# Patient Record
Sex: MALE | Race: WHITE | Employment: FULL TIME | URBAN - METROPOLITAN AREA
[De-identification: names, ages, dates, MRNs, and addresses within clinical notes are randomized per-mention and may not be internally consistent; named-entity substitution may affect disease eponyms.]

---

## 2020-01-15 ENCOUNTER — HOSPITAL ENCOUNTER (OUTPATIENT)
Age: 55
Setting detail: SPECIMEN
Discharge: HOME | End: 2020-01-15
Payer: COMMERCIAL

## 2020-01-15 PROCEDURE — 81378 HLA I & II TYPING HR: CPT

## 2020-02-04 ENCOUNTER — HOSPITAL ENCOUNTER (OUTPATIENT)
Age: 55
Setting detail: SPECIMEN
Discharge: HOME | End: 2020-02-04
Payer: COMMERCIAL

## 2020-02-04 PROCEDURE — 38204 BL DONOR SEARCH MANAGEMENT: CPT

## 2020-02-11 ENCOUNTER — HOSPITAL ENCOUNTER (OUTPATIENT)
Dept: ONCOLOGY | Age: 55
Setting detail: INFUSION SERIES
Discharge: HOME | End: 2020-02-11
Payer: COMMERCIAL

## 2020-02-11 LAB
ABO/RH: NORMAL
ANTIBODY SCREEN: NORMAL

## 2020-02-11 PROCEDURE — 86900 BLOOD TYPING SEROLOGIC ABO: CPT

## 2020-02-11 PROCEDURE — 86901 BLOOD TYPING SEROLOGIC RH(D): CPT

## 2020-02-11 PROCEDURE — 86644 CMV ANTIBODY: CPT

## 2020-02-11 PROCEDURE — 86850 RBC ANTIBODY SCREEN: CPT

## 2020-02-14 LAB — CYTOMEGALOVIRUS IGG ANTIBODY: 1.2 U/ML

## 2020-02-17 ENCOUNTER — HOSPITAL ENCOUNTER (OUTPATIENT)
Dept: INTERVENTIONAL RADIOLOGY/VASCULAR | Age: 55
Discharge: HOME | End: 2020-02-17
Attending: RADIOLOGY | Admitting: RADIOLOGY
Payer: COMMERCIAL

## 2020-02-17 VITALS — BODY MASS INDEX: 25.77 KG/M2 | TEMPERATURE: 97.5 F | HEIGHT: 70 IN | WEIGHT: 180 LBS

## 2020-02-17 PROCEDURE — 85610 PROTHROMBIN TIME: CPT

## 2020-02-17 PROCEDURE — C1788 PORT, INDWELLING, IMP: HCPCS

## 2020-02-17 PROCEDURE — 2500000003 HC RX 250 WO HCPCS

## 2020-02-17 PROCEDURE — 77001 FLUOROGUIDE FOR VEIN DEVICE: CPT

## 2020-02-17 PROCEDURE — C1769 GUIDE WIRE: HCPCS

## 2020-02-17 PROCEDURE — 2709999900 HC NON-CHARGEABLE SUPPLY

## 2020-02-17 PROCEDURE — 99152 MOD SED SAME PHYS/QHP 5/>YRS: CPT

## 2020-02-17 PROCEDURE — 76937 US GUIDE VASCULAR ACCESS: CPT

## 2020-02-17 PROCEDURE — 6360000002 HC RX W HCPCS

## 2020-02-17 PROCEDURE — 99153 MOD SED SAME PHYS/QHP EA: CPT

## 2020-02-17 PROCEDURE — 36561 INSERT TUNNELED CV CATH: CPT

## 2020-02-17 PROCEDURE — C1887 CATHETER, GUIDING: HCPCS

## 2020-02-17 PROCEDURE — C1894 INTRO/SHEATH, NON-LASER: HCPCS

## 2020-02-17 RX ORDER — IBUPROFEN 200 MG
200 TABLET ORAL EVERY 6 HOURS PRN
Status: ON HOLD | COMMUNITY
End: 2020-06-04 | Stop reason: HOSPADM

## 2020-02-17 NOTE — H&P
Patient:  Bell Ansari   :   1965      Relevant clinical history, particularly as it involves the pending procedure, was reviewed and discussed. The procedure including risks and benefits was discussed at length with the patient (or designated family member) and all questions were answered. Informed consent to proceed with the procedure was given. Vital signs were monitored and documented by the Radiology nurse. Targeted physical examination  Heart : regular rate and rhythm  Lungs : clear, breathing easily  Condition : stable    [facility] nurses notes reviewed and agreed. Past Medical History:    No past medical history on file. Past Surgical History:     No past surgical history on file. Allergies:  Pcn [penicillins]    Medications:   Home Meds  No current facility-administered medications on file prior to encounter.       Current Outpatient Medications on File Prior to Encounter   Medication Sig Dispense Refill    ibuprofen (ADVIL;MOTRIN) 200 MG tablet Take 200 mg by mouth every 6 hours as needed for Pain         Current Meds  vancomycin (VANCOCIN) 1,000 mg in dextrose 5 % 250 mL IVPB, Once          ASA 2 - Patient with mild systemic disease with no functional limitations    I (soft palate, uvula, fauces, tonsillar pillars visible)    Activity:  2 - Able to move 4 extremities voluntarily on command  Respiration:  2 - Able to breathe deeply and cough freely  Circulation:  2 - BP+/- 20mmHg of normal  Consciousness:  2 - Fully awake  Oxygen Saturation (color):  2 - Able to maintain oxygen saturation >92% on room air    Sedation : Moderate sedation planned

## 2020-02-18 LAB — INR BLD: 1.2 (ref 0.86–1.14)

## 2020-03-06 ENCOUNTER — HOSPITAL ENCOUNTER (OUTPATIENT)
Age: 55
Setting detail: SPECIMEN
Discharge: HOME | End: 2020-03-06
Payer: COMMERCIAL

## 2020-03-06 PROCEDURE — 38204 BL DONOR SEARCH MANAGEMENT: CPT

## 2020-03-31 ENCOUNTER — HOSPITAL ENCOUNTER (OUTPATIENT)
Dept: GENERAL RADIOLOGY | Age: 55
Discharge: HOME | End: 2020-03-31
Payer: COMMERCIAL

## 2020-03-31 ENCOUNTER — HOSPITAL ENCOUNTER (OUTPATIENT)
Dept: CT IMAGING | Age: 55
Discharge: HOME | End: 2020-03-31
Payer: COMMERCIAL

## 2020-03-31 ENCOUNTER — HOSPITAL ENCOUNTER (OUTPATIENT)
Dept: ONCOLOGY | Age: 55
Setting detail: INFUSION SERIES
Discharge: HOME | End: 2020-03-31
Payer: COMMERCIAL

## 2020-03-31 ENCOUNTER — HOSPITAL ENCOUNTER (OUTPATIENT)
Dept: PULMONOLOGY | Age: 55
Discharge: HOME | End: 2020-03-31
Payer: COMMERCIAL

## 2020-03-31 ENCOUNTER — HOSPITAL ENCOUNTER (OUTPATIENT)
Dept: NON INVASIVE DIAGNOSTICS | Age: 55
Discharge: HOME | End: 2020-03-31
Payer: COMMERCIAL

## 2020-03-31 VITALS — OXYGEN SATURATION: 97 %

## 2020-03-31 VITALS
WEIGHT: 184.6 LBS | RESPIRATION RATE: 18 BRPM | HEART RATE: 67 BPM | HEIGHT: 70 IN | TEMPERATURE: 97.6 F | SYSTOLIC BLOOD PRESSURE: 113 MMHG | DIASTOLIC BLOOD PRESSURE: 77 MMHG | BODY MASS INDEX: 26.43 KG/M2

## 2020-03-31 LAB
A/G RATIO: 1.8 (ref 1.1–2.2)
ABO/RH: NORMAL
ALBUMIN SERPL-MCNC: 4.4 G/DL (ref 3.4–5)
ALP BLD-CCNC: 110 U/L (ref 40–129)
ALT SERPL-CCNC: 46 U/L (ref 10–40)
ANION GAP SERPL CALCULATED.3IONS-SCNC: 11 MMOL/L (ref 3–16)
ANTIBODY SCREEN: NORMAL
AST SERPL-CCNC: 28 U/L (ref 15–37)
BASOPHILS ABSOLUTE: 0 K/UL (ref 0–0.2)
BASOPHILS RELATIVE PERCENT: 0 %
BILIRUB SERPL-MCNC: 1.2 MG/DL (ref 0–1)
BILIRUBIN URINE: NEGATIVE
BLOOD, URINE: NEGATIVE
BUN BLDV-MCNC: 12 MG/DL (ref 7–20)
CALCIUM SERPL-MCNC: 9.2 MG/DL (ref 8.3–10.6)
CHLORIDE BLD-SCNC: 103 MMOL/L (ref 99–110)
CLARITY: CLEAR
CO2: 23 MMOL/L (ref 21–32)
COLOR: YELLOW
CREAT SERPL-MCNC: 0.9 MG/DL (ref 0.9–1.3)
EKG ATRIAL RATE: 68 BPM
EKG DIAGNOSIS: NORMAL
EKG P AXIS: 35 DEGREES
EKG P-R INTERVAL: 160 MS
EKG Q-T INTERVAL: 412 MS
EKG QRS DURATION: 76 MS
EKG QTC CALCULATION (BAZETT): 438 MS
EKG R AXIS: 60 DEGREES
EKG T AXIS: 37 DEGREES
EKG VENTRICULAR RATE: 68 BPM
EOSINOPHILS ABSOLUTE: 0.1 K/UL (ref 0–0.6)
EOSINOPHILS RELATIVE PERCENT: 4 %
FERRITIN: 370.5 NG/ML (ref 30–400)
GFR AFRICAN AMERICAN: >60
GFR NON-AFRICAN AMERICAN: >60
GLOBULIN: 2.5 G/DL
GLUCOSE BLD-MCNC: 97 MG/DL (ref 70–99)
GLUCOSE URINE: NEGATIVE MG/DL
HCT VFR BLD CALC: 31.9 % (ref 40.5–52.5)
HEMOGLOBIN: 10.9 G/DL (ref 13.5–17.5)
KETONES, URINE: NEGATIVE MG/DL
LACTATE DEHYDROGENASE: 157 U/L (ref 100–190)
LEUKOCYTE ESTERASE, URINE: NEGATIVE
LV EF: 58 %
LVEF MODALITY: NORMAL
LYMPHOCYTES ABSOLUTE: 1.1 K/UL (ref 1–5.1)
LYMPHOCYTES RELATIVE PERCENT: 79 %
MAGNESIUM: 1.8 MG/DL (ref 1.8–2.4)
MCH RBC QN AUTO: 34.8 PG (ref 26–34)
MCHC RBC AUTO-ENTMCNC: 34.2 G/DL (ref 31–36)
MCV RBC AUTO: 101.9 FL (ref 80–100)
MICROSCOPIC EXAMINATION: NORMAL
MONOCYTES ABSOLUTE: 0.1 K/UL (ref 0–1.3)
MONOCYTES RELATIVE PERCENT: 6 %
NEUTROPHILS ABSOLUTE: 0.2 K/UL (ref 1.7–7.7)
NEUTROPHILS RELATIVE PERCENT: 11 %
NITRITE, URINE: NEGATIVE
PDW BLD-RTO: 13.8 % (ref 12.4–15.4)
PH UA: 5.5 (ref 5–8)
PLATELET # BLD: 154 K/UL (ref 135–450)
PMV BLD AUTO: 9.2 FL (ref 5–10.5)
POTASSIUM SERPL-SCNC: 3.9 MMOL/L (ref 3.5–5.1)
PROSTATE SPECIFIC ANTIGEN: 1.95 NG/ML (ref 0–4)
PROTEIN UA: NEGATIVE MG/DL
RBC # BLD: 3.13 M/UL (ref 4.2–5.9)
SODIUM BLD-SCNC: 137 MMOL/L (ref 136–145)
SPECIFIC GRAVITY UA: >=1.03 (ref 1–1.03)
TOTAL PROTEIN: 6.9 G/DL (ref 6.4–8.2)
URINE TYPE: NORMAL
UROBILINOGEN, URINE: 0.2 E.U./DL
WBC # BLD: 1.4 K/UL (ref 4–11)

## 2020-03-31 PROCEDURE — 86665 EPSTEIN-BARR CAPSID VCA: CPT

## 2020-03-31 PROCEDURE — 86777 TOXOPLASMA ANTIBODY: CPT

## 2020-03-31 PROCEDURE — 86787 VARICELLA-ZOSTER ANTIBODY: CPT

## 2020-03-31 PROCEDURE — 86705 HEP B CORE ANTIBODY IGM: CPT

## 2020-03-31 PROCEDURE — 80053 COMPREHEN METABOLIC PANEL: CPT

## 2020-03-31 PROCEDURE — 86694 HERPES SIMPLEX NES ANTBDY: CPT

## 2020-03-31 PROCEDURE — 81265 STR MARKERS SPECIMEN ANAL: CPT

## 2020-03-31 PROCEDURE — 70486 CT MAXILLOFACIAL W/O DYE: CPT

## 2020-03-31 PROCEDURE — 71046 X-RAY EXAM CHEST 2 VIEWS: CPT

## 2020-03-31 PROCEDURE — 87340 HEPATITIS B SURFACE AG IA: CPT

## 2020-03-31 PROCEDURE — 86664 EPSTEIN-BARR NUCLEAR ANTIGEN: CPT

## 2020-03-31 PROCEDURE — 86709 HEPATITIS A IGM ANTIBODY: CPT

## 2020-03-31 PROCEDURE — 86702 HIV-2 ANTIBODY: CPT

## 2020-03-31 PROCEDURE — 86780 TREPONEMA PALLIDUM: CPT

## 2020-03-31 PROCEDURE — 94664 DEMO&/EVAL PT USE INHALER: CPT

## 2020-03-31 PROCEDURE — 86803 HEPATITIS C AB TEST: CPT

## 2020-03-31 PROCEDURE — 86778 TOXOPLASMA ANTIBODY IGM: CPT

## 2020-03-31 PROCEDURE — 86707 HEPATITIS BE ANTIBODY: CPT

## 2020-03-31 PROCEDURE — 83735 ASSAY OF MAGNESIUM: CPT

## 2020-03-31 PROCEDURE — 86663 EPSTEIN-BARR ANTIBODY: CPT

## 2020-03-31 PROCEDURE — 93005 ELECTROCARDIOGRAM TRACING: CPT | Performed by: INTERNAL MEDICINE

## 2020-03-31 PROCEDURE — 86645 CMV ANTIBODY IGM: CPT

## 2020-03-31 PROCEDURE — 94726 PLETHYSMOGRAPHY LUNG VOLUMES: CPT

## 2020-03-31 PROCEDURE — 6360000002 HC RX W HCPCS: Performed by: INTERNAL MEDICINE

## 2020-03-31 PROCEDURE — 94010 BREATHING CAPACITY TEST: CPT

## 2020-03-31 PROCEDURE — 81003 URINALYSIS AUTO W/O SCOPE: CPT

## 2020-03-31 PROCEDURE — 87536 HIV-1 QUANT&REVRSE TRNSCRPJ: CPT

## 2020-03-31 PROCEDURE — 86788 WEST NILE VIRUS AB IGM: CPT

## 2020-03-31 PROCEDURE — 93306 TTE W/DOPPLER COMPLETE: CPT

## 2020-03-31 PROCEDURE — 82728 ASSAY OF FERRITIN: CPT

## 2020-03-31 PROCEDURE — 86701 HIV-1ANTIBODY: CPT

## 2020-03-31 PROCEDURE — 85025 COMPLETE CBC W/AUTO DIFF WBC: CPT

## 2020-03-31 PROCEDURE — 87522 HEPATITIS C REVRS TRNSCRPJ: CPT

## 2020-03-31 PROCEDURE — 94729 DIFFUSING CAPACITY: CPT

## 2020-03-31 PROCEDURE — 93356 MYOCRD STRAIN IMG SPCKL TRCK: CPT

## 2020-03-31 PROCEDURE — 83615 LACTATE (LD) (LDH) ENZYME: CPT

## 2020-03-31 PROCEDURE — 93010 ELECTROCARDIOGRAM REPORT: CPT | Performed by: INTERNAL MEDICINE

## 2020-03-31 PROCEDURE — 94760 N-INVAS EAR/PLS OXIMETRY 1: CPT

## 2020-03-31 PROCEDURE — 86850 RBC ANTIBODY SCREEN: CPT

## 2020-03-31 PROCEDURE — 86901 BLOOD TYPING SEROLOGIC RH(D): CPT

## 2020-03-31 PROCEDURE — 86789 WEST NILE VIRUS ANTIBODY: CPT

## 2020-03-31 PROCEDURE — 36591 DRAW BLOOD OFF VENOUS DEVICE: CPT

## 2020-03-31 PROCEDURE — 86900 BLOOD TYPING SEROLOGIC ABO: CPT

## 2020-03-31 PROCEDURE — 86790 VIRUS ANTIBODY NOS: CPT

## 2020-03-31 PROCEDURE — 86706 HEP B SURFACE ANTIBODY: CPT

## 2020-03-31 PROCEDURE — 81371 HLA I & II TYPE VERIFY LR: CPT

## 2020-03-31 PROCEDURE — 36415 COLL VENOUS BLD VENIPUNCTURE: CPT

## 2020-03-31 PROCEDURE — 84153 ASSAY OF PSA TOTAL: CPT

## 2020-03-31 RX ORDER — HEPARIN SODIUM (PORCINE) LOCK FLUSH IV SOLN 100 UNIT/ML 100 UNIT/ML
300 SOLUTION INTRAVENOUS PRN
Status: DISCONTINUED | OUTPATIENT
Start: 2020-03-31 | End: 2020-04-01 | Stop reason: HOSPADM

## 2020-03-31 RX ADMIN — Medication 300 UNITS: at 09:19

## 2020-03-31 NOTE — PROGRESS NOTES
Patient seen in OP Infusion for labs and teaching with Shannon Love, Transplant Coordinator. D/C'd ambulatory with Maria Luisa.

## 2020-04-01 LAB
CYTOMEGALOVIRUS IGM ANTIBODY: <8 AU/ML
EPSTEIN BARR VIRUS NUCLEAR AB IGG: 112 U/ML (ref 0–21.9)
EPSTEIN-BARR EARLY ANTIGEN ANTIBODY: 18.9 U/ML (ref 0–10.9)
EPSTEIN-BARR VCA IGG: 120 U/ML (ref 0–21.9)
EPSTEIN-BARR VCA IGM: <10 U/ML (ref 0–43.9)
HAV IGM SER IA-ACNC: NORMAL
HBV SURFACE AB TITR SER: <3.5 MIU/ML
HEPATITIS B CORE IGM ANTIBODY: NORMAL
HEPATITIS B SURFACE ANTIGEN INTERPRETATION: NORMAL
HEPATITIS C ANTIBODY INTERPRETATION: NORMAL
HERPES TYPE 1/2 IGM COMBINED: 0.35 IV
HIV AG/AB: NORMAL
HIV ANTIGEN: NORMAL
HIV-1 ANTIBODY: NORMAL
HIV-2 AB: NORMAL
HTLV I/II AB: NEGATIVE
TOTAL SYPHILLIS IGG/IGM: NORMAL
TOXOPLASMA GONDI AB IGM: <3 AU/ML
TOXOPLASMA GONDII AB IGG: <3 IU/ML
VARICELLA-ZOSTER VIRUS AB, IGG: NORMAL

## 2020-04-02 LAB
HCV QNT BY NAAT IU/ML: NOT DETECTED IU/ML
HCV QNT BY NAAT LOG IU/ML: NOT DETECTED LOG IU/ML
HEPATITIS BE ANTIBODY: NEGATIVE
HERPES TYPE I/II IGG COMBINED: >22.4 IV
HIV-1 QNT LOG, IU/ML: NOT DETECTED LOG CPY/ML
HIV-1 QNT, IU/ML: NOT DETECTED CPY/ML
INTERPRETATION: NOT DETECTED
INTERPRETATION: NOT DETECTED
VARICELLA ZOSTER AB IGM: 0.1 ISR
WEST NILE VIRUS, IGG: 0.42 IV
WEST NILE VIRUS, IGM: 0.01 IV

## 2020-04-02 NOTE — BH NOTE
to the no visitors policy during the COVID crisis, it will be important to try to build sufficient rapport with Mr. Bhumika Weber to be able to provide emotional support. He has a great support network at work but other than sending him messages he wont have that support that has helped him deal with his diagnosis thus far. He will be encouraged to make a [employer] page to allow coworkers to reach out to him because he is not someone who seems likely to want to talk on the phone. I did emphasize self-care to his wife in her caregiver role and encouraged her to reach out, but she is also reserved and shy so reaching out to her while she is home during his transplant will be a priority. Mr. Bhumika Weber will receive frequent monitoring for distress and intervention will be provided as needed.     Vale [email or web address], ABPP

## 2020-04-06 ENCOUNTER — HOSPITAL ENCOUNTER (OUTPATIENT)
Age: 55
Setting detail: SPECIMEN
Discharge: HOME | End: 2020-04-06
Payer: COMMERCIAL

## 2020-04-06 PROCEDURE — 38204 BL DONOR SEARCH MANAGEMENT: CPT

## 2020-04-27 ENCOUNTER — HOSPITAL ENCOUNTER (OUTPATIENT)
Dept: ONCOLOGY | Age: 55
Setting detail: INFUSION SERIES
Discharge: HOME | End: 2020-04-27
Payer: COMMERCIAL

## 2020-04-27 PROCEDURE — U0002 COVID-19 LAB TEST NON-CDC: HCPCS

## 2020-04-27 NOTE — PROGRESS NOTES
Patient seen in Outpatient Infusion for COVID-19 nasal swab per order received from MD Chanel. Test completed per RN, results to be forwarded to MD Chanel. Pt verbalizes understanding of d/c instructions, discharged ambulatory to family meeting.

## 2020-04-28 LAB
SARS-COV-2: NOT DETECTED
SOURCE: NORMAL

## 2020-04-30 ENCOUNTER — HOSPITAL ENCOUNTER (OUTPATIENT)
Age: 55
Setting detail: SPECIMEN
Discharge: HOME | End: 2020-04-30
Payer: COMMERCIAL

## 2020-04-30 PROCEDURE — 38204 BL DONOR SEARCH MANAGEMENT: CPT

## 2020-05-02 ENCOUNTER — HOSPITAL ENCOUNTER (OUTPATIENT)
Age: 55
Setting detail: SPECIMEN
Discharge: HOME | End: 2020-05-02
Payer: COMMERCIAL

## 2020-05-02 PROCEDURE — 38207 CRYOPRESERVE STEM CELLS: CPT

## 2020-05-05 ENCOUNTER — HOSPITAL ENCOUNTER (INPATIENT)
Dept: INTERVENTIONAL RADIOLOGY/VASCULAR | Age: 55
LOS: 31 days | Discharge: HOME | DRG: 014 | End: 2020-06-05
Attending: INTERNAL MEDICINE | Admitting: INTERNAL MEDICINE
Payer: COMMERCIAL

## 2020-05-05 ENCOUNTER — APPOINTMENT (OUTPATIENT)
Dept: GENERAL RADIOLOGY | Age: 55
DRG: 014 | End: 2020-05-05
Attending: INTERNAL MEDICINE
Payer: COMMERCIAL

## 2020-05-05 PROBLEM — D46.9 MDS (MYELODYSPLASTIC SYNDROME) (HCC): Status: ACTIVE | Noted: 2020-05-05

## 2020-05-05 LAB
ALBUMIN SERPL-MCNC: 4.8 G/DL (ref 3.4–5)
ALP BLD-CCNC: 101 U/L (ref 40–129)
ALT SERPL-CCNC: 17 U/L (ref 10–40)
ANION GAP SERPL CALCULATED.3IONS-SCNC: 10 MMOL/L (ref 3–16)
ANISOCYTOSIS: ABNORMAL
APTT: 29.6 SEC (ref 24.2–36.2)
AST SERPL-CCNC: 14 U/L (ref 15–37)
BASOPHILS ABSOLUTE: 0 K/UL (ref 0–0.2)
BASOPHILS RELATIVE PERCENT: 0 %
BILIRUB SERPL-MCNC: 0.7 MG/DL (ref 0–1)
BILIRUBIN DIRECT: <0.2 MG/DL (ref 0–0.3)
BILIRUBIN URINE: NEGATIVE
BILIRUBIN, INDIRECT: ABNORMAL MG/DL (ref 0–1)
BLOOD, URINE: NEGATIVE
BUN BLDV-MCNC: 12 MG/DL (ref 7–20)
CALCIUM SERPL-MCNC: 9.3 MG/DL (ref 8.3–10.6)
CHLORIDE BLD-SCNC: 103 MMOL/L (ref 99–110)
CLARITY: CLEAR
CO2: 23 MMOL/L (ref 21–32)
COLOR: YELLOW
CREAT SERPL-MCNC: 0.8 MG/DL (ref 0.9–1.3)
EKG ATRIAL RATE: 60 BPM
EKG DIAGNOSIS: NORMAL
EKG P AXIS: 29 DEGREES
EKG P-R INTERVAL: 180 MS
EKG Q-T INTERVAL: 422 MS
EKG QRS DURATION: 74 MS
EKG QTC CALCULATION (BAZETT): 422 MS
EKG R AXIS: 38 DEGREES
EKG T AXIS: 47 DEGREES
EKG VENTRICULAR RATE: 60 BPM
EOSINOPHILS ABSOLUTE: 0.1 K/UL (ref 0–0.6)
EOSINOPHILS RELATIVE PERCENT: 3 %
GFR AFRICAN AMERICAN: >60
GFR NON-AFRICAN AMERICAN: >60
GLUCOSE BLD-MCNC: 110 MG/DL (ref 70–99)
GLUCOSE URINE: NEGATIVE MG/DL
HCT VFR BLD CALC: 34.5 % (ref 40.5–52.5)
HEMOGLOBIN: 11.9 G/DL (ref 13.5–17.5)
INR BLD: 1.03 (ref 0.86–1.14)
KETONES, URINE: NEGATIVE MG/DL
LACTATE DEHYDROGENASE: 186 U/L (ref 100–190)
LEUKOCYTE ESTERASE, URINE: NEGATIVE
LYMPHOCYTES ABSOLUTE: 1.4 K/UL (ref 1–5.1)
LYMPHOCYTES RELATIVE PERCENT: 76 %
MACROCYTES: ABNORMAL
MCH RBC QN AUTO: 34.4 PG (ref 26–34)
MCHC RBC AUTO-ENTMCNC: 34.3 G/DL (ref 31–36)
MCV RBC AUTO: 100.2 FL (ref 80–100)
MICROSCOPIC EXAMINATION: NORMAL
MONOCYTES ABSOLUTE: 0 K/UL (ref 0–1.3)
MONOCYTES RELATIVE PERCENT: 2 %
NEUTROPHILS ABSOLUTE: 0.4 K/UL (ref 1.7–7.7)
NEUTROPHILS RELATIVE PERCENT: 19 %
NITRITE, URINE: NEGATIVE
OVALOCYTES: ABNORMAL
PDW BLD-RTO: 15.2 % (ref 12.4–15.4)
PH UA: 5.5 (ref 5–8)
PHOSPHORUS: 3.6 MG/DL (ref 2.5–4.9)
PLATELET # BLD: 324 K/UL (ref 135–450)
PMV BLD AUTO: 9.3 FL (ref 5–10.5)
POTASSIUM SERPL-SCNC: 4.2 MMOL/L (ref 3.5–5.1)
PROTEIN UA: NEGATIVE MG/DL
PROTHROMBIN TIME: 12 SEC (ref 10–13.2)
RBC # BLD: 3.45 M/UL (ref 4.2–5.9)
SCHISTOCYTES: ABNORMAL
SODIUM BLD-SCNC: 136 MMOL/L (ref 136–145)
SPECIFIC GRAVITY UA: >=1.03 (ref 1–1.03)
TOTAL PROTEIN: 7.3 G/DL (ref 6.4–8.2)
URIC ACID, SERUM: 7.3 MG/DL (ref 3.5–7.2)
URINE TYPE: NORMAL
UROBILINOGEN, URINE: 0.2 E.U./DL
WBC # BLD: 1.9 K/UL (ref 4–11)

## 2020-05-05 PROCEDURE — 96415 CHEMO IV INFUSION ADDL HR: CPT

## 2020-05-05 PROCEDURE — 80048 BASIC METABOLIC PNL TOTAL CA: CPT

## 2020-05-05 PROCEDURE — C1769 GUIDE WIRE: HCPCS

## 2020-05-05 PROCEDURE — 84100 ASSAY OF PHOSPHORUS: CPT

## 2020-05-05 PROCEDURE — 2580000003 HC RX 258: Performed by: STUDENT IN AN ORGANIZED HEALTH CARE EDUCATION/TRAINING PROGRAM

## 2020-05-05 PROCEDURE — 83615 LACTATE (LD) (LDH) ENZYME: CPT

## 2020-05-05 PROCEDURE — 81003 URINALYSIS AUTO W/O SCOPE: CPT

## 2020-05-05 PROCEDURE — C1894 INTRO/SHEATH, NON-LASER: HCPCS

## 2020-05-05 PROCEDURE — 6370000000 HC RX 637 (ALT 250 FOR IP): Performed by: NURSE PRACTITIONER

## 2020-05-05 PROCEDURE — 2709999900 HC NON-CHARGEABLE SUPPLY

## 2020-05-05 PROCEDURE — 02H633Z INSERTION OF INFUSION DEVICE INTO RIGHT ATRIUM, PERCUTANEOUS APPROACH: ICD-10-PCS | Performed by: RADIOLOGY

## 2020-05-05 PROCEDURE — 6360000002 HC RX W HCPCS: Performed by: NURSE PRACTITIONER

## 2020-05-05 PROCEDURE — 94150 VITAL CAPACITY TEST: CPT

## 2020-05-05 PROCEDURE — 2580000003 HC RX 258: Performed by: INTERNAL MEDICINE

## 2020-05-05 PROCEDURE — 85730 THROMBOPLASTIN TIME PARTIAL: CPT

## 2020-05-05 PROCEDURE — C1751 CATH, INF, PER/CENT/MIDLINE: HCPCS

## 2020-05-05 PROCEDURE — 2580000003 HC RX 258: Performed by: NURSE PRACTITIONER

## 2020-05-05 PROCEDURE — 6360000002 HC RX W HCPCS

## 2020-05-05 PROCEDURE — 99152 MOD SED SAME PHYS/QHP 5/>YRS: CPT | Performed by: RADIOLOGY

## 2020-05-05 PROCEDURE — 96413 CHEMO IV INFUSION 1 HR: CPT

## 2020-05-05 PROCEDURE — 93010 ELECTROCARDIOGRAM REPORT: CPT | Performed by: INTERNAL MEDICINE

## 2020-05-05 PROCEDURE — 6370000000 HC RX 637 (ALT 250 FOR IP): Performed by: INTERNAL MEDICINE

## 2020-05-05 PROCEDURE — 6360000002 HC RX W HCPCS: Performed by: STUDENT IN AN ORGANIZED HEALTH CARE EDUCATION/TRAINING PROGRAM

## 2020-05-05 PROCEDURE — 84550 ASSAY OF BLOOD/URIC ACID: CPT

## 2020-05-05 PROCEDURE — 93005 ELECTROCARDIOGRAM TRACING: CPT | Performed by: NURSE PRACTITIONER

## 2020-05-05 PROCEDURE — 85025 COMPLETE CBC W/AUTO DIFF WBC: CPT

## 2020-05-05 PROCEDURE — 2500000003 HC RX 250 WO HCPCS

## 2020-05-05 PROCEDURE — 2500000003 HC RX 250 WO HCPCS: Performed by: INTERNAL MEDICINE

## 2020-05-05 PROCEDURE — 71046 X-RAY EXAM CHEST 2 VIEWS: CPT

## 2020-05-05 PROCEDURE — 77001 FLUOROGUIDE FOR VEIN DEVICE: CPT | Performed by: RADIOLOGY

## 2020-05-05 PROCEDURE — 6360000002 HC RX W HCPCS: Performed by: INTERNAL MEDICINE

## 2020-05-05 PROCEDURE — 80076 HEPATIC FUNCTION PANEL: CPT

## 2020-05-05 PROCEDURE — 2060000000 HC ICU INTERMEDIATE R&B

## 2020-05-05 PROCEDURE — 87081 CULTURE SCREEN ONLY: CPT

## 2020-05-05 PROCEDURE — 85610 PROTHROMBIN TIME: CPT

## 2020-05-05 PROCEDURE — 36558 INSERT TUNNELED CV CATH: CPT | Performed by: RADIOLOGY

## 2020-05-05 RX ORDER — VALACYCLOVIR HYDROCHLORIDE 500 MG/1
500 TABLET, FILM COATED ORAL 2 TIMES DAILY
Status: DISCONTINUED | OUTPATIENT
Start: 2020-05-05 | End: 2020-06-05 | Stop reason: HOSPADM

## 2020-05-05 RX ORDER — LORAZEPAM 0.5 MG/1
0.5 TABLET ORAL EVERY 4 HOURS PRN
Status: DISCONTINUED | OUTPATIENT
Start: 2020-05-10 | End: 2020-06-04

## 2020-05-05 RX ORDER — ONDANSETRON HYDROCHLORIDE 8 MG/1
24 TABLET, FILM COATED ORAL ONCE
Status: COMPLETED | OUTPATIENT
Start: 2020-05-14 | End: 2020-05-14

## 2020-05-05 RX ORDER — FLUCONAZOLE 200 MG/1
400 TABLET ORAL DAILY
Status: DISCONTINUED | OUTPATIENT
Start: 2020-05-16 | End: 2020-06-03

## 2020-05-05 RX ORDER — ONDANSETRON HYDROCHLORIDE 8 MG/1
24 TABLET, FILM COATED ORAL ONCE
Status: COMPLETED | OUTPATIENT
Start: 2020-05-15 | End: 2020-05-15

## 2020-05-05 RX ORDER — IBUPROFEN 200 MG
600 TABLET ORAL ONCE
Status: COMPLETED | OUTPATIENT
Start: 2020-05-05 | End: 2020-05-05

## 2020-05-05 RX ORDER — LORAZEPAM 2 MG/ML
0.5 INJECTION INTRAMUSCULAR EVERY 4 HOURS PRN
Status: DISCONTINUED | OUTPATIENT
Start: 2020-05-10 | End: 2020-06-04

## 2020-05-05 RX ORDER — FLUCONAZOLE 200 MG/1
200 TABLET ORAL DAILY
Status: COMPLETED | OUTPATIENT
Start: 2020-05-05 | End: 2020-05-15

## 2020-05-05 RX ORDER — SODIUM CHLORIDE 9 MG/ML
INJECTION, SOLUTION INTRAVENOUS CONTINUOUS
Status: DISCONTINUED | OUTPATIENT
Start: 2020-05-16 | End: 2020-06-03

## 2020-05-05 RX ORDER — LEVOFLOXACIN 500 MG/1
500 TABLET, FILM COATED ORAL NIGHTLY
Status: DISCONTINUED | OUTPATIENT
Start: 2020-05-05 | End: 2020-05-20

## 2020-05-05 RX ORDER — URSODIOL 250 MG/1
500 TABLET, FILM COATED ORAL 2 TIMES DAILY
Status: DISCONTINUED | OUTPATIENT
Start: 2020-05-05 | End: 2020-06-05 | Stop reason: HOSPADM

## 2020-05-05 RX ORDER — ONDANSETRON HYDROCHLORIDE 8 MG/1
24 TABLET, FILM COATED ORAL EVERY 24 HOURS
Status: COMPLETED | OUTPATIENT
Start: 2020-05-05 | End: 2020-05-08

## 2020-05-05 RX ORDER — URSODIOL 250 MG/1
500 TABLET, FILM COATED ORAL 2 TIMES DAILY
COMMUNITY
End: 2020-08-09

## 2020-05-05 RX ORDER — URSODIOL 300 MG/1
300 CAPSULE ORAL 2 TIMES DAILY
Status: ON HOLD | COMMUNITY
End: 2020-05-05

## 2020-05-05 RX ORDER — LORAZEPAM 0.5 MG/1
0.5 TABLET ORAL EVERY 6 HOURS
Status: DISCONTINUED | OUTPATIENT
Start: 2020-05-06 | End: 2020-05-07

## 2020-05-05 RX ORDER — SODIUM CHLORIDE 9 MG/ML
INJECTION, SOLUTION INTRAVENOUS CONTINUOUS PRN
Status: DISCONTINUED | OUTPATIENT
Start: 2020-05-05 | End: 2020-06-05 | Stop reason: HOSPADM

## 2020-05-05 RX ORDER — PROCHLORPERAZINE EDISYLATE 5 MG/ML
10 INJECTION INTRAMUSCULAR; INTRAVENOUS EVERY 4 HOURS PRN
Status: DISCONTINUED | OUTPATIENT
Start: 2020-05-05 | End: 2020-06-05 | Stop reason: HOSPADM

## 2020-05-05 RX ORDER — URSODIOL 300 MG/1
500 CAPSULE ORAL 2 TIMES DAILY
Status: ON HOLD | COMMUNITY
End: 2020-05-05 | Stop reason: CLARIF

## 2020-05-05 RX ORDER — LORAZEPAM 2 MG/ML
1 INJECTION INTRAMUSCULAR ONCE
Status: COMPLETED | OUTPATIENT
Start: 2020-05-05 | End: 2020-05-05

## 2020-05-05 RX ORDER — POTASSIUM CHLORIDE 29.8 MG/ML
20 INJECTION INTRAVENOUS PRN
Status: DISCONTINUED | OUTPATIENT
Start: 2020-05-05 | End: 2020-06-05 | Stop reason: HOSPADM

## 2020-05-05 RX ORDER — HEPARIN SODIUM (PORCINE) LOCK FLUSH IV SOLN 100 UNIT/ML 100 UNIT/ML
500 SOLUTION INTRAVENOUS ONCE
Status: COMPLETED | OUTPATIENT
Start: 2020-05-05 | End: 2020-05-05

## 2020-05-05 RX ORDER — DEXAMETHASONE 4 MG/1
20 TABLET ORAL EVERY 24 HOURS
Status: COMPLETED | OUTPATIENT
Start: 2020-05-05 | End: 2020-05-08

## 2020-05-05 RX ORDER — DEXTROSE, SODIUM CHLORIDE, AND POTASSIUM CHLORIDE 5; .45; .075 G/100ML; G/100ML; G/100ML
INJECTION INTRAVENOUS CONTINUOUS
Status: DISCONTINUED | OUTPATIENT
Start: 2020-05-05 | End: 2020-05-06

## 2020-05-05 RX ORDER — SODIUM CHLORIDE 0.9 % (FLUSH) 0.9 %
10 SYRINGE (ML) INJECTION EVERY 12 HOURS SCHEDULED
Status: DISCONTINUED | OUTPATIENT
Start: 2020-05-05 | End: 2020-06-05 | Stop reason: HOSPADM

## 2020-05-05 RX ORDER — FUROSEMIDE 10 MG/ML
40 INJECTION INTRAMUSCULAR; INTRAVENOUS EVERY 12 HOURS
Status: DISCONTINUED | OUTPATIENT
Start: 2020-05-06 | End: 2020-05-19

## 2020-05-05 RX ORDER — MAGNESIUM SULFATE IN WATER 40 MG/ML
4 INJECTION, SOLUTION INTRAVENOUS PRN
Status: DISCONTINUED | OUTPATIENT
Start: 2020-05-05 | End: 2020-06-05 | Stop reason: HOSPADM

## 2020-05-05 RX ORDER — SODIUM CHLORIDE 9 MG/ML
INJECTION, SOLUTION INTRAVENOUS CONTINUOUS
Status: DISPENSED | OUTPATIENT
Start: 2020-05-14 | End: 2020-05-16

## 2020-05-05 RX ORDER — SULFAMETHOXAZOLE AND TRIMETHOPRIM 800; 160 MG/1; MG/1
1 TABLET ORAL 2 TIMES DAILY
Status: COMPLETED | OUTPATIENT
Start: 2020-05-05 | End: 2020-05-06

## 2020-05-05 RX ORDER — FUROSEMIDE 10 MG/ML
20 INJECTION INTRAMUSCULAR; INTRAVENOUS PRN
Status: ACTIVE | OUTPATIENT
Start: 2020-05-14 | End: 2020-05-16

## 2020-05-05 RX ORDER — PROCHLORPERAZINE MALEATE 10 MG
10 TABLET ORAL EVERY 4 HOURS PRN
Status: DISCONTINUED | OUTPATIENT
Start: 2020-05-05 | End: 2020-06-05 | Stop reason: HOSPADM

## 2020-05-05 RX ORDER — URSODIOL 250 MG/1
500 TABLET, FILM COATED ORAL 2 TIMES DAILY
Status: DISCONTINUED | OUTPATIENT
Start: 2020-05-05 | End: 2020-05-05 | Stop reason: SDUPTHER

## 2020-05-05 RX ORDER — ACETAMINOPHEN 325 MG/1
650 TABLET ORAL EVERY 4 HOURS PRN
Status: DISCONTINUED | OUTPATIENT
Start: 2020-05-10 | End: 2020-05-13

## 2020-05-05 RX ORDER — LORAZEPAM 2 MG/ML
0.5 INJECTION INTRAMUSCULAR EVERY 6 HOURS
Status: DISCONTINUED | OUTPATIENT
Start: 2020-05-06 | End: 2020-05-07

## 2020-05-05 RX ADMIN — SODIUM CHLORIDE 15 ML: 900 IRRIGANT IRRIGATION at 18:41

## 2020-05-05 RX ADMIN — BUSULFAN 252 MG: 6 INJECTION INTRAVENOUS at 22:18

## 2020-05-05 RX ADMIN — VALACYCLOVIR HYDROCHLORIDE 500 MG: 500 TABLET, FILM COATED ORAL at 14:04

## 2020-05-05 RX ADMIN — SODIUM CHLORIDE 15 ML: 900 IRRIGANT IRRIGATION at 14:29

## 2020-05-05 RX ADMIN — Medication 10 ML: at 14:05

## 2020-05-05 RX ADMIN — VANCOMYCIN HYDROCHLORIDE 1000 MG: 10 INJECTION, POWDER, LYOPHILIZED, FOR SOLUTION INTRAVENOUS at 07:13

## 2020-05-05 RX ADMIN — FLUCONAZOLE 200 MG: 200 TABLET ORAL at 14:04

## 2020-05-05 RX ADMIN — ONDANSETRON HYDROCHLORIDE 24 MG: 8 TABLET, FILM COATED ORAL at 19:50

## 2020-05-05 RX ADMIN — Medication 10 ML: at 20:56

## 2020-05-05 RX ADMIN — URSODIOL 500 MG: 250 TABLET, FILM COATED ORAL at 14:05

## 2020-05-05 RX ADMIN — POTASSIUM CHLORIDE, DEXTROSE MONOHYDRATE AND SODIUM CHLORIDE: 75; 5; 450 INJECTION, SOLUTION INTRAVENOUS at 14:04

## 2020-05-05 RX ADMIN — ENOXAPARIN SODIUM 40 MG: 40 INJECTION SUBCUTANEOUS at 18:39

## 2020-05-05 RX ADMIN — Medication 500 UNITS: at 14:05

## 2020-05-05 RX ADMIN — DEXAMETHASONE 20 MG: 4 TABLET ORAL at 19:50

## 2020-05-05 RX ADMIN — SULFAMETHOXAZOLE AND TRIMETHOPRIM 1 TABLET: 800; 160 TABLET ORAL at 20:56

## 2020-05-05 RX ADMIN — LEVOFLOXACIN 500 MG: 500 TABLET, FILM COATED ORAL at 20:56

## 2020-05-05 RX ADMIN — SULFAMETHOXAZOLE AND TRIMETHOPRIM 1 TABLET: 800; 160 TABLET ORAL at 14:05

## 2020-05-05 RX ADMIN — IBUPROFEN 600 MG: 200 TABLET, FILM COATED ORAL at 09:17

## 2020-05-05 RX ADMIN — VALACYCLOVIR HYDROCHLORIDE 500 MG: 500 TABLET, FILM COATED ORAL at 20:56

## 2020-05-05 RX ADMIN — FLUDARABINE PHOSPHATE 80 MG: 25 INJECTION, SOLUTION INTRAVENOUS at 20:55

## 2020-05-05 RX ADMIN — URSODIOL 500 MG: 250 TABLET, FILM COATED ORAL at 20:55

## 2020-05-05 RX ADMIN — LORAZEPAM 1 MG: 2 INJECTION INTRAMUSCULAR; INTRAVENOUS at 19:50

## 2020-05-05 ASSESSMENT — PAIN DESCRIPTION - PAIN TYPE: TYPE: ACUTE PAIN

## 2020-05-05 ASSESSMENT — PAIN DESCRIPTION - ORIENTATION: ORIENTATION: LEFT

## 2020-05-05 ASSESSMENT — PAIN SCALES - GENERAL
PAINLEVEL_OUTOF10: 4
PAINLEVEL_OUTOF10: 0
PAINLEVEL_OUTOF10: 0

## 2020-05-05 ASSESSMENT — PAIN DESCRIPTION - ONSET: ONSET: GRADUAL

## 2020-05-05 ASSESSMENT — PAIN DESCRIPTION - PROGRESSION: CLINICAL_PROGRESSION: NOT CHANGED

## 2020-05-05 ASSESSMENT — PAIN - FUNCTIONAL ASSESSMENT: PAIN_FUNCTIONAL_ASSESSMENT: ACTIVITIES ARE NOT PREVENTED

## 2020-05-05 ASSESSMENT — PAIN DESCRIPTION - FREQUENCY: FREQUENCY: INTERMITTENT

## 2020-05-05 ASSESSMENT — PAIN DESCRIPTION - LOCATION: LOCATION: HEAD;NECK

## 2020-05-05 ASSESSMENT — PAIN DESCRIPTION - DESCRIPTORS: DESCRIPTORS: ACHING;SORE;HEADACHE

## 2020-05-05 NOTE — H&P
PLAN:        1. Myelodysplastic Syndrome: Stable disease  - Most recent BM Bx/Asp on 4/7/20 revealed ongoing disease  - Admit for Bu/Flu & MUD-pb BMT     Dr. Raji Gordon has discussed the risks associated with transplant which include the risk of infection, bleeding and inability to obtain a long term remission. He understands these risks and is willing to proceed. The consent is signed and on the chart.        Day - 6     2. ID: Afebrile, no evidence of infection.    - Start Levaquin, Valtrex & Diflucan ppx   - Increase Diflucan ppx to 400 mg daily 5/16/20 (day +5)     Donor/Recipient CMV: Pos / Pos  - Check CMV PCR weekly once WBC 1.0  - Start letermovir ppx if started on high-dose steroids     3. Heme:  Neutropenia and anemia from disease   - Transfuse for Hgb < 7 and Platelets < 73B.    - No transfusion today. 4.  Metabolic:  Electrolytes are WNL and renal fxn stable. - Start IVF:  D5.45NS + KCl 10  Meq @ 150 mL/hr  - Replace potassium and magnesium per policy. 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 VOD:  No evidence of VOD. Admission Weight: 185 lb (83.9 kg)  Current Weight: Weight: 185 lb (83.9 kg). Recent Labs     05/05/20  0958   BILIDIR <0.2   BILITOT 0.7     - Cont Actigall. 7. Pulmonary: No active issues. - Encourage IS and ambulation      8. GI / Nutrition: Appetite & intake good  - Start low microbial diet  - Dietician to follow    - DVT Prophylaxis: Platelets >07,849 cells/dL, - daily lovenox prophylaxis ordered  Contraindications to pharmacologic prophylaxis: None  Contraindications to mechanical prophylaxis: None    - Disposition:  Once ANC >1 and recovered from toxicities of transplant. The patient was seen and examined by Dr. Raji Gordon.   This admission history and physical has been discussed

## 2020-05-05 NOTE — FLOWSHEET NOTE
05/05/20 1548   Encounter Summary   Services provided to: Patient not available   Referral/Consult From:   (Spiritual consult. )   Routine   Type   (Nurse will tell the patient about Spiritual Care service.)

## 2020-05-05 NOTE — H&P
Patient:  Keller Severin   :   1965      Relevant clinical history, particularly as it involves the pending procedure, was reviewed and discussed. The procedure including risks and benefits was discussed at length with the patient (or designated family member) and all questions were answered. Informed consent to proceed with the procedure was given. Vital signs were monitored and documented by the Radiology nurse. Targeted physical examination  Heart : regular rate and rhythm  Lungs : clear, breathing easily  Condition : stable    [facility] nurses notes reviewed and agreed. Past Medical History:    No past medical history on file. Past Surgical History:     No past surgical history on file. Allergies:  Pcn [penicillins]    Medications:   Home Meds  Current Outpatient Medications on File Prior to Encounter   Medication Sig Dispense Refill    ibuprofen (ADVIL;MOTRIN) 200 MG tablet Take 200 mg by mouth every 6 hours as needed for Pain       No current facility-administered medications on file prior to encounter. Current Meds  No current facility-administered medications for this encounter.          ASA 2 - Patient with mild systemic disease with no functional limitations    II (soft palate, uvula, fauces visible)    Activity:  2 - Able to move 4 extremities voluntarily on command  Respiration:  2 - Able to breathe deeply and cough freely  Circulation:  2 - BP+/- 20mmHg of normal  Consciousness:  2 - Fully awake  Oxygen Saturation (color):  2 - Able to maintain oxygen saturation >92% on room air    Sedation : Moderate sedation planned

## 2020-05-05 NOTE — PROGRESS NOTES
Incentive Spirometry education and demonstration completed by Respiratory Therapy. Turning over to Nursing for routine follow-up. Minimum Predicted Vital Capacity - 1068 mL. Patient's Actual Vital Capacity - 1250 mL.

## 2020-05-05 NOTE — PROGRESS NOTES
4 Eyes Admission Assessment     I agree as the admission nurse that 2 RN's have performed a thorough Head to Toe Skin Assessment on the patient. ALL assessment sites listed below have been assessed on admission. Areas assessed by both nurses: Martha Sapp and Virgen Wiseman  [x]   Head, Face, and Ears   [x]   Shoulders, Back, and Chest  [x]   Arms, Elbows, and Hands   [x]   Coccyx, Sacrum, and Ischum  [x]   Legs, Feet, and Heels        Does the Patient have Skin Breakdown?   No         Fidel Prevention initiated:  No   Wound Care Orders initiated:  No      WOC nurse consulted for Pressure Injury (Stage 3,4, Unstageable, DTI, NWPT, and Complex wounds):  No      Nurse 1 eSignature: Electronically signed by Rock Duque on 5/5/20 at 6:41 PM EDT    **SHARE this note so that the co-signing nurse is able to place an eSignature**    Nurse 2 eSignature: Electronically signed by Virgen Wiseman RN on 5/5/20 at 6:52 PM EDT

## 2020-05-06 LAB
ALBUMIN SERPL-MCNC: 4.4 G/DL (ref 3.4–5)
ALP BLD-CCNC: 100 U/L (ref 40–129)
ALT SERPL-CCNC: 16 U/L (ref 10–40)
ANION GAP SERPL CALCULATED.3IONS-SCNC: 13 MMOL/L (ref 3–16)
AST SERPL-CCNC: 14 U/L (ref 15–37)
BASOPHILS ABSOLUTE: 0 K/UL (ref 0–0.2)
BASOPHILS RELATIVE PERCENT: 0.4 %
BILIRUB SERPL-MCNC: 0.7 MG/DL (ref 0–1)
BILIRUBIN DIRECT: <0.2 MG/DL (ref 0–0.3)
BILIRUBIN, INDIRECT: ABNORMAL MG/DL (ref 0–1)
BUN BLDV-MCNC: 12 MG/DL (ref 7–20)
CALCIUM SERPL-MCNC: 9.3 MG/DL (ref 8.3–10.6)
CHLORIDE BLD-SCNC: 106 MMOL/L (ref 99–110)
CO2: 19 MMOL/L (ref 21–32)
CREAT SERPL-MCNC: 1 MG/DL (ref 0.9–1.3)
EOSINOPHILS ABSOLUTE: 0 K/UL (ref 0–0.6)
EOSINOPHILS RELATIVE PERCENT: 0.2 %
GFR AFRICAN AMERICAN: >60
GFR NON-AFRICAN AMERICAN: >60
GLUCOSE BLD-MCNC: 114 MG/DL (ref 70–99)
GLUCOSE BLD-MCNC: 144 MG/DL (ref 70–99)
GLUCOSE BLD-MCNC: 159 MG/DL (ref 70–99)
GLUCOSE BLD-MCNC: 198 MG/DL (ref 70–99)
HCT VFR BLD CALC: 34.8 % (ref 40.5–52.5)
HEMOGLOBIN: 11.8 G/DL (ref 13.5–17.5)
INR BLD: 1.2 (ref 0.86–1.14)
LACTATE DEHYDROGENASE: 174 U/L (ref 100–190)
LYMPHOCYTES ABSOLUTE: 0.3 K/UL (ref 1–5.1)
LYMPHOCYTES RELATIVE PERCENT: 33.1 %
MCH RBC QN AUTO: 34.2 PG (ref 26–34)
MCHC RBC AUTO-ENTMCNC: 34 G/DL (ref 31–36)
MCV RBC AUTO: 100.5 FL (ref 80–100)
MONOCYTES ABSOLUTE: 0 K/UL (ref 0–1.3)
MONOCYTES RELATIVE PERCENT: 0.9 %
NEUTROPHILS ABSOLUTE: 0.7 K/UL (ref 1.7–7.7)
NEUTROPHILS RELATIVE PERCENT: 65.4 %
PDW BLD-RTO: 15.1 % (ref 12.4–15.4)
PERFORMED ON: ABNORMAL
PHOSPHORUS: 1.4 MG/DL (ref 2.5–4.9)
PLATELET # BLD: 300 K/UL (ref 135–450)
PMV BLD AUTO: 9.1 FL (ref 5–10.5)
POTASSIUM SERPL-SCNC: 4.2 MMOL/L (ref 3.5–5.1)
RBC # BLD: 3.46 M/UL (ref 4.2–5.9)
SODIUM BLD-SCNC: 138 MMOL/L (ref 136–145)
TOTAL PROTEIN: 7.2 G/DL (ref 6.4–8.2)
URIC ACID, SERUM: 6.6 MG/DL (ref 3.5–7.2)
WBC # BLD: 1 K/UL (ref 4–11)

## 2020-05-06 PROCEDURE — 2060000000 HC ICU INTERMEDIATE R&B

## 2020-05-06 PROCEDURE — 36592 COLLECT BLOOD FROM PICC: CPT

## 2020-05-06 PROCEDURE — 6360000002 HC RX W HCPCS: Performed by: NURSE PRACTITIONER

## 2020-05-06 PROCEDURE — 2580000003 HC RX 258: Performed by: INTERNAL MEDICINE

## 2020-05-06 PROCEDURE — 2500000003 HC RX 250 WO HCPCS: Performed by: NURSE PRACTITIONER

## 2020-05-06 PROCEDURE — 84100 ASSAY OF PHOSPHORUS: CPT

## 2020-05-06 PROCEDURE — 80076 HEPATIC FUNCTION PANEL: CPT

## 2020-05-06 PROCEDURE — 83615 LACTATE (LD) (LDH) ENZYME: CPT

## 2020-05-06 PROCEDURE — 2580000003 HC RX 258: Performed by: NURSE PRACTITIONER

## 2020-05-06 PROCEDURE — 84550 ASSAY OF BLOOD/URIC ACID: CPT

## 2020-05-06 PROCEDURE — 6360000002 HC RX W HCPCS: Performed by: INTERNAL MEDICINE

## 2020-05-06 PROCEDURE — 6370000000 HC RX 637 (ALT 250 FOR IP): Performed by: NURSE PRACTITIONER

## 2020-05-06 PROCEDURE — 6370000000 HC RX 637 (ALT 250 FOR IP): Performed by: INTERNAL MEDICINE

## 2020-05-06 PROCEDURE — 85025 COMPLETE CBC W/AUTO DIFF WBC: CPT

## 2020-05-06 PROCEDURE — 80048 BASIC METABOLIC PNL TOTAL CA: CPT

## 2020-05-06 PROCEDURE — 2500000003 HC RX 250 WO HCPCS: Performed by: INTERNAL MEDICINE

## 2020-05-06 RX ORDER — DEXTROSE MONOHYDRATE 50 MG/ML
100 INJECTION, SOLUTION INTRAVENOUS PRN
Status: DISCONTINUED | OUTPATIENT
Start: 2020-05-06 | End: 2020-06-05 | Stop reason: HOSPADM

## 2020-05-06 RX ORDER — DEXTROSE MONOHYDRATE 25 G/50ML
12.5 INJECTION, SOLUTION INTRAVENOUS PRN
Status: DISCONTINUED | OUTPATIENT
Start: 2020-05-06 | End: 2020-06-05 | Stop reason: HOSPADM

## 2020-05-06 RX ORDER — INSULIN LISPRO 100 [IU]/ML
0-3 INJECTION, SOLUTION INTRAVENOUS; SUBCUTANEOUS NIGHTLY
Status: DISCONTINUED | OUTPATIENT
Start: 2020-05-06 | End: 2020-05-08

## 2020-05-06 RX ORDER — NICOTINE POLACRILEX 4 MG
15 LOZENGE BUCCAL PRN
Status: DISCONTINUED | OUTPATIENT
Start: 2020-05-06 | End: 2020-06-05 | Stop reason: HOSPADM

## 2020-05-06 RX ORDER — INSULIN LISPRO 100 [IU]/ML
0-6 INJECTION, SOLUTION INTRAVENOUS; SUBCUTANEOUS
Status: DISCONTINUED | OUTPATIENT
Start: 2020-05-06 | End: 2020-05-08

## 2020-05-06 RX ADMIN — INSULIN LISPRO 1 UNITS: 100 INJECTION, SOLUTION INTRAVENOUS; SUBCUTANEOUS at 21:39

## 2020-05-06 RX ADMIN — LORAZEPAM 0.5 MG: 0.5 TABLET ORAL at 12:55

## 2020-05-06 RX ADMIN — BUSULFAN 252 MG: 6 INJECTION INTRAVENOUS at 21:29

## 2020-05-06 RX ADMIN — POTASSIUM CHLORIDE: 2 INJECTION, SOLUTION, CONCENTRATE INTRAVENOUS at 10:40

## 2020-05-06 RX ADMIN — POTASSIUM CHLORIDE, DEXTROSE MONOHYDRATE AND SODIUM CHLORIDE: 75; 5; 450 INJECTION, SOLUTION INTRAVENOUS at 08:06

## 2020-05-06 RX ADMIN — URSODIOL 500 MG: 250 TABLET, FILM COATED ORAL at 08:07

## 2020-05-06 RX ADMIN — FLUDARABINE PHOSPHATE 80 MG: 25 INJECTION, SOLUTION INTRAVENOUS at 20:15

## 2020-05-06 RX ADMIN — DEXAMETHASONE 20 MG: 4 TABLET ORAL at 19:14

## 2020-05-06 RX ADMIN — VALACYCLOVIR HYDROCHLORIDE 500 MG: 500 TABLET, FILM COATED ORAL at 20:27

## 2020-05-06 RX ADMIN — SODIUM CHLORIDE 15 ML: 900 IRRIGANT IRRIGATION at 12:07

## 2020-05-06 RX ADMIN — LORAZEPAM 0.5 MG: 0.5 TABLET ORAL at 01:52

## 2020-05-06 RX ADMIN — SULFAMETHOXAZOLE AND TRIMETHOPRIM 1 TABLET: 800; 160 TABLET ORAL at 08:08

## 2020-05-06 RX ADMIN — LORAZEPAM 0.5 MG: 0.5 TABLET ORAL at 19:15

## 2020-05-06 RX ADMIN — SULFAMETHOXAZOLE AND TRIMETHOPRIM 1 TABLET: 800; 160 TABLET ORAL at 20:16

## 2020-05-06 RX ADMIN — FLUCONAZOLE 200 MG: 200 TABLET ORAL at 08:07

## 2020-05-06 RX ADMIN — SODIUM PHOSPHATE, MONOBASIC, MONOHYDRATE 20 MMOL: 276; 142 INJECTION, SOLUTION INTRAVENOUS at 12:07

## 2020-05-06 RX ADMIN — LORAZEPAM 0.5 MG: 0.5 TABLET ORAL at 07:20

## 2020-05-06 RX ADMIN — ONDANSETRON HYDROCHLORIDE 24 MG: 8 TABLET, FILM COATED ORAL at 19:14

## 2020-05-06 RX ADMIN — Medication 10 ML: at 20:16

## 2020-05-06 RX ADMIN — LEVOFLOXACIN 500 MG: 500 TABLET, FILM COATED ORAL at 20:16

## 2020-05-06 RX ADMIN — ENOXAPARIN SODIUM 40 MG: 40 INJECTION SUBCUTANEOUS at 17:34

## 2020-05-06 RX ADMIN — URSODIOL 500 MG: 250 TABLET, FILM COATED ORAL at 20:16

## 2020-05-06 RX ADMIN — VALACYCLOVIR HYDROCHLORIDE 500 MG: 500 TABLET, FILM COATED ORAL at 08:07

## 2020-05-06 ASSESSMENT — PAIN SCALES - GENERAL
PAINLEVEL_OUTOF10: 0

## 2020-05-06 NOTE — ONCOLOGY
Original chemotherapy orders reviewed and acknowledged. Appropriateness of chemotherapy treatment regimen BuFlu for diagnosis of MDS was verified. Patient educated on chemotherapy regimen. Acknowledgement of informed consent for chemotherapy verified. Pt height, weight, and BSA verified. Appropriate dosing calculations of chemotherapy based on height, weight, and BSA verified. Fludara Administration: Chemotherapy drug Fludara independently verified with Maria Esther Adames RN prior to administration. Original order, appropriateness of regimen, drug supplied, height, weight, BSA, dose calculations, expiration dates/times, drug appearance, and two patient identifiers were verified by both RNs. Drug checked for vesicant/irritant status and for risk of hypersensitivity. Most recent laboratory values and allergies, were reviewed. Appropriate IV access available: Positive, brisk blood return via CVC was confirmed prior to administration. Chest x-ray for correct line placement reviewed  Tyrel Gant and Chapo Estes RN verified correct rate of chemotherapy and maintenance IV fluids. Patient was educated on chemotherapy regimen prior to administration including indication for treatment related to disease & side effects of chemotherapy drug. Patient verbalizes understanding of all instructions. Fludara Completion of Chemotherapy: Monitoring during infusion done per policy, see Flowsheets. Blood return verified before, during, and after infusion per policy; no signs of extravasation. Pt tolerated chemotherapy well and without incident. Chemotherapy infusion end time on the [facility] Will continue to monitor. Busulfan Administration: Chemotherapy drug Busulfan independently verified with Maria Esther Adames RN prior to administration.   Original order, appropriateness of regimen, drug supplied, height, weight, BSA, dose calculations, expiration dates/times, drug appearance, and two patient identifiers were verified by both RNs. Drug checked for vesicant/irritant status and for risk of hypersensitivity. Most recent laboratory values and allergies, were reviewed. Appropriate IV access available: Positive, brisk blood return via CVC was confirmed prior to administration. Chest x-ray for correct line placement reviewed  Shanda Gunn RN verified correct rate of chemotherapy and maintenance IV fluids. Patient was educated on chemotherapy regimen prior to administration including indication for treatment related to disease & side effects of chemotherapy drug. Patient verbalizes understanding of all instructions. Busulfan Completion of Chemotherapy: Monitoring during infusion done per policy, see Flowsheets. Blood return verified before, during, and after infusion per policy; no signs of extravasation. Pt tolerated chemotherapy well and without incident. Chemotherapy infusion end time on the [facility] Will continue to monitor.

## 2020-05-06 NOTE — PLAN OF CARE
Problem: Pain:  Goal: Pain level will decrease  Description: Pain level will decrease  5/6/2020 0424 by Dejuan Barr RN  Outcome: Ongoing  Note: No pain reported this shift. Will continue to monitor. Problem: Falls - Risk of:  Goal: Will remain free from falls  Description: Will remain free from falls  5/6/2020 0424 by Dejuan Barr RN  Outcome: Ongoing  Note: Orthostatic vital signs obtained at start of shift - see flowsheet for details. Pt does not meet criteria for orthostasis. Pt is a Med fall risk. See Dieudonne Rachana Fall Score and ABCDS Injury Risk assessments. - Screening for Orthostasis AND not a [place] Risk per PHAM/ABCDS: Pt bed is in low position, side rails up, call light and belongings are in reach. Fall risk light is on outside pts room. Pt encouraged to call for assistance as needed. Will continue with hourly rounds for PO intake, pain needs, toileting and repositioning as needed. Problem: Infection - Central Venous Catheter-Associated Bloodstream Infection:  Goal: Will show no infection signs and symptoms  Description: Will show no infection signs and symptoms  5/6/2020 0424 by Dejuan Barr RN  Outcome: Ongoing  Note: CVC site remains free of signs/symptoms of infection. No drainage, edema, erythema, pain, or warmth noted at site. Dressing changes continue per protocol and on an as needed basis - see flowsheet. Compliant with [facility] Bath Protocol:  Performed CHG bath today per [facility] protocol utilizing CHG solution in the shower. CVC site cleansed with CHG wipe over dressing, skin surrounding dressing, and first 6\" of IV tubing. Pt tolerated well. Continued to encourage daily CHG bathing per [facility] protocol.            Problem: Venous Thromboembolism:  Goal: Will show no signs or symptoms of venous thromboembolism  Description: Will show no signs or symptoms of venous thromboembolism  5/6/2020 0424 by Dejuan Barr RN  Outcome: Ongoing  Note: Adherent with DVT Prevention: Pt is at risk for DVT d/t decreased mobility and cancer treatment. Pt educated on importance of activity. Pt has orders for Subcut prophylactic lovenox. Pt verbalizes understanding of need for prophylaxis while inpatient. Problem: Bleeding:  Goal: Will show no signs and symptoms of excessive bleeding  Description: Will show no signs and symptoms of excessive bleeding  5/6/2020 0424 by Manuela Cox RN  Outcome: Ongoing  Note: Patient's hemoglobin this AM:   Recent Labs     05/06/20 0334   HGB 11.8*     Patient's platelet count this AM:   Recent Labs     05/06/20 0334       Thrombocytopenia not present at this time. Patient showing no signs or symptoms of active bleeding. Transfusion not indicated at this time. Patient verbalizes understanding of all instructions. Will continue to assess and implement POC. Call light within reach and hourly rounding in place.

## 2020-05-06 NOTE — PLAN OF CARE
excessive bleeding  Description: Will show no signs and symptoms of excessive bleeding  5/6/2020 1345 by Juno eWber RN  Outcome: Ongoing

## 2020-05-06 NOTE — PROGRESS NOTES
Oral Q12H     Continuous Infusions:   IV infusion builder      dextrose      sodium chloride      [START ON 5/14/2020] sodium chloride      [START ON 5/16/2020] sodium chloride       PRN Meds:glucose, dextrose, glucagon (rDNA), dextrose, sodium chloride, alteplase, magnesium hydroxide, magnesium sulfate, potassium chloride, Saline Mouthwash, prochlorperazine **OR** prochlorperazine, [START ON 5/10/2020] acetaminophen, [START ON 5/10/2020] LORazepam **OR** [START ON 5/10/2020] LORazepam, [START ON 5/14/2020] furosemide    ROS:  As noted above, otherwise remainder of 10-point ROS negative    Physical Exam:    I&O:      Intake/Output Summary (Last 24 hours) at 5/6/2020 0931  Last data filed at 5/6/2020 0803  Gross per 24 hour   Intake 2915 ml   Output 3825 ml   Net -910 ml       Vital Signs:  /81   Pulse 80   Temp 97.8 °F (36.6 °C) (Oral)   Resp 16   Ht 5' 9.8\" (1.773 m)   Wt 185 lb 6.4 oz (84.1 kg)   SpO2 96%   BMI 26.75 kg/m²     Weight:    Wt Readings from Last 3 Encounters:   05/06/20 185 lb 6.4 oz (84.1 kg)   03/31/20 184 lb 9.6 oz (83.7 kg)   02/17/20 180 lb (81.6 kg)       General: Awake, alert and oriented.   HEENT: normocephalic, PERRL, no scleral erythema or icterus, Oral mucosa moist and intact, throat clear  NECK: supple without palpable adenopathy  BACK: Straight  SKIN: warm dry and intact without lesions rashes or masses  CHEST: CTA bilaterally without use of accessory muscles  CV: Normal S1 S2, RRR, no MRG  ABD: NT, ND, normoactive BS, no palpable masses or hepatosplenomegaly  EXTREMITIES: without edema, denies calf tenderness  NEURO: CN II - XII grossly intact  CATHETER: Left IJ TLH (5/5/20, IR) - CDI & Right IJ PAC (2/17/20, [facility]) - CDI    Data:   CBC:   Recent Labs     05/05/20  0958 05/06/20  0334   WBC 1.9* 1.0*   HGB 11.9* 11.8*   HCT 34.5* 34.8*   .2* 100.5*    300     BMP/Mag:  Recent Labs     05/05/20  0958 05/06/20  0334    138   K 4.2 4.2    106   CO2

## 2020-05-07 LAB
ANION GAP SERPL CALCULATED.3IONS-SCNC: 12 MMOL/L (ref 3–16)
APTT: 26.8 SEC (ref 24.2–36.2)
BASOPHILS ABSOLUTE: 0 K/UL (ref 0–0.2)
BASOPHILS RELATIVE PERCENT: 0.1 %
BUN BLDV-MCNC: 13 MG/DL (ref 7–20)
CALCIUM SERPL-MCNC: 9.3 MG/DL (ref 8.3–10.6)
CHLORIDE BLD-SCNC: 109 MMOL/L (ref 99–110)
CO2: 18 MMOL/L (ref 21–32)
CREAT SERPL-MCNC: 0.7 MG/DL (ref 0.9–1.3)
EOSINOPHILS ABSOLUTE: 0 K/UL (ref 0–0.6)
EOSINOPHILS RELATIVE PERCENT: 0 %
GFR AFRICAN AMERICAN: >60
GFR NON-AFRICAN AMERICAN: >60
GLUCOSE BLD-MCNC: 106 MG/DL (ref 70–99)
GLUCOSE BLD-MCNC: 156 MG/DL (ref 70–99)
GLUCOSE BLD-MCNC: 163 MG/DL (ref 70–99)
GLUCOSE BLD-MCNC: 181 MG/DL (ref 70–99)
GLUCOSE BLD-MCNC: 98 MG/DL (ref 70–99)
HCT VFR BLD CALC: 33 % (ref 40.5–52.5)
HEMOGLOBIN: 11 G/DL (ref 13.5–17.5)
INR BLD: 1.13 (ref 0.86–1.14)
LYMPHOCYTES ABSOLUTE: 0.3 K/UL (ref 1–5.1)
LYMPHOCYTES RELATIVE PERCENT: 15.4 %
MAGNESIUM: 2 MG/DL (ref 1.8–2.4)
MCH RBC QN AUTO: 34 PG (ref 26–34)
MCHC RBC AUTO-ENTMCNC: 33.4 G/DL (ref 31–36)
MCV RBC AUTO: 101.9 FL (ref 80–100)
MONOCYTES ABSOLUTE: 0 K/UL (ref 0–1.3)
MONOCYTES RELATIVE PERCENT: 1.4 %
NEUTROPHILS ABSOLUTE: 1.8 K/UL (ref 1.7–7.7)
NEUTROPHILS RELATIVE PERCENT: 83.1 %
PDW BLD-RTO: 15.5 % (ref 12.4–15.4)
PERFORMED ON: ABNORMAL
PERFORMED ON: NORMAL
PHOSPHORUS: 2.9 MG/DL (ref 2.5–4.9)
PLATELET # BLD: 301 K/UL (ref 135–450)
PMV BLD AUTO: 9.2 FL (ref 5–10.5)
POTASSIUM SERPL-SCNC: 4.4 MMOL/L (ref 3.5–5.1)
PROTHROMBIN TIME: 13.1 SEC (ref 10–13.2)
RBC # BLD: 3.24 M/UL (ref 4.2–5.9)
SLIDE REVIEW: ABNORMAL
SODIUM BLD-SCNC: 139 MMOL/L (ref 136–145)
VRE CULTURE: NORMAL
WBC # BLD: 2.1 K/UL (ref 4–11)

## 2020-05-07 PROCEDURE — 80048 BASIC METABOLIC PNL TOTAL CA: CPT

## 2020-05-07 PROCEDURE — 83735 ASSAY OF MAGNESIUM: CPT

## 2020-05-07 PROCEDURE — 6360000002 HC RX W HCPCS: Performed by: INTERNAL MEDICINE

## 2020-05-07 PROCEDURE — 96413 CHEMO IV INFUSION 1 HR: CPT

## 2020-05-07 PROCEDURE — 36415 COLL VENOUS BLD VENIPUNCTURE: CPT

## 2020-05-07 PROCEDURE — 96415 CHEMO IV INFUSION ADDL HR: CPT

## 2020-05-07 PROCEDURE — 6360000002 HC RX W HCPCS: Performed by: NURSE PRACTITIONER

## 2020-05-07 PROCEDURE — 6370000000 HC RX 637 (ALT 250 FOR IP): Performed by: NURSE PRACTITIONER

## 2020-05-07 PROCEDURE — 85730 THROMBOPLASTIN TIME PARTIAL: CPT

## 2020-05-07 PROCEDURE — 85610 PROTHROMBIN TIME: CPT

## 2020-05-07 PROCEDURE — 2060000000 HC ICU INTERMEDIATE R&B

## 2020-05-07 PROCEDURE — 2580000003 HC RX 258: Performed by: INTERNAL MEDICINE

## 2020-05-07 PROCEDURE — 36592 COLLECT BLOOD FROM PICC: CPT

## 2020-05-07 PROCEDURE — 2580000003 HC RX 258: Performed by: NURSE PRACTITIONER

## 2020-05-07 PROCEDURE — 6370000000 HC RX 637 (ALT 250 FOR IP): Performed by: INTERNAL MEDICINE

## 2020-05-07 PROCEDURE — 85025 COMPLETE CBC W/AUTO DIFF WBC: CPT

## 2020-05-07 PROCEDURE — 84100 ASSAY OF PHOSPHORUS: CPT

## 2020-05-07 RX ORDER — LANOLIN ALCOHOL/MO/W.PET/CERES
CREAM (GRAM) TOPICAL PRN
Status: DISCONTINUED | OUTPATIENT
Start: 2020-05-07 | End: 2020-06-05 | Stop reason: HOSPADM

## 2020-05-07 RX ORDER — LORAZEPAM 2 MG/ML
1 INJECTION INTRAMUSCULAR EVERY 6 HOURS
Status: COMPLETED | OUTPATIENT
Start: 2020-05-07 | End: 2020-05-10

## 2020-05-07 RX ORDER — LORAZEPAM 1 MG/1
1 TABLET ORAL EVERY 6 HOURS
Status: COMPLETED | OUTPATIENT
Start: 2020-05-07 | End: 2020-05-10

## 2020-05-07 RX ADMIN — FLUDARABINE PHOSPHATE 80 MG: 25 INJECTION, SOLUTION INTRAVENOUS at 20:13

## 2020-05-07 RX ADMIN — POTASSIUM CHLORIDE: 2 INJECTION, SOLUTION, CONCENTRATE INTRAVENOUS at 08:47

## 2020-05-07 RX ADMIN — POTASSIUM CHLORIDE: 2 INJECTION, SOLUTION, CONCENTRATE INTRAVENOUS at 14:40

## 2020-05-07 RX ADMIN — VALACYCLOVIR HYDROCHLORIDE 500 MG: 500 TABLET, FILM COATED ORAL at 20:14

## 2020-05-07 RX ADMIN — BUSULFAN 315 MG: 6 INJECTION INTRAVENOUS at 21:43

## 2020-05-07 RX ADMIN — SODIUM CHLORIDE 0.5 ML: 900 IRRIGANT IRRIGATION at 18:32

## 2020-05-07 RX ADMIN — LEVOFLOXACIN 500 MG: 500 TABLET, FILM COATED ORAL at 20:14

## 2020-05-07 RX ADMIN — ENOXAPARIN SODIUM 40 MG: 40 INJECTION SUBCUTANEOUS at 18:33

## 2020-05-07 RX ADMIN — LORAZEPAM 0.5 MG: 0.5 TABLET ORAL at 06:40

## 2020-05-07 RX ADMIN — LORAZEPAM 1 MG: 1 TABLET ORAL at 13:00

## 2020-05-07 RX ADMIN — VALACYCLOVIR HYDROCHLORIDE 500 MG: 500 TABLET, FILM COATED ORAL at 08:53

## 2020-05-07 RX ADMIN — URSODIOL 500 MG: 250 TABLET, FILM COATED ORAL at 08:53

## 2020-05-07 RX ADMIN — LORAZEPAM 0.5 MG: 0.5 TABLET ORAL at 00:56

## 2020-05-07 RX ADMIN — ONDANSETRON HYDROCHLORIDE 24 MG: 8 TABLET, FILM COATED ORAL at 19:35

## 2020-05-07 RX ADMIN — Medication 10 ML: at 20:33

## 2020-05-07 RX ADMIN — DEXAMETHASONE 20 MG: 4 TABLET ORAL at 19:35

## 2020-05-07 RX ADMIN — FLUCONAZOLE 200 MG: 200 TABLET ORAL at 08:53

## 2020-05-07 RX ADMIN — POTASSIUM CHLORIDE: 2 INJECTION, SOLUTION, CONCENTRATE INTRAVENOUS at 00:56

## 2020-05-07 RX ADMIN — URSODIOL 500 MG: 250 TABLET, FILM COATED ORAL at 20:14

## 2020-05-07 RX ADMIN — LORAZEPAM 1 MG: 1 TABLET ORAL at 18:32

## 2020-05-07 RX ADMIN — Medication 10 ML: at 08:47

## 2020-05-07 RX ADMIN — INSULIN LISPRO 1 UNITS: 100 INJECTION, SOLUTION INTRAVENOUS; SUBCUTANEOUS at 20:28

## 2020-05-07 ASSESSMENT — PAIN SCALES - GENERAL
PAINLEVEL_OUTOF10: 0

## 2020-05-07 NOTE — PLAN OF CARE
Problem: Pain:  Goal: Pain level will decrease  Description: Pain level will decrease  5/7/2020 1614 by Boo Love RN  Outcome: Ongoing  Note: No complaints of pain this shift. Problem: Falls - Risk of:  Goal: Will remain free from falls  Description: Will remain free from falls  5/7/2020 1614 by Boo Love RN  Outcome: Ongoing  Note: Orthostatic vital signs obtained at start of shift - see flowsheet for details. Pt does not meet criteria for orthostasis. Pt is a Med fall risk. See Erle Olivares Fall Score and ABCDS Injury Risk assessments. - Screening for Orthostasis AND not a [place] Risk per PHAM/ABCDS: Pt bed is in low position, side rails up, call light and belongings are in reach. Fall risk light is on outside pts room. Pt encouraged to call for assistance as needed. Will continue with hourly rounds for PO intake, pain needs, toileting and repositioning as needed. Problem: Infection - Central Venous Catheter-Associated Bloodstream Infection:  Goal: Will show no infection signs and symptoms  Description: Will show no infection signs and symptoms  5/7/2020 1614 by Boo Love RN  Outcome: Ongoing     Problem: Venous Thromboembolism:  Goal: Will show no signs or symptoms of venous thromboembolism  Description: Will show no signs or symptoms of venous thromboembolism  5/7/2020 1614 by Boo Love RN  Outcome: Ongoing  Note: Adherent with DVT Prevention: Pt is at risk for DVT d/t decreased mobility and cancer treatment. Pt educated on importance of activity. Pt has orders for Subcut prophylactic lovenox. Pt verbalizes understanding of need for prophylaxis while inpatient.         Problem: Bleeding:  Goal: Will show no signs and symptoms of excessive bleeding  Description: Will show no signs and symptoms of excessive bleeding  5/7/2020 1614 by Boo Love RN  Outcome: Ongoing  Note: Patient's hemoglobin this AM:   Recent Labs     05/07/20  0340   HGB 11.0*     Patient's platelet count

## 2020-05-07 NOTE — PLAN OF CARE
Problem: Pain:  Goal: Pain level will decrease  Description: Pain level will decrease  Outcome: Ongoing  Note:   No complaints of pain this shift. Problem: Falls - Risk of:  Goal: Will remain free from falls  Description: Will remain free from falls  Outcome: Ongoing  Note: Orthostatic vital signs obtained at start of shift - see flowsheet for details. Pt does not meet criteria for orthostasis. Pt is a Med fall risk. See Silas Cadet Fall Score and ABCDS Injury Risk assessments. - Screening for Orthostasis AND not a [place] Risk per PHAM/ABCDS: Pt bed is in low position, side rails up, call light and belongings are in reach. Fall risk light is on outside pts room. Pt encouraged to call for assistance as needed. Will continue with hourly rounds for PO intake, pain needs, toileting and repositioning as needed. Problem: Infection - Central Venous Catheter-Associated Bloodstream Infection:  Goal: Will show no infection signs and symptoms  Description: Will show no infection signs and symptoms  Outcome: Ongoing  Note: CVC site remains free of signs/symptoms of infection. No drainage, edema, erythema, pain, or warmth noted at site. Dressing changes continue per protocol and on an as needed basis - see flowsheet. Compliant with [facility] Bath Protocol:  Performed CHG bath today per [facility] protocol utilizing CHG solution in the shower. CVC site cleansed with CHG wipe over dressing, skin surrounding dressing, and first 6\" of IV tubing. Pt tolerated well. Continued to encourage daily CHG bathing per [facility] protocol. Problem: Venous Thromboembolism:  Goal: Will show no signs or symptoms of venous thromboembolism  Description: Will show no signs or symptoms of venous thromboembolism  Outcome: Ongoing  Note: Adherent with DVT Prevention: Pt is at risk for DVT d/t decreased mobility and cancer treatment. Pt educated on importance of activity. Pt has orders for Subcut prophylactic lovenox.   Pt verbalizes understanding of need for prophylaxis while inpatient. Problem: Bleeding:  Goal: Will show no signs and symptoms of excessive bleeding  Description: Will show no signs and symptoms of excessive bleeding  Outcome: Ongoing  Note: Patient's hemoglobin this AM:   Recent Labs     05/07/20  0340   HGB 11.0*     Patient's platelet count this AM:   Recent Labs     05/07/20  0340       Thrombocytopenia not present at this time. Patient showing no signs or symptoms of active bleeding. Transfusion not indicated at this time. Patient verbalizes understanding of all instructions. Will continue to assess and implement POC. Call light within reach and hourly rounding in place.

## 2020-05-07 NOTE — PROGRESS NOTES
BUN 12 12 13   CREATININE 0.8* 1.0 0.7*   MG  --   --  2.00     LIVP:   Recent Labs     05/05/20  0958 05/06/20  0334   AST 14* 14*   ALT 17 16   BILIDIR <0.2 <0.2   BILITOT 0.7 0.7   ALKPHOS 101 100     Uric Acid:    Recent Labs     05/06/20  0334   LABURIC 6.6     Coags:   Recent Labs     05/05/20  0657 05/05/20  0958 05/07/20  0340   PROTIME  --  12.0 13.1   INR 1.20* 1.03 1.13   APTT  --  29.6 26.8     Tacro:  No results for input(s): TACROLEV in the last 72 hours. CMV Quant DNA by PCR: No results found for: CMVDNAQNT    PROBLEM LIST:        1. MDS  2. Anxiety and depression  3. HLD  4. H/o shingles (2004, RLQ)  5. Gilbert syndrome     TREATMENT:       1. Decitabine x3 cycles - 2/17-4/14/20   2. Targeted Busulfan & Fludarabine followed by MUD Allo PBSC infusion 5/11/20  Preparative Regimen: Targeted Busulfan & Fludarabine  Date of BMT: 5/11/2020  Source of stem cells: PBSC - 6.7 x10^6 [id number]/kg  Donor/Recipient Blood Type: A+ / A+  Donor Sex: Male, follow VNTR (DID# [id number])  CMV Donor / Recipient: Pos / Pos     ASSESSMENT AND PLAN:         1. Myelodysplastic Syndrome: Stable disease  - Most recent BM Bx/Asp (4/7/20) - ongoing disease  - Admitted for Bu/Flu & MUD-pb BMT     Day - 4     2. ID: Afebrile, no evidence of infection.    - Cont Levaquin, Valtrex & Diflucan ppx   - Increase Diflucan ppx to 400 mg daily 5/16/20 (day +5)     Donor/Recipient CMV: Pos / Pos  - Check CMV PCR weekly once WBC 1.0  - Start letermovir ppx if started on high-dose steroids        3. Heme:  Neutropenia and anemia from disease   - Transfuse for Hgb < 7 and Platelets < 17Z.    - No transfusion today.     4.  Metabolic:  Electrolytes are WNL except metabolic acidosis and steroid-induced hyperglycemia and renal fxn stable.     - Cont IVF:  NS + KCl 10 meq @ 150 mL/hr  - Replace potassium and magnesium per policy.     5. 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

## 2020-05-07 NOTE — ONCOLOGY
Original chemotherapy orders reviewed and acknowledged. Appropriateness of chemotherapy treatment regimen BuFlu for diagnosis of MDS was verified. Patient educated on chemotherapy regimen. Acknowledgement of informed consent for chemotherapy verified. Pt height, weight, and BSA verified. Appropriate dosing calculations of chemotherapy based on height, weight, and BSA verified. Fludarabine Administration: Chemotherapy drug Fludarabine independently verified with Sahara Duarte, RN prior to administration. Original order, appropriateness of regimen, drug supplied, height, weight, BSA, dose calculations, expiration dates/times, drug appearance, and two patient identifiers were verified by both RNs. Drug checked for vesicant/irritant status and for risk of hypersensitivity. Most recent laboratory values and allergies, were reviewed. Appropriate IV access available: Positive, brisk blood return via CVC was confirmed prior to administration. Chest x-ray for correct line placement reviewed  Oneida Felty and Carlos Will RN verified correct rate of chemotherapy and maintenance IV fluids. Patient was educated on chemotherapy regimen prior to administration including indication for treatment related to disease & side effects of chemotherapy drug. Patient verbalizes understanding of all instructions. Fludarabine Completion of Chemotherapy: Monitoring during infusion done per policy, see Flowsheets. Blood return verified before, during, and after infusion per policy; no signs of extravasation. Pt tolerated chemotherapy well and without incident. Chemotherapy infusion end time on the [facility] Will continue to monitor. Busulfan Administration: Chemotherapy drug Busulfan independently verified with Sahara Duarte, AUNG prior to administration.   Original order, appropriateness of regimen, drug supplied, height, weight, BSA, dose calculations, expiration dates/times, drug appearance, and two patient identifiers were verified by both RNs. Drug checked for vesicant/irritant status and for risk of hypersensitivity. Most recent laboratory values and allergies, were reviewed. Appropriate IV access available: Positive, brisk blood return via CVC was confirmed prior to administration. Chest x-ray for correct line placement reviewed  Davonte Irizarry and Elaina Cronin RN verified correct rate of chemotherapy and maintenance IV fluids. Patient was educated on chemotherapy regimen prior to administration including indication for treatment related to disease & side effects of chemotherapy drug. Patient verbalizes understanding of all instructions. Busulfan Completion of Chemotherapy: Monitoring during infusion done per policy, see Flowsheets. Blood return verified before, during, and after infusion per policy; no signs of extravasation. Pt tolerated chemotherapy well and without incident. Chemotherapy infusion end time on the [facility] Will continue to monitor.

## 2020-05-08 LAB
ALBUMIN SERPL-MCNC: 3.8 G/DL (ref 3.4–5)
ALP BLD-CCNC: 67 U/L (ref 40–129)
ALT SERPL-CCNC: 13 U/L (ref 10–40)
ANION GAP SERPL CALCULATED.3IONS-SCNC: 12 MMOL/L (ref 3–16)
AST SERPL-CCNC: 9 U/L (ref 15–37)
BASOPHILS ABSOLUTE: 0 K/UL (ref 0–0.2)
BASOPHILS RELATIVE PERCENT: 0 %
BILIRUB SERPL-MCNC: 0.6 MG/DL (ref 0–1)
BILIRUBIN DIRECT: <0.2 MG/DL (ref 0–0.3)
BILIRUBIN, INDIRECT: ABNORMAL MG/DL (ref 0–1)
BUN BLDV-MCNC: 12 MG/DL (ref 7–20)
CALCIUM SERPL-MCNC: 8.9 MG/DL (ref 8.3–10.6)
CHLORIDE BLD-SCNC: 109 MMOL/L (ref 99–110)
CO2: 18 MMOL/L (ref 21–32)
CREAT SERPL-MCNC: 0.7 MG/DL (ref 0.9–1.3)
EOSINOPHILS ABSOLUTE: 0 K/UL (ref 0–0.6)
EOSINOPHILS RELATIVE PERCENT: 0 %
GFR AFRICAN AMERICAN: >60
GFR NON-AFRICAN AMERICAN: >60
GLUCOSE BLD-MCNC: 147 MG/DL (ref 70–99)
GLUCOSE BLD-MCNC: 152 MG/DL (ref 70–99)
HCT VFR BLD CALC: 30.5 % (ref 40.5–52.5)
HEMOGLOBIN: 10.2 G/DL (ref 13.5–17.5)
LACTATE DEHYDROGENASE: 141 U/L (ref 100–190)
LYMPHOCYTES ABSOLUTE: 0.1 K/UL (ref 1–5.1)
LYMPHOCYTES RELATIVE PERCENT: 6 %
MCH RBC QN AUTO: 34.2 PG (ref 26–34)
MCHC RBC AUTO-ENTMCNC: 33.4 G/DL (ref 31–36)
MCV RBC AUTO: 102.3 FL (ref 80–100)
MONOCYTES ABSOLUTE: 0 K/UL (ref 0–1.3)
MONOCYTES RELATIVE PERCENT: 0 %
NEUTROPHILS ABSOLUTE: 2 K/UL (ref 1.7–7.7)
NEUTROPHILS RELATIVE PERCENT: 94 %
PDW BLD-RTO: 15.9 % (ref 12.4–15.4)
PERFORMED ON: ABNORMAL
PHOSPHORUS: 3.2 MG/DL (ref 2.5–4.9)
PLATELET # BLD: 259 K/UL (ref 135–450)
PMV BLD AUTO: 8.5 FL (ref 5–10.5)
POTASSIUM SERPL-SCNC: 4.4 MMOL/L (ref 3.5–5.1)
RBC # BLD: 2.98 M/UL (ref 4.2–5.9)
SODIUM BLD-SCNC: 139 MMOL/L (ref 136–145)
TOTAL PROTEIN: 6.1 G/DL (ref 6.4–8.2)
URIC ACID, SERUM: 4.8 MG/DL (ref 3.5–7.2)
WBC # BLD: 2.1 K/UL (ref 4–11)

## 2020-05-08 PROCEDURE — 2580000003 HC RX 258: Performed by: NURSE PRACTITIONER

## 2020-05-08 PROCEDURE — 80076 HEPATIC FUNCTION PANEL: CPT

## 2020-05-08 PROCEDURE — 96415 CHEMO IV INFUSION ADDL HR: CPT

## 2020-05-08 PROCEDURE — 6370000000 HC RX 637 (ALT 250 FOR IP): Performed by: NURSE PRACTITIONER

## 2020-05-08 PROCEDURE — 2580000003 HC RX 258: Performed by: INTERNAL MEDICINE

## 2020-05-08 PROCEDURE — 6360000002 HC RX W HCPCS: Performed by: INTERNAL MEDICINE

## 2020-05-08 PROCEDURE — 85025 COMPLETE CBC W/AUTO DIFF WBC: CPT

## 2020-05-08 PROCEDURE — 6370000000 HC RX 637 (ALT 250 FOR IP): Performed by: INTERNAL MEDICINE

## 2020-05-08 PROCEDURE — 6360000002 HC RX W HCPCS: Performed by: NURSE PRACTITIONER

## 2020-05-08 PROCEDURE — 84100 ASSAY OF PHOSPHORUS: CPT

## 2020-05-08 PROCEDURE — 2060000000 HC ICU INTERMEDIATE R&B

## 2020-05-08 PROCEDURE — 80048 BASIC METABOLIC PNL TOTAL CA: CPT

## 2020-05-08 PROCEDURE — 96413 CHEMO IV INFUSION 1 HR: CPT

## 2020-05-08 PROCEDURE — 84550 ASSAY OF BLOOD/URIC ACID: CPT

## 2020-05-08 PROCEDURE — 83615 LACTATE (LD) (LDH) ENZYME: CPT

## 2020-05-08 PROCEDURE — 36592 COLLECT BLOOD FROM PICC: CPT

## 2020-05-08 RX ADMIN — VALACYCLOVIR HYDROCHLORIDE 500 MG: 500 TABLET, FILM COATED ORAL at 08:41

## 2020-05-08 RX ADMIN — POTASSIUM CHLORIDE: 2 INJECTION, SOLUTION, CONCENTRATE INTRAVENOUS at 08:32

## 2020-05-08 RX ADMIN — VALACYCLOVIR HYDROCHLORIDE 500 MG: 500 TABLET, FILM COATED ORAL at 20:30

## 2020-05-08 RX ADMIN — LORAZEPAM 1 MG: 1 TABLET ORAL at 18:19

## 2020-05-08 RX ADMIN — DEXAMETHASONE 20 MG: 4 TABLET ORAL at 19:46

## 2020-05-08 RX ADMIN — URSODIOL 500 MG: 250 TABLET, FILM COATED ORAL at 08:40

## 2020-05-08 RX ADMIN — LORAZEPAM 1 MG: 1 TABLET ORAL at 00:51

## 2020-05-08 RX ADMIN — LORAZEPAM 1 MG: 1 TABLET ORAL at 06:44

## 2020-05-08 RX ADMIN — Medication 10 ML: at 20:17

## 2020-05-08 RX ADMIN — LEVOFLOXACIN 500 MG: 500 TABLET, FILM COATED ORAL at 20:30

## 2020-05-08 RX ADMIN — LORAZEPAM 1 MG: 1 TABLET ORAL at 12:26

## 2020-05-08 RX ADMIN — FLUDARABINE PHOSPHATE 80 MG: 25 INJECTION, SOLUTION INTRAVENOUS at 20:17

## 2020-05-08 RX ADMIN — Medication 10 ML: at 08:41

## 2020-05-08 RX ADMIN — URSODIOL 500 MG: 250 TABLET, FILM COATED ORAL at 20:18

## 2020-05-08 RX ADMIN — BUSULFAN 315 MG: 6 INJECTION INTRAVENOUS at 21:31

## 2020-05-08 RX ADMIN — FLUCONAZOLE 200 MG: 200 TABLET ORAL at 08:41

## 2020-05-08 RX ADMIN — POTASSIUM CHLORIDE: 2 INJECTION, SOLUTION, CONCENTRATE INTRAVENOUS at 00:51

## 2020-05-08 RX ADMIN — ONDANSETRON HYDROCHLORIDE 24 MG: 8 TABLET, FILM COATED ORAL at 19:46

## 2020-05-08 RX ADMIN — ENOXAPARIN SODIUM 40 MG: 40 INJECTION SUBCUTANEOUS at 18:19

## 2020-05-08 RX ADMIN — POTASSIUM CHLORIDE: 2 INJECTION, SOLUTION, CONCENTRATE INTRAVENOUS at 15:14

## 2020-05-08 ASSESSMENT — PAIN SCALES - GENERAL
PAINLEVEL_OUTOF10: 0

## 2020-05-08 NOTE — PLAN OF CARE
importance of activity. Pt has orders for Subcut prophylactic lovenox. Pt verbalizes understanding of need for prophylaxis while inpatient. Problem: Bleeding:  Goal: Will show no signs and symptoms of excessive bleeding  Description: Will show no signs and symptoms of excessive bleeding  5/8/2020 1607 by Luci Waite RN  Outcome: Met This Shift  Note: Patient's hemoglobin this AM:   Recent Labs     05/08/20  0415   HGB 10.2*     Patient's platelet count this AM:   Recent Labs     05/08/20  0415       Thrombocytopenia Precautions in place. Patient showing no signs or symptoms of active bleeding. Transfusion not indicated at this time. Patient verbalizes understanding of all instructions. Will continue to assess and implement POC. Call light within reach and hourly rounding in place. Problem: Anxiety:  Goal: Level of anxiety will decrease  Description: Level of anxiety will decrease  5/8/2020 1607 by Luci Waite RN  Outcome: Met This Shift  Note: Pt denies anxiety during shift.

## 2020-05-08 NOTE — ONCOLOGY
Original chemotherapy orders reviewed and acknowledged. Appropriateness of chemotherapy treatment regimen BuFlu for diagnosis of MDS was verified. Patient educated on chemotherapy regimen. Acknowledgement of informed consent for chemotherapy verified. Pt height, weight, and BSA verified. Appropriate dosing calculations of chemotherapy based on height, weight, and BSA verified. Fludarabine Administration: Chemotherapy drug Fludarabine independently verified with Gavin Wiseman RN prior to administration. Original order, appropriateness of regimen, drug supplied, height, weight, BSA, dose calculations, expiration dates/times, drug appearance, and two patient identifiers were verified by both RNs. Drug checked for vesicant/irritant status and for risk of hypersensitivity. Most recent laboratory values and allergies, were reviewed. Appropriate IV access available: Positive, brisk blood return via CVC was confirmed prior to administration. Chest x-ray for correct line placement reviewed  Orlin Dickens and Tamika Reinoso RN verified correct rate of chemotherapy and maintenance IV fluids. Patient was educated on chemotherapy regimen prior to administration including indication for treatment related to disease & side effects of chemotherapy drug. Patient verbalizes understanding of all instructions. Fludarabine Completion of Chemotherapy: Monitoring during infusion done per policy, see Flowsheets. Blood return verified before, during, and after infusion per policy; no signs of extravasation. Pt tolerated chemotherapy well and without incident. Chemotherapy infusion end time on the [facility] Will continue to monitor. Busulfan Administration: Chemotherapy drug Busulfan independently verified with Gavin Wiseman RN prior to administration.   Original order, appropriateness of regimen, drug supplied, height, weight, BSA, dose calculations, expiration dates/times, drug appearance, and two patient identifiers were verified by

## 2020-05-08 NOTE — PROGRESS NOTES
Nutrition Assessment    Type and Reason for Visit: Initial, Patient Education    Nutrition Recommendations:   Continue current diet and encourage intake. Education provided for post BMT diet    Nutrition Assessment: Pre Allo transplant nutrition evaluation and Pt provided with diet edu handouts for post BMT.  RD to monitor for pt nutritional adequacy through admit and post BMT    Malnutrition Assessment:  · Malnutrition Status: No malnutrition    Nutrition Risk Level   Risk Level: Low    Nutrition Diagnosis:   · Problem: No nutrition diagnosis at this time    Nutrition Intervention:  Food and/or Delivery: Continue current diet  Nutrition Education/Counseling/Coordination of Care:  Continued Inpatient Monitoring, Education Initiated      Electronically signed by Gretchen Coles RD, LD on 5/8/20 at 2:27 PM EDT    Contact Number: [phone]

## 2020-05-08 NOTE — PLAN OF CARE
Problem: Pain:  Goal: Pain level will decrease  Description: Pain level will decrease  5/8/2020 0608 by Darrin Zhou RN  Outcome: Ongoing  Note: No pain reported this shift. Will continue to monitor. Problem: Falls - Risk of:  Goal: Will remain free from falls  Description: Will remain free from falls  5/8/2020 0608 by Darrin Zhou RN  Outcome: Ongoing  Note: Orthostatic vital signs obtained at start of shift - see flowsheet for details. Pt does not meet criteria for orthostasis. Pt is a Med fall risk. See [place] Smiling Fall Score and ABCDS Injury Risk assessments. - Screening for Orthostasis AND not a [place] Risk per PHAM/ABCDS: Pt bed is in low position, side rails up, call light and belongings are in reach. Fall risk light is on outside pts room. Pt encouraged to call for assistance as needed. Will continue with hourly rounds for PO intake, pain needs, toileting and repositioning as needed. Problem: Infection - Central Venous Catheter-Associated Bloodstream Infection:  Goal: Will show no infection signs and symptoms  Description: Will show no infection signs and symptoms  5/8/2020 0608 by Darrin Zhou RN  Outcome: Ongoing  Note: CVC site remains free of signs/symptoms of infection. No drainage, edema, erythema, pain, or warmth noted at site. Dressing changes continue per protocol and on an as needed basis - see flowsheet. Compliant with [facility] Bath Protocol:  Performed CHG bath today per [facility] protocol utilizing CHG solution in the shower. CVC site cleansed with CHG wipe over dressing, skin surrounding dressing, and first 6\" of IV tubing. Pt tolerated well.   Continued to encourage daily CHG bathing per [employer] protocol       Problem: Venous Thromboembolism:  Goal: Will show no signs or symptoms of venous thromboembolism  Description: Will show no signs or symptoms of venous thromboembolism  5/8/2020 0608 by Darrin Zhou RN  Outcome: Ongoing  Note: Adherent with DVT Prevention: Pt is at risk for DVT d/t decreased mobility and cancer treatment. Pt educated on importance of activity. Pt has orders for Subcut prophylactic lovenox. Pt verbalizes understanding of need for prophylaxis while inpatient. Problem: Bleeding:  Goal: Will show no signs and symptoms of excessive bleeding  Description: Will show no signs and symptoms of excessive bleeding  5/8/2020 0608 by Jackie Rose RN  Outcome: Ongoing  Note: Patient's hemoglobin this AM:   Recent Labs     05/08/20  0415   HGB 10.2*     Patient's platelet count this AM:   Recent Labs     05/08/20  0415       Thrombocytopenia not present at this time. Patient showing no signs or symptoms of active bleeding. Transfusion not indicated at this time. Patient verbalizes understanding of all instructions. Will continue to assess and implement POC. Call light within reach and hourly rounding in place. Problem: Anxiety:  Goal: Level of anxiety will decrease  Description: Level of anxiety will decrease  5/8/2020 0608 by Jackie Rose RN  Outcome: Ongoing  Note: Patient remains on scheduled Ativan for seizure prophylaxis. No anxiety reported this shift. Will continue to monitor.

## 2020-05-08 NOTE — PROGRESS NOTES
[facility] All[facility]ic Progress Note    2020    Philippe Lozoya    :  1965    MRN:  [id number]      Subjective: Tremor improved today.      ECOG PS:  (1) Restricted in physically strenuous activity, ambulatory and able to do work of light nature    KPS: 90% Able to carry on normal activity; minor signs or symptoms of disease    Isolation:  None     Medications    Scheduled Meds:   LORazepam  1 mg Oral Q6H    Or    LORazepam  1 mg Intravenous Q6H    busulfan (BUSULFEX) chemo infusion  315 mg Intravenous Q24H    insulin lispro  0-6 Units Subcutaneous TID WC    insulin lispro  0-3 Units Subcutaneous Nightly    enoxaparin  40 mg Subcutaneous Daily    [START ON 2020] fluconazole  400 mg Oral Daily    levoFLOXacin  500 mg Oral Nightly    Saline Mouthwash  15 mL Swish & Spit 4x Daily AC & HS    sodium chloride flush  10 mL Intravenous 2 times per day    valACYclovir  500 mg Oral BID    fluconazole  200 mg Oral Daily    ursodiol  500 mg Oral BID    ondansetron  24 mg Oral Q24H    dexamethasone  20 mg Oral Q24H    furosemide  40 mg Intravenous Q12H    [START ON 2020] ondansetron  24 mg Oral Once    [START ON 2020] fosaprepitant (EMEND) IVPB  150 mg Intravenous Once    [START ON 2020] mesna (MESNEX) IVPB  720 mg Intravenous Once    [START ON 2020] mesna (MESNEX) IVPB  3,600 mg Intravenous Q24H    [START ON 2020] cyclophosphamide (CYTOXAN) chemo infusion  3,600 mg Intravenous Once    [START ON 5/15/2020] ondansetron  24 mg Oral Once    [START ON 5/15/2020] mesna (MESNEX) IVPB  720 mg Intravenous Once    [START ON 5/15/2020] cyclophosphamide (CYTOXAN) chemo infusion  3,600 mg Intravenous Once    fludarabine (FLUDARA) chemo IVPB  80 mg Intravenous Q24H    [START ON 2020] tacrolimus  2.5 mg Oral Q12H     Continuous Infusions:   IV infusion builder 150 mL/hr at 20 0051    dextrose      sodium chloride      [START ON 2020] sodium chloride      [START ON 5/16/2020] sodium chloride       PRN Meds:potassium phosphate IVPB, Hydrocerin, glucose, dextrose, glucagon (rDNA), dextrose, sodium chloride, alteplase, magnesium hydroxide, magnesium sulfate, potassium chloride, Saline Mouthwash, prochlorperazine **OR** prochlorperazine, [START ON 5/10/2020] acetaminophen, [START ON 5/10/2020] LORazepam **OR** [START ON 5/10/2020] LORazepam, [START ON 5/14/2020] furosemide    ROS:  As noted above, otherwise remainder of 10-point ROS negative    Physical Exam:    I&O:      Intake/Output Summary (Last 24 hours) at 5/8/2020 0700  Last data filed at 5/8/2020 0640  Gross per 24 hour   Intake 4639 ml   Output 5450 ml   Net -811 ml       Vital Signs:  /79   Pulse 65   Temp 97.9 °F (36.6 °C) (Oral)   Resp 18   Ht 5' 9.8\" (1.773 m)   Wt 183 lb 6.4 oz (83.2 kg)   SpO2 97%   BMI 26.46 kg/m²     Weight:    Wt Readings from Last 3 Encounters:   05/07/20 183 lb 6.4 oz (83.2 kg)   03/31/20 184 lb 9.6 oz (83.7 kg)   02/17/20 180 lb (81.6 kg)       General: Awake, alert and oriented. HEENT: normocephalic, PERRL, no scleral erythema or icterus, Oral mucosa moist and intact, throat clear  NECK: supple without palpable adenopathy  BACK: Straight  SKIN: warm dry and intact without lesions rashes or masses  CHEST: CTA bilaterally without use of accessory muscles  CV: Normal S1 S2, RRR, no MRG  ABD: NT, ND, normoactive BS, no palpable masses or hepatosplenomegaly  EXTREMITIES: without edema, denies calf tenderness  NEURO: CN II - XII grossly intact.   REsting tremor  CATHETER: Left IJ TLH (5/5/20, IR) - CDI & Right IJ PAC (2/17/20, [facility]) - CDI    Data:   CBC:   Recent Labs     05/06/20  0334 05/07/20  0340 05/08/20  0415   WBC 1.0* 2.1* 2.1*   HGB 11.8* 11.0* 10.2*   HCT 34.8* 33.0* 30.5*   .5* 101.9* 102.3*    301 259     BMP/Mag:  Recent Labs     05/06/20  0334 05/07/20  0304 05/07/20  0340 05/08/20  0415     --  139 139   K 4.2  --  4.4

## 2020-05-09 LAB
ANION GAP SERPL CALCULATED.3IONS-SCNC: 11 MMOL/L (ref 3–16)
BASOPHILS ABSOLUTE: 0 K/UL (ref 0–0.2)
BASOPHILS RELATIVE PERCENT: 0.2 %
BUN BLDV-MCNC: 12 MG/DL (ref 7–20)
CALCIUM SERPL-MCNC: 8.5 MG/DL (ref 8.3–10.6)
CHLORIDE BLD-SCNC: 107 MMOL/L (ref 99–110)
CO2: 21 MMOL/L (ref 21–32)
CREAT SERPL-MCNC: 0.7 MG/DL (ref 0.9–1.3)
EOSINOPHILS ABSOLUTE: 0 K/UL (ref 0–0.6)
EOSINOPHILS RELATIVE PERCENT: 0 %
GFR AFRICAN AMERICAN: >60
GFR NON-AFRICAN AMERICAN: >60
GLUCOSE BLD-MCNC: 157 MG/DL (ref 70–99)
HCT VFR BLD CALC: 28.9 % (ref 40.5–52.5)
HEMOGLOBIN: 10 G/DL (ref 13.5–17.5)
LYMPHOCYTES ABSOLUTE: 0 K/UL (ref 1–5.1)
LYMPHOCYTES RELATIVE PERCENT: 3.4 %
MCH RBC QN AUTO: 34.7 PG (ref 26–34)
MCHC RBC AUTO-ENTMCNC: 34.6 G/DL (ref 31–36)
MCV RBC AUTO: 100.5 FL (ref 80–100)
MONOCYTES ABSOLUTE: 0 K/UL (ref 0–1.3)
MONOCYTES RELATIVE PERCENT: 0.4 %
NEUTROPHILS ABSOLUTE: 1.4 K/UL (ref 1.7–7.7)
NEUTROPHILS RELATIVE PERCENT: 96 %
PDW BLD-RTO: 15.3 % (ref 12.4–15.4)
PLATELET # BLD: 241 K/UL (ref 135–450)
PMV BLD AUTO: 9 FL (ref 5–10.5)
POTASSIUM SERPL-SCNC: 4.5 MMOL/L (ref 3.5–5.1)
RBC # BLD: 2.88 M/UL (ref 4.2–5.9)
SODIUM BLD-SCNC: 139 MMOL/L (ref 136–145)
WBC # BLD: 1.5 K/UL (ref 4–11)

## 2020-05-09 PROCEDURE — 6370000000 HC RX 637 (ALT 250 FOR IP): Performed by: NURSE PRACTITIONER

## 2020-05-09 PROCEDURE — 36592 COLLECT BLOOD FROM PICC: CPT

## 2020-05-09 PROCEDURE — 6360000002 HC RX W HCPCS: Performed by: NURSE PRACTITIONER

## 2020-05-09 PROCEDURE — 80048 BASIC METABOLIC PNL TOTAL CA: CPT

## 2020-05-09 PROCEDURE — 6370000000 HC RX 637 (ALT 250 FOR IP): Performed by: INTERNAL MEDICINE

## 2020-05-09 PROCEDURE — 2060000000 HC ICU INTERMEDIATE R&B

## 2020-05-09 PROCEDURE — 6360000002 HC RX W HCPCS: Performed by: INTERNAL MEDICINE

## 2020-05-09 PROCEDURE — 2580000003 HC RX 258: Performed by: NURSE PRACTITIONER

## 2020-05-09 PROCEDURE — 85025 COMPLETE CBC W/AUTO DIFF WBC: CPT

## 2020-05-09 PROCEDURE — 2580000003 HC RX 258: Performed by: INTERNAL MEDICINE

## 2020-05-09 RX ORDER — PANTOPRAZOLE SODIUM 40 MG/1
40 TABLET, DELAYED RELEASE ORAL
Status: DISCONTINUED | OUTPATIENT
Start: 2020-05-09 | End: 2020-06-05 | Stop reason: HOSPADM

## 2020-05-09 RX ORDER — CALCIUM CARBONATE 200(500)MG
500 TABLET,CHEWABLE ORAL 3 TIMES DAILY PRN
Status: DISCONTINUED | OUTPATIENT
Start: 2020-05-09 | End: 2020-06-05 | Stop reason: HOSPADM

## 2020-05-09 RX ADMIN — LORAZEPAM 1 MG: 1 TABLET ORAL at 12:11

## 2020-05-09 RX ADMIN — VALACYCLOVIR HYDROCHLORIDE 500 MG: 500 TABLET, FILM COATED ORAL at 08:44

## 2020-05-09 RX ADMIN — VALACYCLOVIR HYDROCHLORIDE 500 MG: 500 TABLET, FILM COATED ORAL at 20:30

## 2020-05-09 RX ADMIN — Medication 10 ML: at 08:44

## 2020-05-09 RX ADMIN — LEVOFLOXACIN 500 MG: 500 TABLET, FILM COATED ORAL at 20:30

## 2020-05-09 RX ADMIN — Medication 10 ML: at 20:29

## 2020-05-09 RX ADMIN — POTASSIUM CHLORIDE: 2 INJECTION, SOLUTION, CONCENTRATE INTRAVENOUS at 08:44

## 2020-05-09 RX ADMIN — PANTOPRAZOLE SODIUM 40 MG: 40 TABLET, DELAYED RELEASE ORAL at 13:00

## 2020-05-09 RX ADMIN — ENOXAPARIN SODIUM 40 MG: 40 INJECTION SUBCUTANEOUS at 17:55

## 2020-05-09 RX ADMIN — URSODIOL 500 MG: 250 TABLET, FILM COATED ORAL at 08:44

## 2020-05-09 RX ADMIN — LORAZEPAM 1 MG: 1 TABLET ORAL at 06:52

## 2020-05-09 RX ADMIN — LORAZEPAM 1 MG: 1 TABLET ORAL at 17:55

## 2020-05-09 RX ADMIN — LORAZEPAM 1 MG: 1 TABLET ORAL at 00:37

## 2020-05-09 RX ADMIN — POTASSIUM CHLORIDE: 2 INJECTION, SOLUTION, CONCENTRATE INTRAVENOUS at 17:55

## 2020-05-09 RX ADMIN — FLUCONAZOLE 200 MG: 200 TABLET ORAL at 08:44

## 2020-05-09 RX ADMIN — URSODIOL 500 MG: 250 TABLET, FILM COATED ORAL at 20:30

## 2020-05-09 RX ADMIN — SODIUM CHLORIDE 15 ML: 900 IRRIGANT IRRIGATION at 20:31

## 2020-05-09 RX ADMIN — POTASSIUM CHLORIDE: 2 INJECTION, SOLUTION, CONCENTRATE INTRAVENOUS at 01:47

## 2020-05-09 ASSESSMENT — PAIN SCALES - GENERAL
PAINLEVEL_OUTOF10: 0

## 2020-05-09 NOTE — PLAN OF CARE
05/09/20  0350       Thrombocytopenia Precautions in place. Patient showing no signs or symptoms of active bleeding. Transfusion not indicated at this time. Patient verbalizes understanding of all instructions. Will continue to assess and implement POC. Call light within reach and hourly rounding in place. Problem: Anxiety:  Goal: Level of anxiety will decrease  Description: Level of anxiety will decrease    Outcome: Met This Shift  Note: Pt denies anxiety during shift.

## 2020-05-09 NOTE — PLAN OF CARE
Problem: Pain:  Goal: Pain level will decrease  Description: Pain level will decrease  5/8/2020 1607 by Nurys Christine RN  Outcome: Met This Shift  Note: Pt denies pain during shift. Problem: Falls - Risk of:  Goal: Will remain free from falls  Description: Will remain free from falls  5/8/2020 1607 by Nurys Christine RN  Outcome: Met This Shift  Note: Orthostatic vital signs obtained at start of shift - see flowsheet for details. Pt does not meet criteria for orthostasis. Pt is a Med fall risk. See [facility] Fall Score and ABCDS Injury Risk assessments. Pt bed is in low position, side rails up, call light and belongings are in reach. Fall risk light is on outside pts room. Pt encouraged to call for assistance as needed. Will continue with hourly rounds for PO intake, pain needs, toileting and repositioning as needed. Problem: Infection - Central Venous Catheter-Associated Bloodstream Infection:  Goal: Will show no infection signs and symptoms  Description: Will show no infection signs and symptoms  5/8/2020 1607 by Nurys Christine RN  Outcome: Met This Shift  Note: CVC site remains free of signs/symptoms of infection. No drainage, edema, erythema, pain, or warmth noted at site. Dressing changes continue per protocol and on an as needed basis - see flowsheet. Problem: Venous Thromboembolism:  Goal: Will show no signs or symptoms of venous thromboembolism  Description: Will show no signs or symptoms of venous thromboembolism  5/8/2020 1607 by Nurys Christine RN  Outcome: Met This Shift  Note: Pt is at risk for DVT d/t decreased mobility and cancer treatment. Pt educated on importance of activity. Pt has orders for Subcut prophylactic lovenox. Pt verbalizes understanding of need for prophylaxis while inpatient.         Problem: Bleeding:  Goal: Will show no signs and symptoms of excessive bleeding  Description: Will show no signs and symptoms of excessive bleeding  5/8/2020 1607 by Zaynab Sanabria RN  Outcome: Met This Shift  Note: Patient's hemoglobin this AM:   Recent Labs     05/09/20  0350   HGB 10.0*     Patient's platelet count this AM:   Recent Labs     05/09/20  0350       Thrombocytopenia Precautions in place. Patient showing no signs or symptoms of active bleeding. Transfusion not indicated at this time. Patient verbalizes understanding of all instructions. Will continue to assess and implement POC. Call light within reach and hourly rounding in place. Problem: Anxiety:  Goal: Level of anxiety will decrease  Description: Level of anxiety will decrease  5/8/2020 1607 by Zaynab Sanabria RN  Outcome: Met This Shift  Note: Pt denies anxiety during shift.

## 2020-05-10 LAB
ANION GAP SERPL CALCULATED.3IONS-SCNC: 8 MMOL/L (ref 3–16)
BASOPHILS ABSOLUTE: 0 K/UL (ref 0–0.2)
BASOPHILS RELATIVE PERCENT: 0.3 %
BUN BLDV-MCNC: 17 MG/DL (ref 7–20)
CALCIUM SERPL-MCNC: 8.4 MG/DL (ref 8.3–10.6)
CHLORIDE BLD-SCNC: 105 MMOL/L (ref 99–110)
CO2: 25 MMOL/L (ref 21–32)
CREAT SERPL-MCNC: 0.8 MG/DL (ref 0.9–1.3)
EOSINOPHILS ABSOLUTE: 0 K/UL (ref 0–0.6)
EOSINOPHILS RELATIVE PERCENT: 0.1 %
GFR AFRICAN AMERICAN: >60
GFR NON-AFRICAN AMERICAN: >60
GLUCOSE BLD-MCNC: 107 MG/DL (ref 70–99)
HCT VFR BLD CALC: 29.1 % (ref 40.5–52.5)
HEMOGLOBIN: 9.8 G/DL (ref 13.5–17.5)
LYMPHOCYTES ABSOLUTE: 0 K/UL (ref 1–5.1)
LYMPHOCYTES RELATIVE PERCENT: 3 %
MCH RBC QN AUTO: 34 PG (ref 26–34)
MCHC RBC AUTO-ENTMCNC: 33.7 G/DL (ref 31–36)
MCV RBC AUTO: 100.9 FL (ref 80–100)
MONOCYTES ABSOLUTE: 0 K/UL (ref 0–1.3)
MONOCYTES RELATIVE PERCENT: 1.1 %
NEUTROPHILS ABSOLUTE: 1 K/UL (ref 1.7–7.7)
NEUTROPHILS RELATIVE PERCENT: 95.5 %
PDW BLD-RTO: 15.1 % (ref 12.4–15.4)
PLATELET # BLD: 225 K/UL (ref 135–450)
PMV BLD AUTO: 9.1 FL (ref 5–10.5)
POTASSIUM SERPL-SCNC: 4 MMOL/L (ref 3.5–5.1)
RBC # BLD: 2.88 M/UL (ref 4.2–5.9)
SODIUM BLD-SCNC: 138 MMOL/L (ref 136–145)
WBC # BLD: 1.1 K/UL (ref 4–11)

## 2020-05-10 PROCEDURE — 80048 BASIC METABOLIC PNL TOTAL CA: CPT

## 2020-05-10 PROCEDURE — 6370000000 HC RX 637 (ALT 250 FOR IP): Performed by: INTERNAL MEDICINE

## 2020-05-10 PROCEDURE — 6370000000 HC RX 637 (ALT 250 FOR IP): Performed by: NURSE PRACTITIONER

## 2020-05-10 PROCEDURE — 2580000003 HC RX 258: Performed by: INTERNAL MEDICINE

## 2020-05-10 PROCEDURE — 2580000003 HC RX 258: Performed by: NURSE PRACTITIONER

## 2020-05-10 PROCEDURE — 36592 COLLECT BLOOD FROM PICC: CPT

## 2020-05-10 PROCEDURE — 6360000002 HC RX W HCPCS: Performed by: INTERNAL MEDICINE

## 2020-05-10 PROCEDURE — 2060000000 HC ICU INTERMEDIATE R&B

## 2020-05-10 PROCEDURE — 85025 COMPLETE CBC W/AUTO DIFF WBC: CPT

## 2020-05-10 PROCEDURE — 6360000002 HC RX W HCPCS: Performed by: NURSE PRACTITIONER

## 2020-05-10 RX ADMIN — PANTOPRAZOLE SODIUM 40 MG: 40 TABLET, DELAYED RELEASE ORAL at 06:11

## 2020-05-10 RX ADMIN — LEVOFLOXACIN 500 MG: 500 TABLET, FILM COATED ORAL at 20:54

## 2020-05-10 RX ADMIN — FLUCONAZOLE 200 MG: 200 TABLET ORAL at 09:25

## 2020-05-10 RX ADMIN — VALACYCLOVIR HYDROCHLORIDE 500 MG: 500 TABLET, FILM COATED ORAL at 20:54

## 2020-05-10 RX ADMIN — ENOXAPARIN SODIUM 40 MG: 40 INJECTION SUBCUTANEOUS at 17:53

## 2020-05-10 RX ADMIN — URSODIOL 500 MG: 250 TABLET, FILM COATED ORAL at 20:54

## 2020-05-10 RX ADMIN — LORAZEPAM 1 MG: 1 TABLET ORAL at 00:42

## 2020-05-10 RX ADMIN — URSODIOL 500 MG: 250 TABLET, FILM COATED ORAL at 09:26

## 2020-05-10 RX ADMIN — POTASSIUM CHLORIDE: 2 INJECTION, SOLUTION, CONCENTRATE INTRAVENOUS at 11:44

## 2020-05-10 RX ADMIN — VALACYCLOVIR HYDROCHLORIDE 500 MG: 500 TABLET, FILM COATED ORAL at 09:26

## 2020-05-10 RX ADMIN — Medication 10 ML: at 09:26

## 2020-05-10 RX ADMIN — MAGNESIUM HYDROXIDE 10 ML: 400 SUSPENSION ORAL at 17:57

## 2020-05-10 ASSESSMENT — PAIN SCALES - GENERAL
PAINLEVEL_OUTOF10: 0

## 2020-05-10 NOTE — PROGRESS NOTES
**OR** prochlorperazine, acetaminophen, LORazepam **OR** LORazepam, [START ON 5/14/2020] furosemide    ROS:  As noted above, otherwise remainder of 10-point ROS negative    Physical Exam:    I&O:      Intake/Output Summary (Last 24 hours) at 5/10/2020 1211  Last data filed at 5/10/2020 8534  Gross per 24 hour   Intake 2699 ml   Output 2850 ml   Net -151 ml       Vital Signs:  /76   Pulse 54   Temp 97.7 °F (36.5 °C) (Oral)   Resp 16   Ht 5' 9.8\" (1.773 m)   Wt 191 lb 1 oz (86.7 kg)   SpO2 100%   BMI 27.57 kg/m²     Weight:    Wt Readings from Last 3 Encounters:   05/10/20 191 lb 1 oz (86.7 kg)   03/31/20 184 lb 9.6 oz (83.7 kg)   02/17/20 180 lb (81.6 kg)       General: Awake, alert and oriented. HEENT: normocephalic, PERRL, no scleral erythema or icterus, Oral mucosa moist and intact, throat clear  NECK: supple without palpable adenopathy  BACK: Straight  SKIN: warm dry and intact without lesions rashes or masses  CHEST: CTA bilaterally without use of accessory muscles  CV: Normal S1 S2, RRR, no MRG  ABD: NT, ND, normoactive BS, no palpable masses or hepatosplenomegaly  EXTREMITIES: without edema, denies calf tenderness  NEURO: CN II - XII grossly intact. REsting tremor  CATHETER: Left IJ TLH (5/5/20, IR) - CDI & Right IJ PAC (2/17/20, [facility]) - CDI    Data:   CBC:   Recent Labs     05/08/20  0415 05/09/20  0350 05/10/20  0330   WBC 2.1* 1.5* 1.1*   HGB 10.2* 10.0* 9.8*   HCT 30.5* 28.9* 29.1*   .3* 100.5* 100.9*    241 225     BMP/Mag:  Recent Labs     05/08/20  0415 05/09/20  0350 05/10/20  0330    139 138   K 4.4 4.5 4.0    107 105   CO2 18* 21 25   PHOS 3.2  --   --    BUN 12 12 17   CREATININE 0.7* 0.7* 0.8*     LIVP:   Recent Labs     05/08/20 0415   AST 9*   ALT 13   BILIDIR <0.2   BILITOT 0.6   ALKPHOS 67     Uric Acid:    Recent Labs     05/08/20 0415   LABURIC 4.8     Coags:   No results for input(s): PROTIME, INR, APTT in the last 72 hours.   Tacro:  No results for input(s): TACROLEV in the last 72 hours. CMV Quant DNA by PCR: No results found for: CMVDNAQNT    PROBLEM LIST:        1. MDS  2. Anxiety and depression  3. HLD  4. H/o shingles (2004, RLQ)  5. Gilbert syndrome    Post- Transplant Complications:  1. Resting tremor   2. Steroid Induced Hyperglycemia      TREATMENT:       1. Decitabine x3 cycles - 2/17-4/14/20   2. Targeted Busulfan & Fludarabine followed by MUD Allo PBSC infusion 5/11/20  Preparative Regimen: Targeted Busulfan & Fludarabine  Date of BMT: 5/11/2020  Source of stem cells: PBSC - 6.7 x10^6 hv75iffdf/kg  Donor/Recipient Blood Type: A+ / A+  Donor Sex: Male, follow VNTR (DID# [id number])  CMV Donor / Recipient: Pos / Pos     ASSESSMENT AND PLAN:         1. Myelodysplastic Syndrome: Stable disease  - Most recent BM Bx/Asp (4/7/20) - ongoing disease  - Admitted for Bu/Flu & MUD-pb BMT  - Busulfan AUC low at 4672, therefore busulfan dose increased from 252 mg to 315 mg, for doses 3 and 4. Day - 1     2. ID: Afebrile, no evidence of infection.    - Cont Levaquin, Valtrex & Diflucan ppx   - Increase Diflucan ppx to 400 mg daily 5/16/20 (day +5)     Donor/Recipient CMV: Pos / Pos  - Check CMV PCR weekly once WBC 1.0  - Start letermovir ppx if started on high-dose steroids     3. Heme:  Neutropenia and anemia from chemotherapy   - Transfuse for Hgb < 7 and Platelets < 04V.    - No transfusion today.     4.  Metabolic:  hypervolemia + hyperglycemia from steroids     - Cont IVF:  NS + KCl 10 meq @ 150 mL/hr --> decrease to 100mls/hr (5/9)   - Replace potassium and magnesium per policy.     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 VOD:  No evidence of VOD.   Recent Labs     05/08/20  0415   BILIDIR <0.2   BILITOT 0.6     Admission Weight: 185 lb (83.9 kg)  Current

## 2020-05-11 ENCOUNTER — APPOINTMENT (OUTPATIENT)
Dept: GENERAL RADIOLOGY | Age: 55
DRG: 014 | End: 2020-05-11
Attending: INTERNAL MEDICINE
Payer: COMMERCIAL

## 2020-05-11 LAB
ALBUMIN SERPL-MCNC: 3.9 G/DL (ref 3.4–5)
ALP BLD-CCNC: 54 U/L (ref 40–129)
ALT SERPL-CCNC: 12 U/L (ref 10–40)
ANION GAP SERPL CALCULATED.3IONS-SCNC: 7 MMOL/L (ref 3–16)
APTT: 30.4 SEC (ref 24.2–36.2)
AST SERPL-CCNC: 11 U/L (ref 15–37)
ATYPICAL LYMPHOCYTE RELATIVE PERCENT: 3 % (ref 0–6)
BANDED NEUTROPHILS RELATIVE PERCENT: 2 % (ref 0–7)
BASOPHILS ABSOLUTE: 0 K/UL (ref 0–0.2)
BASOPHILS RELATIVE PERCENT: 4 %
BILIRUB SERPL-MCNC: 1.4 MG/DL (ref 0–1)
BILIRUBIN DIRECT: <0.2 MG/DL (ref 0–0.3)
BILIRUBIN, INDIRECT: ABNORMAL MG/DL (ref 0–1)
BUN BLDV-MCNC: 17 MG/DL (ref 7–20)
CALCIUM SERPL-MCNC: 8.3 MG/DL (ref 8.3–10.6)
CHLORIDE BLD-SCNC: 106 MMOL/L (ref 99–110)
CO2: 25 MMOL/L (ref 21–32)
CREAT SERPL-MCNC: 0.7 MG/DL (ref 0.9–1.3)
EOSINOPHILS ABSOLUTE: 0 K/UL (ref 0–0.6)
EOSINOPHILS RELATIVE PERCENT: 0 %
GFR AFRICAN AMERICAN: >60
GFR NON-AFRICAN AMERICAN: >60
GLUCOSE BLD-MCNC: 104 MG/DL (ref 70–99)
HCT VFR BLD CALC: 30.4 % (ref 40.5–52.5)
HEMATOLOGY PATH CONSULT: NO
HEMOGLOBIN: 10.5 G/DL (ref 13.5–17.5)
HYPOCHROMIA: ABNORMAL
INR BLD: 1.21 (ref 0.86–1.14)
LACTATE DEHYDROGENASE: 154 U/L (ref 100–190)
LYMPHOCYTES ABSOLUTE: 0.1 K/UL (ref 1–5.1)
LYMPHOCYTES RELATIVE PERCENT: 7 %
MACROCYTES: ABNORMAL
MAGNESIUM: 2.2 MG/DL (ref 1.8–2.4)
MCH RBC QN AUTO: 34.4 PG (ref 26–34)
MCHC RBC AUTO-ENTMCNC: 34.4 G/DL (ref 31–36)
MCV RBC AUTO: 100 FL (ref 80–100)
METAMYELOCYTES RELATIVE PERCENT: 2 %
MONOCYTES ABSOLUTE: 0 K/UL (ref 0–1.3)
MONOCYTES RELATIVE PERCENT: 3 %
MYELOCYTE PERCENT: 2 %
NEUTROPHILS ABSOLUTE: 0.7 K/UL (ref 1.7–7.7)
NEUTROPHILS RELATIVE PERCENT: 77 %
NUCLEATED RED BLOOD CELLS: 2 /100 WBC
NUCLEATED RED BLOOD CELLS: 2 /100 WBC
PDW BLD-RTO: 15.1 % (ref 12.4–15.4)
PHOSPHORUS: 2.3 MG/DL (ref 2.5–4.9)
PLATELET # BLD: 239 K/UL (ref 135–450)
PMV BLD AUTO: 9.4 FL (ref 5–10.5)
POTASSIUM SERPL-SCNC: 3.4 MMOL/L (ref 3.5–5.1)
PROTHROMBIN TIME: 14.1 SEC (ref 10–13.2)
RBC # BLD: 3.04 M/UL (ref 4.2–5.9)
SODIUM BLD-SCNC: 138 MMOL/L (ref 136–145)
TOTAL PROTEIN: 5.7 G/DL (ref 6.4–8.2)
URIC ACID, SERUM: 5.8 MG/DL (ref 3.5–7.2)
WBC # BLD: 0.9 K/UL (ref 4–11)

## 2020-05-11 PROCEDURE — 6360000002 HC RX W HCPCS: Performed by: INTERNAL MEDICINE

## 2020-05-11 PROCEDURE — 6360000002 HC RX W HCPCS: Performed by: NURSE PRACTITIONER

## 2020-05-11 PROCEDURE — 2580000003 HC RX 258: Performed by: NURSE PRACTITIONER

## 2020-05-11 PROCEDURE — 84550 ASSAY OF BLOOD/URIC ACID: CPT

## 2020-05-11 PROCEDURE — 6370000000 HC RX 637 (ALT 250 FOR IP): Performed by: NURSE PRACTITIONER

## 2020-05-11 PROCEDURE — 36592 COLLECT BLOOD FROM PICC: CPT

## 2020-05-11 PROCEDURE — 85610 PROTHROMBIN TIME: CPT

## 2020-05-11 PROCEDURE — 85025 COMPLETE CBC W/AUTO DIFF WBC: CPT

## 2020-05-11 PROCEDURE — 30243Y3 TRANSFUSION OF ALLOGENEIC UNRELATED HEMATOPOIETIC STEM CELLS INTO CENTRAL VEIN, PERCUTANEOUS APPROACH: ICD-10-PCS | Performed by: INTERNAL MEDICINE

## 2020-05-11 PROCEDURE — 85730 THROMBOPLASTIN TIME PARTIAL: CPT

## 2020-05-11 PROCEDURE — 80048 BASIC METABOLIC PNL TOTAL CA: CPT

## 2020-05-11 PROCEDURE — 84100 ASSAY OF PHOSPHORUS: CPT

## 2020-05-11 PROCEDURE — 83735 ASSAY OF MAGNESIUM: CPT

## 2020-05-11 PROCEDURE — 71045 X-RAY EXAM CHEST 1 VIEW: CPT

## 2020-05-11 PROCEDURE — 6370000000 HC RX 637 (ALT 250 FOR IP): Performed by: INTERNAL MEDICINE

## 2020-05-11 PROCEDURE — 2580000003 HC RX 258: Performed by: INTERNAL MEDICINE

## 2020-05-11 PROCEDURE — 80076 HEPATIC FUNCTION PANEL: CPT

## 2020-05-11 PROCEDURE — 38208 THAW PRESERVED STEM CELLS: CPT

## 2020-05-11 PROCEDURE — 83615 LACTATE (LD) (LDH) ENZYME: CPT

## 2020-05-11 PROCEDURE — 2060000000 HC ICU INTERMEDIATE R&B

## 2020-05-11 PROCEDURE — 38241 TRANSPLT AUTOL HCT/DONOR: CPT

## 2020-05-11 RX ORDER — ACETAMINOPHEN 325 MG/1
650 TABLET ORAL ONCE
Status: COMPLETED | OUTPATIENT
Start: 2020-05-11 | End: 2020-05-11

## 2020-05-11 RX ORDER — MORPHINE SULFATE 2 MG/ML
2 INJECTION, SOLUTION INTRAMUSCULAR; INTRAVENOUS EVERY 10 MIN PRN
Status: DISCONTINUED | OUTPATIENT
Start: 2020-05-11 | End: 2020-05-18

## 2020-05-11 RX ORDER — SODIUM CHLORIDE 9 MG/ML
INJECTION, SOLUTION INTRAVENOUS CONTINUOUS PRN
Status: DISCONTINUED | OUTPATIENT
Start: 2020-05-11 | End: 2020-06-05 | Stop reason: HOSPADM

## 2020-05-11 RX ORDER — EPINEPHRINE 1 MG/ML
0.3 INJECTION, SOLUTION, CONCENTRATE INTRAVENOUS
Status: DISPENSED | OUTPATIENT
Start: 2020-05-11 | End: 2020-05-11

## 2020-05-11 RX ORDER — DIPHENHYDRAMINE HYDROCHLORIDE 50 MG/ML
25 INJECTION INTRAMUSCULAR; INTRAVENOUS PRN
Status: DISCONTINUED | OUTPATIENT
Start: 2020-05-11 | End: 2020-05-18 | Stop reason: ALTCHOICE

## 2020-05-11 RX ORDER — DIPHENHYDRAMINE HCL 25 MG
25 TABLET ORAL ONCE
Status: COMPLETED | OUTPATIENT
Start: 2020-05-11 | End: 2020-05-11

## 2020-05-11 RX ADMIN — POTASSIUM CHLORIDE 20 MEQ: 29.8 INJECTION, SOLUTION INTRAVENOUS at 09:04

## 2020-05-11 RX ADMIN — DIPHENHYDRAMINE HYDROCHLORIDE 25 MG: 25 TABLET ORAL at 10:30

## 2020-05-11 RX ADMIN — SODIUM CHLORIDE 15 ML: 900 IRRIGANT IRRIGATION at 10:32

## 2020-05-11 RX ADMIN — POTASSIUM CHLORIDE: 2 INJECTION, SOLUTION, CONCENTRATE INTRAVENOUS at 06:06

## 2020-05-11 RX ADMIN — LORAZEPAM 0.5 MG: 0.5 TABLET ORAL at 21:03

## 2020-05-11 RX ADMIN — POTASSIUM CHLORIDE: 2 INJECTION, SOLUTION, CONCENTRATE INTRAVENOUS at 13:40

## 2020-05-11 RX ADMIN — POTASSIUM CHLORIDE 20 MEQ: 29.8 INJECTION, SOLUTION INTRAVENOUS at 08:00

## 2020-05-11 RX ADMIN — POTASSIUM CHLORIDE 20 MEQ: 29.8 INJECTION, SOLUTION INTRAVENOUS at 06:57

## 2020-05-11 RX ADMIN — PANTOPRAZOLE SODIUM 40 MG: 40 TABLET, DELAYED RELEASE ORAL at 06:38

## 2020-05-11 RX ADMIN — ENOXAPARIN SODIUM 40 MG: 40 INJECTION SUBCUTANEOUS at 17:29

## 2020-05-11 RX ADMIN — POTASSIUM CHLORIDE: 2 INJECTION, SOLUTION, CONCENTRATE INTRAVENOUS at 23:55

## 2020-05-11 RX ADMIN — URSODIOL 500 MG: 250 TABLET, FILM COATED ORAL at 08:03

## 2020-05-11 RX ADMIN — Medication 10 ML: at 08:03

## 2020-05-11 RX ADMIN — LEVOFLOXACIN 500 MG: 500 TABLET, FILM COATED ORAL at 21:03

## 2020-05-11 RX ADMIN — SODIUM CHLORIDE 15 ML: 900 IRRIGANT IRRIGATION at 16:07

## 2020-05-11 RX ADMIN — HYDROCORTISONE SODIUM SUCCINATE 100 MG: 100 INJECTION, POWDER, FOR SOLUTION INTRAMUSCULAR; INTRAVENOUS at 10:30

## 2020-05-11 RX ADMIN — URSODIOL 500 MG: 250 TABLET, FILM COATED ORAL at 21:02

## 2020-05-11 RX ADMIN — BISACODYL 10 MG: 5 TABLET, COATED ORAL at 10:39

## 2020-05-11 RX ADMIN — VALACYCLOVIR HYDROCHLORIDE 500 MG: 500 TABLET, FILM COATED ORAL at 21:03

## 2020-05-11 RX ADMIN — FLUCONAZOLE 200 MG: 200 TABLET ORAL at 08:03

## 2020-05-11 RX ADMIN — VALACYCLOVIR HYDROCHLORIDE 500 MG: 500 TABLET, FILM COATED ORAL at 08:03

## 2020-05-11 RX ADMIN — ACETAMINOPHEN 650 MG: 325 TABLET ORAL at 10:30

## 2020-05-11 RX ADMIN — POTASSIUM CHLORIDE: 2 INJECTION, SOLUTION, CONCENTRATE INTRAVENOUS at 18:37

## 2020-05-11 RX ADMIN — POTASSIUM CHLORIDE 20 MEQ: 29.8 INJECTION, SOLUTION INTRAVENOUS at 06:09

## 2020-05-11 ASSESSMENT — PAIN SCALES - GENERAL
PAINLEVEL_OUTOF10: 0

## 2020-05-11 NOTE — PLAN OF CARE
Problem: Pain:  Goal: Pain level will decrease  Description: Pain level will decrease  Outcome: Ongoing  Note:   No complaints of pain this shift. Problem: Falls - Risk of:  Goal: Will remain free from falls  Description: Will remain free from falls  Outcome: Ongoing  Note: Orthostatic vital signs obtained at start of shift - see flowsheet for details. Pt does not meet criteria for orthostasis. Pt is a Med fall risk. See [place] Fall Score and ABCDS Injury Risk assessments. - Screening for Orthostasis AND not a [place] Risk per PHAM/ABCDS: Pt bed is in low position, side rails up, call light and belongings are in reach. Fall risk light is on outside pts room. Pt encouraged to call for assistance as needed. Will continue with hourly rounds for PO intake, pain needs, toileting and repositioning as needed. Problem: Infection - Central Venous Catheter-Associated Bloodstream Infection:  Goal: Will show no infection signs and symptoms  Description: Will show no infection signs and symptoms  Outcome: Ongoing  Note: CVC site remains free of signs/symptoms of infection. No drainage, edema, erythema, pain, or warmth noted at site. Dressing changes continue per protocol and on an as needed basis - see flowsheet. Compliant with [facility] Bath Protocol:  Performed CHG bath today per [facility] protocol utilizing CHG solution in the shower. CVC site cleansed with CHG wipe over dressing, skin surrounding dressing, and first 6\" of IV tubing. Pt tolerated well. Continued to encourage daily CHG bathing per [employer] protocol. Problem: Venous Thromboembolism:  Goal: Will show no signs or symptoms of venous thromboembolism  Description: Will show no signs or symptoms of venous thromboembolism  Outcome: Ongoing  Note: Adherent with DVT Prevention: Pt is at risk for DVT d/t decreased mobility and cancer treatment. Pt educated on importance of activity. Pt has orders for Subcut prophylactic lovenox.   Pt verbalizes understanding of need for prophylaxis while inpatient. Problem: Bleeding:  Goal: Will show no signs and symptoms of excessive bleeding  Description: Will show no signs and symptoms of excessive bleeding  Outcome: Ongoing  Note: Patient's hemoglobin this AM:   Recent Labs     05/07/20  0340   HGB 11.0*     Patient's platelet count this AM:   Recent Labs     05/07/20  0340       Thrombocytopenia not present at this time. Patient showing no signs or symptoms of active bleeding. Transfusion not indicated at this time. Patient verbalizes understanding of all instructions. Will continue to assess and implement POC. Call light within reach and hourly rounding in place.

## 2020-05-11 NOTE — PROGRESS NOTES
Pt feeling better this afternoon. Pt verbalize abdominal cramps went away. Pt still awaiting a BM. Pt walked several times in hallway. Will monitor.

## 2020-05-11 NOTE — CARE COORDINATION
Case Management Assessment           Daily Note                 Date/ Time of Note: 5/11/2020 11:16 AM         Note completed by: Juanita Sánchez    Patient Name: Martha Burnette  YOB: 1965    Diagnosis:Myelodysplastic syndrome [facility]) [D46.9]  MDS (myelodysplastic syndrome) ([facility] [D46.9]  Patient Admission Status: Inpatient    Date of Admission:5/5/2020  8:30 AM Length of Stay: 6 GLOS:      Current Plan of Care: home with no needs  ________________________________________________________________________________________  PT AM-PAC:   / 24 per last evaluation on: none    OT AM-PAC:   / 24 per last evaluation on: none    DME Needs for discharge: none  ________________________________________________________________________________________  Discharge Plan: Home    Tentative discharge date: 2-3 weeks    Current barriers to discharge: recovery from allogenic transplant    Referrals completed: Not Applicable    Resources/ information provided: Not indicated at this time  ________________________________________________________________________________________  Case Management Notes: Patient is receiving allogenic transplant today. Will likely be hospitalized for another 2-3 weeks waiting for his WBC to recover. SW following for potential discharge planning needs. Jamarikt Todd and his family were provided with choice of provider; he and his family are in agreement with the discharge plan.     Care Transition Patient: Anne Marie SánchezAdrienne  Case Management Department  Ph: [phone]  Fax: [phone]

## 2020-05-11 NOTE — PROGRESS NOTES
<0. 2   BILITOT 1.4*     Admission Weight: 185 lb (83.9 kg)  Current Weight: Weight: 191 lb 1 oz (86.7 kg). - Cont Actigall.  - Monitor bili closely     7. Pulmonary: No active issues. - Encourage IS and ambulation      8. GI / Nutrition:   Nutrition:  Appetite & intake has decreased slightly   - Cont low microbial diet  - Dietician to follow  - constiptation- no response to MOM 5/11, Duloclax 10 mg 5/12    9. Neuro:  Resting tremor improved w/ increased ativan, possibly from Busulfan.  It was present prior to starting   - S/p scheduled Ativan w/ Buslufan  - Cont to monitor     - DVT Prophylaxis: Platelets >23,997 cells/dL, - daily lovenox prophylaxis ordered  Contraindications to pharmacologic prophylaxis: None  Contraindications to mechanical prophylaxis: None    - Disposition:  Once ANC >1 and recovered from toxicities of transplant.  ODALIS Moon - CNP     Anayeli Moya MD  [facility]  Please contact me through [address]

## 2020-05-11 NOTE — PLAN OF CARE
Problem: Pain:  Goal: Pain level will decrease  Description: Pain level will decrease  Outcome: Ongoing   Pt verbalize having some discomfort from abdominal cramping due to constipation. Pt denies need for pain medication at this time. Encourage pt to call out with any needs or status changes. Will monitor. Problem: Falls - Risk of:  Goal: Will remain free from falls  Description: Will remain free from falls  Outcome: Ongoing    Pt with activity orders for up ad savannah. Encouraged pt to be up OOB as much as possible throughout the day and for all meals. Encouraged frequent short naps as necessary to preserve energy but instructed that while awake, pt should be OOB. Pt in bed most of day due to not sleeping last night. Call light within reach. Pt is visualized to be OOB 1-25% of the time this shift. Will continue to encourage frequent activity. Problem: Venous Thromboembolism:  Goal: Will show no signs or symptoms of venous thromboembolism  Description: Will show no signs or symptoms of venous thromboembolism  Outcome: Ongoing   Adherent with DVT Prevention: Pt is at risk for DVT d/t decreased mobility and cancer treatment. Pt educated on importance of activity. Pt has orders for Subcut prophylactic lovenox. Pt verbalizes understanding of need for prophylaxis while inpatient. Problem: Bleeding:  Goal: Will show no signs and symptoms of excessive bleeding  Description: Will show no signs and symptoms of excessive bleeding  Outcome: Ongoing   Patient's hemoglobin this AM:   Recent Labs     05/11/20  0348   HGB 10.5*     Patient's platelet count this AM:   Recent Labs     05/11/20  0348       Thrombocytopenia Precautions in place. Patient showing no signs or symptoms of active bleeding. Transfusion not indicated at this time. Patient verbalizes understanding of all instructions. Will continue to assess and implement POC. Call light within reach and hourly rounding in place.       Problem:

## 2020-05-12 LAB
ANION GAP SERPL CALCULATED.3IONS-SCNC: 7 MMOL/L (ref 3–16)
BASOPHILS ABSOLUTE: 0 K/UL (ref 0–0.2)
BASOPHILS RELATIVE PERCENT: 0.5 %
BILIRUB SERPL-MCNC: 1 MG/DL (ref 0–1)
BUN BLDV-MCNC: 13 MG/DL (ref 7–20)
CALCIUM SERPL-MCNC: 8.6 MG/DL (ref 8.3–10.6)
CHLORIDE BLD-SCNC: 104 MMOL/L (ref 99–110)
CO2: 22 MMOL/L (ref 21–32)
CREAT SERPL-MCNC: 0.7 MG/DL (ref 0.9–1.3)
EOSINOPHILS ABSOLUTE: 0.1 K/UL (ref 0–0.6)
EOSINOPHILS RELATIVE PERCENT: 4.6 %
GFR AFRICAN AMERICAN: >60
GFR NON-AFRICAN AMERICAN: >60
GLUCOSE BLD-MCNC: 93 MG/DL (ref 70–99)
HCT VFR BLD CALC: 30.9 % (ref 40.5–52.5)
HEMOGLOBIN: 10.6 G/DL (ref 13.5–17.5)
LYMPHOCYTES ABSOLUTE: 0.1 K/UL (ref 1–5.1)
LYMPHOCYTES RELATIVE PERCENT: 9 %
MCH RBC QN AUTO: 34 PG (ref 26–34)
MCHC RBC AUTO-ENTMCNC: 34.4 G/DL (ref 31–36)
MCV RBC AUTO: 98.7 FL (ref 80–100)
MONOCYTES ABSOLUTE: 0 K/UL (ref 0–1.3)
MONOCYTES RELATIVE PERCENT: 0.8 %
NEUTROPHILS ABSOLUTE: 1.2 K/UL (ref 1.7–7.7)
NEUTROPHILS RELATIVE PERCENT: 85.1 %
PDW BLD-RTO: 14.8 % (ref 12.4–15.4)
PHOSPHORUS: 3 MG/DL (ref 2.5–4.9)
PLATELET # BLD: 240 K/UL (ref 135–450)
PMV BLD AUTO: 9.3 FL (ref 5–10.5)
POTASSIUM SERPL-SCNC: 4.1 MMOL/L (ref 3.5–5.1)
RBC # BLD: 3.13 M/UL (ref 4.2–5.9)
SODIUM BLD-SCNC: 133 MMOL/L (ref 136–145)
WBC # BLD: 1.4 K/UL (ref 4–11)

## 2020-05-12 PROCEDURE — 2580000003 HC RX 258: Performed by: NURSE PRACTITIONER

## 2020-05-12 PROCEDURE — 2580000003 HC RX 258: Performed by: INTERNAL MEDICINE

## 2020-05-12 PROCEDURE — 85025 COMPLETE CBC W/AUTO DIFF WBC: CPT

## 2020-05-12 PROCEDURE — 6370000000 HC RX 637 (ALT 250 FOR IP): Performed by: NURSE PRACTITIONER

## 2020-05-12 PROCEDURE — 6370000000 HC RX 637 (ALT 250 FOR IP): Performed by: INTERNAL MEDICINE

## 2020-05-12 PROCEDURE — 6360000002 HC RX W HCPCS: Performed by: INTERNAL MEDICINE

## 2020-05-12 PROCEDURE — 80048 BASIC METABOLIC PNL TOTAL CA: CPT

## 2020-05-12 PROCEDURE — 2060000000 HC ICU INTERMEDIATE R&B

## 2020-05-12 PROCEDURE — 84100 ASSAY OF PHOSPHORUS: CPT

## 2020-05-12 PROCEDURE — 36592 COLLECT BLOOD FROM PICC: CPT

## 2020-05-12 PROCEDURE — 6360000002 HC RX W HCPCS: Performed by: NURSE PRACTITIONER

## 2020-05-12 PROCEDURE — 87081 CULTURE SCREEN ONLY: CPT

## 2020-05-12 PROCEDURE — 82247 BILIRUBIN TOTAL: CPT

## 2020-05-12 RX ADMIN — Medication 10 ML: at 08:42

## 2020-05-12 RX ADMIN — POTASSIUM CHLORIDE: 2 INJECTION, SOLUTION, CONCENTRATE INTRAVENOUS at 18:02

## 2020-05-12 RX ADMIN — SODIUM CHLORIDE 15 ML: 900 IRRIGANT IRRIGATION at 17:24

## 2020-05-12 RX ADMIN — PANTOPRAZOLE SODIUM 40 MG: 40 TABLET, DELAYED RELEASE ORAL at 06:45

## 2020-05-12 RX ADMIN — SODIUM CHLORIDE 15 ML: 900 IRRIGANT IRRIGATION at 11:24

## 2020-05-12 RX ADMIN — URSODIOL 500 MG: 250 TABLET, FILM COATED ORAL at 20:45

## 2020-05-12 RX ADMIN — SODIUM CHLORIDE 15 ML: 900 IRRIGANT IRRIGATION at 20:45

## 2020-05-12 RX ADMIN — ENOXAPARIN SODIUM 40 MG: 40 INJECTION SUBCUTANEOUS at 17:24

## 2020-05-12 RX ADMIN — VALACYCLOVIR HYDROCHLORIDE 500 MG: 500 TABLET, FILM COATED ORAL at 08:42

## 2020-05-12 RX ADMIN — PROCHLORPERAZINE MALEATE 10 MG: 10 TABLET ORAL at 17:22

## 2020-05-12 RX ADMIN — LORAZEPAM 0.5 MG: 2 INJECTION INTRAMUSCULAR; INTRAVENOUS at 23:45

## 2020-05-12 RX ADMIN — Medication 10 ML: at 20:45

## 2020-05-12 RX ADMIN — URSODIOL 500 MG: 250 TABLET, FILM COATED ORAL at 08:43

## 2020-05-12 RX ADMIN — PROCHLORPERAZINE MALEATE 10 MG: 10 TABLET ORAL at 20:45

## 2020-05-12 RX ADMIN — FLUCONAZOLE 200 MG: 200 TABLET ORAL at 08:42

## 2020-05-12 RX ADMIN — VALACYCLOVIR HYDROCHLORIDE 500 MG: 500 TABLET, FILM COATED ORAL at 20:45

## 2020-05-12 RX ADMIN — LEVOFLOXACIN 500 MG: 500 TABLET, FILM COATED ORAL at 20:45

## 2020-05-12 ASSESSMENT — PAIN DESCRIPTION - PROGRESSION
CLINICAL_PROGRESSION: NOT CHANGED
CLINICAL_PROGRESSION: NOT CHANGED

## 2020-05-12 ASSESSMENT — PAIN SCALES - GENERAL
PAINLEVEL_OUTOF10: 0
PAINLEVEL_OUTOF10: 0
PAINLEVEL_OUTOF10: 4
PAINLEVEL_OUTOF10: 0
PAINLEVEL_OUTOF10: 4

## 2020-05-12 ASSESSMENT — PAIN DESCRIPTION - LOCATION
LOCATION: HEAD
LOCATION: HEAD

## 2020-05-12 ASSESSMENT — PAIN DESCRIPTION - FREQUENCY
FREQUENCY: CONTINUOUS
FREQUENCY: CONTINUOUS

## 2020-05-12 ASSESSMENT — PAIN DESCRIPTION - ORIENTATION
ORIENTATION: RIGHT;ANTERIOR
ORIENTATION: RIGHT;ANTERIOR

## 2020-05-12 ASSESSMENT — PAIN DESCRIPTION - ONSET
ONSET: ON-GOING
ONSET: ON-GOING

## 2020-05-12 ASSESSMENT — PAIN DESCRIPTION - DESCRIPTORS
DESCRIPTORS: HEADACHE
DESCRIPTORS: HEADACHE

## 2020-05-12 ASSESSMENT — PAIN DESCRIPTION - PAIN TYPE
TYPE: ACUTE PAIN
TYPE: ACUTE PAIN

## 2020-05-12 NOTE — PROGRESS NOTES
hydroxide, magnesium sulfate, potassium chloride, Saline Mouthwash, prochlorperazine **OR** prochlorperazine, acetaminophen, LORazepam **OR** LORazepam, [START ON 5/14/2020] furosemide    ROS:  As noted above, otherwise remainder of 10-point ROS negative    Physical Exam:    I&O:      Intake/Output Summary (Last 24 hours) at 5/12/2020 0751  Last data filed at 5/12/2020 0553  Gross per 24 hour   Intake 4771 ml   Output 6500 ml   Net -1729 ml       Vital Signs:  BP (!) 141/86   Pulse 70   Temp 98.7 °F (37.1 °C) (Oral)   Resp 17   Ht 5' 9.8\" (1.773 m)   Wt 185 lb 14.4 oz (84.3 kg)   SpO2 99%   BMI 26.82 kg/m²     Weight:    Wt Readings from Last 3 Encounters:   05/11/20 185 lb 14.4 oz (84.3 kg)   03/31/20 184 lb 9.6 oz (83.7 kg)   02/17/20 180 lb (81.6 kg)       General: Awake, alert and oriented. HEENT: normocephalic, PERRL, no scleral erythema or icterus, Oral mucosa moist and intact, throat clear  NECK: supple without palpable adenopathy  BACK: Straight  SKIN: warm dry and intact without lesions rashes or masses  CHEST: CTA bilaterally without use of accessory muscles  CV: Normal S1 S2, RRR, no MRG  ABD: NT, ND, normoactive BS, no palpable masses or hepatosplenomegaly  EXTREMITIES: without edema, denies calf tenderness  NEURO: CN II - XII grossly intact.   REsting tremor  CATHETER: Left IJ TLH (5/5/20, [facility]) - CDI & Right IJ PAC (2/17/20, [facility]) - CDI    Data:   CBC:   Recent Labs     05/10/20  0330 05/11/20  0348 05/12/20  0406   WBC 1.1* 0.9* 1.4*   HGB 9.8* 10.5* 10.6*   HCT 29.1* 30.4* 30.9*   .9* 100.0 98.7    239 240     BMP/Mag:  Recent Labs     05/10/20  0330 05/11/20  0348 05/12/20  0406    138 133*   K 4.0 3.4* 4.1    106 104   CO2 25 25 22   PHOS  --  2.3* 3.0   BUN 17 17 13   CREATININE 0.8* 0.7* 0.7*   MG  --  2.20  --      LIVP:   Recent Labs     05/11/20 0348 05/12/20 0406   AST 11*  --    ALT 12  --    BILIDIR <0.2  --    BILITOT 1.4* 1.0   ALKPHOS 54  --      Uric

## 2020-05-12 NOTE — PLAN OF CARE
Problem: Nausea/Vomiting:  Goal: Absence of nausea/vomiting  Description: Absence of nausea/vomiting  Outcome: Ongoing   Pt nauseated today. Pt had emesis in toilet. Medicated with compazine. Encourage pt to call out with any needs or status changes. Will monitor.

## 2020-05-13 ENCOUNTER — APPOINTMENT (OUTPATIENT)
Dept: GENERAL RADIOLOGY | Age: 55
DRG: 014 | End: 2020-05-13
Attending: INTERNAL MEDICINE
Payer: COMMERCIAL

## 2020-05-13 LAB
ALBUMIN SERPL-MCNC: 3.7 G/DL (ref 3.4–5)
ALP BLD-CCNC: 49 U/L (ref 40–129)
ALT SERPL-CCNC: 16 U/L (ref 10–40)
ANION GAP SERPL CALCULATED.3IONS-SCNC: 7 MMOL/L (ref 3–16)
AST SERPL-CCNC: 13 U/L (ref 15–37)
BASOPHILS ABSOLUTE: 0 K/UL (ref 0–0.2)
BASOPHILS RELATIVE PERCENT: 0 %
BILIRUB SERPL-MCNC: 1.2 MG/DL (ref 0–1)
BILIRUBIN DIRECT: <0.2 MG/DL (ref 0–0.3)
BILIRUBIN, INDIRECT: ABNORMAL MG/DL (ref 0–1)
BUN BLDV-MCNC: 13 MG/DL (ref 7–20)
CALCIUM SERPL-MCNC: 8.7 MG/DL (ref 8.3–10.6)
CHLORIDE BLD-SCNC: 103 MMOL/L (ref 99–110)
CO2: 25 MMOL/L (ref 21–32)
CREAT SERPL-MCNC: 0.6 MG/DL (ref 0.9–1.3)
EOSINOPHILS ABSOLUTE: 0 K/UL (ref 0–0.6)
EOSINOPHILS RELATIVE PERCENT: 2.3 %
GFR AFRICAN AMERICAN: >60
GFR NON-AFRICAN AMERICAN: >60
GLUCOSE BLD-MCNC: 88 MG/DL (ref 70–99)
HCT VFR BLD CALC: 31.1 % (ref 40.5–52.5)
HEMOGLOBIN: 10.6 G/DL (ref 13.5–17.5)
LACTATE DEHYDROGENASE: 217 U/L (ref 100–190)
LYMPHOCYTES ABSOLUTE: 0.1 K/UL (ref 1–5.1)
LYMPHOCYTES RELATIVE PERCENT: 13.2 %
MCH RBC QN AUTO: 33.9 PG (ref 26–34)
MCHC RBC AUTO-ENTMCNC: 34 G/DL (ref 31–36)
MCV RBC AUTO: 99.8 FL (ref 80–100)
MONOCYTES ABSOLUTE: 0 K/UL (ref 0–1.3)
MONOCYTES RELATIVE PERCENT: 0.5 %
NEUTROPHILS ABSOLUTE: 0.9 K/UL (ref 1.7–7.7)
NEUTROPHILS RELATIVE PERCENT: 84 %
PDW BLD-RTO: 14.9 % (ref 12.4–15.4)
PHOSPHORUS: 3.2 MG/DL (ref 2.5–4.9)
PLATELET # BLD: 253 K/UL (ref 135–450)
PMV BLD AUTO: 9.2 FL (ref 5–10.5)
POTASSIUM SERPL-SCNC: 4.3 MMOL/L (ref 3.5–5.1)
RBC # BLD: 3.12 M/UL (ref 4.2–5.9)
SODIUM BLD-SCNC: 135 MMOL/L (ref 136–145)
TOTAL PROTEIN: 5.7 G/DL (ref 6.4–8.2)
URIC ACID, SERUM: 5.3 MG/DL (ref 3.5–7.2)
WBC # BLD: 1.1 K/UL (ref 4–11)

## 2020-05-13 PROCEDURE — 0J2TXYZ CHANGE OTHER DEVICE IN TRUNK SUBCUTANEOUS TISSUE AND FASCIA, EXTERNAL APPROACH: ICD-10-PCS | Performed by: RADIOLOGY

## 2020-05-13 PROCEDURE — 84100 ASSAY OF PHOSPHORUS: CPT

## 2020-05-13 PROCEDURE — 2580000003 HC RX 258: Performed by: NURSE PRACTITIONER

## 2020-05-13 PROCEDURE — 6370000000 HC RX 637 (ALT 250 FOR IP): Performed by: NURSE PRACTITIONER

## 2020-05-13 PROCEDURE — 36592 COLLECT BLOOD FROM PICC: CPT

## 2020-05-13 PROCEDURE — 80076 HEPATIC FUNCTION PANEL: CPT

## 2020-05-13 PROCEDURE — 85025 COMPLETE CBC W/AUTO DIFF WBC: CPT

## 2020-05-13 PROCEDURE — 84550 ASSAY OF BLOOD/URIC ACID: CPT

## 2020-05-13 PROCEDURE — 83615 LACTATE (LD) (LDH) ENZYME: CPT

## 2020-05-13 PROCEDURE — 6360000002 HC RX W HCPCS: Performed by: NURSE PRACTITIONER

## 2020-05-13 PROCEDURE — 6360000002 HC RX W HCPCS: Performed by: INTERNAL MEDICINE

## 2020-05-13 PROCEDURE — 71046 X-RAY EXAM CHEST 2 VIEWS: CPT

## 2020-05-13 PROCEDURE — 2060000000 HC ICU INTERMEDIATE R&B

## 2020-05-13 PROCEDURE — 2580000003 HC RX 258: Performed by: INTERNAL MEDICINE

## 2020-05-13 PROCEDURE — 6370000000 HC RX 637 (ALT 250 FOR IP): Performed by: INTERNAL MEDICINE

## 2020-05-13 PROCEDURE — 80048 BASIC METABOLIC PNL TOTAL CA: CPT

## 2020-05-13 RX ORDER — ONDANSETRON 2 MG/ML
8 INJECTION INTRAMUSCULAR; INTRAVENOUS EVERY 8 HOURS PRN
Status: DISPENSED | OUTPATIENT
Start: 2020-05-13 | End: 2020-05-14

## 2020-05-13 RX ORDER — ACETAMINOPHEN 325 MG/1
650 TABLET ORAL EVERY 4 HOURS PRN
Status: DISCONTINUED | OUTPATIENT
Start: 2020-05-13 | End: 2020-05-20 | Stop reason: SDUPTHER

## 2020-05-13 RX ADMIN — Medication 10 ML: at 21:00

## 2020-05-13 RX ADMIN — URSODIOL 500 MG: 250 TABLET, FILM COATED ORAL at 08:55

## 2020-05-13 RX ADMIN — VALACYCLOVIR HYDROCHLORIDE 500 MG: 500 TABLET, FILM COATED ORAL at 08:55

## 2020-05-13 RX ADMIN — Medication 10 ML: at 08:56

## 2020-05-13 RX ADMIN — ENOXAPARIN SODIUM 40 MG: 40 INJECTION SUBCUTANEOUS at 18:09

## 2020-05-13 RX ADMIN — PANTOPRAZOLE SODIUM 40 MG: 40 TABLET, DELAYED RELEASE ORAL at 05:45

## 2020-05-13 RX ADMIN — LEVOFLOXACIN 500 MG: 500 TABLET, FILM COATED ORAL at 21:16

## 2020-05-13 RX ADMIN — POTASSIUM CHLORIDE: 2 INJECTION, SOLUTION, CONCENTRATE INTRAVENOUS at 15:10

## 2020-05-13 RX ADMIN — FLUCONAZOLE 200 MG: 200 TABLET ORAL at 08:55

## 2020-05-13 RX ADMIN — URSODIOL 500 MG: 250 TABLET, FILM COATED ORAL at 21:16

## 2020-05-13 RX ADMIN — VALACYCLOVIR HYDROCHLORIDE 500 MG: 500 TABLET, FILM COATED ORAL at 21:16

## 2020-05-13 RX ADMIN — SODIUM CHLORIDE 15 ML: 900 IRRIGANT IRRIGATION at 05:45

## 2020-05-13 ASSESSMENT — PAIN DESCRIPTION - LOCATION: LOCATION: HEAD

## 2020-05-13 ASSESSMENT — PAIN DESCRIPTION - PAIN TYPE: TYPE: ACUTE PAIN

## 2020-05-13 ASSESSMENT — PAIN SCALES - GENERAL
PAINLEVEL_OUTOF10: 0
PAINLEVEL_OUTOF10: 4
PAINLEVEL_OUTOF10: 0
PAINLEVEL_OUTOF10: 0

## 2020-05-13 ASSESSMENT — PAIN DESCRIPTION - FREQUENCY: FREQUENCY: CONTINUOUS

## 2020-05-13 ASSESSMENT — PAIN DESCRIPTION - PROGRESSION: CLINICAL_PROGRESSION: NOT CHANGED

## 2020-05-13 ASSESSMENT — PAIN DESCRIPTION - ORIENTATION: ORIENTATION: RIGHT;ANTERIOR

## 2020-05-13 ASSESSMENT — PAIN DESCRIPTION - ONSET: ONSET: ON-GOING

## 2020-05-13 ASSESSMENT — PAIN DESCRIPTION - DESCRIPTORS: DESCRIPTORS: HEADACHE

## 2020-05-13 NOTE — PLAN OF CARE
Problem: Pain:  Goal: Pain level will decrease  Description: Pain level will decrease  5/13/2020 1457 by Vivian Brian RN  Outcome: Ongoing  Note: No complaints of pain at this time. Will continue to monitor,      Problem: Falls - Risk of:  Goal: Will remain free from falls  Description: Will remain free from falls  5/13/2020 1457 by Vivian Brian RN  Outcome: Ongoing  Note: Orthostatic vital signs obtained at start of shift - see flowsheet for details. Pt meets criteria for orthostasis. Pt is a Med fall risk. See [employer] Fall Score and ABCDS Injury Risk assessments. - Screening for Orthostasis AND not a [place] Risk per PHAM/ABCDS: Pt bed is in low position, side rails up, call light and belongings are in reach. Fall risk light is on outside pts room. Pt encouraged to call for assistance as needed. Will continue with hourly rounds for PO intake, pain needs, toileting and repositioning as needed. Problem: Infection - Central Venous Catheter-Associated Bloodstream Infection:  Goal: Will show no infection signs and symptoms  Description: Will show no infection signs and symptoms  5/13/2020 1457 by Vivian Brian RN  Outcome: Ongoing  Note: CVC site remains free of signs/symptoms of infection. No drainage, edema, erythema, pain, or warmth noted at site. Dressing changes continue per protocol and on an as needed basis - see flowsheet. Compliant with BCC Bath Protocol:  Performed CHG bath today per BCC protocol utilizing CHG solution in the shower. CVC site cleansed with CHG wipe over dressing, skin surrounding dressing, and first 6\" of IV tubing. Pt tolerated well. Continued to encourage daily CHG bathing per [address] [address] [employer] protocol.          Problem: Venous Thromboembolism:  Goal: Will show no signs or symptoms of venous thromboembolism  Description: Will show no signs or symptoms of venous thromboembolism  5/13/2020 1457 by Vivian Brian RN  Outcome: Ongoing  Note: Adherent with DVT Prevention: Pt is at risk for DVT d/t decreased mobility and cancer treatment. Pt educated on importance of activity. Pt has orders for SCDs while in bed. Pt verbalizes understanding of need for prophylaxis while inpatient. Problem: Bleeding:  Goal: Will show no signs and symptoms of excessive bleeding  Description: Will show no signs and symptoms of excessive bleeding  5/13/2020 1457 by Anu Valladares RN  Outcome: Ongoing  Note: Patient's hemoglobin this AM:   Recent Labs     05/13/20  0345   HGB 10.6*     Patient's platelet count this AM:   Recent Labs     05/13/20  0345       Thrombocytopenia Precautions in place. Patient showing no signs or symptoms of active bleeding. Transfusion not indicated at this time. Patient verbalizes understanding of all instructions. Will continue to assess and implement POC. Call light within reach and hourly rounding in place. Problem: Nausea/Vomiting:  Goal: Absence of nausea/vomiting  Description: Absence of nausea/vomiting  5/13/2020 1457 by Anu Valladares RN  Outcome: Ongoing  Note: No complaints at this time. Will continue to monitor,      Problem: PROTECTIVE PRECAUTIONS  Goal: Patient will remain free of nosocomial Infections  5/13/2020 1457 by Anu Valladares RN  Outcome: Ongoing  Note: Pt remains in neutropenic precautions per floor policy. Pt, visitors, and staff noted to be following precautions appropriately. Handwashing in place; pt wearing mask in hallway per protocol. Pt in private room. Low microbial diet in place. Will continue to monitor. Problem: Activity:  Goal: Ability to tolerate increased activity will improve  Description: Ability to tolerate increased activity will improve  5/13/2020 1457 by Anu Valladares RN  Outcome: Ongoing  Note: Stable/No Isolation Precautions:  Pt with activity orders for up ad savannah. Encouraged pt to be up OOB as much as possible throughout the day and for all meals.   Encouraged frequent short

## 2020-05-13 NOTE — PROGRESS NOTES
magnesium hydroxide, magnesium sulfate, potassium chloride, Saline Mouthwash, prochlorperazine **OR** prochlorperazine, LORazepam **OR** LORazepam, [START ON 5/14/2020] furosemide    ROS:  As noted above, otherwise remainder of 10-point ROS negative    Physical Exam:    I&O:      Intake/Output Summary (Last 24 hours) at 5/13/2020 1013  Last data filed at 5/13/2020 9451  Gross per 24 hour   Intake 2641 ml   Output 4550 ml   Net -1909 ml       Vital Signs:  /88   Pulse 67   Temp 97.9 °F (36.6 °C) (Oral)   Resp 16   Ht 5' 9.8\" (1.773 m)   Wt 182 lb (82.6 kg)   SpO2 99%   BMI 26.26 kg/m²     Weight:    Wt Readings from Last 3 Encounters:   05/13/20 182 lb (82.6 kg)   03/31/20 184 lb 9.6 oz (83.7 kg)   02/17/20 180 lb (81.6 kg)       General: Awake, alert and oriented. HEENT: normocephalic, PERRL, no scleral erythema or icterus, Oral mucosa moist and intact, throat clear  NECK: supple without palpable adenopathy  BACK: Straight  SKIN: warm dry and intact without lesions rashes or masses  CHEST: CTA bilaterally without use of accessory muscles  CV: Normal S1 S2, RRR, no MRG  ABD: NT, ND, normoactive BS, no palpable masses or hepatosplenomegaly  EXTREMITIES: without edema, denies calf tenderness  NEURO: CN II - XII grossly intact.   REsting tremor  CATHETER: Left IJ TLH (5/5/20, ) - CDI with cuff exposed & Right IJ PAC (2/17/20, [facility]) - CDI    Data:   CBC:   Recent Labs     05/11/20  0348 05/12/20  0406 05/13/20  0345   WBC 0.9* 1.4* 1.1*   HGB 10.5* 10.6* 10.6*   HCT 30.4* 30.9* 31.1*   .0 98.7 99.8    240 253     BMP/Mag:  Recent Labs     05/11/20  0348 05/12/20  0406 05/13/20  0345    133* 135*   K 3.4* 4.1 4.3    104 103   CO2 25 22 25   PHOS 2.3* 3.0 3.2   BUN 17 13 13   CREATININE 0.7* 0.7* 0.6*   MG 2.20  --   --      LIVP:   Recent Labs     05/11/20  0348 05/12/20  0406 05/13/20  0345   AST 11*  --  13*   ALT 12  --  16   BILIDIR <0.2  --  <0.2   BILITOT 1.4* 1.0 1.2* ALKPHOS 54  --  49     Uric Acid:    Recent Labs     05/13/20  0345   LABURIC 5.3     Coags:   Recent Labs     05/11/20  0348   PROTIME 14.1*   INR 1.21*   APTT 30.4     Tacro:  No results for input(s): TACROLEV in the last 72 hours. CMV Quant DNA by PCR: No results found for: CMVDNAQNT    PROBLEM LIST:        1. MDS  2. Anxiety and depression  3. HLD  4. H/o shingles (2004, RLQ)  5. Gilbert syndrome    Post- Transplant Complications:  1. Resting tremor   2. Steroid Induced Hyperglycemia   3. Hypervolemia   4. Constipation      TREATMENT:       1. Decitabine x3 cycles - 2/17-4/14/20   2. Targeted Busulfan & Fludarabine followed by MUD Allo PBSC infusion 5/11/20  Preparative Regimen: Targeted Busulfan & Fludarabine  Date of BMT: 5/11/2020  Source of stem cells: PBSC - 6.7 x10^6 np91tmgxl/kg  Donor/Recipient Blood Type: A+ / A+  Donor Sex: Male, follow VNTR (DID# [id number])  CMV Donor / Recipient: Pos / Pos     ASSESSMENT AND PLAN:         1. Myelodysplastic Syndrome: Stable disease  - Most recent BM Bx/Asp (4/7/20) - ongoing disease  - S/p Bu/Flu & MUD-pb BMT  - Busulfan AUC low at 4672, therefore busulfan dose increased from 252 mg to 315 mg, for doses 3 and 4. Day + 2     2. ID: Afebrile, no evidence of infection.    - Cont Levaquin, Valtrex & Diflucan ppx   - Increase Diflucan ppx to 400 mg daily 5/16/20 (day +5)     Donor/Recipient CMV: Pos / Pos  - Check CMV PCR weekly once WBC 1.0  - Start letermovir ppx if started on high-dose steroids     3.   Heme:  Neutropenia and anemia from chemotherapy   - Transfuse for Hgb < 7 and Platelets < 08A.    - No transfusion today.     4.  Metabolic:  mild hypervolemia w/ hypoNa  - Cont IVF:  NS + KCl 10 meq @ 50 mL/hr  - Replace potassium and magnesium per policy.     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 VOD:  No evidence of VOD  Recent Labs     05/13/20  0345   BILIDIR <0.2   BILITOT 1.2*     Admission Weight: 185 lb (83.9 kg)  Current Weight: Weight: 182 lb (82.6 kg). - Cont Actigall. 7. Pulmonary: No active issues. - Encourage IS and ambulation      8. GI / Nutrition:  Chemotherapy induced nausea  Nutrition:  Appetite & intake has decreased slightly   - Cont low microbial diet  - Dietician to follow  Constipation:  BM after dulcolax (5/12/20)    - No response to MOM (5/11/12)  Nausea:  -PRN compazine, zofran and ativan    9.   Neuro:    - H/o resting tremor, possibly from Busulfan   - S/p scheduled Ativan w/ Buslufan  - Cont to monitor   -Headaches:  Prn tylenol    - DVT Prophylaxis: Platelets >00,272 cells/dL, - daily lovenox prophylaxis ordered  Contraindications to pharmacologic prophylaxis: None  Contraindications to mechanical prophylaxis: None    - Disposition:  Once ANC >1 and recovered from toxicities of transplant.  ODALIS Garg - CNP     Quince Nyhan, MD  [facility]  Please contact me through [employer]

## 2020-05-13 NOTE — PLAN OF CARE
Problem: Falls - Risk of:  Goal: Will remain free from falls  Description: Will remain free from falls  Outcome: Ongoing  Note: Pt with steady gait, up ad savannah, no issues at this time, instructed pt to call out for assistance as needed, pt verbalized understanding, bed in low position, side rails up, call light in reach, will continue to monitor. Problem: Infection - Central Venous Catheter-Associated Bloodstream Infection:  Goal: Will show no infection signs and symptoms  Description: Will show no infection signs and symptoms  Outcome: Ongoing  Note: Pt afebrile, no S/S of infection, CVC site free from S/S of infection, no drainage, edema, redness, swelling, or tenderness, flushes easily with brisk blood return, dressing changes continue per protocol and on an as needed basis - see flowsheet, will continue to monitor. Problem: Bleeding:  Goal: Will show no signs and symptoms of excessive bleeding  Description: Will show no signs and symptoms of excessive bleeding  Outcome: Ongoing  Note: Pt at risk for bleeding, no S/S of bleeding noted, PLTS were 253 with AM labs, orders to transfuse if equal to or less then 10, will continue to monitor. Problem: PROTECTIVE PRECAUTIONS  Goal: Patient will remain free of nosocomial Infections  Outcome: Ongoing  Note: Pt afebrile, no S/S of infection, will continue to monitor. Compliant with [facility]CC Bath Protocol:  Performed CHG bath today per [facility] protocol utilizing CHG solution. CVC site cleansed with CHG wipe over dressing, skin surrounding dressing, and first 6\" of IV tubing. Pt tolerated well. Continued to encourage daily CHG bathing per [facility] protocol. Problem: Activity:  Goal: Ability to tolerate increased activity will improve  Description: Ability to tolerate increased activity will improve  Outcome: Ongoing  Note: Stable/No Isolation Precautions:  Pt with activity orders for up ad savannah.   Encouraged pt to be up OOB as much as possible throughout the day and for all meals. Encouraged frequent short naps as necessary to preserve energy but instructed that while awake, pt should be OOB. Encouraged pt to ambulate in halls. Pt seen up in halls once this shift. Pt is visualized to be OOB 26-50% of the time this shift. Will continue to encourage frequent activity. Problem: Skin Integrity:  Goal: Absence of new skin breakdown  Description: Absence of new skin breakdown  Outcome: Ongoing  Note: Pt's skin remains intact, no evidence of breakdown noted, will continue to monitor. Problem: Venous Thromboembolism:  Goal: Will show no signs or symptoms of venous thromboembolism  Description: Will show no signs or symptoms of venous thromboembolism  Outcome: Ongoing  Note: Adherent with DVT Prevention: Pt is at risk for DVT d/t decreased mobility and cancer treatment. Pt educated on importance of activity. Pt has orders for lovenox. Pt verbalizes understanding of need for prophylaxis while inpatient. Problem: Pain:  Goal: Pain level will decrease  Description: Pain level will decrease  Outcome: Ongoing  Note: Pt reporting ongoing headache, denies want for medication at this time, will continue to monitor. Problem: Anxiety:  Goal: Level of anxiety will decrease  Description: Level of anxiety will decrease  Outcome: Ongoing  Note: Pt free from anxiety, will continue to monitor. Problem: Nausea/Vomiting:  Goal: Absence of nausea/vomiting  Description: Absence of nausea/vomiting  Outcome: Ongoing  Note: Pt reporting nausea, administered compazine x1 with no relief, administered ativan x1 with relief, will notify treatment team and continue to monitor.

## 2020-05-14 ENCOUNTER — APPOINTMENT (OUTPATIENT)
Dept: INTERVENTIONAL RADIOLOGY/VASCULAR | Age: 55
DRG: 014 | End: 2020-05-14
Attending: INTERNAL MEDICINE
Payer: COMMERCIAL

## 2020-05-14 LAB
ANION GAP SERPL CALCULATED.3IONS-SCNC: 7 MMOL/L (ref 3–16)
APTT: 30.7 SEC (ref 24.2–36.2)
BASOPHILS ABSOLUTE: 0 K/UL (ref 0–0.2)
BASOPHILS RELATIVE PERCENT: 0 %
BILIRUB SERPL-MCNC: 1.2 MG/DL (ref 0–1)
BUN BLDV-MCNC: 13 MG/DL (ref 7–20)
CALCIUM SERPL-MCNC: 8.8 MG/DL (ref 8.3–10.6)
CHLORIDE BLD-SCNC: 107 MMOL/L (ref 99–110)
CO2: 24 MMOL/L (ref 21–32)
CREAT SERPL-MCNC: 0.6 MG/DL (ref 0.9–1.3)
EOSINOPHILS ABSOLUTE: 0 K/UL (ref 0–0.6)
EOSINOPHILS RELATIVE PERCENT: 0 %
GFR AFRICAN AMERICAN: >60
GFR NON-AFRICAN AMERICAN: >60
GLUCOSE BLD-MCNC: 107 MG/DL (ref 70–99)
HCT VFR BLD CALC: 31.3 % (ref 40.5–52.5)
HEMOGLOBIN: 10.8 G/DL (ref 13.5–17.5)
INR BLD: 0.96 (ref 0.86–1.14)
LYMPHOCYTES ABSOLUTE: 0.1 K/UL (ref 1–5.1)
LYMPHOCYTES RELATIVE PERCENT: 5 %
MAGNESIUM: 2.1 MG/DL (ref 1.8–2.4)
MCH RBC QN AUTO: 34 PG (ref 26–34)
MCHC RBC AUTO-ENTMCNC: 34.4 G/DL (ref 31–36)
MCV RBC AUTO: 98.7 FL (ref 80–100)
MONOCYTES ABSOLUTE: 0 K/UL (ref 0–1.3)
MONOCYTES RELATIVE PERCENT: 2 %
NEUTROPHILS ABSOLUTE: 0.9 K/UL (ref 1.7–7.7)
NEUTROPHILS RELATIVE PERCENT: 93 %
NUCLEATED RED BLOOD CELLS: 1 /100 WBC
NUCLEATED RED BLOOD CELLS: 1 /100 WBC
PDW BLD-RTO: 14.7 % (ref 12.4–15.4)
PHOSPHORUS: 3.2 MG/DL (ref 2.5–4.9)
PLATELET # BLD: 257 K/UL (ref 135–450)
PMV BLD AUTO: 9.3 FL (ref 5–10.5)
POTASSIUM SERPL-SCNC: 4.4 MMOL/L (ref 3.5–5.1)
PROTHROMBIN TIME: 11.1 SEC (ref 10–13.2)
RBC # BLD: 3.17 M/UL (ref 4.2–5.9)
SODIUM BLD-SCNC: 138 MMOL/L (ref 136–145)
VRE CULTURE: NORMAL
WBC # BLD: 1 K/UL (ref 4–11)

## 2020-05-14 PROCEDURE — 96413 CHEMO IV INFUSION 1 HR: CPT

## 2020-05-14 PROCEDURE — 77001 FLUOROGUIDE FOR VEIN DEVICE: CPT

## 2020-05-14 PROCEDURE — C1894 INTRO/SHEATH, NON-LASER: HCPCS

## 2020-05-14 PROCEDURE — 2580000003 HC RX 258: Performed by: INTERNAL MEDICINE

## 2020-05-14 PROCEDURE — 85610 PROTHROMBIN TIME: CPT

## 2020-05-14 PROCEDURE — 6370000000 HC RX 637 (ALT 250 FOR IP): Performed by: INTERNAL MEDICINE

## 2020-05-14 PROCEDURE — 6360000002 HC RX W HCPCS

## 2020-05-14 PROCEDURE — C1769 GUIDE WIRE: HCPCS

## 2020-05-14 PROCEDURE — 99152 MOD SED SAME PHYS/QHP 5/>YRS: CPT

## 2020-05-14 PROCEDURE — 96415 CHEMO IV INFUSION ADDL HR: CPT

## 2020-05-14 PROCEDURE — 2060000000 HC ICU INTERMEDIATE R&B

## 2020-05-14 PROCEDURE — 2500000003 HC RX 250 WO HCPCS

## 2020-05-14 PROCEDURE — 6370000000 HC RX 637 (ALT 250 FOR IP): Performed by: NURSE PRACTITIONER

## 2020-05-14 PROCEDURE — 6360000002 HC RX W HCPCS: Performed by: NURSE PRACTITIONER

## 2020-05-14 PROCEDURE — 85730 THROMBOPLASTIN TIME PARTIAL: CPT

## 2020-05-14 PROCEDURE — 85025 COMPLETE CBC W/AUTO DIFF WBC: CPT

## 2020-05-14 PROCEDURE — C1751 CATH, INF, PER/CENT/MIDLINE: HCPCS

## 2020-05-14 PROCEDURE — 36558 INSERT TUNNELED CV CATH: CPT

## 2020-05-14 PROCEDURE — 84100 ASSAY OF PHOSPHORUS: CPT

## 2020-05-14 PROCEDURE — 83735 ASSAY OF MAGNESIUM: CPT

## 2020-05-14 PROCEDURE — 2580000003 HC RX 258: Performed by: NURSE PRACTITIONER

## 2020-05-14 PROCEDURE — 6360000002 HC RX W HCPCS: Performed by: INTERNAL MEDICINE

## 2020-05-14 PROCEDURE — 82247 BILIRUBIN TOTAL: CPT

## 2020-05-14 PROCEDURE — 80048 BASIC METABOLIC PNL TOTAL CA: CPT

## 2020-05-14 RX ORDER — TRAMADOL HYDROCHLORIDE 50 MG/1
50 TABLET ORAL EVERY 6 HOURS PRN
Status: DISCONTINUED | OUTPATIENT
Start: 2020-05-14 | End: 2020-05-27

## 2020-05-14 RX ADMIN — VALACYCLOVIR HYDROCHLORIDE 500 MG: 500 TABLET, FILM COATED ORAL at 09:37

## 2020-05-14 RX ADMIN — FLUCONAZOLE 200 MG: 200 TABLET ORAL at 09:36

## 2020-05-14 RX ADMIN — MESNA 720 MG: 100 INJECTION, SOLUTION INTRAVENOUS at 10:05

## 2020-05-14 RX ADMIN — SODIUM CHLORIDE: 9 INJECTION, SOLUTION INTRAVENOUS at 16:47

## 2020-05-14 RX ADMIN — ONDANSETRON HYDROCHLORIDE 24 MG: 8 TABLET, FILM COATED ORAL at 09:36

## 2020-05-14 RX ADMIN — URSODIOL 500 MG: 250 TABLET, FILM COATED ORAL at 09:36

## 2020-05-14 RX ADMIN — URSODIOL 500 MG: 250 TABLET, FILM COATED ORAL at 21:01

## 2020-05-14 RX ADMIN — MESNA 3600 MG: 100 INJECTION, SOLUTION INTRAVENOUS at 10:18

## 2020-05-14 RX ADMIN — LEVOFLOXACIN 500 MG: 500 TABLET, FILM COATED ORAL at 21:01

## 2020-05-14 RX ADMIN — SODIUM CHLORIDE: 9 INJECTION, SOLUTION INTRAVENOUS at 07:00

## 2020-05-14 RX ADMIN — ACETAMINOPHEN 650 MG: 325 TABLET ORAL at 11:34

## 2020-05-14 RX ADMIN — Medication 10 ML: at 21:01

## 2020-05-14 RX ADMIN — Medication 10 ML: at 09:37

## 2020-05-14 RX ADMIN — CYCLOPHOSPHAMIDE 3600 MG: 2 INJECTION, POWDER, FOR SOLUTION INTRAVENOUS; ORAL at 10:24

## 2020-05-14 RX ADMIN — VALACYCLOVIR HYDROCHLORIDE 500 MG: 500 TABLET, FILM COATED ORAL at 21:01

## 2020-05-14 RX ADMIN — SODIUM CHLORIDE 150 MG: 900 INJECTION, SOLUTION INTRAVENOUS at 09:47

## 2020-05-14 RX ADMIN — ACETAMINOPHEN 650 MG: 325 TABLET ORAL at 22:06

## 2020-05-14 RX ADMIN — ENOXAPARIN SODIUM 40 MG: 40 INJECTION SUBCUTANEOUS at 17:41

## 2020-05-14 ASSESSMENT — PAIN DESCRIPTION - LOCATION
LOCATION: CHEST
LOCATION: CHEST
LOCATION: SHOULDER
LOCATION: CHEST

## 2020-05-14 ASSESSMENT — PAIN DESCRIPTION - FREQUENCY
FREQUENCY: CONTINUOUS
FREQUENCY: INTERMITTENT

## 2020-05-14 ASSESSMENT — PAIN DESCRIPTION - DESCRIPTORS
DESCRIPTORS: ACHING;SORE
DESCRIPTORS: DISCOMFORT
DESCRIPTORS: BURNING;SHARP;DISCOMFORT
DESCRIPTORS: BURNING;DISCOMFORT;SORE

## 2020-05-14 ASSESSMENT — PAIN DESCRIPTION - PROGRESSION
CLINICAL_PROGRESSION: GRADUALLY WORSENING
CLINICAL_PROGRESSION: NOT CHANGED
CLINICAL_PROGRESSION: GRADUALLY IMPROVING
CLINICAL_PROGRESSION: GRADUALLY WORSENING

## 2020-05-14 ASSESSMENT — PAIN DESCRIPTION - ONSET
ONSET: ON-GOING

## 2020-05-14 ASSESSMENT — PAIN - FUNCTIONAL ASSESSMENT
PAIN_FUNCTIONAL_ASSESSMENT: PREVENTS OR INTERFERES SOME ACTIVE ACTIVITIES AND ADLS
PAIN_FUNCTIONAL_ASSESSMENT: ACTIVITIES ARE NOT PREVENTED
PAIN_FUNCTIONAL_ASSESSMENT: PREVENTS OR INTERFERES SOME ACTIVE ACTIVITIES AND ADLS
PAIN_FUNCTIONAL_ASSESSMENT: ACTIVITIES ARE NOT PREVENTED

## 2020-05-14 ASSESSMENT — PAIN DESCRIPTION - PAIN TYPE
TYPE: ACUTE PAIN

## 2020-05-14 ASSESSMENT — PAIN SCALES - GENERAL
PAINLEVEL_OUTOF10: 4
PAINLEVEL_OUTOF10: 0
PAINLEVEL_OUTOF10: 0
PAINLEVEL_OUTOF10: 7
PAINLEVEL_OUTOF10: 8
PAINLEVEL_OUTOF10: 5
PAINLEVEL_OUTOF10: 0

## 2020-05-14 ASSESSMENT — PAIN DESCRIPTION - ORIENTATION
ORIENTATION: LEFT

## 2020-05-14 NOTE — H&P
Patient:  Jeniffer Morales   :   1965      Relevant clinical history, particularly as it involves the pending procedure, was reviewed and discussed. The procedure including risks and benefits was discussed at length with the patient (or designated family member) and all questions were answered. Informed consent to proceed with the procedure was given. Vital signs were monitored and documented by the Radiology nurse. Targeted physical examination  Heart : regular rate and rhythm  Lungs : clear, breathing easily  Condition : stable    [facility] nurses notes reviewed and agreed. Past Medical History:    No past medical history on file. Past Surgical History:     No past surgical history on file. Allergies:  Pcn [penicillins]    Medications:   Home Meds  No current facility-administered medications on file prior to encounter.       Current Outpatient Medications on File Prior to Encounter   Medication Sig Dispense Refill    ursodiol (BOYD) 250 MG tablet Take 500 mg by mouth 2 times daily      ibuprofen (ADVIL;MOTRIN) 200 MG tablet Take 200 mg by mouth every 6 hours as needed for Pain         Current Meds  acetaminophen (TYLENOL) tablet 650 mg, Q4H PRN  diphenhydrAMINE (BENADRYL) injection 25 mg, PRN  morphine (PF) injection 2 mg, Q10 Min PRN  0.9 % sodium chloride infusion, Continuous PRN  potassium phosphate 20 mmol in dextrose 5 % 250 mL IVPB, PRN  pantoprazole (PROTONIX) tablet 40 mg, QAM AC  calcium carbonate (TUMS) chewable tablet 500 mg, TID PRN  Hydrocerin cream CREA, PRN  glucose (GLUTOSE) 40 % oral gel 15 g, PRN  dextrose 50 % IV solution, PRN  glucagon (rDNA) injection 1 mg, PRN  dextrose 5 % solution, PRN  0.9 % sodium chloride infusion, Continuous PRN  alteplase (CATHFLO) injection 2 mg, PRN  enoxaparin (LOVENOX) injection 40 mg, Daily  [START ON 2020] fluconazole (DIFLUCAN) tablet 400 mg, Daily  levoFLOXacin (LEVAQUIN) tablet 500 mg, Nightly  magnesium hydroxide (MILK OF MAGNESIA) 400 MG/5ML suspension 10 mL, Daily PRN  magnesium sulfate 4 g in 100 mL IVPB premix, PRN  potassium chloride 20 mEq/50 mL IVPB (Central Line), PRN  Saline Mouthwash 15 mL, 4x Daily AC & HS  Saline Mouthwash 15 mL, Q4H PRN  sodium chloride flush 0.9 % injection 10 mL, 2 times per day  valACYclovir (VALTREX) tablet 500 mg, BID  fluconazole (DIFLUCAN) tablet 200 mg, Daily  ursodiol (ACTIGALL) tablet 500 mg, BID  furosemide (LASIX) injection 40 mg, Q12H  prochlorperazine (COMPAZINE) tablet 10 mg, Q4H PRN    Or  prochlorperazine (COMPAZINE) injection 10 mg, Q4H PRN  LORazepam (ATIVAN) tablet 0.5 mg, Q4H PRN    Or  LORazepam (ATIVAN) injection 0.5 mg, Q4H PRN  ondansetron (ZOFRAN) tablet 24 mg, Once  fosaprepitant (EMEND) 150 mg in sodium chloride 0.9 % 150 mL IVPB, Once  mesna (MESNEX) 720 mg in sodium chloride 0.9 % 50 mL infusion, Once  mesna (MESNEX) 3,600 mg in sodium chloride 0.9 % 500 mL infusion, Q24H  cyclophosphamide (CYTOXAN) 3,600 mg in sodium chloride 0.9 % 500 mL chemo infusion, Once  furosemide (LASIX) injection 20 mg, PRN  [START ON 5/15/2020] ondansetron (ZOFRAN) tablet 24 mg, Once  [START ON 5/15/2020] mesna (MESNEX) 720 mg in sodium chloride 0.9 % 50 mL infusion, Once  [START ON 5/15/2020] cyclophosphamide (CYTOXAN) 3,600 mg in sodium chloride 0.9 % 500 mL chemo infusion, Once  0.9 % sodium chloride infusion, Continuous  [START ON 5/16/2020] 0.9 % sodium chloride infusion, Continuous  [START ON 5/16/2020] tacrolimus (proGRAF) capsule 2.5 mg, Q12H          ASA 2 - Patient with mild systemic disease with no functional limitations    I (soft palate, uvula, fauces, tonsillar pillars visible)    Activity:  2 - Able to move 4 extremities voluntarily on command  Respiration:  2 - Able to breathe deeply and cough freely  Circulation:  2 - BP+/- 20mmHg of normal  Consciousness:  2 - Fully awake  Oxygen Saturation (color):  2 - Able to maintain oxygen saturation >92% on room air    Sedation : Moderate sedation planned

## 2020-05-14 NOTE — PROGRESS NOTES
BCC Allogeneic Progress Note    2020    Danielle Fee    :  1965    MRN:  [id number]    Subjective: A little groggy after CVC placed today. Otherwise no complaints.      ECOG PS:  (1) Restricted in physically strenuous activity, ambulatory and able to do work of light nature    KPS: 80% Normal activity with effort; some signs or symptoms of disease      Isolation:  None     Medications    Scheduled Meds:   pantoprazole  40 mg Oral QAM AC    enoxaparin  40 mg Subcutaneous Daily    [START ON 2020] fluconazole  400 mg Oral Daily    levoFLOXacin  500 mg Oral Nightly    Saline Mouthwash  15 mL Swish & Spit 4x Daily AC & HS    sodium chloride flush  10 mL Intravenous 2 times per day    valACYclovir  500 mg Oral BID    fluconazole  200 mg Oral Daily    ursodiol  500 mg Oral BID    furosemide  40 mg Intravenous Q12H    ondansetron  24 mg Oral Once    fosaprepitant (EMEND) IVPB  150 mg Intravenous Once    mesna (MESNEX) IVPB  720 mg Intravenous Once    mesna (MESNEX) IVPB  3,600 mg Intravenous Q24H    cyclophosphamide (CYTOXAN) chemo infusion  3,600 mg Intravenous Once    [START ON 5/15/2020] ondansetron  24 mg Oral Once    [START ON 5/15/2020] mesna (MESNEX) IVPB  720 mg Intravenous Once    [START ON 5/15/2020] cyclophosphamide (CYTOXAN) chemo infusion  3,600 mg Intravenous Once    [START ON 2020] tacrolimus  2.5 mg Oral Q12H     Continuous Infusions:   sodium chloride      dextrose      sodium chloride      sodium chloride      [START ON 2020] sodium chloride       PRN Meds:acetaminophen, diphenhydrAMINE, morphine, sodium chloride, potassium phosphate IVPB, calcium carbonate, Hydrocerin, glucose, dextrose, glucagon (rDNA), dextrose, sodium chloride, alteplase, magnesium hydroxide, magnesium sulfate, potassium chloride, Saline Mouthwash, prochlorperazine **OR** prochlorperazine, LORazepam **OR** LORazepam, furosemide    ROS:  As noted above, otherwise remainder of 10-point ROS negative    Physical Exam:    I&O:      Intake/Output Summary (Last 24 hours) at 5/14/2020 0720  Last data filed at 5/14/2020 0703  Gross per 24 hour   Intake 1939 ml   Output 4000 ml   Net -2061 ml       Vital Signs:  /85   Pulse 76   Temp 98.3 °F (36.8 °C) (Oral)   Resp 16   Ht 5' 9.8\" (1.773 m)   Wt 182 lb (82.6 kg)   SpO2 98%   BMI 26.26 kg/m²     Weight:    Wt Readings from Last 3 Encounters:   05/13/20 182 lb (82.6 kg)   03/31/20 184 lb 9.6 oz (83.7 kg)   02/17/20 180 lb (81.6 kg)       General: Awake, alert and oriented. HEENT: normocephalic, PERRL, no scleral erythema or icterus, Oral mucosa moist and intact, throat clear  NECK: supple without palpable adenopathy  BACK: Straight  SKIN: warm dry and intact without lesions rashes or masses  CHEST: CTA bilaterally without use of accessory muscles  CV: Normal S1 S2, RRR, no MRG  ABD: NT, ND, normoactive BS, no palpable masses or hepatosplenomegaly  EXTREMITIES: without edema, denies calf tenderness  NEURO: CN II - XII grossly intact.   REsting tremor  CATHETER: Left IJ TLH (5/13/20, IR) - CDI & Right IJ PAC (2/17/20, TCH) - CDI    Data:   CBC:   Recent Labs     05/12/20  0406 05/13/20  0345 05/14/20  0405   WBC 1.4* 1.1* 1.0*   HGB 10.6* 10.6* 10.8*   HCT 30.9* 31.1* 31.3*   MCV 98.7 99.8 98.7    253 257     BMP/Mag:  Recent Labs     05/12/20  0406 05/13/20  0345 05/14/20  0405   * 135* 138   K 4.1 4.3 4.4    103 107   CO2 22 25 24   PHOS 3.0 3.2 3.2   BUN 13 13 13   CREATININE 0.7* 0.6* 0.6*   MG  --   --  2.10     LIVP:   Recent Labs     05/12/20  0406 05/13/20 0345 05/14/20 0405   AST  --  13*  --    ALT  --  16  --    BILIDIR  --  <0.2  --    BILITOT 1.0 1.2* 1.2*   ALKPHOS  --  49  --      Uric Acid:    Recent Labs     05/13/20 0345   LABURIC 5.3     Coags:   Recent Labs     05/14/20 0405   PROTIME 11.1   INR 0.96   APTT 30.7     Tacro:  No results for input(s): TACROLEV in the last 72 Admission Weight: 185 lb (83.9 kg)  Current Weight: Weight: 182 lb (82.6 kg). - Cont Actigall. 7. Pulmonary: No active issues. - Encourage IS and ambulation      8. GI / Nutrition:    Nutrition:  Appetite & intake has decreased   - Cont low microbial diet  - Dietician to follow  Constipation:  Resolved  - No response to MOM (5/11/12)  - S/p Dulcolax (5/12/20) w/ BM  Nausea:  Chemotherapy induced nausea, but not emesis in last 24 hours   - Cont PRN compazine, zofran and ativan    9. Neuro:    - H/o resting tremor, possibly from Busulfan   - S/p scheduled Ativan w/ Buslufan  - Cont to monitor   Headaches:    - Cont Prn tylenol    10.   CVC:  Cuff exposed on TLH  - IR to replace line (5/13/20)    - DVT Prophylaxis: Platelets >69,195 cells/dL, - daily lovenox prophylaxis ordered  Contraindications to pharmacologic prophylaxis: None  Contraindications to mechanical prophylaxis: None    - Disposition:  Once ANC >1 and recovered from toxicities of transplant.  ODALIS Moon - CNP     Kim Nails MD  [facility]  Please contact me through [facility]

## 2020-05-14 NOTE — PLAN OF CARE
Signed             Show:Clear all  []Manual[x]Template[]Copied    Added by:  Sammye Sandhoff, RN    []Keith for details     Problem: Pain:  Goal: Pain level will decrease  Description: Pain level will decrease  5/13/2020 1457 by Maninder Avila RN  Outcome: Ongoing  Note: No complaints of pain at this time. Will continue to monitor,      Problem: Falls - Risk of:  Goal: Will remain free from falls  Description: Will remain free from falls  5/13/2020 1457 by Maninder Avila RN  Outcome: Ongoing  Note: Orthostatic vital signs obtained at start of shift - see flowsheet for details. Pt meets criteria for orthostasis. Pt is a Med fall risk. See Charly Bis Fall Score and ABCDS Injury Risk assessments. - Screening for Orthostasis AND not a [place] Risk per PHAM/ABCDS: Pt bed is in low position, side rails up, call light and belongings are in reach. Fall risk light is on outside pts room. Pt encouraged to call for assistance as needed. Will continue with hourly rounds for PO intake, pain needs, toileting and repositioning as needed. Problem: Infection - Central Venous Catheter-Associated Bloodstream Infection:  Goal: Will show no infection signs and symptoms  Description: Will show no infection signs and symptoms  5/13/2020 1457 by Maninder Avila RN  Outcome: Ongoing  Note: CVC site remains free of signs/symptoms of infection. No drainage, edema, erythema, pain, or warmth noted at site. Dressing changes continue per protocol and on an as needed basis - see flowsheet. Compliant with BCC Bath Protocol:  Performed CHG bath today per [facility] protocol utilizing CHG solution in the shower. CVC site cleansed with CHG wipe over dressing, skin surrounding dressing, and first 6\" of IV tubing. Pt tolerated well. Continued to encourage daily CHG bathing per [facility] protocol.            Problem: Venous Thromboembolism:  Goal: Will show no signs or symptoms of venous thromboembolism  Description: Will show no

## 2020-05-15 LAB
ALBUMIN SERPL-MCNC: 3.5 G/DL (ref 3.4–5)
ALP BLD-CCNC: 50 U/L (ref 40–129)
ALT SERPL-CCNC: 18 U/L (ref 10–40)
ANION GAP SERPL CALCULATED.3IONS-SCNC: 7 MMOL/L (ref 3–16)
ANISOCYTOSIS: ABNORMAL
AST SERPL-CCNC: 13 U/L (ref 15–37)
BASOPHILS ABSOLUTE: 0 K/UL (ref 0–0.2)
BASOPHILS RELATIVE PERCENT: 0.2 %
BILIRUB SERPL-MCNC: 1.8 MG/DL (ref 0–1)
BILIRUBIN DIRECT: 0.3 MG/DL (ref 0–0.3)
BILIRUBIN, INDIRECT: 1.5 MG/DL (ref 0–1)
BUN BLDV-MCNC: 10 MG/DL (ref 7–20)
C DIFF TOXIN/ANTIGEN: NORMAL
CALCIUM SERPL-MCNC: 8.3 MG/DL (ref 8.3–10.6)
CHLORIDE BLD-SCNC: 104 MMOL/L (ref 99–110)
CO2: 23 MMOL/L (ref 21–32)
CREAT SERPL-MCNC: 0.6 MG/DL (ref 0.9–1.3)
EOSINOPHILS ABSOLUTE: 0 K/UL (ref 0–0.6)
EOSINOPHILS RELATIVE PERCENT: 1.2 %
GFR AFRICAN AMERICAN: >60
GFR NON-AFRICAN AMERICAN: >60
GLUCOSE BLD-MCNC: 105 MG/DL (ref 70–99)
HCT VFR BLD CALC: 27.1 % (ref 40.5–52.5)
HEMOGLOBIN: 9.3 G/DL (ref 13.5–17.5)
LACTATE DEHYDROGENASE: 198 U/L (ref 100–190)
LYMPHOCYTES ABSOLUTE: 0 K/UL (ref 1–5.1)
LYMPHOCYTES RELATIVE PERCENT: 6.1 %
MCH RBC QN AUTO: 34.1 PG (ref 26–34)
MCHC RBC AUTO-ENTMCNC: 34.2 G/DL (ref 31–36)
MCV RBC AUTO: 99.7 FL (ref 80–100)
MONOCYTES ABSOLUTE: 0 K/UL (ref 0–1.3)
MONOCYTES RELATIVE PERCENT: 1.4 %
NEUTROPHILS ABSOLUTE: 0.6 K/UL (ref 1.7–7.7)
NEUTROPHILS RELATIVE PERCENT: 91.1 %
PDW BLD-RTO: 14.7 % (ref 12.4–15.4)
PHOSPHORUS: 2.9 MG/DL (ref 2.5–4.9)
PLATELET # BLD: 199 K/UL (ref 135–450)
PMV BLD AUTO: 8.7 FL (ref 5–10.5)
POTASSIUM SERPL-SCNC: 4.1 MMOL/L (ref 3.5–5.1)
RBC # BLD: 2.71 M/UL (ref 4.2–5.9)
SCHISTOCYTES: ABNORMAL
SODIUM BLD-SCNC: 134 MMOL/L (ref 136–145)
TEAR DROP CELLS: ABNORMAL
TOTAL PROTEIN: 5.3 G/DL (ref 6.4–8.2)
URIC ACID, SERUM: 4 MG/DL (ref 3.5–7.2)
WBC # BLD: 0.6 K/UL (ref 4–11)

## 2020-05-15 PROCEDURE — 80076 HEPATIC FUNCTION PANEL: CPT

## 2020-05-15 PROCEDURE — 80048 BASIC METABOLIC PNL TOTAL CA: CPT

## 2020-05-15 PROCEDURE — 6360000002 HC RX W HCPCS: Performed by: NURSE PRACTITIONER

## 2020-05-15 PROCEDURE — 87324 CLOSTRIDIUM AG IA: CPT

## 2020-05-15 PROCEDURE — 83615 LACTATE (LD) (LDH) ENZYME: CPT

## 2020-05-15 PROCEDURE — 85025 COMPLETE CBC W/AUTO DIFF WBC: CPT

## 2020-05-15 PROCEDURE — 2580000003 HC RX 258: Performed by: NURSE PRACTITIONER

## 2020-05-15 PROCEDURE — 96415 CHEMO IV INFUSION ADDL HR: CPT

## 2020-05-15 PROCEDURE — 84550 ASSAY OF BLOOD/URIC ACID: CPT

## 2020-05-15 PROCEDURE — 6370000000 HC RX 637 (ALT 250 FOR IP): Performed by: NURSE PRACTITIONER

## 2020-05-15 PROCEDURE — 6370000000 HC RX 637 (ALT 250 FOR IP): Performed by: INTERNAL MEDICINE

## 2020-05-15 PROCEDURE — 36592 COLLECT BLOOD FROM PICC: CPT

## 2020-05-15 PROCEDURE — 84100 ASSAY OF PHOSPHORUS: CPT

## 2020-05-15 PROCEDURE — 96413 CHEMO IV INFUSION 1 HR: CPT

## 2020-05-15 PROCEDURE — 2060000000 HC ICU INTERMEDIATE R&B

## 2020-05-15 PROCEDURE — 87449 NOS EACH ORGANISM AG IA: CPT

## 2020-05-15 PROCEDURE — 2580000003 HC RX 258: Performed by: INTERNAL MEDICINE

## 2020-05-15 PROCEDURE — 6360000002 HC RX W HCPCS: Performed by: INTERNAL MEDICINE

## 2020-05-15 RX ADMIN — URSODIOL 500 MG: 250 TABLET, FILM COATED ORAL at 09:34

## 2020-05-15 RX ADMIN — MESNA 3600 MG: 100 INJECTION, SOLUTION INTRAVENOUS at 10:07

## 2020-05-15 RX ADMIN — SODIUM CHLORIDE: 9 INJECTION, SOLUTION INTRAVENOUS at 10:08

## 2020-05-15 RX ADMIN — SODIUM CHLORIDE 15 ML: 900 IRRIGANT IRRIGATION at 19:05

## 2020-05-15 RX ADMIN — FLUCONAZOLE 200 MG: 200 TABLET ORAL at 09:33

## 2020-05-15 RX ADMIN — ACETAMINOPHEN 650 MG: 325 TABLET ORAL at 04:47

## 2020-05-15 RX ADMIN — SODIUM CHLORIDE: 9 INJECTION, SOLUTION INTRAVENOUS at 15:40

## 2020-05-15 RX ADMIN — CYCLOPHOSPHAMIDE 3600 MG: 2 INJECTION, POWDER, FOR SOLUTION INTRAVENOUS; ORAL at 10:40

## 2020-05-15 RX ADMIN — SODIUM CHLORIDE 15 ML: 900 IRRIGANT IRRIGATION at 11:17

## 2020-05-15 RX ADMIN — URSODIOL 500 MG: 250 TABLET, FILM COATED ORAL at 21:13

## 2020-05-15 RX ADMIN — SODIUM CHLORIDE 15 ML: 900 IRRIGANT IRRIGATION at 21:26

## 2020-05-15 RX ADMIN — Medication 10 ML: at 21:26

## 2020-05-15 RX ADMIN — Medication 10 ML: at 09:34

## 2020-05-15 RX ADMIN — MESNA 720 MG: 100 INJECTION, SOLUTION INTRAVENOUS at 10:09

## 2020-05-15 RX ADMIN — PANTOPRAZOLE SODIUM 40 MG: 40 TABLET, DELAYED RELEASE ORAL at 06:53

## 2020-05-15 RX ADMIN — VALACYCLOVIR HYDROCHLORIDE 500 MG: 500 TABLET, FILM COATED ORAL at 21:13

## 2020-05-15 RX ADMIN — LEVOFLOXACIN 500 MG: 500 TABLET, FILM COATED ORAL at 21:13

## 2020-05-15 RX ADMIN — SODIUM CHLORIDE: 9 INJECTION, SOLUTION INTRAVENOUS at 01:01

## 2020-05-15 RX ADMIN — VALACYCLOVIR HYDROCHLORIDE 500 MG: 500 TABLET, FILM COATED ORAL at 09:34

## 2020-05-15 RX ADMIN — ONDANSETRON HYDROCHLORIDE 24 MG: 8 TABLET, FILM COATED ORAL at 09:34

## 2020-05-15 RX ADMIN — ENOXAPARIN SODIUM 40 MG: 40 INJECTION SUBCUTANEOUS at 19:01

## 2020-05-15 RX ADMIN — SODIUM CHLORIDE: 9 INJECTION, SOLUTION INTRAVENOUS at 05:11

## 2020-05-15 RX ADMIN — SODIUM CHLORIDE: 9 INJECTION, SOLUTION INTRAVENOUS at 21:04

## 2020-05-15 ASSESSMENT — PAIN SCALES - GENERAL
PAINLEVEL_OUTOF10: 0
PAINLEVEL_OUTOF10: 0
PAINLEVEL_OUTOF10: 5
PAINLEVEL_OUTOF10: 0
PAINLEVEL_OUTOF10: 3
PAINLEVEL_OUTOF10: 5

## 2020-05-15 ASSESSMENT — PAIN DESCRIPTION - PAIN TYPE
TYPE: ACUTE PAIN
TYPE: ACUTE PAIN

## 2020-05-15 ASSESSMENT — PAIN DESCRIPTION - PROGRESSION
CLINICAL_PROGRESSION: NOT CHANGED

## 2020-05-15 ASSESSMENT — PAIN DESCRIPTION - ORIENTATION
ORIENTATION: LEFT
ORIENTATION: LEFT

## 2020-05-15 ASSESSMENT — PAIN DESCRIPTION - LOCATION
LOCATION: CHEST
LOCATION: CHEST

## 2020-05-15 ASSESSMENT — PAIN - FUNCTIONAL ASSESSMENT: PAIN_FUNCTIONAL_ASSESSMENT: ACTIVITIES ARE NOT PREVENTED

## 2020-05-15 ASSESSMENT — PAIN DESCRIPTION - ONSET: ONSET: ON-GOING

## 2020-05-15 ASSESSMENT — PAIN DESCRIPTION - FREQUENCY
FREQUENCY: CONTINUOUS
FREQUENCY: CONTINUOUS

## 2020-05-15 ASSESSMENT — PAIN DESCRIPTION - DESCRIPTORS
DESCRIPTORS: DISCOMFORT
DESCRIPTORS: ACHING;DISCOMFORT

## 2020-05-15 NOTE — ONCOLOGY
Administration: Chemotherapy drug Cytoxan  independently verified with Shreya Pereira RN prior to administration. Acknowledgement of informed consent for chemotherapy administration verified. Original order, appropriateness of regimen, drug supplied, height, weight, BSA, dose calculations, expiration dates/times, drug appearance, and two patient identifiers were verified by both RNs. Drug checked for vesicant/irritant status and for risk of hypersensitivity. Most recent laboratory values and allergies, were reviewed. Positive, brisk blood return via CVC was confirmed prior to administration. Chest x-ray for correct line placement reviewed. Angela Rodriguez and Shreya Pereira RN verified correct rate of chemotherapy and maintenance IV fluids. Patient was educated on chemotherapy regimen prior to administration including indication for treatment related to disease & side effects of chemotherapy drug. Patient verbalizes understanding of all instructions. Completion of Chemotherapy: Monitoring during infusion done per policy, see Flowsheets. Blood return verified before, during, and after infusion per policy; no signs of extravasation. Pt tolerated chemotherapy well and without incident. Chemotherapy infusion end time on the [facility] Will continue to monitor.

## 2020-05-15 NOTE — PROGRESS NOTES
Pt stated severe pain at central line site, 7/10. Pt did not want to take any medication at this time. Pt called half an hour later for tylenol. Ice pack placed on site. Checked on patient 30 minutes later and patient still in pain. Dr. Katie Salas notified. Tramadol ordered, see MAR. Pt states no need for tramadol at this time and wants to see how the pain feels after some time. Will continue to monitor.

## 2020-05-15 NOTE — PROGRESS NOTES
Pulse 71   Temp 97.8 °F (36.6 °C) (Oral)   Resp 16   Ht 5' 9.8\" (1.773 m)   Wt 179 lb 1.6 oz (81.2 kg)   SpO2 99%   BMI 25.84 kg/m²     Weight:    Wt Readings from Last 3 Encounters:   05/14/20 179 lb 1.6 oz (81.2 kg)   03/31/20 184 lb 9.6 oz (83.7 kg)   02/17/20 180 lb (81.6 kg)       General: Awake, alert and oriented. HEENT: normocephalic, PERRL, no scleral erythema or icterus, Oral mucosa moist and intact, throat clear  NECK: supple without palpable adenopathy  BACK: Straight  SKIN: warm dry and intact without lesions rashes or masses  CHEST: CTA bilaterally without use of accessory muscles  CV: Normal S1 S2, RRR, no MRG  ABD: NT, ND, normoactive BS, no palpable masses or hepatosplenomegaly  EXTREMITIES: without edema, denies calf tenderness  NEURO: CN II - XII grossly intact. REsting tremor  CATHETER: Left IJ TLH (5/13/20, IR) - CDI & Right IJ PAC (2/17/20, TCH) - CDI    Data:   CBC:   Recent Labs     05/13/20  0345 05/14/20  0405 05/15/20  0417   WBC 1.1* 1.0* 0.6*   HGB 10.6* 10.8* 9.3*   HCT 31.1* 31.3* 27.1*   MCV 99.8 98.7 99.7    257 199     BMP/Mag:  Recent Labs     05/13/20  0345 05/14/20  0405 05/15/20  0417   * 138 134*   K 4.3 4.4 4.1    107 104   CO2 25 24 23   PHOS 3.2 3.2 2.9   BUN 13 13 10   CREATININE 0.6* 0.6* 0.6*   MG  --  2.10  --      LIVP:   Recent Labs     05/13/20  0345 05/14/20  0405 05/15/20  0417   AST 13*  --  13*   ALT 16  --  18   BILIDIR <0.2  --  0.3   BILITOT 1.2* 1.2* 1.8*   ALKPHOS 49  --  50     Uric Acid:    Recent Labs     05/15/20  0417   LABURIC 4.0     Coags:   Recent Labs     05/14/20  0405   PROTIME 11.1   INR 0.96   APTT 30.7     Tacro:  No results for input(s): TACROLEV in the last 72 hours. CMV Quant DNA by PCR: No results found for: CMVDNAQNT    PROBLEM LIST:        1. MDS  2. Anxiety and depression  3. HLD  4. H/o shingles (2004, RLQ)  5. Gilbert syndrome    Post- Transplant Complications:  1. Resting tremor   2.   Steroid Induced Nutrition:  Appetite & intake has decreased, but he is still eating small amounts    - Cont low microbial diet  - Dietician to follow  Constipation:  Improved   - No response to MOM (5/11/12)  - S/p Dulcolax (5/12/20) w/ BM  Nausea:  Chemotherapy induced nausea  - Cont PRN compazine, zofran and ativan    9.   Neuro:    - H/o resting tremor, possibly from Busulfan   - S/p scheduled Ativan w/ Buslufan  - Cont to monitor   Headaches:  Improved   - Cont Prn tylenol      - DVT Prophylaxis: Platelets >68,032 cells/dL, - daily lovenox prophylaxis ordered  Contraindications to pharmacologic prophylaxis: None  Contraindications to mechanical prophylaxis: None    - Disposition:  Once ANC >1 and recovered from toxicities of transplant.  ODALIS Nagy - CNP     Gama Dsouza MD  [facility]  Please contact me through [facility]

## 2020-05-15 NOTE — CARE COORDINATION
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 5/8/20: Staying active by walking in hallway. 5/12/20: Tolerated infusion of stem cells without proble, but reports that he has had insomnia that last few days. , states \"I think it has been my nerves\". Remains active by walking in the hallway. 5/15/20:  Maintaining activity by walking in hallway. Seen by Dietitian      Education  Patient and caregiver have been through the educational process for allogeneic bone marrow transplantation including the allogeneic family meeting, preadmission teaching and preadmission physician office Lissa Hinojosa RN BMT Coordinator    Patient and caregiver verbalize understanding of treatment regimen, possible adverse events, length of stay, and risk and benefits of transplantation and are agreeable to proceed. Patient and caregiver have been given an opportunity to ask, and to have their questions answered to their satisfaction. Documentation for above education can be found in the [facility], [facility] office chart. 5/5/20 Confirmed  Home Pharmacy. Reviewed infusion of stem cells, telemetry & frequent vital signs during infusion of stem cells. Discussed medication management as he prepares for home have spoken to his spouse, Delorse Landau on the phone, re-introduced myself, reinforced my contact information & the [facility] unit phone #.  5/8/20:  Discussed volume of infusion of stem cells( Total Volume-=280 ml)      Discharge  When AN >1.0 & without toxicities      Pending  5/15/20:  The following medications have been called into [address] [place] [address] ( [phone])  Tacrolimus 0.5 mg bid #60 with 3 refills  Tacrolimus 1 mg cap ( 2 caps) bid #120 with 3

## 2020-05-15 NOTE — PLAN OF CARE
Problem: Pain:  Goal: Pain level will decrease  Description: Pain level will decrease  Outcome: Ongoing  Note: Pt reports pain of 3-4 out of 10. Pt states pain is located in right chest/CVC placement. Pt currently on PRN tylenol . Educated pt on use of tylenol for pain control. R: Pt verbalized understanding of education. Problem: Pain:  Goal: Control of acute pain  Description: Control of acute pain  Outcome: Ongoing     Problem: Falls - Risk of:  Goal: Will remain free from falls  Description: Will remain free from falls  Outcome: Ongoing  Note: Orthostatic vital signs obtained at start of shift - see flowsheet for details. Pt meets criteria for orthostasis. Pt is a Med fall risk. See Sonia Mocha Fall Score and ABCDS Injury Risk assessments. - Screening for Orthostasis AND not a [place] Risk per PHAM/ABCDS: Pt bed is in low position, side rails up, call light and belongings are in reach. Fall risk light is on outside pts room. Pt encouraged to call for assistance as needed. Will continue with hourly rounds for PO intake, pain needs, toileting and repositioning as needed. Problem: Infection - Central Venous Catheter-Associated Bloodstream Infection:  Goal: Will show no infection signs and symptoms  Description: Will show no infection signs and symptoms  Outcome: Ongoing  Note: CVC site remains free of signs/symptoms of infection. No drainage, edema, erythema, pain, or warmth noted at site. Dressing changes continue per protocol and on an as needed basis - see flowsheet. Compliant with BCC Bath Protocol:  Performed CHG bath today per [facility] protocol utilizing CHG solution in the shower. CVC site cleansed with CHG wipe over dressing, skin surrounding dressing, and first 6\" of IV tubing. Pt tolerated well. Continued to encourage daily CHG bathing per [facility] protocol.          Problem: Venous Thromboembolism:  Goal: Will show no signs or symptoms of venous thromboembolism  Description: Will show no signs or symptoms of venous thromboembolism  Outcome: Ongoing  Note: - DVT Prophylaxis: Platelets >83,868 cells/dL, - daily lovenox prophylaxis ordered  Contraindications to pharmacologic prophylaxis: Patient already on therapeutic anticoagulation  Contraindications to mechanical prophylaxis: Patient already on therapeutic anticoagulation        Problem: Bleeding:  Goal: Will show no signs and symptoms of excessive bleeding  Description: Will show no signs and symptoms of excessive bleeding  Outcome: Ongoing  Note: Patient's hemoglobin this AM:   Recent Labs     05/15/20  0417   HGB 9.3*     Patient's platelet count this AM:   Recent Labs     05/15/20  0417       Thrombocytopenia not present at this time. Patient showing no signs or symptoms of active bleeding. Transfusion not indicated at this time. Patient verbalizes understanding of all instructions. Will continue to assess and implement POC. Call light within reach and hourly rounding in place. Problem: Anxiety:  Goal: Level of anxiety will decrease  Description: Level of anxiety will decrease  Outcome: Ongoing     Problem: PROTECTIVE PRECAUTIONS  Goal: Patient will remain free of nosocomial Infections  Outcome: Ongoing  Note: Pt remains in protective precautions. Pt educated on wearing mask when in hallways. Pt, staff, and visitors adhering to handwashing guidelines. Pt educated to shower or bathe daily with chlorhexidine and linens changed daily per protocol. Pt verbalizes understanding of low microbial diet. Will continue to monitor. Problem: Nausea/Vomiting:  Goal: Absence of nausea/vomiting  Description: Absence of nausea/vomiting  Outcome: Ongoing  Note: Pt has no c/o nausea throughout shift, able to eat % of meals this shift. Will continue to monitor. Problem:  Activity:  Goal: Ability to tolerate increased activity will improve  Description: Ability to tolerate increased activity will improve  Outcome: Ongoing  Note: Stable/No Isolation Precautions:  Pt with activity orders for up ad savannah. Encouraged pt to be up OOB as much as possible throughout the day and for all meals. Encouraged frequent short naps as necessary to preserve energy but instructed that while awake, pt should be OOB. Encouraged pt to ambulate in halls. Pt seen up ad savananh in halls twice this shift. Pt is visualized to be OOB % of the time this shift. Will continue to encourage frequent activity. Stable/In Isolation: Pt did walk laps this morning before placed in isolation. Pt with activity orders for up ad savannah. Encouraged pt to be up OOB as much as possible throughout the day and for all meals. Encouraged frequent short naps as necessary to preserve energy but instructed that while awake, pt should be OOB. Pt in isolation and is unable to ambulate in halls d/t restrictions. Continued to encourage activity in room as much as possible. Pt is visualized to be OOB % of the time this shift. Will continue to encourage frequent activity. .       Problem: Nutrition Deficit:  Goal: Ability to achieve adequate nutritional intake will improve  Description: Ability to achieve adequate nutritional intake will improve  Outcome: Ongoing  Note: Pt ate % of meals this shift. Problem: Skin Integrity:  Goal: Absence of new skin breakdown  Description: Absence of new skin breakdown  Outcome: Ongoing  Note: Pt skin assessed this shift, no new s/s of skin breakdown noted at this time. Will continue to monitor.       Problem: Nutrition  Goal: Optimal nutrition therapy  5/15/2020 1818 by Rayna Mueller RN  Outcome: Ongoing

## 2020-05-15 NOTE — PROGRESS NOTES
NUTRITION ASSESSMENT  Admission Date: 5/5/2020     Type and Reason for Visit: Reassess    NUTRITION RECOMMENDATIONS:   1. PO Diet: Continue current general diet and encourage intake. 2. ONS: Not indicated at this point. Pt may order as PRN if needed  3. Nutrition Education: Post BMT diet instructions reviewed     NUTRITION ASSESSMENT:  Nutritionally stable AEB PO intake % of most meals despite chemo induced nausea. No vomiting. Pt remains at nutritional compromise given predicted decreased PO intake following BMT, however risk is low at this point given adequate PO intake and weight stability. RD will continue to monitor per [facility].      MALNUTRITION ASSESSMENT  Context: Acute illness or injury   Malnutrition Status: No malnutrition  Findings of the 6 clinical characteristics of malnutrition (Minimum of 2 out of 6 clinical characteristics is required to make the diagnosis of moderate or severe Protein Calorie Malnutrition based on AND/ASPEN Guidelines):  Energy Intake %: Greater than 75% of estimated energy requirement     Interpretation of Weight Loss %: No significant weight loss  Interpretation of Weight Loss Time: in 1 year  Body Fat Status: No significant subcutaneous fat loss     Muscle Mass Status: No significant muscle mass loss     Fluid Accumulation Status: No significant fluid accumulation  Fluid Accumulation Location: Extremities(Trace in BLE)  Reduced  Strength: Not measured    NUTRITION DIAGNOSIS   Problem: Increased Nutrient Needs  Etiology: Catabolic Illness  Signs & Symptoms: Cancer Dx and chemo Tx    NUTRITION INTERVENTION  Food and/or Nutrient Delivery:Continue Current diet   Nutrition education/counseling/coordination of care: Continue Inpatient Monitoring  or Education Initiated     NUTRITION MONITORING & EVALUATION:  Evaluation:Progressing towards goal   Goals: Pt to meet >75% of nutritional requirements from diet  Monitoring: I/O, Meal Intake  or Nausea      OBJECTIVE DATA:  · Nutrition-Focused Physical Findings: LBM 5/15. Trace edema in BLE  · Wounds None      No past medical history on file. ANTHROPOMETRICS  Current Height: 5' 9.8\" (177.3 cm)  Current Weight: 184 lb (83.5 kg)    Admission weight: 185 lb (83.9 kg)  Ideal Bodyweight 166 lb (75.5 kg)   Usual Bodyweight 180 lb per EMR   Adjusted Bodyweight n/a  Weight Changes no change       BMI BMI (Calculated): 26.6    Wt Readings from Last 50 Encounters:   05/15/20 184 lb (83.5 kg)   03/31/20 184 lb 9.6 oz (83.7 kg)   02/17/20 180 lb (81.6 kg)       COMPARATIVE STANDARDS  Estimated Total Kcals/Day : 25-30  Current Bodyweight (83.5 kg) 0900-5810 kcal    Estimated Total Protein (g/day) : 1.2-1.5 Current Bodyweight (83.5 kg) 100-125 g/day  Estimated Daily Total Fluid (ml/day): 4592-9481 mL per day     Food / Nutrition-Related History  Pre-Admission / Home Diet:  Pre-Admission/Home Diet: General   Home Supplements / Herbals:    none noted  Food Restrictions / Cultural Requests:   none noted    Diet Orders / Intake / Nutrition Support  Current diet/supplement order: DIET GENERAL;     NSG Recorded PO:   PO Fluids P.O.: 240 mL  PO Meals PO Meals Eaten (%): 76 - 100%(beefstrognof)   PO Intake: % Stated  PO Supplement: None   PO Supplement Intake: n/a  IVF: 235 ml/hr     NUTRITION RISK LEVEL: Risk Level:  Moderate    Davy Glover, [address]  [place]ic:  [phone]  Office:  [phone]

## 2020-05-16 LAB
ANION GAP SERPL CALCULATED.3IONS-SCNC: 7 MMOL/L (ref 3–16)
BILIRUB SERPL-MCNC: 0.7 MG/DL (ref 0–1)
BUN BLDV-MCNC: 7 MG/DL (ref 7–20)
CALCIUM SERPL-MCNC: 8.9 MG/DL (ref 8.3–10.6)
CHLORIDE BLD-SCNC: 105 MMOL/L (ref 99–110)
CO2: 24 MMOL/L (ref 21–32)
CREAT SERPL-MCNC: 0.6 MG/DL (ref 0.9–1.3)
GFR AFRICAN AMERICAN: >60
GFR NON-AFRICAN AMERICAN: >60
GLUCOSE BLD-MCNC: 99 MG/DL (ref 70–99)
HCT VFR BLD CALC: 25.6 % (ref 40.5–52.5)
HEMOGLOBIN: 9 G/DL (ref 13.5–17.5)
MAGNESIUM: 1.9 MG/DL (ref 1.8–2.4)
MCH RBC QN AUTO: 34.1 PG (ref 26–34)
MCHC RBC AUTO-ENTMCNC: 35 G/DL (ref 31–36)
MCV RBC AUTO: 97.3 FL (ref 80–100)
PDW BLD-RTO: 14.3 % (ref 12.4–15.4)
PHOSPHORUS: 3 MG/DL (ref 2.5–4.9)
PLATELET # BLD: 180 K/UL (ref 135–450)
PMV BLD AUTO: 8.3 FL (ref 5–10.5)
POTASSIUM SERPL-SCNC: 4.2 MMOL/L (ref 3.5–5.1)
RBC # BLD: 2.63 M/UL (ref 4.2–5.9)
SODIUM BLD-SCNC: 136 MMOL/L (ref 136–145)
WBC # BLD: 0.4 K/UL (ref 4–11)

## 2020-05-16 PROCEDURE — 2580000003 HC RX 258: Performed by: INTERNAL MEDICINE

## 2020-05-16 PROCEDURE — 6360000002 HC RX W HCPCS: Performed by: INTERNAL MEDICINE

## 2020-05-16 PROCEDURE — 6370000000 HC RX 637 (ALT 250 FOR IP): Performed by: INTERNAL MEDICINE

## 2020-05-16 PROCEDURE — 85025 COMPLETE CBC W/AUTO DIFF WBC: CPT

## 2020-05-16 PROCEDURE — 82247 BILIRUBIN TOTAL: CPT

## 2020-05-16 PROCEDURE — 36592 COLLECT BLOOD FROM PICC: CPT

## 2020-05-16 PROCEDURE — 6370000000 HC RX 637 (ALT 250 FOR IP): Performed by: NURSE PRACTITIONER

## 2020-05-16 PROCEDURE — 2580000003 HC RX 258: Performed by: NURSE PRACTITIONER

## 2020-05-16 PROCEDURE — 80048 BASIC METABOLIC PNL TOTAL CA: CPT

## 2020-05-16 PROCEDURE — 84100 ASSAY OF PHOSPHORUS: CPT

## 2020-05-16 PROCEDURE — 6360000002 HC RX W HCPCS: Performed by: NURSE PRACTITIONER

## 2020-05-16 PROCEDURE — 83735 ASSAY OF MAGNESIUM: CPT

## 2020-05-16 PROCEDURE — 2060000000 HC ICU INTERMEDIATE R&B

## 2020-05-16 RX ORDER — LOPERAMIDE HYDROCHLORIDE 2 MG/1
4 CAPSULE ORAL ONCE
Status: COMPLETED | OUTPATIENT
Start: 2020-05-16 | End: 2020-05-16

## 2020-05-16 RX ORDER — LOPERAMIDE HYDROCHLORIDE 2 MG/1
2 CAPSULE ORAL PRN
Status: DISCONTINUED | OUTPATIENT
Start: 2020-05-16 | End: 2020-06-05 | Stop reason: HOSPADM

## 2020-05-16 RX ORDER — HEPARIN SODIUM (PORCINE) LOCK FLUSH IV SOLN 100 UNIT/ML 100 UNIT/ML
500 SOLUTION INTRAVENOUS ONCE
Status: COMPLETED | OUTPATIENT
Start: 2020-05-16 | End: 2020-05-16

## 2020-05-16 RX ADMIN — URSODIOL 500 MG: 250 TABLET, FILM COATED ORAL at 09:53

## 2020-05-16 RX ADMIN — LEVOFLOXACIN 500 MG: 500 TABLET, FILM COATED ORAL at 20:00

## 2020-05-16 RX ADMIN — SODIUM CHLORIDE: 9 INJECTION, SOLUTION INTRAVENOUS at 01:10

## 2020-05-16 RX ADMIN — VALACYCLOVIR HYDROCHLORIDE 500 MG: 500 TABLET, FILM COATED ORAL at 09:54

## 2020-05-16 RX ADMIN — FLUCONAZOLE 400 MG: 200 TABLET ORAL at 09:53

## 2020-05-16 RX ADMIN — PANTOPRAZOLE SODIUM 40 MG: 40 TABLET, DELAYED RELEASE ORAL at 06:56

## 2020-05-16 RX ADMIN — URSODIOL 500 MG: 250 TABLET, FILM COATED ORAL at 20:00

## 2020-05-16 RX ADMIN — SODIUM CHLORIDE: 900 INJECTION INTRAVENOUS at 12:01

## 2020-05-16 RX ADMIN — TACROLIMUS 2.5 MG: 1 CAPSULE ORAL at 09:59

## 2020-05-16 RX ADMIN — SODIUM CHLORIDE: 900 INJECTION INTRAVENOUS at 18:57

## 2020-05-16 RX ADMIN — SODIUM CHLORIDE 15 ML: 900 IRRIGANT IRRIGATION at 18:19

## 2020-05-16 RX ADMIN — SODIUM CHLORIDE: 9 INJECTION, SOLUTION INTRAVENOUS at 10:00

## 2020-05-16 RX ADMIN — ENOXAPARIN SODIUM 40 MG: 40 INJECTION SUBCUTANEOUS at 18:19

## 2020-05-16 RX ADMIN — Medication 10 ML: at 09:54

## 2020-05-16 RX ADMIN — SODIUM CHLORIDE 15 ML: 900 IRRIGANT IRRIGATION at 11:00

## 2020-05-16 RX ADMIN — Medication 500 UNITS: at 15:40

## 2020-05-16 RX ADMIN — TACROLIMUS 2.5 MG: 1 CAPSULE ORAL at 20:00

## 2020-05-16 RX ADMIN — VALACYCLOVIR HYDROCHLORIDE 500 MG: 500 TABLET, FILM COATED ORAL at 20:00

## 2020-05-16 RX ADMIN — SODIUM CHLORIDE: 9 INJECTION, SOLUTION INTRAVENOUS at 05:18

## 2020-05-16 RX ADMIN — SODIUM CHLORIDE 15 ML: 900 IRRIGANT IRRIGATION at 20:00

## 2020-05-16 RX ADMIN — Medication 10 ML: at 20:00

## 2020-05-16 RX ADMIN — LOPERAMIDE HYDROCHLORIDE 4 MG: 2 CAPSULE ORAL at 18:18

## 2020-05-16 ASSESSMENT — PAIN SCALES - GENERAL
PAINLEVEL_OUTOF10: 0

## 2020-05-16 ASSESSMENT — PAIN DESCRIPTION - PROGRESSION
CLINICAL_PROGRESSION: NOT CHANGED

## 2020-05-16 NOTE — PROGRESS NOTES
General: Awake, alert and oriented. HEENT: normocephalic, PERRL, no scleral erythema or icterus, Oral mucosa moist and intact, throat clear  NECK: supple without palpable adenopathy  BACK: Straight  SKIN: warm dry and intact without lesions rashes or masses  CHEST: CTA bilaterally without use of accessory muscles  CV: Normal S1 S2, RRR, no MRG  ABD: NT, ND, normoactive BS, no palpable masses or hepatosplenomegaly  EXTREMITIES: without edema, denies calf tenderness  NEURO: CN II - XII grossly intact. REsting tremor  CATHETER: Left IJ TLH (5/13/20, IR) - CDI & Right IJ PAC (2/17/20, [facility]) - CDI    Data:   CBC:   Recent Labs     05/14/20  0405 05/15/20  0417 05/16/20  0405   WBC 1.0* 0.6* 0.4*   HGB 10.8* 9.3* 9.0*   HCT 31.3* 27.1* 25.6*   MCV 98.7 99.7 97.3    199 180     BMP/Mag:  Recent Labs     05/14/20  0405 05/15/20  0417 05/16/20  0405    134* 136   K 4.4 4.1 4.2    104 105   CO2 24 23 24   PHOS 3.2 2.9 3.0   BUN 13 10 7   CREATININE 0.6* 0.6* 0.6*   MG 2.10  --  1.90     LIVP:   Recent Labs     05/14/20  0405 05/15/20  0417 05/16/20  0405   AST  --  13*  --    ALT  --  18  --    BILIDIR  --  0.3  --    BILITOT 1.2* 1.8* 0.7   ALKPHOS  --  50  --      Uric Acid:    Recent Labs     05/15/20  0417   LABURIC 4.0     Coags:   Recent Labs     05/14/20 0405   PROTIME 11.1   INR 0.96   APTT 30.7     Tacro:  No results for input(s): TACROLEV in the last 72 hours. CMV Quant DNA by PCR: No results found for: CMVDNAQNT    PROBLEM LIST:        1. MDS  2. Anxiety and depression  3. HLD  4. H/o shingles (2004, RLQ)  5. Gilbert syndrome    Post- Transplant Complications:  1. Resting tremor   2. Steroid Induced Hyperglycemia   3. Hypervolemia   4. Constipation   5. Nausea   6. Headache      TREATMENT:       1. Decitabine x3 cycles - 2/17-4/14/20   2.  Targeted Busulfan & Fludarabine followed by MUD Allo PBSC infusion 5/11/20  Preparative Regimen: Targeted Busulfan & Fludarabine  Date of BMT: 5/11/2020  Source of stem cells: PBSC - 6.7 x10^6 pb32ouaos/kg  Donor/Recipient Blood Type: A+ / A+  Donor Sex: Male, follow VNTR (DID# [id number])  CMV Donor / Recipient: Pos / Pos     ASSESSMENT AND PLAN:         1. Myelodysplastic Syndrome: Stable disease  - Most recent BM Bx/Asp (4/7/20) - ongoing disease  - S/p Bu/Flu & MUD-pb BMT  - Busulfan AUC low at 4672, therefore busulfan dose increased from 252 mg to 315 mg, for doses 3 and 4. Day + 5     2. ID: Afebrile, no evidence of infection.    - Cont Levaquin, Valtrex & Diflucan ppx   - Increase Diflucan ppx to 400 mg daily 5/16/20 (day +5)     Donor/Recipient CMV: Pos / Pos  - Check CMV PCR weekly once WBC 1.0  - Start letermovir ppx if started on high-dose steroids     3. Heme:  Neutropenia and anemia from chemotherapy   - Transfuse for Hgb < 7 and Platelets < 43O.    - No transfusion today.     4.  Metabolic:  euvolemia + nl lytes   - Cont IVF:  NS + KCl 10 meq @ 235 mL/hr (increased w/ Cytoxan) - decrease to 75 mL/hr on 5/16/20  - Replace potassium and magnesium per policy.     5. 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 VOD:  No evidence of VOD. T. Bili trending up, but mostly indirect  Recent Labs     05/15/20  0417 05/16/20  0405   BILIDIR 0.3  --    BILITOT 1.8* 0.7     Admission Weight: 185 lb (83.9 kg)  Current Weight: Weight: 182 lb (82.6 kg). - Cont Actigall.  - Cont to monitor     7. Pulmonary: No active issues.    - Encourage IS and ambulation      8. GI / Nutrition:    Nutrition:  Appetite & intake has decreased, but he is still eating small amounts    - Cont low microbial diet  - Dietician to follow  Constipation:  Improved   - No response to MOM (5/11/12)  - S/p Dulcolax (5/12/20) w/ BM  Nausea:  Chemotherapy induced nausea  - Cont PRN compazine,

## 2020-05-16 NOTE — PROGRESS NOTES
Pt port a cath de-accessed as ordered; pt tolerated well; pt dressing changed over benz triple lumen - some redness and warmth noted at top of insertion site and light brown drainage noted from previous insertion site; dressing changed and site cleaned thoroughly. Status of benz insertion site passed on in report to Chrissy Johnson for night shift. Will continue to monitor.

## 2020-05-16 NOTE — PLAN OF CARE
this shift; pt has not displayed any s/s of restlessness or distress this shift; Pt calm and pleasant for this shift; will continue to monitor.

## 2020-05-17 LAB
ANION GAP SERPL CALCULATED.3IONS-SCNC: 8 MMOL/L (ref 3–16)
BILIRUB SERPL-MCNC: 0.3 MG/DL (ref 0–1)
BUN BLDV-MCNC: 8 MG/DL (ref 7–20)
CALCIUM SERPL-MCNC: 8.6 MG/DL (ref 8.3–10.6)
CHLORIDE BLD-SCNC: 106 MMOL/L (ref 99–110)
CO2: 24 MMOL/L (ref 21–32)
CREAT SERPL-MCNC: 0.6 MG/DL (ref 0.9–1.3)
GFR AFRICAN AMERICAN: >60
GFR NON-AFRICAN AMERICAN: >60
GLUCOSE BLD-MCNC: 107 MG/DL (ref 70–99)
HCT VFR BLD CALC: 24.8 % (ref 40.5–52.5)
HEMOGLOBIN: 8.5 G/DL (ref 13.5–17.5)
MAGNESIUM: 1.9 MG/DL (ref 1.8–2.4)
MCH RBC QN AUTO: 33.7 PG (ref 26–34)
MCHC RBC AUTO-ENTMCNC: 34.3 G/DL (ref 31–36)
MCV RBC AUTO: 98.3 FL (ref 80–100)
PDW BLD-RTO: 14 % (ref 12.4–15.4)
PHOSPHORUS: 2.5 MG/DL (ref 2.5–4.9)
PLATELET # BLD: 144 K/UL (ref 135–450)
PMV BLD AUTO: 9 FL (ref 5–10.5)
POTASSIUM SERPL-SCNC: 4 MMOL/L (ref 3.5–5.1)
RBC # BLD: 2.52 M/UL (ref 4.2–5.9)
SODIUM BLD-SCNC: 138 MMOL/L (ref 136–145)
WBC # BLD: 0.2 K/UL (ref 4–11)

## 2020-05-17 PROCEDURE — 2580000003 HC RX 258: Performed by: NURSE PRACTITIONER

## 2020-05-17 PROCEDURE — 82247 BILIRUBIN TOTAL: CPT

## 2020-05-17 PROCEDURE — 36592 COLLECT BLOOD FROM PICC: CPT

## 2020-05-17 PROCEDURE — 83735 ASSAY OF MAGNESIUM: CPT

## 2020-05-17 PROCEDURE — 6360000002 HC RX W HCPCS: Performed by: INTERNAL MEDICINE

## 2020-05-17 PROCEDURE — 2060000000 HC ICU INTERMEDIATE R&B

## 2020-05-17 PROCEDURE — 6370000000 HC RX 637 (ALT 250 FOR IP): Performed by: INTERNAL MEDICINE

## 2020-05-17 PROCEDURE — 2580000003 HC RX 258: Performed by: INTERNAL MEDICINE

## 2020-05-17 PROCEDURE — 6360000002 HC RX W HCPCS: Performed by: NURSE PRACTITIONER

## 2020-05-17 PROCEDURE — 84100 ASSAY OF PHOSPHORUS: CPT

## 2020-05-17 PROCEDURE — 6370000000 HC RX 637 (ALT 250 FOR IP): Performed by: NURSE PRACTITIONER

## 2020-05-17 PROCEDURE — 85025 COMPLETE CBC W/AUTO DIFF WBC: CPT

## 2020-05-17 PROCEDURE — 80048 BASIC METABOLIC PNL TOTAL CA: CPT

## 2020-05-17 RX ORDER — DEXAMETHASONE 0.5 MG/5ML
1 SOLUTION ORAL EVERY 12 HOURS SCHEDULED
Status: DISCONTINUED | OUTPATIENT
Start: 2020-05-17 | End: 2020-05-18

## 2020-05-17 RX ADMIN — SODIUM CHLORIDE: 900 INJECTION INTRAVENOUS at 20:34

## 2020-05-17 RX ADMIN — URSODIOL 500 MG: 250 TABLET, FILM COATED ORAL at 20:32

## 2020-05-17 RX ADMIN — SODIUM CHLORIDE 15 ML: 900 IRRIGANT IRRIGATION at 06:30

## 2020-05-17 RX ADMIN — Medication 10 ML: at 08:03

## 2020-05-17 RX ADMIN — ENOXAPARIN SODIUM 40 MG: 40 INJECTION SUBCUTANEOUS at 18:32

## 2020-05-17 RX ADMIN — SODIUM CHLORIDE: 900 INJECTION INTRAVENOUS at 06:30

## 2020-05-17 RX ADMIN — FLUCONAZOLE 400 MG: 200 TABLET ORAL at 08:04

## 2020-05-17 RX ADMIN — VALACYCLOVIR HYDROCHLORIDE 500 MG: 500 TABLET, FILM COATED ORAL at 08:04

## 2020-05-17 RX ADMIN — SODIUM CHLORIDE 15 ML: 900 IRRIGANT IRRIGATION at 20:34

## 2020-05-17 RX ADMIN — Medication 1 MG: at 20:33

## 2020-05-17 RX ADMIN — LEVOFLOXACIN 500 MG: 500 TABLET, FILM COATED ORAL at 20:32

## 2020-05-17 RX ADMIN — Medication 10 ML: at 20:34

## 2020-05-17 RX ADMIN — TACROLIMUS 2.5 MG: 1 CAPSULE ORAL at 20:32

## 2020-05-17 RX ADMIN — PANTOPRAZOLE SODIUM 40 MG: 40 TABLET, DELAYED RELEASE ORAL at 06:30

## 2020-05-17 RX ADMIN — TACROLIMUS 2.5 MG: 1 CAPSULE ORAL at 08:03

## 2020-05-17 RX ADMIN — URSODIOL 500 MG: 250 TABLET, FILM COATED ORAL at 08:03

## 2020-05-17 RX ADMIN — VALACYCLOVIR HYDROCHLORIDE 500 MG: 500 TABLET, FILM COATED ORAL at 20:32

## 2020-05-17 ASSESSMENT — PAIN DESCRIPTION - PROGRESSION
CLINICAL_PROGRESSION: NOT CHANGED
CLINICAL_PROGRESSION: NOT CHANGED

## 2020-05-17 ASSESSMENT — PAIN DESCRIPTION - ORIENTATION: ORIENTATION: MID

## 2020-05-17 ASSESSMENT — PAIN DESCRIPTION - LOCATION
LOCATION: HEAD
LOCATION: MOUTH

## 2020-05-17 ASSESSMENT — PAIN SCALES - GENERAL
PAINLEVEL_OUTOF10: 0
PAINLEVEL_OUTOF10: 2
PAINLEVEL_OUTOF10: 0
PAINLEVEL_OUTOF10: 4
PAINLEVEL_OUTOF10: 0

## 2020-05-17 ASSESSMENT — PAIN DESCRIPTION - ONSET
ONSET: ON-GOING
ONSET: GRADUAL

## 2020-05-17 ASSESSMENT — PAIN DESCRIPTION - DESCRIPTORS: DESCRIPTORS: ACHING

## 2020-05-17 ASSESSMENT — PAIN DESCRIPTION - PAIN TYPE
TYPE: ACUTE PAIN
TYPE: ACUTE PAIN

## 2020-05-17 ASSESSMENT — PAIN - FUNCTIONAL ASSESSMENT
PAIN_FUNCTIONAL_ASSESSMENT: ACTIVITIES ARE NOT PREVENTED
PAIN_FUNCTIONAL_ASSESSMENT: PREVENTS OR INTERFERES SOME ACTIVE ACTIVITIES AND ADLS

## 2020-05-17 ASSESSMENT — PAIN DESCRIPTION - FREQUENCY
FREQUENCY: INTERMITTENT
FREQUENCY: INTERMITTENT

## 2020-05-17 NOTE — PROGRESS NOTES
[facility] Allogeneic Progress Note    2020    Rajani Wiley    :  1965    MRN:  [id number]    Subjective:  CVC site sore, using tramadol - mucositis worse; previous insertion site of catheter, mildly red and pale centralized area     ECOG PS:  (1) Restricted in physically strenuous activity, ambulatory and able to do work of light nature    KPS: 80% Normal activity with effort; some signs or symptoms of disease    Isolation:  None     Medications    Scheduled Meds:   pantoprazole  40 mg Oral QAM AC    enoxaparin  40 mg Subcutaneous Daily    fluconazole  400 mg Oral Daily    levoFLOXacin  500 mg Oral Nightly    Saline Mouthwash  15 mL Swish & Spit 4x Daily AC & HS    sodium chloride flush  10 mL Intravenous 2 times per day    valACYclovir  500 mg Oral BID    ursodiol  500 mg Oral BID    furosemide  40 mg Intravenous Q12H    tacrolimus  2.5 mg Oral Q12H     Continuous Infusions:   sodium chloride      dextrose      sodium chloride      sodium chloride 75 mL/hr at 20 0630     PRN Meds:[COMPLETED] loperamide **FOLLOWED BY** loperamide, traMADol, acetaminophen, diphenhydrAMINE, morphine, sodium chloride, potassium phosphate IVPB, calcium carbonate, Hydrocerin, glucose, dextrose, glucagon (rDNA), dextrose, sodium chloride, alteplase, magnesium hydroxide, magnesium sulfate, potassium chloride, Saline Mouthwash, prochlorperazine **OR** prochlorperazine, LORazepam **OR** LORazepam    ROS:  As noted above, otherwise remainder of 10-point ROS negative    Physical Exam:    I&O:      Intake/Output Summary (Last 24 hours) at 2020 1434  Last data filed at 2020 0814  Gross per 24 hour   Intake 3367 ml   Output 3300 ml   Net 67 ml       Vital Signs:  BP (!) 132/90   Pulse 72   Temp 98 °F (36.7 °C) (Oral)   Resp 18   Ht 5' 9.8\" (1.773 m)   Wt 181 lb 9.6 oz (82.4 kg)   SpO2 100%   BMI 26.20 kg/m²     Weight:    Wt Readings from Last 3 Encounters:   20 181

## 2020-05-17 NOTE — PLAN OF CARE
throughout the day and for all meals. Encouraged frequent short naps as necessary to preserve energy but instructed that while awake, pt should be OOB. Encouraged pt to ambulate in halls. Pt not seen up in halls this shift. Pt is visualized to be OOB 0-25% of the time this shift. Will continue to encourage frequent activity. Problem: Skin Integrity:  Goal: Absence of new skin breakdown  Description: Absence of new skin breakdown  Outcome: Ongoing  Note: Pt's skin remains intact, no evidence of breakdown noted, will continue to monitor. Problem: Venous Thromboembolism:  Goal: Will show no signs or symptoms of venous thromboembolism  Description: Will show no signs or symptoms of venous thromboembolism  Outcome: Ongoing  Note: Adherent with DVT Prevention: Pt is at risk for DVT d/t decreased mobility and cancer treatment. Pt educated on importance of activity. Pt has orders for lovenox. Pt verbalizes understanding of need for prophylaxis while inpatient. Problem: Pain:  Goal: Pain level will decrease  Description: Pain level will decrease  Outcome: Ongoing  Note: Pt free from pain, will continue to monitor. Problem: Nausea/Vomiting:  Goal: Absence of nausea/vomiting  Description: Absence of nausea/vomiting  Outcome: Ongoing  Note: Pt free from nausea/vomiting, will continue to monitor. Problem: Diarrhea:  Goal: Bowel elimination is within specified parameters  Description: Bowel elimination is within specified parameters  Outcome: Ongoing  Note: Pt free from diarrhea, will continue to monitor. Problem: Anxiety:  Goal: Level of anxiety will decrease  Description: Level of anxiety will decrease  Outcome: Ongoing  Note: Pt free from anxiety, will continue to monitor.

## 2020-05-17 NOTE — PLAN OF CARE
Will continue to monitor. Problem: Nausea/Vomiting:  Goal: Able to drink  Description: Able to drink  Outcome: Ongoing     Problem: Nausea/Vomiting:  Goal: Able to eat  Description: Able to eat  Outcome: Ongoing     Problem: PROTECTIVE PRECAUTIONS  Goal: Patient will remain free of nosocomial Infections  5/17/2020 1519 by Luis Ochoa RN  Outcome: Ongoing     Problem: Discharge Planning:  Goal: Discharged to appropriate level of care  Description: Discharged to appropriate level of care  Outcome: Ongoing     Problem: Activity:  Goal: Ability to tolerate increased activity will improve  Description: Ability to tolerate increased activity will improve  5/17/2020 1519 by Luis cOhoa RN  Outcome: Ongoing   Pt expresses fatigued during this shift; pt educated about process of BMT and encouraged to take short naps/rest periods to preserve energy. Pt. Demonstrates correct understanding of this education. Will continue to monitor.      Problem: Nutrition Deficit:  Goal: Ability to achieve adequate nutritional intake will improve  Description: Ability to achieve adequate nutritional intake will improve  Outcome: Ongoing     Problem: Skin Integrity:  Goal: Absence of new skin breakdown  Description: Absence of new skin breakdown  5/17/2020 1519 by Luis Ochoa RN  Outcome: Ongoing     Problem: Diarrhea:  Goal: Bowel elimination is within specified parameters  Description: Bowel elimination is within specified parameters  5/17/2020 1519 by Luis Ochoa RN  Outcome: Ongoing     Problem: Coping:  Goal: Ability to identify and develop effective coping behavior will improve  Description: Ability to identify and develop effective coping behavior will improve  Outcome: Ongoing

## 2020-05-18 ENCOUNTER — APPOINTMENT (OUTPATIENT)
Dept: GENERAL RADIOLOGY | Age: 55
DRG: 014 | End: 2020-05-18
Attending: INTERNAL MEDICINE
Payer: COMMERCIAL

## 2020-05-18 LAB
ALBUMIN SERPL-MCNC: 3.7 G/DL (ref 3.4–5)
ALP BLD-CCNC: 65 U/L (ref 40–129)
ALT SERPL-CCNC: 16 U/L (ref 10–40)
ANION GAP SERPL CALCULATED.3IONS-SCNC: 9 MMOL/L (ref 3–16)
APTT: 31.2 SEC (ref 24.2–36.2)
AST SERPL-CCNC: 12 U/L (ref 15–37)
BILIRUB SERPL-MCNC: 0.3 MG/DL (ref 0–1)
BILIRUBIN DIRECT: <0.2 MG/DL (ref 0–0.3)
BILIRUBIN URINE: NEGATIVE
BILIRUBIN, INDIRECT: ABNORMAL MG/DL (ref 0–1)
BLOOD, URINE: NEGATIVE
BUN BLDV-MCNC: 7 MG/DL (ref 7–20)
CALCIUM SERPL-MCNC: 8.7 MG/DL (ref 8.3–10.6)
CHLORIDE BLD-SCNC: 109 MMOL/L (ref 99–110)
CLARITY: CLEAR
CO2: 23 MMOL/L (ref 21–32)
COLOR: YELLOW
CREAT SERPL-MCNC: 0.6 MG/DL (ref 0.9–1.3)
GFR AFRICAN AMERICAN: >60
GFR NON-AFRICAN AMERICAN: >60
GLUCOSE BLD-MCNC: 88 MG/DL (ref 70–99)
GLUCOSE URINE: NEGATIVE MG/DL
HCT VFR BLD CALC: 24.5 % (ref 40.5–52.5)
HEMOGLOBIN: 8.6 G/DL (ref 13.5–17.5)
INR BLD: 0.97 (ref 0.86–1.14)
KETONES, URINE: NEGATIVE MG/DL
LACTATE DEHYDROGENASE: 160 U/L (ref 100–190)
LEUKOCYTE ESTERASE, URINE: NEGATIVE
MAGNESIUM: 1.7 MG/DL (ref 1.8–2.4)
MCH RBC QN AUTO: 34.2 PG (ref 26–34)
MCHC RBC AUTO-ENTMCNC: 35.1 G/DL (ref 31–36)
MCV RBC AUTO: 97.6 FL (ref 80–100)
MICROSCOPIC EXAMINATION: NORMAL
NITRITE, URINE: NEGATIVE
PDW BLD-RTO: 14.3 % (ref 12.4–15.4)
PH UA: 7 (ref 5–8)
PHOSPHORUS: 3 MG/DL (ref 2.5–4.9)
PLATELET # BLD: 105 K/UL (ref 135–450)
PMV BLD AUTO: 9.4 FL (ref 5–10.5)
POTASSIUM SERPL-SCNC: 4 MMOL/L (ref 3.5–5.1)
PROTEIN UA: NEGATIVE MG/DL
PROTHROMBIN TIME: 11.3 SEC (ref 10–13.2)
RBC # BLD: 2.51 M/UL (ref 4.2–5.9)
SODIUM BLD-SCNC: 141 MMOL/L (ref 136–145)
SPECIFIC GRAVITY UA: 1.02 (ref 1–1.03)
TOTAL PROTEIN: 5.5 G/DL (ref 6.4–8.2)
URIC ACID, SERUM: 3.4 MG/DL (ref 3.5–7.2)
URINE TYPE: NORMAL
UROBILINOGEN, URINE: 0.2 E.U./DL
WBC # BLD: 0.1 K/UL (ref 4–11)

## 2020-05-18 PROCEDURE — 81003 URINALYSIS AUTO W/O SCOPE: CPT

## 2020-05-18 PROCEDURE — 84100 ASSAY OF PHOSPHORUS: CPT

## 2020-05-18 PROCEDURE — 6370000000 HC RX 637 (ALT 250 FOR IP): Performed by: INTERNAL MEDICINE

## 2020-05-18 PROCEDURE — 85025 COMPLETE CBC W/AUTO DIFF WBC: CPT

## 2020-05-18 PROCEDURE — 84550 ASSAY OF BLOOD/URIC ACID: CPT

## 2020-05-18 PROCEDURE — 6370000000 HC RX 637 (ALT 250 FOR IP): Performed by: NURSE PRACTITIONER

## 2020-05-18 PROCEDURE — 6360000002 HC RX W HCPCS: Performed by: INTERNAL MEDICINE

## 2020-05-18 PROCEDURE — 2060000000 HC ICU INTERMEDIATE R&B

## 2020-05-18 PROCEDURE — 2580000003 HC RX 258: Performed by: INTERNAL MEDICINE

## 2020-05-18 PROCEDURE — 6360000002 HC RX W HCPCS: Performed by: NURSE PRACTITIONER

## 2020-05-18 PROCEDURE — 83735 ASSAY OF MAGNESIUM: CPT

## 2020-05-18 PROCEDURE — 85610 PROTHROMBIN TIME: CPT

## 2020-05-18 PROCEDURE — 2580000003 HC RX 258: Performed by: NURSE PRACTITIONER

## 2020-05-18 PROCEDURE — 80048 BASIC METABOLIC PNL TOTAL CA: CPT

## 2020-05-18 PROCEDURE — 36592 COLLECT BLOOD FROM PICC: CPT

## 2020-05-18 PROCEDURE — 85730 THROMBOPLASTIN TIME PARTIAL: CPT

## 2020-05-18 PROCEDURE — 71045 X-RAY EXAM CHEST 1 VIEW: CPT

## 2020-05-18 PROCEDURE — 80076 HEPATIC FUNCTION PANEL: CPT

## 2020-05-18 PROCEDURE — 83615 LACTATE (LD) (LDH) ENZYME: CPT

## 2020-05-18 RX ORDER — DEXAMETHASONE 0.5 MG/5ML
1 SOLUTION ORAL EVERY 12 HOURS SCHEDULED
Status: DISCONTINUED | OUTPATIENT
Start: 2020-05-18 | End: 2020-06-01

## 2020-05-18 RX ADMIN — ENOXAPARIN SODIUM 40 MG: 40 INJECTION SUBCUTANEOUS at 17:37

## 2020-05-18 RX ADMIN — SODIUM CHLORIDE 15 ML: 900 IRRIGANT IRRIGATION at 17:39

## 2020-05-18 RX ADMIN — Medication 1 MG: at 21:17

## 2020-05-18 RX ADMIN — SODIUM CHLORIDE: 900 INJECTION INTRAVENOUS at 21:18

## 2020-05-18 RX ADMIN — FLUCONAZOLE 400 MG: 200 TABLET ORAL at 08:30

## 2020-05-18 RX ADMIN — TRAMADOL HYDROCHLORIDE 50 MG: 50 TABLET, FILM COATED ORAL at 08:42

## 2020-05-18 RX ADMIN — URSODIOL 500 MG: 250 TABLET, FILM COATED ORAL at 08:29

## 2020-05-18 RX ADMIN — LOPERAMIDE HYDROCHLORIDE 2 MG: 2 CAPSULE ORAL at 08:42

## 2020-05-18 RX ADMIN — Medication 10 ML: at 08:29

## 2020-05-18 RX ADMIN — SODIUM CHLORIDE 15 ML: 900 IRRIGANT IRRIGATION at 21:18

## 2020-05-18 RX ADMIN — TRAMADOL HYDROCHLORIDE 50 MG: 50 TABLET, FILM COATED ORAL at 21:17

## 2020-05-18 RX ADMIN — Medication 1 MG: at 08:29

## 2020-05-18 RX ADMIN — PANTOPRAZOLE SODIUM 40 MG: 40 TABLET, DELAYED RELEASE ORAL at 06:13

## 2020-05-18 RX ADMIN — TACROLIMUS 2.5 MG: 1 CAPSULE ORAL at 21:17

## 2020-05-18 RX ADMIN — SODIUM CHLORIDE: 900 INJECTION INTRAVENOUS at 08:34

## 2020-05-18 RX ADMIN — URSODIOL 500 MG: 250 TABLET, FILM COATED ORAL at 21:17

## 2020-05-18 RX ADMIN — VALACYCLOVIR HYDROCHLORIDE 500 MG: 500 TABLET, FILM COATED ORAL at 21:17

## 2020-05-18 RX ADMIN — TACROLIMUS 2.5 MG: 1 CAPSULE ORAL at 08:29

## 2020-05-18 RX ADMIN — Medication 10 ML: at 21:18

## 2020-05-18 RX ADMIN — SODIUM CHLORIDE 15 ML: 900 IRRIGANT IRRIGATION at 11:58

## 2020-05-18 RX ADMIN — LEVOFLOXACIN 500 MG: 500 TABLET, FILM COATED ORAL at 21:17

## 2020-05-18 RX ADMIN — VALACYCLOVIR HYDROCHLORIDE 500 MG: 500 TABLET, FILM COATED ORAL at 08:29

## 2020-05-18 ASSESSMENT — PAIN DESCRIPTION - FREQUENCY
FREQUENCY: INTERMITTENT
FREQUENCY: CONTINUOUS

## 2020-05-18 ASSESSMENT — PAIN DESCRIPTION - LOCATION
LOCATION: HEAD
LOCATION: THROAT

## 2020-05-18 ASSESSMENT — PAIN DESCRIPTION - PAIN TYPE
TYPE: ACUTE PAIN
TYPE: ACUTE PAIN

## 2020-05-18 ASSESSMENT — PAIN DESCRIPTION - ONSET
ONSET: GRADUAL
ONSET: ON-GOING

## 2020-05-18 ASSESSMENT — PAIN SCALES - GENERAL
PAINLEVEL_OUTOF10: 0
PAINLEVEL_OUTOF10: 4
PAINLEVEL_OUTOF10: 4
PAINLEVEL_OUTOF10: 0
PAINLEVEL_OUTOF10: 0
PAINLEVEL_OUTOF10: 5
PAINLEVEL_OUTOF10: 1

## 2020-05-18 ASSESSMENT — PAIN - FUNCTIONAL ASSESSMENT
PAIN_FUNCTIONAL_ASSESSMENT: ACTIVITIES ARE NOT PREVENTED
PAIN_FUNCTIONAL_ASSESSMENT: ACTIVITIES ARE NOT PREVENTED

## 2020-05-18 ASSESSMENT — PAIN DESCRIPTION - PROGRESSION
CLINICAL_PROGRESSION: GRADUALLY WORSENING
CLINICAL_PROGRESSION: GRADUALLY WORSENING

## 2020-05-18 ASSESSMENT — PAIN DESCRIPTION - ORIENTATION: ORIENTATION: MID

## 2020-05-18 ASSESSMENT — PAIN DESCRIPTION - DESCRIPTORS
DESCRIPTORS: BURNING;DISCOMFORT
DESCRIPTORS: HEADACHE

## 2020-05-18 NOTE — PLAN OF CARE
Problem: Pain:  Goal: Pain level will decrease  Description: Pain level will decrease  Outcome: Ongoing  Note: Pt continues with mucositis pain, especially in throat. PRN Tramadol administered per eMar. Pt also utilizing scheduled dex S+S mouthwash for relief. Will continue to monitor. Problem: Falls - Risk of:  Goal: Will remain free from falls  Description: Will remain free from falls  Outcome: Ongoing  Note: Pt remains free of falls; up ad [place] with steady gait. Patient's BP was orthostatic negative this shift. Bed in lowest position, wheels locked, side rails up 2/4. Possessions and call light within reach; pt uses call light appropriately. Will continue to monitor. Stable/No Isolation Precautions:  Pt with activity orders for up ad savannah. Encouraged pt to be up OOB as much as possible throughout the day and for all meals. Encouraged frequent short naps as necessary to preserve energy but instructed that while awake, pt should be OOB. Encouraged pt to ambulate in halls. Pt seen up ad savannah in halls several times this shift. Pt is visualized to be OOB 51-75% of the time this shift. Will continue to encourage frequent activity. Problem: Infection - Central Venous Catheter-Associated Bloodstream Infection:  Goal: Will show no infection signs and symptoms  Description: Will show no infection signs and symptoms  Outcome: Ongoing  Note: Pt afebrile. TLH in place; site and dressing remain c/d/i. Lines flush well with good blood return; Tegaderm and Biopatch in place. Lines pinned per protocol. Will continue to monitor. CVC site remains free of signs/symptoms of infection. No drainage, edema, erythema, pain, or warmth noted at site. Dressing changes continue per protocol and on an as needed basis - see flowsheet. Compliant with BCC Bath Protocol:  Performed CHG bath today per BCC protocol utilizing CHG solution in the shower.   CVC site cleansed with CHG wipe over dressing, skin surrounding dressing, chest. Site yellow and with intermittent drainage; MD Chanel aware. New CVC drsg changed this shift and old site left ALTAF. Pt UAL and turns/repositions self. Will continue to monitor. Problem: Diarrhea:  Goal: Bowel elimination is within specified parameters  Description: Bowel elimination is within specified parameters  Outcome: Ongoing  Note: Pt continues with diarrhea; C Diff negative 5/15. PRN Imodium administered per eMar. Pt provided with barrier wipes/cream for relief. Will continue to monitor.

## 2020-05-18 NOTE — PROGRESS NOTES
(82.4 kg)   03/31/20 184 lb 9.6 oz (83.7 kg)   02/17/20 180 lb (81.6 kg)       General: Awake, alert and oriented. HEENT: normocephalic, PERRL, no scleral erythema or icterus, Oral mucosa moist and intact, throat clear  NECK: supple without palpable adenopathy  BACK: Straight  SKIN: warm dry and intact without lesions rashes or masses  CHEST: CTA bilaterally without use of accessory muscles  CV: Normal S1 S2, RRR, no MRG  ABD: NT, ND, normoactive BS, no palpable masses or hepatosplenomegaly  EXTREMITIES: without edema, denies calf tenderness  NEURO: CN II - XII grossly intact. REsting tremor  CATHETER: Left IJ TLH (5/13/20, IR) - CDI & Right IJ PAC (2/17/20, [facility]) - CDI. Old TLH site - yellow discharge. Data:   CBC:   Recent Labs     05/16/20 0405 05/17/20 0400 05/18/20 0312   WBC 0.4* 0.2* 0.1*   HGB 9.0* 8.5* 8.6*   HCT 25.6* 24.8* 24.5*   MCV 97.3 98.3 97.6    144 105*     BMP/Mag:  Recent Labs     05/16/20 0405 05/17/20 0400 05/18/20 0312    138 141   K 4.2 4.0 4.0    106 109   CO2 24 24 23   PHOS 3.0 2.5 3.0   BUN 7 8 7   CREATININE 0.6* 0.6* 0.6*   MG 1.90 1.90 1.70*     LIVP:   Recent Labs     05/16/20 0405 05/17/20 0400 05/18/20 0312   AST  --   --  12*   ALT  --   --  16   BILIDIR  --   --  <0.2   BILITOT 0.7 0.3 0.3   ALKPHOS  --   --  65     Uric Acid:    Recent Labs     05/18/20 0312   LABURIC 3.4*     Coags:   Recent Labs     05/18/20 0312   PROTIME 11.3   INR 0.97   APTT 31.2     Tacro:  No results for input(s): TACROLEV in the last 72 hours. CMV Quant DNA by PCR: No results found for: CMVDNAQNT    PROBLEM LIST:        1. MDS  2. Anxiety and depression  3. HLD  4. H/o shingles (2004, RLQ)  5. Gilbert syndrome    Post- Transplant Complications:  1. Resting tremor   2. Steroid Induced Hyperglycemia   3. Hypervolemia   4. Constipation   5. Nausea   6. Headache   7. Mucositis     TREATMENT:       1. Decitabine x3 cycles - 2/17-4/14/20   2.  Targeted Busulfan &

## 2020-05-18 NOTE — CARE COORDINATION
Type of Admission  MDS  Fully Ablative MUD Allogeneic SCT ( Male Donor, PBSC's)  T:0---> 5/11/20  Day +7( Busulfan+Fludara)        Central venous catheter  Left IJ TLC ( 5/520, IR)        Plan  Fully Ablative MUD Allogeneic SCT ( 5/11/20)        Update  5/5/20: Planned admission for MUD Allogeneic transplant. 5/8/20: Staying active by walking in hallway. 5/12/20: Tolerated infusion of stem cells without proble, but reports that he has had insomnia that last few days. , states \"I think it has been my nerves\". Remains active by walking in the hallway. 5/15/20:  Maintaining activity by walking in hallway. Seen by Dietitian  5/18/29:  Maintaining activity by walking in hallway, reports fatigue. Education  Patient and caregiver have been through the educational process for allogeneic bone marrow transplantation including the allogeneic family meeting, preadmission teaching and preadmission physician office Feliberto Jordan RN BMT Coordinator    Patient and caregiver verbalize understanding of treatment regimen, possible adverse events, length of stay, and risk and benefits of transplantation and are agreeable to proceed. Patient and caregiver have been given an opportunity to ask, and to have their questions answered to their satisfaction. Documentation for above education can be found in the [facility], [facility] office chart. 5/5/20 Confirmed  Home Pharmacy. Reviewed infusion of stem cells, telemetry & frequent vital signs during infusion of stem cells. Discussed medication management as he prepares for home have spoken to his spouse, Torie Man on the phone, re-introduced myself, reinforced my contact information & the [facility] unit phone #.  5/8/20:  Discussed volume of infusion of stem cells( Total Volume-=280 ml)  5/18/20:   I have maintained contact with spouse via phone & e-mail, I have responded to questions regarding discahrge/count recovery, I have also informed

## 2020-05-18 NOTE — PLAN OF CARE
X Peets, RN  Note: Pt denied any nausea throughout shift. Will continue to monitor. Problem: PROTECTIVE PRECAUTIONS  Goal: Patient will remain free of nosocomial Infections  5/17/2020 2159 by Dony Auguste RN  Note: Pt remains in protective precautions. No living plants or fresh flowers in his room. Patient educated on wearing mask when in hallways. Patient, staff, and visitors adhering to handwashing guidelines. Patient cleansed with chlorhexidine wipes and linens changed daily per protocol. Pt verbalizes understanding of low microbial diet. Patient remains free of nosocomial infections. Problem: Nutrition Deficit:  Goal: Ability to achieve adequate nutritional intake will improve  Description: Ability to achieve adequate nutritional intake will improve  5/17/2020 2159 by Dony Auguste RN  Note: Patient with decreased appetite. Encouraged patient to eat small meals more frequently as well as drink nutritional supplements to meet caloric intake. Patient verbalized understanding. Patient attempting to eat (see flow sheet). Will continue to monitor.

## 2020-05-19 ENCOUNTER — APPOINTMENT (OUTPATIENT)
Dept: CT IMAGING | Age: 55
DRG: 014 | End: 2020-05-19
Attending: INTERNAL MEDICINE
Payer: COMMERCIAL

## 2020-05-19 LAB
ANION GAP SERPL CALCULATED.3IONS-SCNC: 9 MMOL/L (ref 3–16)
BILIRUB SERPL-MCNC: 0.7 MG/DL (ref 0–1)
BUN BLDV-MCNC: 7 MG/DL (ref 7–20)
CALCIUM SERPL-MCNC: 8.9 MG/DL (ref 8.3–10.6)
CHLORIDE BLD-SCNC: 106 MMOL/L (ref 99–110)
CO2: 24 MMOL/L (ref 21–32)
CREAT SERPL-MCNC: 0.6 MG/DL (ref 0.9–1.3)
GFR AFRICAN AMERICAN: >60
GFR NON-AFRICAN AMERICAN: >60
GLUCOSE BLD-MCNC: 93 MG/DL (ref 70–99)
HCT VFR BLD CALC: 24.8 % (ref 40.5–52.5)
HEMOGLOBIN: 8.8 G/DL (ref 13.5–17.5)
MAGNESIUM: 1.5 MG/DL (ref 1.8–2.4)
MCH RBC QN AUTO: 33.9 PG (ref 26–34)
MCHC RBC AUTO-ENTMCNC: 35.3 G/DL (ref 31–36)
MCV RBC AUTO: 96.2 FL (ref 80–100)
PDW BLD-RTO: 14.4 % (ref 12.4–15.4)
PHOSPHORUS: 2.7 MG/DL (ref 2.5–4.9)
PLATELET # BLD: 88 K/UL (ref 135–450)
PMV BLD AUTO: 9.5 FL (ref 5–10.5)
POTASSIUM SERPL-SCNC: 4.2 MMOL/L (ref 3.5–5.1)
RBC # BLD: 2.58 M/UL (ref 4.2–5.9)
SODIUM BLD-SCNC: 139 MMOL/L (ref 136–145)
WBC # BLD: 0.1 K/UL (ref 4–11)

## 2020-05-19 PROCEDURE — 70486 CT MAXILLOFACIAL W/O DYE: CPT

## 2020-05-19 PROCEDURE — 2580000003 HC RX 258: Performed by: INTERNAL MEDICINE

## 2020-05-19 PROCEDURE — 6370000000 HC RX 637 (ALT 250 FOR IP): Performed by: INTERNAL MEDICINE

## 2020-05-19 PROCEDURE — 2580000003 HC RX 258: Performed by: NURSE PRACTITIONER

## 2020-05-19 PROCEDURE — 6370000000 HC RX 637 (ALT 250 FOR IP): Performed by: NURSE PRACTITIONER

## 2020-05-19 PROCEDURE — 36592 COLLECT BLOOD FROM PICC: CPT

## 2020-05-19 PROCEDURE — 83735 ASSAY OF MAGNESIUM: CPT

## 2020-05-19 PROCEDURE — 82247 BILIRUBIN TOTAL: CPT

## 2020-05-19 PROCEDURE — 85025 COMPLETE CBC W/AUTO DIFF WBC: CPT

## 2020-05-19 PROCEDURE — 6360000002 HC RX W HCPCS: Performed by: INTERNAL MEDICINE

## 2020-05-19 PROCEDURE — 84100 ASSAY OF PHOSPHORUS: CPT

## 2020-05-19 PROCEDURE — 70450 CT HEAD/BRAIN W/O DYE: CPT

## 2020-05-19 PROCEDURE — 87081 CULTURE SCREEN ONLY: CPT

## 2020-05-19 PROCEDURE — 2060000000 HC ICU INTERMEDIATE R&B

## 2020-05-19 PROCEDURE — 6360000002 HC RX W HCPCS: Performed by: NURSE PRACTITIONER

## 2020-05-19 PROCEDURE — 80048 BASIC METABOLIC PNL TOTAL CA: CPT

## 2020-05-19 RX ADMIN — TRAMADOL HYDROCHLORIDE 50 MG: 50 TABLET, FILM COATED ORAL at 15:09

## 2020-05-19 RX ADMIN — PANTOPRAZOLE SODIUM 40 MG: 40 TABLET, DELAYED RELEASE ORAL at 08:50

## 2020-05-19 RX ADMIN — Medication 1 MG: at 08:49

## 2020-05-19 RX ADMIN — TACROLIMUS 2.5 MG: 1 CAPSULE ORAL at 08:49

## 2020-05-19 RX ADMIN — FLUCONAZOLE 400 MG: 200 TABLET ORAL at 08:49

## 2020-05-19 RX ADMIN — Medication 10 ML: at 08:50

## 2020-05-19 RX ADMIN — ENOXAPARIN SODIUM 40 MG: 40 INJECTION SUBCUTANEOUS at 17:58

## 2020-05-19 RX ADMIN — VALACYCLOVIR HYDROCHLORIDE 500 MG: 500 TABLET, FILM COATED ORAL at 21:26

## 2020-05-19 RX ADMIN — URSODIOL 500 MG: 250 TABLET, FILM COATED ORAL at 08:49

## 2020-05-19 RX ADMIN — TACROLIMUS 2.5 MG: 1 CAPSULE ORAL at 21:26

## 2020-05-19 RX ADMIN — SODIUM CHLORIDE: 900 INJECTION INTRAVENOUS at 08:54

## 2020-05-19 RX ADMIN — LORAZEPAM 0.5 MG: 2 INJECTION INTRAMUSCULAR; INTRAVENOUS at 21:27

## 2020-05-19 RX ADMIN — ACETAMINOPHEN 650 MG: 325 TABLET ORAL at 21:26

## 2020-05-19 RX ADMIN — VALACYCLOVIR HYDROCHLORIDE 500 MG: 500 TABLET, FILM COATED ORAL at 08:49

## 2020-05-19 RX ADMIN — Medication: at 08:51

## 2020-05-19 RX ADMIN — SODIUM CHLORIDE: 900 INJECTION INTRAVENOUS at 21:30

## 2020-05-19 RX ADMIN — SODIUM CHLORIDE 15 ML: 900 IRRIGANT IRRIGATION at 17:59

## 2020-05-19 RX ADMIN — ACETAMINOPHEN 650 MG: 325 TABLET ORAL at 03:30

## 2020-05-19 RX ADMIN — Medication 1 MG: at 21:35

## 2020-05-19 RX ADMIN — URSODIOL 500 MG: 250 TABLET, FILM COATED ORAL at 21:26

## 2020-05-19 RX ADMIN — Medication 10 ML: at 21:35

## 2020-05-19 RX ADMIN — LEVOFLOXACIN 500 MG: 500 TABLET, FILM COATED ORAL at 21:27

## 2020-05-19 ASSESSMENT — PAIN DESCRIPTION - FREQUENCY
FREQUENCY: INTERMITTENT

## 2020-05-19 ASSESSMENT — PAIN DESCRIPTION - PAIN TYPE
TYPE: ACUTE PAIN

## 2020-05-19 ASSESSMENT — PAIN DESCRIPTION - PROGRESSION
CLINICAL_PROGRESSION: NOT CHANGED

## 2020-05-19 ASSESSMENT — PAIN DESCRIPTION - ORIENTATION
ORIENTATION: MID;ANTERIOR
ORIENTATION: ANTERIOR
ORIENTATION: ANTERIOR
ORIENTATION: MID

## 2020-05-19 ASSESSMENT — PAIN DESCRIPTION - DESCRIPTORS
DESCRIPTORS: ACHING
DESCRIPTORS: HEADACHE
DESCRIPTORS: HEADACHE
DESCRIPTORS: ACHING

## 2020-05-19 ASSESSMENT — PAIN SCALES - GENERAL
PAINLEVEL_OUTOF10: 4
PAINLEVEL_OUTOF10: 1
PAINLEVEL_OUTOF10: 6
PAINLEVEL_OUTOF10: 4
PAINLEVEL_OUTOF10: 3

## 2020-05-19 ASSESSMENT — PAIN DESCRIPTION - DIRECTION
RADIATING_TOWARDS: EYES
RADIATING_TOWARDS: EYES

## 2020-05-19 ASSESSMENT — PAIN DESCRIPTION - ONSET
ONSET: GRADUAL
ONSET: GRADUAL
ONSET: ON-GOING
ONSET: ON-GOING

## 2020-05-19 ASSESSMENT — PAIN DESCRIPTION - LOCATION
LOCATION: HEAD

## 2020-05-19 ASSESSMENT — PAIN - FUNCTIONAL ASSESSMENT
PAIN_FUNCTIONAL_ASSESSMENT: ACTIVITIES ARE NOT PREVENTED

## 2020-05-19 NOTE — PLAN OF CARE
Problem: Bleeding:  Goal: Will show no signs and symptoms of excessive bleeding  Description: Will show no signs and symptoms of excessive bleeding  Outcome: Ongoing  Note: Patient's hemoglobin this AM:   Recent Labs     05/19/20  0345   HGB 8.8*     Patient's platelet count this AM:   Recent Labs     05/19/20  0345   PLT 88*    Thrombocytopenia Precautions in place. Patient showing no signs or symptoms of active bleeding. Transfusion not indicated at this time. Patient verbalizes understanding of all instructions. Will continue to assess and implement POC. Call light within reach and hourly rounding in place. Problem: Nausea/Vomiting:  Goal: Absence of nausea/vomiting  Description: Absence of nausea/vomiting  Outcome: Ongoing  Note: Patient denied nausea and vomiting during shift. Problem: PROTECTIVE PRECAUTIONS  Goal: Patient will remain free of nosocomial Infections  Outcome: Ongoing  Note: Pt remains in protective precautions. No living plants or fresh flowers in his/her room. Patient educated on wearing mask when in hallways. Patient, staff, and visitors adhering to handwashing guidelines. Patient cleansed with chlorhexidine wipes and linens changed daily per protocol. Pt verbalizes understanding of low microbial diet. Patient remains free of nosocomial infections. Problem: Discharge Planning:  Goal: Discharged to appropriate level of care  Description: Discharged to appropriate level of care  Outcome: Ongoing  Note: Patient verbalized understanding of treatment plan. Problem: Skin Integrity:  Goal: Absence of new skin breakdown  Description: Absence of new skin breakdown  Outcome: Ongoing  Note: Patient with small skin tear around CVC site. Patient denied pain around site. Problem: Diarrhea:  Goal: Bowel elimination is within specified parameters  Description: Bowel elimination is within specified parameters  Outcome: Ongoing  Note: Patient with one soft, formed BM during shift.

## 2020-05-19 NOTE — PROGRESS NOTES
This RN spoke to pts wife at 1. Provided Jose's wife with an update. Let her know of his increased headaches and results of todays CT result being negative.

## 2020-05-20 ENCOUNTER — APPOINTMENT (OUTPATIENT)
Dept: GENERAL RADIOLOGY | Age: 55
DRG: 014 | End: 2020-05-20
Attending: INTERNAL MEDICINE
Payer: COMMERCIAL

## 2020-05-20 LAB
ALBUMIN SERPL-MCNC: 3.8 G/DL (ref 3.4–5)
ALP BLD-CCNC: 71 U/L (ref 40–129)
ALT SERPL-CCNC: 13 U/L (ref 10–40)
ANION GAP SERPL CALCULATED.3IONS-SCNC: 9 MMOL/L (ref 3–16)
AST SERPL-CCNC: 9 U/L (ref 15–37)
BILIRUB SERPL-MCNC: 0.7 MG/DL (ref 0–1)
BILIRUBIN DIRECT: <0.2 MG/DL (ref 0–0.3)
BILIRUBIN URINE: NEGATIVE
BILIRUBIN, INDIRECT: ABNORMAL MG/DL (ref 0–1)
BLOOD, URINE: NEGATIVE
BUN BLDV-MCNC: 9 MG/DL (ref 7–20)
CALCIUM SERPL-MCNC: 9.1 MG/DL (ref 8.3–10.6)
CHLORIDE BLD-SCNC: 105 MMOL/L (ref 99–110)
CLARITY: CLEAR
CO2: 23 MMOL/L (ref 21–32)
COLOR: YELLOW
CREAT SERPL-MCNC: 0.6 MG/DL (ref 0.9–1.3)
GFR AFRICAN AMERICAN: >60
GFR NON-AFRICAN AMERICAN: >60
GLUCOSE BLD-MCNC: 92 MG/DL (ref 70–99)
GLUCOSE URINE: NEGATIVE MG/DL
HCT VFR BLD CALC: 23.9 % (ref 40.5–52.5)
HEMOGLOBIN: 8.5 G/DL (ref 13.5–17.5)
KETONES, URINE: NEGATIVE MG/DL
LACTATE DEHYDROGENASE: 142 U/L (ref 100–190)
LACTIC ACID: 0.7 MMOL/L (ref 0.4–2)
LEUKOCYTE ESTERASE, URINE: NEGATIVE
MAGNESIUM: 1.4 MG/DL (ref 1.8–2.4)
MCH RBC QN AUTO: 33.8 PG (ref 26–34)
MCHC RBC AUTO-ENTMCNC: 35.5 G/DL (ref 31–36)
MCV RBC AUTO: 95.1 FL (ref 80–100)
MICROSCOPIC EXAMINATION: NORMAL
NITRITE, URINE: NEGATIVE
PDW BLD-RTO: 13.8 % (ref 12.4–15.4)
PH UA: 6.5 (ref 5–8)
PHOSPHORUS: 3 MG/DL (ref 2.5–4.9)
PLATELET # BLD: 53 K/UL (ref 135–450)
PMV BLD AUTO: 7.8 FL (ref 5–10.5)
POTASSIUM SERPL-SCNC: 4.5 MMOL/L (ref 3.5–5.1)
PROTEIN UA: NEGATIVE MG/DL
RBC # BLD: 2.51 M/UL (ref 4.2–5.9)
SODIUM BLD-SCNC: 137 MMOL/L (ref 136–145)
SPECIFIC GRAVITY UA: 1.01 (ref 1–1.03)
TACROLIMUS BLOOD: 7.3 NG/ML (ref 5–20)
TOTAL PROTEIN: 6 G/DL (ref 6.4–8.2)
URIC ACID, SERUM: 3.5 MG/DL (ref 3.5–7.2)
URINE TYPE: NORMAL
UROBILINOGEN, URINE: 0.2 E.U./DL
WBC # BLD: 0.1 K/UL (ref 4–11)

## 2020-05-20 PROCEDURE — 83615 LACTATE (LD) (LDH) ENZYME: CPT

## 2020-05-20 PROCEDURE — 83605 ASSAY OF LACTIC ACID: CPT

## 2020-05-20 PROCEDURE — 87102 FUNGUS ISOLATION CULTURE: CPT

## 2020-05-20 PROCEDURE — 80197 ASSAY OF TACROLIMUS: CPT

## 2020-05-20 PROCEDURE — 84100 ASSAY OF PHOSPHORUS: CPT

## 2020-05-20 PROCEDURE — 87252 VIRUS INOCULATION TISSUE: CPT

## 2020-05-20 PROCEDURE — 81003 URINALYSIS AUTO W/O SCOPE: CPT

## 2020-05-20 PROCEDURE — 80076 HEPATIC FUNCTION PANEL: CPT

## 2020-05-20 PROCEDURE — 71045 X-RAY EXAM CHEST 1 VIEW: CPT

## 2020-05-20 PROCEDURE — 6360000002 HC RX W HCPCS: Performed by: NURSE PRACTITIONER

## 2020-05-20 PROCEDURE — 6370000000 HC RX 637 (ALT 250 FOR IP): Performed by: INTERNAL MEDICINE

## 2020-05-20 PROCEDURE — 6370000000 HC RX 637 (ALT 250 FOR IP): Performed by: NURSE PRACTITIONER

## 2020-05-20 PROCEDURE — 36592 COLLECT BLOOD FROM PICC: CPT

## 2020-05-20 PROCEDURE — 87103 BLOOD FUNGUS CULTURE: CPT

## 2020-05-20 PROCEDURE — 83735 ASSAY OF MAGNESIUM: CPT

## 2020-05-20 PROCEDURE — 6360000002 HC RX W HCPCS: Performed by: INTERNAL MEDICINE

## 2020-05-20 PROCEDURE — 84550 ASSAY OF BLOOD/URIC ACID: CPT

## 2020-05-20 PROCEDURE — 87040 BLOOD CULTURE FOR BACTERIA: CPT

## 2020-05-20 PROCEDURE — 2580000003 HC RX 258: Performed by: INTERNAL MEDICINE

## 2020-05-20 PROCEDURE — 85025 COMPLETE CBC W/AUTO DIFF WBC: CPT

## 2020-05-20 PROCEDURE — 87086 URINE CULTURE/COLONY COUNT: CPT

## 2020-05-20 PROCEDURE — 2060000000 HC ICU INTERMEDIATE R&B

## 2020-05-20 PROCEDURE — 80048 BASIC METABOLIC PNL TOTAL CA: CPT

## 2020-05-20 PROCEDURE — 87253 VIRUS INOCULATE TISSUE ADDL: CPT

## 2020-05-20 PROCEDURE — 87205 SMEAR GRAM STAIN: CPT

## 2020-05-20 PROCEDURE — 2580000003 HC RX 258: Performed by: NURSE PRACTITIONER

## 2020-05-20 PROCEDURE — 87070 CULTURE OTHR SPECIMN AEROBIC: CPT

## 2020-05-20 RX ORDER — OXYCODONE HYDROCHLORIDE 5 MG/1
10 TABLET ORAL EVERY 4 HOURS PRN
Status: DISCONTINUED | OUTPATIENT
Start: 2020-05-20 | End: 2020-05-27

## 2020-05-20 RX ORDER — HYDROCORTISONE 25 MG/G
CREAM TOPICAL 2 TIMES DAILY
Status: DISCONTINUED | OUTPATIENT
Start: 2020-05-20 | End: 2020-05-25

## 2020-05-20 RX ORDER — OXYCODONE HYDROCHLORIDE 5 MG/1
5 TABLET ORAL EVERY 4 HOURS PRN
Status: DISCONTINUED | OUTPATIENT
Start: 2020-05-20 | End: 2020-05-27

## 2020-05-20 RX ORDER — ACETAMINOPHEN 325 MG/1
650 TABLET ORAL EVERY 4 HOURS PRN
Status: DISCONTINUED | OUTPATIENT
Start: 2020-05-20 | End: 2020-06-01

## 2020-05-20 RX ADMIN — TACROLIMUS 2.5 MG: 1 CAPSULE ORAL at 08:39

## 2020-05-20 RX ADMIN — HYDROCORTISONE: 25 CREAM TOPICAL at 21:13

## 2020-05-20 RX ADMIN — SODIUM CHLORIDE 15 ML: 900 IRRIGANT IRRIGATION at 21:07

## 2020-05-20 RX ADMIN — OXYCODONE 5 MG: 5 TABLET ORAL at 21:04

## 2020-05-20 RX ADMIN — VALACYCLOVIR HYDROCHLORIDE 500 MG: 500 TABLET, FILM COATED ORAL at 21:03

## 2020-05-20 RX ADMIN — MEROPENEM 1 G: 1 INJECTION, POWDER, FOR SOLUTION INTRAVENOUS at 17:07

## 2020-05-20 RX ADMIN — Medication 1 MG: at 08:38

## 2020-05-20 RX ADMIN — LORAZEPAM 0.5 MG: 2 INJECTION INTRAMUSCULAR; INTRAVENOUS at 03:22

## 2020-05-20 RX ADMIN — Medication 60 ML: at 08:39

## 2020-05-20 RX ADMIN — TRAMADOL HYDROCHLORIDE 50 MG: 50 TABLET, FILM COATED ORAL at 00:11

## 2020-05-20 RX ADMIN — VALACYCLOVIR HYDROCHLORIDE 500 MG: 500 TABLET, FILM COATED ORAL at 08:38

## 2020-05-20 RX ADMIN — MAGNESIUM SULFATE HEPTAHYDRATE 4 G: 40 INJECTION, SOLUTION INTRAVENOUS at 05:50

## 2020-05-20 RX ADMIN — TACROLIMUS 2.5 MG: 1 CAPSULE ORAL at 21:03

## 2020-05-20 RX ADMIN — Medication 10 ML: at 21:08

## 2020-05-20 RX ADMIN — FLUCONAZOLE 400 MG: 200 TABLET ORAL at 08:38

## 2020-05-20 RX ADMIN — ACETAMINOPHEN 650 MG: 325 TABLET ORAL at 07:03

## 2020-05-20 RX ADMIN — Medication 1 MG: at 21:03

## 2020-05-20 RX ADMIN — OXYCODONE 5 MG: 5 TABLET ORAL at 16:46

## 2020-05-20 RX ADMIN — LORAZEPAM 0.5 MG: 0.5 TABLET ORAL at 21:03

## 2020-05-20 RX ADMIN — ACETAMINOPHEN 650 MG: 325 TABLET ORAL at 17:07

## 2020-05-20 RX ADMIN — URSODIOL 500 MG: 250 TABLET, FILM COATED ORAL at 21:03

## 2020-05-20 RX ADMIN — PANTOPRAZOLE SODIUM 40 MG: 40 TABLET, DELAYED RELEASE ORAL at 07:03

## 2020-05-20 RX ADMIN — URSODIOL 500 MG: 250 TABLET, FILM COATED ORAL at 08:38

## 2020-05-20 ASSESSMENT — PAIN - FUNCTIONAL ASSESSMENT
PAIN_FUNCTIONAL_ASSESSMENT: ACTIVITIES ARE NOT PREVENTED

## 2020-05-20 ASSESSMENT — PAIN DESCRIPTION - DIRECTION
RADIATING_TOWARDS: EYES
RADIATING_TOWARDS: EYES

## 2020-05-20 ASSESSMENT — PAIN SCALES - GENERAL
PAINLEVEL_OUTOF10: 4
PAINLEVEL_OUTOF10: 0
PAINLEVEL_OUTOF10: 5
PAINLEVEL_OUTOF10: 4
PAINLEVEL_OUTOF10: 4
PAINLEVEL_OUTOF10: 0
PAINLEVEL_OUTOF10: 3

## 2020-05-20 ASSESSMENT — PAIN DESCRIPTION - PROGRESSION
CLINICAL_PROGRESSION: NOT CHANGED

## 2020-05-20 ASSESSMENT — PAIN DESCRIPTION - ORIENTATION
ORIENTATION: ANTERIOR
ORIENTATION: ANTERIOR
ORIENTATION: INNER
ORIENTATION: ANTERIOR

## 2020-05-20 ASSESSMENT — PAIN DESCRIPTION - DESCRIPTORS
DESCRIPTORS: ACHING
DESCRIPTORS: ACHING
DESCRIPTORS: SORE
DESCRIPTORS: ACHING

## 2020-05-20 ASSESSMENT — PAIN DESCRIPTION - LOCATION
LOCATION: HEAD
LOCATION: THROAT
LOCATION: HEAD;EYE
LOCATION: HEAD

## 2020-05-20 ASSESSMENT — PAIN DESCRIPTION - PAIN TYPE
TYPE: ACUTE PAIN

## 2020-05-20 ASSESSMENT — PAIN DESCRIPTION - FREQUENCY
FREQUENCY: CONTINUOUS
FREQUENCY: CONTINUOUS
FREQUENCY: INTERMITTENT
FREQUENCY: INTERMITTENT

## 2020-05-20 ASSESSMENT — PAIN DESCRIPTION - ONSET
ONSET: ON-GOING
ONSET: ON-GOING
ONSET: GRADUAL
ONSET: ON-GOING

## 2020-05-20 NOTE — PLAN OF CARE
Problem: Pain:  Goal: Pain level will decrease  Description: Pain level will decrease    Note: Rick complained of headache pain that radiated behind his eyes  this shift. He received PRN tramadol and acetaminophen separately with good  relief. Problem: Falls - Risk of:  Goal: Will remain free from falls  Description: Will remain free from falls    Note: Orthostatic vital signs obtained at start of shift - see flowsheet for details. Pt does not meet criteria for orthostasis. Pt is a Med fall risk. See [place] Fall Score and ABCDS Injury Risk assessments. - Screening for Orthostasis AND not a [place] Risk per PHAM/ABCDS: Pt bed is in low position, side rails up, call light and belongings are in reach. Fall risk light is on outside pts room. Pt encouraged to call for assistance as needed. Will continue with hourly rounds for PO intake, pain needs, toileting and repositioning as needed. Problem: Bleeding:  Goal: Will show no signs and symptoms of excessive bleeding  Description: Will show no signs and symptoms of excessive bleeding  Patient's hemoglobin this AM:   Recent Labs     05/20/20  0323   HGB 8.5*     Patient's platelet count this AM:   Recent Labs     05/20/20  0323   PLT 53*    Thrombocytopenia Precautions in place. Patient showing no signs or symptoms of active bleeding. Transfusion not indicated at this time. Patient verbalizes understanding of all instructions. Will continue to assess and implement POC. Call light within reach and hourly rounding in place. Problem: Nausea/Vomiting:  Goal: Absence of nausea/vomiting  Description: Absence of nausea/vomiting    Note: Pt remained free from nausea and vomiting this shift. Problem: PROTECTIVE PRECAUTIONS  Goal: Patient will remain free of nosocomial Infections    Note: Pt remains in protective precautions. Pt educated on wearing mask when in hallways. Pt, staff, and visitors adhering to handwashing guidelines.  Pt educated to shower or bathe daily with chlorhexidine and linens changed daily per protocol. Pt verbalizes understanding of low microbial diet. Will continue to monitor. Problem: Diarrhea:  Goal: Bowel elimination is within specified parameters  Description: Bowel elimination is within specified parameters    Note: Jose remained free from diarrhea this shift. Will continue to monitor. Problem: Discharge Planning:  Goal: Discharged to appropriate level of care  Description: Discharged to appropriate level of care  Note: Will keep Jose up to date related plans for his discharge.

## 2020-05-20 NOTE — PROGRESS NOTES
oriented. HEENT: normocephalic, PERRL, no scleral erythema or icterus, Oral mucosa early breakdown. NECK: supple without palpable adenopathy  BACK: Straight  SKIN: warm dry and intact without lesions rashes or masses  CHEST: CTA bilaterally without use of accessory muscles  CV: Normal S1 S2, RRR, no MRG  ABD: NT, ND, normoactive BS, no palpable masses or hepatosplenomegaly  EXTREMITIES: without edema, denies calf tenderness  NEURO: CN II - XII grossly intact. REsting tremor  CATHETER: Left IJ TLH (5/13/20, IR) - CDI & Right IJ PAC (2/17/20, [facility]) - CDI. Old TLH site - yellow discharge. Data:   CBC:   Recent Labs     05/18/20 0312 05/19/20 0345 05/20/20  0323   WBC 0.1* 0.1* 0.1*   HGB 8.6* 8.8* 8.5*   HCT 24.5* 24.8* 23.9*   MCV 97.6 96.2 95.1   * 88* 53*     BMP/Mag:  Recent Labs     05/18/20 0312 05/19/20 0345 05/20/20  0323    139 137   K 4.0 4.2 4.5    106 105   CO2 23 24 23   PHOS 3.0 2.7 3.0   BUN 7 7 9   CREATININE 0.6* 0.6* 0.6*   MG 1.70* 1.50* 1.40*     LIVP:   Recent Labs     05/18/20 0312 05/19/20 0345 05/20/20  0323   AST 12*  --  9*   ALT 16  --  13   BILIDIR <0.2  --  <0.2   BILITOT 0.3 0.7 0.7   ALKPHOS 65  --  71     Uric Acid:    Recent Labs     05/20/20 0323   LABURIC 3.5     Coags:   Recent Labs     05/18/20 0312   PROTIME 11.3   INR 0.97   APTT 31.2     Tacro:  No results for input(s): TACROLEV in the last 72 hours. CMV Quant DNA by PCR: No results found for: CMVDNAQNT    PROBLEM LIST:        1. MDS  2. Anxiety and depression  3. HLD  4. H/o shingles (2004, RLQ)  5. Gilbert syndrome    Post- Transplant Complications:  1. Resting tremor   2. Steroid Induced Hyperglycemia   3. Hypervolemia   4. Constipation   5. Nausea   6. Headache   7. Mucositis  8. Diarrhea / Abdominal Cramping      TREATMENT:       1. Decitabine x3 cycles - 2/17-4/14/20   2.  Targeted Busulfan & Fludarabine followed by MUD Allo PBSC infusion 5/11/20  Preparative Regimen: Targeted Busulfan & Fludarabine  Date of BMT: 5/11/2020  Source of stem cells: PBSC - 6.7 x10^6 [id number]/kg  Donor/Recipient Blood Type: A+ / A+  Donor Sex: Male, follow VNTR (DID# [id number])  CMV Donor / Recipient: Pos / Pos     ASSESSMENT AND PLAN:         1. Myelodysplastic Syndrome: Stable disease  - Most recent BM Bx/Asp (4/7/20) - ongoing disease  - S/p Bu/Flu & MUD-pb BMT  - Busulfan AUC low at 4672, therefore busulfan dose increased from 252 mg to 315 mg, for doses 3 and 4. Day + 9     2. ID: Afebrile, but w/ erythema at previous insertion site and tenderness w/ new site   - Cont Levaquin, Valtrex & Diflucan ppx      Donor/Recipient CMV: Pos / Pos  - Check CMV PCR weekly once WBC 1.0  - Start letermovir ppx if started on high-dose steroids     3. Heme:  Pancytopenia from chemotherapy   - Transfuse for Hgb < 7 and Platelets < 92X.    - No transfusion today      4. Metabolic:  stable renal fxn and e-lytes, mild hypoMag  - Cont IVF:  NS @ 75 mL/hr   - Replace potassium and magnesium per policy.     5. 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 VOD:  No evidence of VOD  Recent Labs     05/20/20  0323   BILIDIR <0.2   BILITOT 0.7     Admission Weight: 185 lb (83.9 kg)  Current Weight: Weight: 179 lb (81.2 kg). - Cont Actigall. 7. Pulmonary: No active issues. - Encourage IS and ambulation      8. GI / Nutrition:    Nutrition:  Appetite & intake has decreased, but he is still eating small amounts    - Cont low microbial diet  - Dietician to follow  Constipation:  Resolved  - No response to MOM (5/11/12)  - S/p Dulcolax (5/12/20) w/ BM  Diarrhea:  Improved  - C.  Diff (5/15/20) - negative  - Cont Imodium as needed   Nausea:  Chemotherapy induced nausea  - Cont PRN compazine, zofran and ativan  Mucositis:  Chemo induced  - Cont magic mouthwash & dex rinse  -

## 2020-05-21 PROBLEM — E44.1 MILD MALNUTRITION (HCC): Status: ACTIVE | Noted: 2020-05-21

## 2020-05-21 LAB
ANION GAP SERPL CALCULATED.3IONS-SCNC: 9 MMOL/L (ref 3–16)
APTT: 27.4 SEC (ref 24.2–36.2)
BILIRUB SERPL-MCNC: 0.7 MG/DL (ref 0–1)
BUN BLDV-MCNC: 9 MG/DL (ref 7–20)
CALCIUM SERPL-MCNC: 8.9 MG/DL (ref 8.3–10.6)
CHLORIDE BLD-SCNC: 106 MMOL/L (ref 99–110)
CO2: 23 MMOL/L (ref 21–32)
CREAT SERPL-MCNC: 0.7 MG/DL (ref 0.9–1.3)
GFR AFRICAN AMERICAN: >60
GFR NON-AFRICAN AMERICAN: >60
GLUCOSE BLD-MCNC: 101 MG/DL (ref 70–99)
HCT VFR BLD CALC: 22.8 % (ref 40.5–52.5)
HEMOGLOBIN: 7.9 G/DL (ref 13.5–17.5)
INR BLD: 1.14 (ref 0.86–1.14)
MAGNESIUM: 1.7 MG/DL (ref 1.8–2.4)
MCH RBC QN AUTO: 33.5 PG (ref 26–34)
MCHC RBC AUTO-ENTMCNC: 34.6 G/DL (ref 31–36)
MCV RBC AUTO: 96.9 FL (ref 80–100)
PDW BLD-RTO: 13.7 % (ref 12.4–15.4)
PHOSPHORUS: 2.7 MG/DL (ref 2.5–4.9)
PLATELET # BLD: 27 K/UL (ref 135–450)
PMV BLD AUTO: 7.4 FL (ref 5–10.5)
POTASSIUM SERPL-SCNC: 4.3 MMOL/L (ref 3.5–5.1)
PROTHROMBIN TIME: 13.3 SEC (ref 10–13.2)
RBC # BLD: 2.35 M/UL (ref 4.2–5.9)
SODIUM BLD-SCNC: 138 MMOL/L (ref 136–145)
VRE CULTURE: NORMAL
WBC # BLD: 0 K/UL (ref 4–11)

## 2020-05-21 PROCEDURE — 83735 ASSAY OF MAGNESIUM: CPT

## 2020-05-21 PROCEDURE — 6370000000 HC RX 637 (ALT 250 FOR IP): Performed by: INTERNAL MEDICINE

## 2020-05-21 PROCEDURE — 2060000000 HC ICU INTERMEDIATE R&B

## 2020-05-21 PROCEDURE — 82247 BILIRUBIN TOTAL: CPT

## 2020-05-21 PROCEDURE — 6370000000 HC RX 637 (ALT 250 FOR IP): Performed by: NURSE PRACTITIONER

## 2020-05-21 PROCEDURE — 6360000002 HC RX W HCPCS: Performed by: INTERNAL MEDICINE

## 2020-05-21 PROCEDURE — 85730 THROMBOPLASTIN TIME PARTIAL: CPT

## 2020-05-21 PROCEDURE — 85025 COMPLETE CBC W/AUTO DIFF WBC: CPT

## 2020-05-21 PROCEDURE — 2580000003 HC RX 258: Performed by: INTERNAL MEDICINE

## 2020-05-21 PROCEDURE — 85610 PROTHROMBIN TIME: CPT

## 2020-05-21 PROCEDURE — 84100 ASSAY OF PHOSPHORUS: CPT

## 2020-05-21 PROCEDURE — 2580000003 HC RX 258: Performed by: NURSE PRACTITIONER

## 2020-05-21 PROCEDURE — 36592 COLLECT BLOOD FROM PICC: CPT

## 2020-05-21 PROCEDURE — 80048 BASIC METABOLIC PNL TOTAL CA: CPT

## 2020-05-21 RX ADMIN — FLUCONAZOLE 400 MG: 200 TABLET ORAL at 09:04

## 2020-05-21 RX ADMIN — HYDROCORTISONE: 25 CREAM TOPICAL at 23:22

## 2020-05-21 RX ADMIN — MEROPENEM 1 G: 1 INJECTION, POWDER, FOR SOLUTION INTRAVENOUS at 18:51

## 2020-05-21 RX ADMIN — OXYCODONE 5 MG: 5 TABLET ORAL at 17:14

## 2020-05-21 RX ADMIN — LORAZEPAM 0.5 MG: 2 INJECTION INTRAMUSCULAR; INTRAVENOUS at 21:19

## 2020-05-21 RX ADMIN — OXYCODONE 5 MG: 5 TABLET ORAL at 07:24

## 2020-05-21 RX ADMIN — TACROLIMUS 2.5 MG: 1 CAPSULE ORAL at 21:20

## 2020-05-21 RX ADMIN — SODIUM CHLORIDE: 900 INJECTION INTRAVENOUS at 06:14

## 2020-05-21 RX ADMIN — OXYCODONE 5 MG: 5 TABLET ORAL at 11:39

## 2020-05-21 RX ADMIN — HYDROCORTISONE: 25 CREAM TOPICAL at 09:11

## 2020-05-21 RX ADMIN — VALACYCLOVIR HYDROCHLORIDE 500 MG: 500 TABLET, FILM COATED ORAL at 09:04

## 2020-05-21 RX ADMIN — OXYCODONE 10 MG: 5 TABLET ORAL at 03:08

## 2020-05-21 RX ADMIN — SODIUM CHLORIDE 15 ML: 900 IRRIGANT IRRIGATION at 06:17

## 2020-05-21 RX ADMIN — Medication 10 ML: at 09:10

## 2020-05-21 RX ADMIN — Medication 1 MG: at 09:03

## 2020-05-21 RX ADMIN — MEROPENEM 1 G: 1 INJECTION, POWDER, FOR SOLUTION INTRAVENOUS at 09:10

## 2020-05-21 RX ADMIN — LOPERAMIDE HYDROCHLORIDE 2 MG: 2 CAPSULE ORAL at 17:45

## 2020-05-21 RX ADMIN — URSODIOL 500 MG: 250 TABLET, FILM COATED ORAL at 21:19

## 2020-05-21 RX ADMIN — VALACYCLOVIR HYDROCHLORIDE 500 MG: 500 TABLET, FILM COATED ORAL at 21:19

## 2020-05-21 RX ADMIN — LOPERAMIDE HYDROCHLORIDE 2 MG: 2 CAPSULE ORAL at 23:57

## 2020-05-21 RX ADMIN — SODIUM CHLORIDE 15 ML: 900 IRRIGANT IRRIGATION at 18:51

## 2020-05-21 RX ADMIN — MEROPENEM 1 G: 1 INJECTION, POWDER, FOR SOLUTION INTRAVENOUS at 00:37

## 2020-05-21 RX ADMIN — SODIUM CHLORIDE: 900 INJECTION INTRAVENOUS at 18:54

## 2020-05-21 RX ADMIN — PANTOPRAZOLE SODIUM 40 MG: 40 TABLET, DELAYED RELEASE ORAL at 06:17

## 2020-05-21 RX ADMIN — Medication 1 MG: at 21:19

## 2020-05-21 RX ADMIN — TACROLIMUS 2.5 MG: 1 CAPSULE ORAL at 09:04

## 2020-05-21 RX ADMIN — URSODIOL 500 MG: 250 TABLET, FILM COATED ORAL at 09:04

## 2020-05-21 ASSESSMENT — PAIN DESCRIPTION - PROGRESSION
CLINICAL_PROGRESSION: NOT CHANGED

## 2020-05-21 ASSESSMENT — PAIN SCALES - GENERAL
PAINLEVEL_OUTOF10: 4
PAINLEVEL_OUTOF10: 2
PAINLEVEL_OUTOF10: 5
PAINLEVEL_OUTOF10: 4
PAINLEVEL_OUTOF10: 7
PAINLEVEL_OUTOF10: 4
PAINLEVEL_OUTOF10: 0

## 2020-05-21 ASSESSMENT — PAIN DESCRIPTION - ONSET
ONSET: ON-GOING

## 2020-05-21 ASSESSMENT — PAIN DESCRIPTION - ORIENTATION
ORIENTATION: INNER

## 2020-05-21 ASSESSMENT — PAIN DESCRIPTION - FREQUENCY
FREQUENCY: CONTINUOUS

## 2020-05-21 ASSESSMENT — PAIN DESCRIPTION - DESCRIPTORS
DESCRIPTORS: SORE

## 2020-05-21 ASSESSMENT — PAIN DESCRIPTION - LOCATION
LOCATION: MOUTH
LOCATION: THROAT
LOCATION: THROAT
LOCATION: MOUTH
LOCATION: THROAT

## 2020-05-21 ASSESSMENT — PAIN - FUNCTIONAL ASSESSMENT
PAIN_FUNCTIONAL_ASSESSMENT: ACTIVITIES ARE NOT PREVENTED

## 2020-05-21 ASSESSMENT — PAIN DESCRIPTION - PAIN TYPE
TYPE: ACUTE PAIN

## 2020-05-21 ASSESSMENT — PAIN DESCRIPTION - DIRECTION
RADIATING_TOWARDS: THR
RADIATING_TOWARDS: THROAT

## 2020-05-21 NOTE — PROGRESS NOTES
Pt temperature = 100.5 this shift. Initiated the neutropenic fever protocol as ordered. Blood cultures x2 were drawn from the RED and WHITE lumen of the pt's Trifusion CVC at 1700. Urine and throat cultures were also collected as ordered. Chest x-ray was completed. [address] was started and Tylenol was given, as ordered. Pt was educated on the fever protocol and the risk of infection. Pt tolerated antibiotic infusion. Pt verbalized understanding of education.

## 2020-05-21 NOTE — PROGRESS NOTES
2/17-4/14/20   2. Targeted Busulfan & Fludarabine followed by MUD Allo PBSC infusion 5/11/20  Preparative Regimen: Targeted Busulfan & Fludarabine  Date of BMT: 5/11/2020  Source of stem cells: PBSC - 6.7 x10^6 [id number]/kg  Donor/Recipient Blood Type: A+ / A+  Donor Sex: Male, follow VNTR (DID# [id number])  CMV Donor / Recipient: Pos / Pos     ASSESSMENT AND PLAN:         1. Myelodysplastic Syndrome: Stable disease  - Most recent BM Bx/Asp (4/7/20) - ongoing disease  - S/p Bu/Flu & MUD-pb BMT  - Busulfan AUC low at 4672, therefore busulfan dose increased from 252 mg to 315 mg, for doses 3 and 4. Day + 9     2. ID: Neutropenic fever, unidentified infection  - Pan - cx (5/20/20) - NGTD  - CXR (5/20/20) - No acute process   - Cont Valtrex & Diflucan ppx      Donor/Recipient CMV: Pos / Pos  - Check CMV PCR weekly once WBC 1.0  - Start letermovir ppx if started on high-dose steroids     3. Heme:  Pancytopenia from chemotherapy   - Transfuse for Hgb < 7 and Platelets < 87G.    - No transfusion today      4. Metabolic:  stable renal fxn and e-lytes, mild hypoMag  - Cont IVF:  NS @ 75 mL/hr   - Replace potassium and magnesium per policy.     5. 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 VOD:  No evidence of VOD  Recent Labs     05/20/20  0323 05/21/20  0315   BILIDIR <0.2  --    BILITOT 0.7 0.7     Admission Weight: 185 lb (83.9 kg)  Current Weight: Weight: 178 lb 9.6 oz (81 kg). - Cont Actigall. 7. Pulmonary: No active issues.    - Encourage IS and ambulation      8. GI / Nutrition:    Nutrition:  Appetite & intake has decreased, but he is still eating small amounts    - Cont low microbial diet  - Dietician to follow  Constipation:  Resolved  - No response to MOM (5/11/12)  - S/p Dulcolax (5/12/20) w/ BM  Diarrhea:

## 2020-05-21 NOTE — PLAN OF CARE
staff, and visitors adhering to handwashing guidelines. Pt educated to shower or bathe daily with chlorhexidine and linens changed daily per protocol. Pt verbalizes understanding of low microbial diet. Will continue to monitor. Problem: Diarrhea:  Goal: Bowel elimination is within specified parameters  Description: Bowel elimination is within specified parameters    Note: Jose remained free from diarrhea this shift. Will continue to monitor. Problem: Discharge Planning:  Goal: Discharged to appropriate level of care  Description: Discharged to appropriate level of care  Note: Will keep Jose up to date related plans for his discharge.

## 2020-05-21 NOTE — PROGRESS NOTES
NUTRITION MONITORING & EVALUATION:  Evaluation:Progress towards goal declining   Goals: Pt will improve PO intake to consume greater than 75% of meals and supplements   Monitoring: mucositis, Meal Intake , Pertinent Labs  or Weight      OBJECTIVE DATA:  · Nutrition-Focused Physical Findings: + bm this AM; mucositis   · Wounds None      No past medical history on file. ANTHROPOMETRICS  Current Height: 5' 9.8\" (177.3 cm)  Current Weight: 177 lb 3.2 oz (80.4 kg)    Admission weight: 185 lb (83.9 kg)  Ideal Bodyweight 166 lb (75.5 kg)   Usual Bodyweight 180 lb per EMR   Adjusted Bodyweight n/a  Weight Changes -8 lb from admit      BMI BMI (Calculated): 25.6    Wt Readings from Last 50 Encounters:   05/21/20 177 lb 3.2 oz (80.4 kg)   03/31/20 184 lb 9.6 oz (83.7 kg)   02/17/20 180 lb (81.6 kg)       COMPARATIVE STANDARDS  Estimated Total Kcals/Day : 25-30  Current Bodyweight (83.5 kg) 7137-7810 kcal    Estimated Total Protein (g/day) : 1.2-1.5 Current Bodyweight (83.5 kg) 100-125 g/day  Estimated Daily Total Fluid (ml/day): 7785-6210 mL per day     Food / Nutrition-Related History  Pre-Admission / Home Diet:  Pre-Admission/Home Diet: General   Home Supplements / Herbals:    none noted  Food Restrictions / Cultural Requests:   none noted    Diet Orders / Intake / Nutrition Support  Current diet/supplement order: DIET GENERAL;     NSG Recorded PO:   PO Fluids P.O.: 480 mL(water)  PO Meals PO Meals Eaten (%): 51 - 75%(soup)   PO Intake: 26-50% and 51-75%   PO Supplement: None   PO Supplement Intake: n/a  IVF:  NS @ 75 mL/hr     NUTRITION RISK LEVEL: Risk Level:  Moderate    Andres Hinojosa RD, MARTELL  Vic:  [phone]  Office:  [phone]

## 2020-05-21 NOTE — PLAN OF CARE
and symptoms of excessive bleeding  Description: Will show no signs and symptoms of excessive bleeding  Outcome: Ongoing  Note: Patient's hemoglobin this AM:   Recent Labs     05/21/20  0315   HGB 7.9*     Patient's platelet count this AM:   Recent Labs     05/21/20  0315   PLT 27*    Thrombocytopenia Precautions in place. Patient showing no signs or symptoms of active bleeding. Transfusion not indicated at this time. Patient verbalizes understanding of all instructions. Will continue to assess and implement POC. Call light within reach and hourly rounding in place. Problem: Nausea/Vomiting:  Goal: Absence of nausea/vomiting  Description: Absence of nausea/vomiting  Outcome: Ongoing  Note: Patient denied nausea and vomiting during shift. Problem: PROTECTIVE PRECAUTIONS  Goal: Patient will remain free of nosocomial Infections  Outcome: Ongoing  Note: Pt remains in protective precautions. No living plants or fresh flowers in his/her room. Patient educated on wearing mask when in hallways. Patient, staff, and visitors adhering to handwashing guidelines. Patient cleansed with chlorhexidine wipes and linens changed daily per protocol. Pt verbalizes understanding of low microbial diet. Patient remains free of nosocomial infections. Problem: Discharge Planning:  Goal: Discharged to appropriate level of care  Description: Discharged to appropriate level of care  Outcome: Ongoing  Note: Patient verbalized understanding of treatment plan. Problem: Skin Integrity:  Goal: Absence of new skin breakdown  Description: Absence of new skin breakdown  Outcome: Ongoing  Note: Patient with small skin tear around CVC site. Patient denied pain around site. Problem: Diarrhea:  Goal: Bowel elimination is within specified parameters  Description: Bowel elimination is within specified parameters  Outcome: Ongoing  Note: Patient with one loose BM during shift.

## 2020-05-22 LAB
ABO/RH: NORMAL
ALBUMIN SERPL-MCNC: 3.7 G/DL (ref 3.4–5)
ALP BLD-CCNC: 67 U/L (ref 40–129)
ALT SERPL-CCNC: 12 U/L (ref 10–40)
ANION GAP SERPL CALCULATED.3IONS-SCNC: 12 MMOL/L (ref 3–16)
ANTIBODY SCREEN: NORMAL
AST SERPL-CCNC: 9 U/L (ref 15–37)
BILIRUB SERPL-MCNC: 0.4 MG/DL (ref 0–1)
BILIRUBIN DIRECT: <0.2 MG/DL (ref 0–0.3)
BILIRUBIN, INDIRECT: ABNORMAL MG/DL (ref 0–1)
BUN BLDV-MCNC: 9 MG/DL (ref 7–20)
CALCIUM SERPL-MCNC: 9 MG/DL (ref 8.3–10.6)
CHLORIDE BLD-SCNC: 105 MMOL/L (ref 99–110)
CO2: 23 MMOL/L (ref 21–32)
CREAT SERPL-MCNC: 0.6 MG/DL (ref 0.9–1.3)
GFR AFRICAN AMERICAN: >60
GFR NON-AFRICAN AMERICAN: >60
GLUCOSE BLD-MCNC: 104 MG/DL (ref 70–99)
HCT VFR BLD CALC: 21.5 % (ref 40.5–52.5)
HEMOGLOBIN: 7.5 G/DL (ref 13.5–17.5)
LACTATE DEHYDROGENASE: 127 U/L (ref 100–190)
MAGNESIUM: 1.5 MG/DL (ref 1.8–2.4)
MCH RBC QN AUTO: 33.4 PG (ref 26–34)
MCHC RBC AUTO-ENTMCNC: 34.8 G/DL (ref 31–36)
MCV RBC AUTO: 96 FL (ref 80–100)
PDW BLD-RTO: 14.1 % (ref 12.4–15.4)
PHOSPHORUS: 2.7 MG/DL (ref 2.5–4.9)
PLATELET # BLD: 16 K/UL (ref 135–450)
PMV BLD AUTO: 8.7 FL (ref 5–10.5)
POTASSIUM SERPL-SCNC: 4.3 MMOL/L (ref 3.5–5.1)
RBC # BLD: 2.24 M/UL (ref 4.2–5.9)
SODIUM BLD-SCNC: 140 MMOL/L (ref 136–145)
TACROLIMUS BLOOD: 7.2 NG/ML (ref 5–20)
THROAT CULTURE: NORMAL
TOTAL PROTEIN: 5.9 G/DL (ref 6.4–8.2)
URIC ACID, SERUM: 3.6 MG/DL (ref 3.5–7.2)
URINE CULTURE, ROUTINE: NORMAL
WBC # BLD: 0 K/UL (ref 4–11)

## 2020-05-22 PROCEDURE — 6360000002 HC RX W HCPCS: Performed by: INTERNAL MEDICINE

## 2020-05-22 PROCEDURE — 80076 HEPATIC FUNCTION PANEL: CPT

## 2020-05-22 PROCEDURE — 86850 RBC ANTIBODY SCREEN: CPT

## 2020-05-22 PROCEDURE — 85025 COMPLETE CBC W/AUTO DIFF WBC: CPT

## 2020-05-22 PROCEDURE — 6370000000 HC RX 637 (ALT 250 FOR IP): Performed by: NURSE PRACTITIONER

## 2020-05-22 PROCEDURE — 86923 COMPATIBILITY TEST ELECTRIC: CPT

## 2020-05-22 PROCEDURE — 86900 BLOOD TYPING SEROLOGIC ABO: CPT

## 2020-05-22 PROCEDURE — P9040 RBC LEUKOREDUCED IRRADIATED: HCPCS

## 2020-05-22 PROCEDURE — 86901 BLOOD TYPING SEROLOGIC RH(D): CPT

## 2020-05-22 PROCEDURE — 80197 ASSAY OF TACROLIMUS: CPT

## 2020-05-22 PROCEDURE — 2580000003 HC RX 258: Performed by: INTERNAL MEDICINE

## 2020-05-22 PROCEDURE — 6370000000 HC RX 637 (ALT 250 FOR IP): Performed by: INTERNAL MEDICINE

## 2020-05-22 PROCEDURE — 36592 COLLECT BLOOD FROM PICC: CPT

## 2020-05-22 PROCEDURE — 83615 LACTATE (LD) (LDH) ENZYME: CPT

## 2020-05-22 PROCEDURE — 84550 ASSAY OF BLOOD/URIC ACID: CPT

## 2020-05-22 PROCEDURE — 2580000003 HC RX 258: Performed by: NURSE PRACTITIONER

## 2020-05-22 PROCEDURE — 80048 BASIC METABOLIC PNL TOTAL CA: CPT

## 2020-05-22 PROCEDURE — 84100 ASSAY OF PHOSPHORUS: CPT

## 2020-05-22 PROCEDURE — 2060000000 HC ICU INTERMEDIATE R&B

## 2020-05-22 PROCEDURE — 83735 ASSAY OF MAGNESIUM: CPT

## 2020-05-22 RX ORDER — AMLODIPINE BESYLATE 2.5 MG/1
2.5 TABLET ORAL DAILY
Status: DISCONTINUED | OUTPATIENT
Start: 2020-05-22 | End: 2020-05-24

## 2020-05-22 RX ORDER — TACROLIMUS 1 MG/1
3 CAPSULE ORAL NIGHTLY
Status: DISCONTINUED | OUTPATIENT
Start: 2020-05-22 | End: 2020-05-27 | Stop reason: DRUGHIGH

## 2020-05-22 RX ADMIN — HYDROCORTISONE: 25 CREAM TOPICAL at 21:45

## 2020-05-22 RX ADMIN — SODIUM CHLORIDE 15 ML: 900 IRRIGANT IRRIGATION at 17:47

## 2020-05-22 RX ADMIN — HYDROCORTISONE: 25 CREAM TOPICAL at 09:50

## 2020-05-22 RX ADMIN — VALACYCLOVIR HYDROCHLORIDE 500 MG: 500 TABLET, FILM COATED ORAL at 09:47

## 2020-05-22 RX ADMIN — SODIUM CHLORIDE: 900 INJECTION INTRAVENOUS at 08:47

## 2020-05-22 RX ADMIN — Medication 1 MG: at 09:47

## 2020-05-22 RX ADMIN — TACROLIMUS 3 MG: 1 CAPSULE ORAL at 21:30

## 2020-05-22 RX ADMIN — AMLODIPINE BESYLATE 2.5 MG: 2.5 TABLET ORAL at 09:54

## 2020-05-22 RX ADMIN — Medication 1 MG: at 21:37

## 2020-05-22 RX ADMIN — LOPERAMIDE HYDROCHLORIDE 2 MG: 2 CAPSULE ORAL at 09:47

## 2020-05-22 RX ADMIN — MEROPENEM 1 G: 1 INJECTION, POWDER, FOR SOLUTION INTRAVENOUS at 08:38

## 2020-05-22 RX ADMIN — VALACYCLOVIR HYDROCHLORIDE 500 MG: 500 TABLET, FILM COATED ORAL at 21:30

## 2020-05-22 RX ADMIN — Medication 10 ML: at 21:39

## 2020-05-22 RX ADMIN — FLUCONAZOLE 400 MG: 200 TABLET ORAL at 09:46

## 2020-05-22 RX ADMIN — TACROLIMUS 2.5 MG: 1 CAPSULE ORAL at 09:46

## 2020-05-22 RX ADMIN — PANTOPRAZOLE SODIUM 40 MG: 40 TABLET, DELAYED RELEASE ORAL at 06:57

## 2020-05-22 RX ADMIN — OXYCODONE 5 MG: 5 TABLET ORAL at 17:40

## 2020-05-22 RX ADMIN — OXYCODONE 5 MG: 5 TABLET ORAL at 21:39

## 2020-05-22 RX ADMIN — OXYCODONE 5 MG: 5 TABLET ORAL at 12:38

## 2020-05-22 RX ADMIN — MEROPENEM 1 G: 1 INJECTION, POWDER, FOR SOLUTION INTRAVENOUS at 01:27

## 2020-05-22 RX ADMIN — MEROPENEM 1 G: 1 INJECTION, POWDER, FOR SOLUTION INTRAVENOUS at 17:40

## 2020-05-22 RX ADMIN — OXYCODONE 10 MG: 5 TABLET ORAL at 07:00

## 2020-05-22 RX ADMIN — URSODIOL 500 MG: 250 TABLET, FILM COATED ORAL at 21:38

## 2020-05-22 RX ADMIN — Medication: at 21:43

## 2020-05-22 RX ADMIN — URSODIOL 500 MG: 250 TABLET, FILM COATED ORAL at 09:47

## 2020-05-22 RX ADMIN — LORAZEPAM 0.5 MG: 2 INJECTION INTRAMUSCULAR; INTRAVENOUS at 01:27

## 2020-05-22 RX ADMIN — SODIUM CHLORIDE: 900 INJECTION INTRAVENOUS at 23:00

## 2020-05-22 RX ADMIN — Medication 10 ML: at 08:41

## 2020-05-22 RX ADMIN — SODIUM CHLORIDE 15 ML: 900 IRRIGANT IRRIGATION at 21:39

## 2020-05-22 RX ADMIN — Medication 10 ML: at 04:05

## 2020-05-22 RX ADMIN — SODIUM CHLORIDE 15 ML: 900 IRRIGANT IRRIGATION at 12:39

## 2020-05-22 ASSESSMENT — PAIN SCALES - GENERAL
PAINLEVEL_OUTOF10: 2
PAINLEVEL_OUTOF10: 4
PAINLEVEL_OUTOF10: 0
PAINLEVEL_OUTOF10: 4
PAINLEVEL_OUTOF10: 1
PAINLEVEL_OUTOF10: 7
PAINLEVEL_OUTOF10: 0

## 2020-05-22 ASSESSMENT — PAIN DESCRIPTION - DIRECTION
RADIATING_TOWARDS: THROAT

## 2020-05-22 ASSESSMENT — PAIN DESCRIPTION - PROGRESSION
CLINICAL_PROGRESSION: NOT CHANGED

## 2020-05-22 ASSESSMENT — PAIN DESCRIPTION - LOCATION
LOCATION: MOUTH

## 2020-05-22 ASSESSMENT — PAIN DESCRIPTION - ORIENTATION
ORIENTATION: INNER

## 2020-05-22 ASSESSMENT — PAIN DESCRIPTION - DESCRIPTORS
DESCRIPTORS: SORE

## 2020-05-22 ASSESSMENT — PAIN - FUNCTIONAL ASSESSMENT
PAIN_FUNCTIONAL_ASSESSMENT: PREVENTS OR INTERFERES SOME ACTIVE ACTIVITIES AND ADLS

## 2020-05-22 ASSESSMENT — PAIN DESCRIPTION - FREQUENCY
FREQUENCY: CONTINUOUS

## 2020-05-22 ASSESSMENT — PAIN DESCRIPTION - ONSET
ONSET: ON-GOING

## 2020-05-22 ASSESSMENT — PAIN DESCRIPTION - PAIN TYPE
TYPE: ACUTE PAIN

## 2020-05-22 NOTE — PROGRESS NOTES
[facility] Progress Note    2020    Deshaun aCmacho    :  1965    MRN:  [id number]    Subjective:  C/o mouth pain and rectal burning.  He is still able to eat and take his pills     ECOG PS:(2) Ambulatory and capable of self care, unable to carry out work activity, up and about > 50% or waking hours      KPS: 70% Cares for self; unable to carry on normal activity or to do active work      Isolation:  None     Medications    Scheduled Meds:   hydrocortisone   Rectal BID    meropenem  1 g Intravenous Q8H    dexamethasone  1 mg Swish & Spit 2 times per day    pantoprazole  40 mg Oral QAM AC    fluconazole  400 mg Oral Daily    Saline Mouthwash  15 mL Swish & Spit 4x Daily AC & HS    sodium chloride flush  10 mL Intravenous 2 times per day    valACYclovir  500 mg Oral BID    ursodiol  500 mg Oral BID    tacrolimus  2.5 mg Oral Q12H     Continuous Infusions:   sodium chloride      dextrose      sodium chloride      sodium chloride 75 mL/hr at 20 1854     PRN Meds:oxyCODONE **OR** oxyCODONE, acetaminophen, magic (miracle) mouthwash, [COMPLETED] loperamide **FOLLOWED BY** loperamide, traMADol, sodium chloride, potassium phosphate IVPB, calcium carbonate, Hydrocerin, glucose, dextrose, glucagon (rDNA), dextrose, sodium chloride, alteplase, magnesium hydroxide, magnesium sulfate, potassium chloride, Saline Mouthwash, prochlorperazine **OR** prochlorperazine, LORazepam **OR** LORazepam    ROS:  As noted above, otherwise remainder of 10-point ROS negative    Physical Exam:    I&O:      Intake/Output Summary (Last 24 hours) at 2020 0754  Last data filed at 2020 0703  Gross per 24 hour   Intake 1225 ml   Output 3025 ml   Net -1800 ml       Vital Signs:  BP (!) 147/88   Pulse 77   Temp 98.5 °F (36.9 °C) (Oral)   Resp 17   Ht 5' 9.8\" (1.773 m)   Wt 177 lb 3.2 oz (80.4 kg)   SpO2 97%   BMI 25.57 kg/m²     Weight:    Wt Readings from Last 3 Encounters: 05/21/20 177 lb 3.2 oz (80.4 kg)   03/31/20 184 lb 9.6 oz (83.7 kg)   02/17/20 180 lb (81.6 kg)       General: Awake, alert and oriented. HEENT: normocephalic, PERRL, no scleral erythema or icterus, Oral mucosa erythematous w/ large white ulceration on left buccal mucosa   NECK: supple without palpable adenopathy  BACK: Straight  SKIN: warm dry and intact without lesions rashes or masses  CHEST: CTA bilaterally without use of accessory muscles  CV: Normal S1 S2, RRR, no MRG  ABD: NT, ND, normoactive BS, no palpable masses or hepatosplenomegaly  EXTREMITIES: without edema, denies calf tenderness  NEURO: CN II - XII grossly intact. REsting tremor  CATHETER: Left IJ TLH (5/13/20, IR) - CDI & Right IJ PAC (2/17/20, [facility]) - CDI    Data:   CBC:   Recent Labs     05/20/20 0323 05/21/20 0315 05/22/20  0335   WBC 0.1* 0.0* 0.0*   HGB 8.5* 7.9* 7.5*   HCT 23.9* 22.8* 21.5*   MCV 95.1 96.9 96.0   PLT 53* 27* 16*     BMP/Mag:  Recent Labs     05/20/20 0323 05/21/20 0315 05/22/20  0335    138 140   K 4.5 4.3 4.3    106 105   CO2 23 23 23   PHOS 3.0 2.7 2.7   BUN 9 9 9   CREATININE 0.6* 0.7* 0.6*   MG 1.40* 1.70* 1.50*     LIVP:   Recent Labs     05/20/20 0323 05/21/20 0315 05/22/20  0335   AST 9*  --  9*   ALT 13  --  12   BILIDIR <0.2  --  <0.2   BILITOT 0.7 0.7 0.4   ALKPHOS 71  --  67     Uric Acid:    Recent Labs     05/22/20  0335   LABURIC 3.6     Coags:   Recent Labs     05/21/20 0315   PROTIME 13.3*   INR 1.14   APTT 27.4     Tacro:    Recent Labs     05/20/20  0904   TACROLEV 7.3     CMV Quant DNA by PCR: No results found for: CMVDNAQNT    PROBLEM LIST:        1. MDS  2. Anxiety and depression  3. HLD  4. H/o shingles (2004, RLQ)  5. Gilbert syndrome    Post- Transplant Complications:  1. Resting tremor   2. Steroid Induced Hyperglycemia   3. Hypervolemia   4. Constipation   5. Nausea   6. Headache   7. Mucositis  8. Diarrhea / Abdominal Cramping   9. Neutropenic Fever   10. HTN     TREATMENT:       1. Decitabine x3 cycles - 2/17-4/14/20   2. Targeted Busulfan & Fludarabine followed by MUD Allo PBSC infusion 5/11/20  Preparative Regimen: Targeted Busulfan & Fludarabine  Date of BMT: 5/11/2020  Source of stem cells: PBSC - 6.7 x10^6 [id number]/kg  Donor/Recipient Blood Type: A+ / A+  Donor Sex: Male, follow VNTR (DID# [id number])  CMV Donor / Recipient: Pos / Pos     ASSESSMENT AND PLAN:         1. Myelodysplastic Syndrome: Stable disease  - Most recent BM Bx/Asp (4/7/20) - ongoing disease  - S/p Bu/Flu & MUD-pb BMT  - Busulfan AUC low at 4672, therefore busulfan dose increased from 252 mg to 315 mg, for doses 3 and 4. Day + 10     2. ID: Now afebrile, he had neutropenic fever, unidentified infection  - Pan - cx (5/20/20) - NGTD  - CXR (5/20/20) - No acute process   - Cont Valtrex & Diflucan ppx   - Merrem Day + 3 (started 5/20/20)     Donor/Recipient CMV: Pos / Pos  - Check CMV PCR weekly once WBC 1.0  - Start letermovir ppx if started on high-dose steroids     3. Heme:  Pancytopenia from chemotherapy   - Transfuse for Hgb < 7 and Platelets < 20T.    - No transfusion today      4. Metabolic:  stable renal fxn and e-lytes, mild hypoMg  - Cont IVF:  NS @ 75 mL/hr   - Replace potassium and magnesium per policy.     5. 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 VOD:  No evidence of VOD  Recent Labs     05/22/20  0335   BILIDIR <0.2   BILITOT 0.4     Admission Weight: 185 lb (83.9 kg)  Current Weight: Weight: 177 lb 3.2 oz (80.4 kg). - Cont Actigall. 7. Pulmonary: No active issues.    - Encourage IS and ambulation      8. GI / Nutrition:    Mild Malnutrition:  Appetite & intake has decreased; he is only eating small amounts    - Cont low microbial diet  - Dietician to follow  Constipation:  Resolved  - No

## 2020-05-22 NOTE — PLAN OF CARE
Problem: Pain:  Goal: Pain level will decrease  Description: Pain level will decrease  Note: No complaints of pain during shift. Reminded patient of PRN medications and frequently assessed. Will continue to monitor. Problem: Falls - Risk of:  Goal: Will remain free from falls  Description: Will remain free from falls  Note: Orthostatic vital signs obtained at start of shift - see flowsheet for details. Pt does not meet criteria for orthostasis. Pt is a Med fall risk. See Jeny Sturkie Fall Score and ABCDS Injury Risk assessments. Pt bed is in low position, side rails up, call light and belongings are in reach. Fall risk light is on outside pts room. Pt encouraged to call for assistance as needed. Will continue with hourly rounds for PO intake, pain needs, toileting and repositioning as needed. Problem: Infection - Central Venous Catheter-Associated Bloodstream Infection:  Goal: Will show no infection signs and symptoms  Description: Will show no infection signs and symptoms  Note: CVC site remains free of signs/symptoms of infection. No drainage, edema, erythema, pain, or warmth noted at site. Dressing changes continue per protocol and on an as needed basis - see flowsheet. Performed CHG bath today per [facility] protocol utilizing CHG solution in the shower. CVC site cleansed with CHG wipe over dressing, skin surrounding dressing, and first 6\" of IV tubing. Pt tolerated well. Continued to encourage daily CHG bathing per [facility] protocol. Problem: Venous Thromboembolism:  Goal: Will show no signs or symptoms of venous thromboembolism  Description: Will show no signs or symptoms of venous thromboembolism  Note: Pt is at risk for DVT d/t decreased mobility and cancer treatment. Pt educated on importance of activity. Pt has orders for SCDs while in bed. Pt verbalizes understanding of need for prophylaxis while inpatient.      Problem: Bleeding:  Goal: Will show no signs and symptoms of excessive

## 2020-05-22 NOTE — CARE COORDINATION
recovery, I have also informed patient. 5/22/20:  I have called spouse regarding home medications that have been called in & are ready for . Discharge  When [facility] >1.0 & without toxicities      Pending  5/15/20:  The following medications have been called into [facility] ( [phone])  Tacrolimus 0.5 mg bid #60 with 3 refills  Tacrolimus 1 mg cap ( 2 caps) bid #120 with 3 refills  Fluconazole 200 mg ( 2 tablets) daily #60 with 3 refills  Valtrex 500 mg bid #60 with 3 refills  Cost Pending--> 5/18: VM left regarding status----> 5/22/20:  No cost --> Nathan plunkettformed

## 2020-05-23 LAB
ANION GAP SERPL CALCULATED.3IONS-SCNC: 12 MMOL/L (ref 3–16)
BILIRUB SERPL-MCNC: 0.4 MG/DL (ref 0–1)
BUN BLDV-MCNC: 10 MG/DL (ref 7–20)
CALCIUM SERPL-MCNC: 9.2 MG/DL (ref 8.3–10.6)
CHLORIDE BLD-SCNC: 106 MMOL/L (ref 99–110)
CO2: 23 MMOL/L (ref 21–32)
CREAT SERPL-MCNC: 0.6 MG/DL (ref 0.9–1.3)
GFR AFRICAN AMERICAN: >60
GFR NON-AFRICAN AMERICAN: >60
GLUCOSE BLD-MCNC: 101 MG/DL (ref 70–99)
HCT VFR BLD CALC: 21.2 % (ref 40.5–52.5)
HEMOGLOBIN: 7.4 G/DL (ref 13.5–17.5)
MAGNESIUM: 1.4 MG/DL (ref 1.8–2.4)
MCH RBC QN AUTO: 33.5 PG (ref 26–34)
MCHC RBC AUTO-ENTMCNC: 35 G/DL (ref 31–36)
MCV RBC AUTO: 95.7 FL (ref 80–100)
PDW BLD-RTO: 13.3 % (ref 12.4–15.4)
PHOSPHORUS: 3.3 MG/DL (ref 2.5–4.9)
PLATELET # BLD: 12 K/UL (ref 135–450)
PMV BLD AUTO: 9.3 FL (ref 5–10.5)
POTASSIUM SERPL-SCNC: 4.2 MMOL/L (ref 3.5–5.1)
RBC # BLD: 2.21 M/UL (ref 4.2–5.9)
SODIUM BLD-SCNC: 141 MMOL/L (ref 136–145)
WBC # BLD: 0 K/UL (ref 4–11)

## 2020-05-23 PROCEDURE — 6370000000 HC RX 637 (ALT 250 FOR IP): Performed by: NURSE PRACTITIONER

## 2020-05-23 PROCEDURE — 6370000000 HC RX 637 (ALT 250 FOR IP): Performed by: INTERNAL MEDICINE

## 2020-05-23 PROCEDURE — 6360000002 HC RX W HCPCS: Performed by: NURSE PRACTITIONER

## 2020-05-23 PROCEDURE — 36592 COLLECT BLOOD FROM PICC: CPT

## 2020-05-23 PROCEDURE — 84100 ASSAY OF PHOSPHORUS: CPT

## 2020-05-23 PROCEDURE — 2580000003 HC RX 258: Performed by: NURSE PRACTITIONER

## 2020-05-23 PROCEDURE — 83735 ASSAY OF MAGNESIUM: CPT

## 2020-05-23 PROCEDURE — 82247 BILIRUBIN TOTAL: CPT

## 2020-05-23 PROCEDURE — 85025 COMPLETE CBC W/AUTO DIFF WBC: CPT

## 2020-05-23 PROCEDURE — 2060000000 HC ICU INTERMEDIATE R&B

## 2020-05-23 PROCEDURE — 80048 BASIC METABOLIC PNL TOTAL CA: CPT

## 2020-05-23 PROCEDURE — 6360000002 HC RX W HCPCS: Performed by: INTERNAL MEDICINE

## 2020-05-23 PROCEDURE — 2580000003 HC RX 258: Performed by: INTERNAL MEDICINE

## 2020-05-23 RX ADMIN — ACETAMINOPHEN 650 MG: 325 TABLET ORAL at 05:22

## 2020-05-23 RX ADMIN — Medication 1 MG: at 21:22

## 2020-05-23 RX ADMIN — URSODIOL 500 MG: 250 TABLET, FILM COATED ORAL at 09:55

## 2020-05-23 RX ADMIN — LORAZEPAM 0.5 MG: 0.5 TABLET ORAL at 03:52

## 2020-05-23 RX ADMIN — HYDROCORTISONE: 25 CREAM TOPICAL at 09:58

## 2020-05-23 RX ADMIN — SODIUM CHLORIDE 15 ML: 900 IRRIGANT IRRIGATION at 17:56

## 2020-05-23 RX ADMIN — MAGNESIUM SULFATE HEPTAHYDRATE 4 G: 40 INJECTION, SOLUTION INTRAVENOUS at 05:15

## 2020-05-23 RX ADMIN — OXYCODONE 10 MG: 5 TABLET ORAL at 17:55

## 2020-05-23 RX ADMIN — OXYCODONE 5 MG: 5 TABLET ORAL at 09:25

## 2020-05-23 RX ADMIN — Medication 1 MG: at 09:49

## 2020-05-23 RX ADMIN — MEROPENEM 1 G: 1 INJECTION, POWDER, FOR SOLUTION INTRAVENOUS at 00:45

## 2020-05-23 RX ADMIN — FLUCONAZOLE 400 MG: 200 TABLET ORAL at 09:55

## 2020-05-23 RX ADMIN — URSODIOL 500 MG: 250 TABLET, FILM COATED ORAL at 21:23

## 2020-05-23 RX ADMIN — Medication 10 ML: at 09:58

## 2020-05-23 RX ADMIN — OXYCODONE 10 MG: 5 TABLET ORAL at 13:33

## 2020-05-23 RX ADMIN — SODIUM CHLORIDE 15 ML: 900 IRRIGANT IRRIGATION at 13:35

## 2020-05-23 RX ADMIN — SODIUM CHLORIDE: 900 INJECTION INTRAVENOUS at 13:43

## 2020-05-23 RX ADMIN — TACROLIMUS 3.5 MG: 1 CAPSULE ORAL at 09:49

## 2020-05-23 RX ADMIN — Medication 10 ML: at 21:23

## 2020-05-23 RX ADMIN — TACROLIMUS 3 MG: 1 CAPSULE ORAL at 21:23

## 2020-05-23 RX ADMIN — SODIUM CHLORIDE 15 ML: 900 IRRIGANT IRRIGATION at 21:24

## 2020-05-23 RX ADMIN — AMLODIPINE BESYLATE 2.5 MG: 2.5 TABLET ORAL at 09:55

## 2020-05-23 RX ADMIN — VALACYCLOVIR HYDROCHLORIDE 500 MG: 500 TABLET, FILM COATED ORAL at 09:54

## 2020-05-23 RX ADMIN — PANTOPRAZOLE SODIUM 40 MG: 40 TABLET, DELAYED RELEASE ORAL at 07:15

## 2020-05-23 RX ADMIN — MEROPENEM 1 G: 1 INJECTION, POWDER, FOR SOLUTION INTRAVENOUS at 17:55

## 2020-05-23 RX ADMIN — HYDROCORTISONE: 25 CREAM TOPICAL at 21:23

## 2020-05-23 RX ADMIN — VALACYCLOVIR HYDROCHLORIDE 500 MG: 500 TABLET, FILM COATED ORAL at 21:23

## 2020-05-23 RX ADMIN — OXYCODONE 10 MG: 5 TABLET ORAL at 22:28

## 2020-05-23 RX ADMIN — MEROPENEM 1 G: 1 INJECTION, POWDER, FOR SOLUTION INTRAVENOUS at 09:55

## 2020-05-23 RX ADMIN — LOPERAMIDE HYDROCHLORIDE 2 MG: 2 CAPSULE ORAL at 09:25

## 2020-05-23 ASSESSMENT — PAIN SCALES - GENERAL
PAINLEVEL_OUTOF10: 7
PAINLEVEL_OUTOF10: 3
PAINLEVEL_OUTOF10: 5
PAINLEVEL_OUTOF10: 0
PAINLEVEL_OUTOF10: 0
PAINLEVEL_OUTOF10: 7
PAINLEVEL_OUTOF10: 1
PAINLEVEL_OUTOF10: 3
PAINLEVEL_OUTOF10: 0
PAINLEVEL_OUTOF10: 7

## 2020-05-23 ASSESSMENT — PAIN DESCRIPTION - PROGRESSION
CLINICAL_PROGRESSION: NOT CHANGED

## 2020-05-23 NOTE — PLAN OF CARE
Problem: Pain:  Goal: Pain level will decrease  Description: Pain level will decrease    Outcome: Ongoing   Pt. Demonstrates correct use of 0-10 pain scale; pt verbalizes and demonstrates correct understanding that he is to notify this RN when pain is not controlled or of new onset pain; PRN pain medications given this shift for mucositis-related mouth pain - see MAR; Pt satisfied with pain control medications. Call light/table in reach. Will continue to monitor. Problem: Falls - Risk of:  Goal: Will remain free from falls  Description: Will remain free from falls    Outcome: Ongoing   Orthostatic vital signs obtained at start of shift - see flowsheet for details. Pt does not meet criteria for orthostasis and continues to ambulate with steady gait. Pt is a Med fall risk. See Ansley Hair Fall Score and ABCDS Injury Risk assessments. Problem: Falls - Risk of:  Goal: Absence of physical injury  Description: Absence of physical injury  Outcome: Ongoing     Problem: Infection - Central Venous Catheter-Associated Bloodstream Infection:  Goal: Will show no infection signs and symptoms  Description: Will show no infection signs and symptoms    Outcome: Ongoing   CVC site remains free of signs/symptoms of infection. No drainage, edema, erythema, pain, or warmth noted at site. Dressing changes continue per protocol and on an as needed basis - see flowsheet. Problem: Venous Thromboembolism:  Goal: Will show no signs or symptoms of venous thromboembolism  Description: Will show no signs or symptoms of venous thromboembolism    Outcome: Ongoing   Adherent with DVT Prevention: Pt is at risk for DVT d/t decreased mobility and cancer treatment. Pt educated on importance of activity. Pt has orders for Subcut prophylactic lovenox - not being given at this time while pt has low platelet count less than 100 - see results. Pt verbalizes understanding of need for prophylaxis while inpatient.      Problem: Bleeding:  Goal: care  Description: Discharged to appropriate level of care    Outcome: Ongoing   Pt. Verbalizes understanding of plan of care at this time and is actively involved in plan of care and demonstrates understanding of role in care/recovery; pt denies further questions a this time. Will continue to monitor. Problem: Nutrition Deficit:  Goal: Ability to achieve adequate nutritional intake will improve  Description: Ability to achieve adequate nutritional intake will improve  Outcome: Ongoing     Problem: Skin Integrity:  Goal: Absence of new skin breakdown  Description: Absence of new skin breakdown  Outcome: Ongoing   Pt does not show any new signs of skin breakdown at this time; Fidel scale score greater than 18; will continue to monitor. Problem: Diarrhea:  Goal: Bowel elimination is within specified parameters  Description: Bowel elimination is within specified parameters  Outcome: Ongoing   Pt. Verbalizes understanding that he is to report any watery, loose stools to RN staff and that PRN immodium is ordered for loose Bms; PRN immodium given x1 so far this shift; call light/table in reach. Will continue to monitor.      Problem: Diarrhea:  Goal: Passage of soft, formed stool  Description: Passage of soft, formed stool  Outcome: Ongoing     Problem: Diarrhea:  Goal: Establishment of normal bowel function will improve to within specified parameters  Description: Establishment of normal bowel function will improve to within specified parameters  Outcome: Ongoing

## 2020-05-23 NOTE — PROGRESS NOTES
[facility] Progress Note    2020    Vasu Mahajan    :  1965    MRN:  [id number]    Subjective: Whole variety of issues. Urinary hesitancy, mouth discomfort, painful hemorrhoid,  but still eating and drinking and walking.     ECOG PS:(2) Ambulatory and capable of self care, unable to carry out work activity, up and about > 50% or waking hours      KPS: 70% Cares for self; unable to carry on normal activity or to do active work      Isolation:  None     Medications    Scheduled Meds:   amLODIPine  2.5 mg Oral Daily    tacrolimus  3.5 mg Oral Daily    tacrolimus  3 mg Oral Nightly    hydrocortisone   Rectal BID    meropenem  1 g Intravenous Q8H    dexamethasone  1 mg Swish & Spit 2 times per day    pantoprazole  40 mg Oral QAM AC    fluconazole  400 mg Oral Daily    Saline Mouthwash  15 mL Swish & Spit 4x Daily AC & HS    sodium chloride flush  10 mL Intravenous 2 times per day    valACYclovir  500 mg Oral BID    ursodiol  500 mg Oral BID     Continuous Infusions:   sodium chloride      dextrose      sodium chloride      sodium chloride 75 mL/hr at 20 2300     PRN Meds:oxyCODONE **OR** oxyCODONE, acetaminophen, magic (miracle) mouthwash, [COMPLETED] loperamide **FOLLOWED BY** loperamide, traMADol, sodium chloride, potassium phosphate IVPB, calcium carbonate, Hydrocerin, glucose, dextrose, glucagon (rDNA), dextrose, sodium chloride, alteplase, magnesium hydroxide, magnesium sulfate, potassium chloride, Saline Mouthwash, prochlorperazine **OR** prochlorperazine, LORazepam **OR** LORazepam    ROS:  As noted above, otherwise remainder of 10-point ROS negative    Physical Exam:    I&O:      Intake/Output Summary (Last 24 hours) at 2020 1135  Last data filed at 2020 0600  Gross per 24 hour   Intake 3074 ml   Output 2450 ml   Net 624 ml       Vital Signs:  /85   Pulse 107   Temp 98.3 °F (36.8 °C) (Oral)   Resp 18   Ht 5' 9.8\" (1.773 m) Wt 175 lb 12.8 oz (79.7 kg)   SpO2 98%   BMI 25.37 kg/m²     Weight:    Wt Readings from Last 3 Encounters:   05/23/20 175 lb 12.8 oz (79.7 kg)   03/31/20 184 lb 9.6 oz (83.7 kg)   02/17/20 180 lb (81.6 kg)       General: Awake, alert and oriented. HEENT: normocephalic, PERRL, no scleral erythema or icterus, Oral mucosa erythematous w/ large white ulceration on left buccal mucosa   NECK: supple without palpable adenopathy  BACK: Straight  SKIN: warm dry and intact without lesions rashes or masses  CHEST: CTA bilaterally without use of accessory muscles  CV: Normal S1 S2, RRR, no MRG  ABD: NT, ND, normoactive BS, no palpable masses or hepatosplenomegaly  EXTREMITIES: without edema, denies calf tenderness  NEURO: CN II - XII grossly intact. REsting tremor  CATHETER: Left IJ TLH (5/13/20, IR) - CDI & Right IJ PAC (2/17/20, [facility]) - CDI    Data:   CBC:   Recent Labs     05/21/20 0315 05/22/20 0335 05/23/20 0347   WBC 0.0* 0.0* 0.0*   HGB 7.9* 7.5* 7.4*   HCT 22.8* 21.5* 21.2*   MCV 96.9 96.0 95.7   PLT 27* 16* 12*     BMP/Mag:  Recent Labs     05/21/20 0315 05/22/20 0335 05/23/20 0347    140 141   K 4.3 4.3 4.2    105 106   CO2 23 23 23   PHOS 2.7 2.7 3.3   BUN 9 9 10   CREATININE 0.7* 0.6* 0.6*   MG 1.70* 1.50* 1.40*     LIVP:   Recent Labs     05/21/20 0315 05/22/20 0335 05/23/20 0347   AST  --  9*  --    ALT  --  12  --    BILIDIR  --  <0.2  --    BILITOT 0.7 0.4 0.4   ALKPHOS  --  67  --      Uric Acid:    Recent Labs     05/22/20 0335   LABURIC 3.6     Coags:   Recent Labs     05/21/20  0315   PROTIME 13.3*   INR 1.14   APTT 27.4     Tacro:    Recent Labs     05/22/20  0830   TACROLEV 7.2     CMV Quant DNA by PCR: No results found for: CMVDNAQNT    PROBLEM LIST:        1. MDS  2. Anxiety and depression  3. HLD  4. H/o shingles (2004, RLQ)  5. Gilbert syndrome    Post- Transplant Complications:  1. Resting tremor   2. Steroid Induced Hyperglycemia   3. Hypervolemia   4.   Constipation Malnutrition:  Appetite & intake has decreased; he is only eating small amounts    - Cont low microbial diet  - Dietician to follow  Constipation:  Resolved  - No response to MOM (5/11/12)  - S/p Dulcolax (5/12/20) w/ BM  Diarrhea:  Intermittent looses stools   - C. Diff (5/15/20) - negative  - Cont Imodium as needed   Nausea:  Mild nausea, no vomiting   - Cont PRN compazine, zofran and ativan  Mucositis:  Improving, chemo induced  - Cont magic mouthwash & dex rinse  - Cont tramadol and tylenol prn    9. Neuro:    - H/o resting tremor, possibly from Busulfan   - S/p scheduled Ativan w/ Buslufan  - Cont to monitor   Headaches:  Intermittent headaches  - CT head (5/19/20) - no acute process   - Cont PRN Tylenol & Tramadol as needed     10. Cardiac:  Intermittent hypertension, not previously on treatment  -  Start Norvasc 2.5 mg daily (5/22/20)      - DVT Prophylaxis: Platelets <18,774 cells/dL - prophylactic lovenox on hold and mechanical prophylaxis with bilateral SCDs while in bed in place. Contraindications to pharmacologic prophylaxis: None  Contraindications to mechanical prophylaxis: None      - Disposition:  Once ANC >1 and recovered from toxicities of transplant.     Colton Neal, [facility] [address]

## 2020-05-23 NOTE — PROGRESS NOTES
Per pt request d/t hair falling out during shower in small clumps, pt's head shaved with electric razor by Lenora RN; pt tolerated well and informed family via video call; pt in good spirits and denies distress at this time concerning hair loss or body image. Pt states \"I feel 5 ounces better. I think I'll walk around the johnson a little bit. \"; will continue to monitor.

## 2020-05-24 LAB
ANION GAP SERPL CALCULATED.3IONS-SCNC: 10 MMOL/L (ref 3–16)
BILIRUB SERPL-MCNC: 0.3 MG/DL (ref 0–1)
BILIRUBIN URINE: NEGATIVE
BLOOD CULTURE, ROUTINE: NORMAL
BLOOD, URINE: NEGATIVE
BUN BLDV-MCNC: 11 MG/DL (ref 7–20)
CALCIUM SERPL-MCNC: 9 MG/DL (ref 8.3–10.6)
CHLORIDE BLD-SCNC: 106 MMOL/L (ref 99–110)
CLARITY: CLEAR
CO2: 25 MMOL/L (ref 21–32)
COLOR: YELLOW
CREAT SERPL-MCNC: 0.7 MG/DL (ref 0.9–1.3)
CULTURE, BLOOD 2: NORMAL
FINAL REPORT: NORMAL
GFR AFRICAN AMERICAN: >60
GFR NON-AFRICAN AMERICAN: >60
GLUCOSE BLD-MCNC: 104 MG/DL (ref 70–99)
GLUCOSE URINE: NEGATIVE MG/DL
HCT VFR BLD CALC: 20.4 % (ref 40.5–52.5)
HEMOGLOBIN: 7.3 G/DL (ref 13.5–17.5)
KETONES, URINE: ABNORMAL MG/DL
LEUKOCYTE ESTERASE, URINE: NEGATIVE
MAGNESIUM: 1.6 MG/DL (ref 1.8–2.4)
MCH RBC QN AUTO: 33.4 PG (ref 26–34)
MCHC RBC AUTO-ENTMCNC: 35.6 G/DL (ref 31–36)
MCV RBC AUTO: 93.8 FL (ref 80–100)
MICROSCOPIC EXAMINATION: ABNORMAL
NITRITE, URINE: NEGATIVE
PDW BLD-RTO: 13.3 % (ref 12.4–15.4)
PH UA: 7.5 (ref 5–8)
PHOSPHORUS: 3.4 MG/DL (ref 2.5–4.9)
PLATELET # BLD: 15 K/UL (ref 135–450)
PMV BLD AUTO: 8.9 FL (ref 5–10.5)
POTASSIUM SERPL-SCNC: 4.4 MMOL/L (ref 3.5–5.1)
PRELIMINARY: NORMAL
PROTEIN UA: NEGATIVE MG/DL
RBC # BLD: 2.18 M/UL (ref 4.2–5.9)
SODIUM BLD-SCNC: 141 MMOL/L (ref 136–145)
SPECIFIC GRAVITY UA: 1.01 (ref 1–1.03)
URINE TYPE: ABNORMAL
UROBILINOGEN, URINE: 0.2 E.U./DL
WBC # BLD: 0 K/UL (ref 4–11)

## 2020-05-24 PROCEDURE — 2060000000 HC ICU INTERMEDIATE R&B

## 2020-05-24 PROCEDURE — 81003 URINALYSIS AUTO W/O SCOPE: CPT

## 2020-05-24 PROCEDURE — 2580000003 HC RX 258: Performed by: NURSE PRACTITIONER

## 2020-05-24 PROCEDURE — 85025 COMPLETE CBC W/AUTO DIFF WBC: CPT

## 2020-05-24 PROCEDURE — 83735 ASSAY OF MAGNESIUM: CPT

## 2020-05-24 PROCEDURE — 6370000000 HC RX 637 (ALT 250 FOR IP): Performed by: NURSE PRACTITIONER

## 2020-05-24 PROCEDURE — 6370000000 HC RX 637 (ALT 250 FOR IP): Performed by: INTERNAL MEDICINE

## 2020-05-24 PROCEDURE — 2580000003 HC RX 258: Performed by: INTERNAL MEDICINE

## 2020-05-24 PROCEDURE — 36592 COLLECT BLOOD FROM PICC: CPT

## 2020-05-24 PROCEDURE — 82247 BILIRUBIN TOTAL: CPT

## 2020-05-24 PROCEDURE — 6360000002 HC RX W HCPCS: Performed by: INTERNAL MEDICINE

## 2020-05-24 PROCEDURE — 80048 BASIC METABOLIC PNL TOTAL CA: CPT

## 2020-05-24 PROCEDURE — 51798 US URINE CAPACITY MEASURE: CPT

## 2020-05-24 PROCEDURE — 84100 ASSAY OF PHOSPHORUS: CPT

## 2020-05-24 RX ORDER — MORPHINE SULFATE 4 MG/ML
4 INJECTION, SOLUTION INTRAMUSCULAR; INTRAVENOUS EVERY 4 HOURS PRN
Status: DISCONTINUED | OUTPATIENT
Start: 2020-05-24 | End: 2020-05-27

## 2020-05-24 RX ORDER — TAMSULOSIN HYDROCHLORIDE 0.4 MG/1
0.4 CAPSULE ORAL DAILY
Status: DISCONTINUED | OUTPATIENT
Start: 2020-05-24 | End: 2020-06-01

## 2020-05-24 RX ORDER — MORPHINE SULFATE 10 MG/ML
6 INJECTION, SOLUTION INTRAMUSCULAR; INTRAVENOUS EVERY 4 HOURS PRN
Status: DISCONTINUED | OUTPATIENT
Start: 2020-05-24 | End: 2020-05-27

## 2020-05-24 RX ADMIN — OXYCODONE 10 MG: 5 TABLET ORAL at 22:04

## 2020-05-24 RX ADMIN — TRAMADOL HYDROCHLORIDE 50 MG: 50 TABLET, FILM COATED ORAL at 15:25

## 2020-05-24 RX ADMIN — URSODIOL 500 MG: 250 TABLET, FILM COATED ORAL at 08:44

## 2020-05-24 RX ADMIN — Medication 10 ML: at 08:46

## 2020-05-24 RX ADMIN — MEROPENEM 1 G: 1 INJECTION, POWDER, FOR SOLUTION INTRAVENOUS at 08:45

## 2020-05-24 RX ADMIN — Medication 1 MG: at 09:31

## 2020-05-24 RX ADMIN — URSODIOL 500 MG: 250 TABLET, FILM COATED ORAL at 20:42

## 2020-05-24 RX ADMIN — TAMSULOSIN HYDROCHLORIDE 0.4 MG: 0.4 CAPSULE ORAL at 15:25

## 2020-05-24 RX ADMIN — HYDROCORTISONE: 25 CREAM TOPICAL at 08:46

## 2020-05-24 RX ADMIN — TACROLIMUS 3.5 MG: 1 CAPSULE ORAL at 08:44

## 2020-05-24 RX ADMIN — SODIUM CHLORIDE 15 ML: 900 IRRIGANT IRRIGATION at 20:42

## 2020-05-24 RX ADMIN — MEROPENEM 1 G: 1 INJECTION, POWDER, FOR SOLUTION INTRAVENOUS at 17:47

## 2020-05-24 RX ADMIN — OXYCODONE 10 MG: 5 TABLET ORAL at 06:22

## 2020-05-24 RX ADMIN — HYDROCORTISONE: 25 CREAM TOPICAL at 20:43

## 2020-05-24 RX ADMIN — OXYCODONE 10 MG: 5 TABLET ORAL at 17:46

## 2020-05-24 RX ADMIN — SODIUM CHLORIDE 15 ML: 900 IRRIGANT IRRIGATION at 12:05

## 2020-05-24 RX ADMIN — SODIUM CHLORIDE: 900 INJECTION INTRAVENOUS at 01:04

## 2020-05-24 RX ADMIN — MEROPENEM 1 G: 1 INJECTION, POWDER, FOR SOLUTION INTRAVENOUS at 01:04

## 2020-05-24 RX ADMIN — TACROLIMUS 3 MG: 1 CAPSULE ORAL at 20:42

## 2020-05-24 RX ADMIN — VALACYCLOVIR HYDROCHLORIDE 500 MG: 500 TABLET, FILM COATED ORAL at 08:44

## 2020-05-24 RX ADMIN — SODIUM CHLORIDE: 900 INJECTION INTRAVENOUS at 15:25

## 2020-05-24 RX ADMIN — FLUCONAZOLE 400 MG: 200 TABLET ORAL at 08:44

## 2020-05-24 RX ADMIN — PANTOPRAZOLE SODIUM 40 MG: 40 TABLET, DELAYED RELEASE ORAL at 06:22

## 2020-05-24 RX ADMIN — SODIUM CHLORIDE 15 ML: 900 IRRIGANT IRRIGATION at 17:47

## 2020-05-24 RX ADMIN — VALACYCLOVIR HYDROCHLORIDE 500 MG: 500 TABLET, FILM COATED ORAL at 20:42

## 2020-05-24 RX ADMIN — Medication 10 ML: at 20:42

## 2020-05-24 ASSESSMENT — PAIN DESCRIPTION - PAIN TYPE
TYPE: ACUTE PAIN

## 2020-05-24 ASSESSMENT — PAIN DESCRIPTION - DESCRIPTORS
DESCRIPTORS: PRESSURE

## 2020-05-24 ASSESSMENT — PAIN DESCRIPTION - ORIENTATION
ORIENTATION: LOWER

## 2020-05-24 ASSESSMENT — PAIN DESCRIPTION - LOCATION
LOCATION: ABDOMEN;GROIN;RECTUM

## 2020-05-24 ASSESSMENT — PAIN DESCRIPTION - FREQUENCY
FREQUENCY: INTERMITTENT

## 2020-05-24 ASSESSMENT — PAIN DESCRIPTION - ONSET
ONSET: SUDDEN

## 2020-05-24 ASSESSMENT — PAIN SCALES - GENERAL
PAINLEVEL_OUTOF10: 7
PAINLEVEL_OUTOF10: 2
PAINLEVEL_OUTOF10: 2
PAINLEVEL_OUTOF10: 7
PAINLEVEL_OUTOF10: 7

## 2020-05-24 ASSESSMENT — PAIN - FUNCTIONAL ASSESSMENT
PAIN_FUNCTIONAL_ASSESSMENT: ACTIVITIES ARE NOT PREVENTED
PAIN_FUNCTIONAL_ASSESSMENT: PREVENTS OR INTERFERES SOME ACTIVE ACTIVITIES AND ADLS
PAIN_FUNCTIONAL_ASSESSMENT: PREVENTS OR INTERFERES SOME ACTIVE ACTIVITIES AND ADLS

## 2020-05-24 ASSESSMENT — PAIN DESCRIPTION - PROGRESSION
CLINICAL_PROGRESSION: GRADUALLY WORSENING

## 2020-05-24 NOTE — PROGRESS NOTES
BMI 25.31 kg/m²     Weight:    Wt Readings from Last 3 Encounters:   05/24/20 175 lb 6.4 oz (79.6 kg)   03/31/20 184 lb 9.6 oz (83.7 kg)   02/17/20 180 lb (81.6 kg)       General: Awake, alert and oriented. HEENT: normocephalic, PERRL, no scleral erythema or icterus, Oral mucosa erythematous w/ large white ulceration on left buccal mucosa   NECK: supple without palpable adenopathy  BACK: Straight  SKIN: warm dry and intact without lesions rashes or masses  CHEST: CTA bilaterally without use of accessory muscles  CV: Normal S1 S2, RRR, no MRG  ABD: NT, ND, normoactive BS, no palpable masses or hepatosplenomegaly  EXTREMITIES: without edema, denies calf tenderness  NEURO: CN II - XII grossly intact. REsting tremor  CATHETER: Left IJ TLH (5/13/20, IR) - CDI & Right IJ PAC (2/17/20, [facility]) - CDI    Data:   CBC:   Recent Labs     05/22/20 0335 05/23/20 0347 05/24/20 0316   WBC 0.0* 0.0* 0.0*   HGB 7.5* 7.4* 7.3*   HCT 21.5* 21.2* 20.4*   MCV 96.0 95.7 93.8   PLT 16* 12* 15*     BMP/Mag:  Recent Labs     05/22/20 0335 05/23/20 0347 05/24/20 0316    141 141   K 4.3 4.2 4.4    106 106   CO2 23 23 25   PHOS 2.7 3.3 3.4   BUN 9 10 11   CREATININE 0.6* 0.6* 0.7*   MG 1.50* 1.40* 1.60*     LIVP:   Recent Labs     05/22/20  0335 05/23/20 0347 05/24/20  0316   AST 9*  --   --    ALT 12  --   --    BILIDIR <0.2  --   --    BILITOT 0.4 0.4 0.3   ALKPHOS 67  --   --      Uric Acid:    Recent Labs     05/22/20 0335   LABURIC 3.6     Coags:   No results for input(s): PROTIME, INR, APTT in the last 72 hours. Tacro:    Recent Labs     05/22/20  0830   TACROLEV 7.2     CMV Quant DNA by PCR: No results found for: CMVDNAQNT    PROBLEM LIST:        1. MDS  2. Anxiety and depression  3. HLD  4. H/o shingles (2004, RLQ)  5. Gilbert syndrome    Post- Transplant Complications:  1. Resting tremor   2. Steroid Induced Hyperglycemia   3. Hypervolemia   4. Constipation   5. Nausea   6. Headache   7. Mucositis  8. Diarrhea / Abdominal Cramping   9. Neutropenic Fever   10. HTN     TREATMENT:       1. Decitabine x3 cycles - 2/17-4/14/20   2. Targeted Busulfan & Fludarabine followed by MUD Allo PBSC infusion 5/11/20  Preparative Regimen: Targeted Busulfan & Fludarabine  Date of BMT: 5/11/2020  Source of stem cells: PBSC - 6.7 x10^6 he27hpfpb/kg  Donor/Recipient Blood Type: A+ / A+  Donor Sex: Male, follow VNTR (DID# [id number])  CMV Donor / Recipient: Pos / Pos     ASSESSMENT AND PLAN:         1. Myelodysplastic Syndrome: Stable disease  - Most recent BM Bx/Asp (4/7/20) - ongoing disease  - S/p Bu/Flu & MUD-pb BMT  - Busulfan AUC low at 4672, therefore busulfan dose increased from 252 mg to 315 mg, for doses 3 and 4. Day + 13     2. ID: Now afebrile, he had neutropenic fever, unidentified infection  - Pan - cx (5/20/20) - NGTD  - CXR (5/20/20) - No acute process   - Cont Valtrex & Diflucan ppx   - Merrem Day + 6 (started 5/20/20)     Donor/Recipient CMV: Pos / Pos  - Check CMV PCR weekly once WBC 1.0  - Start letermovir ppx if started on high-dose steroids     3. Heme:  Pancytopenia from chemotherapy   - Transfuse for Hgb < 7 and Platelets < 52C.    - No transfusion today      4. Metabolic:  stable renal fxn and e-lytes, mild hypoMg  - Cont IVF:  NS @ 75 mL/hr   - Replace potassium and magnesium per policy.     5. Graft versus host disease: No evidence of disease   - S/p Post-Transplant Cytoxan days + 3 & 4   - Cont tacrolimus 2.5 mg bid     Tacro Level:  Lab Results   Component Value Date    TACROLEV 7.2 05/22/2020    TACROLEV 7.3 05/20/2020       6. VOD:  No evidence of VOD  Recent Labs     05/22/20  0335  05/24/20  0316   BILIDIR <0.2  --   --    BILITOT 0.4   < > 0.3    < > = values in this interval not displayed. Admission Weight: 185 lb (83.9 kg)  Current Weight: Weight: 175 lb 6.4 oz (79.6 kg). - Cont Actigall. 7. Pulmonary: No active issues.    - Encourage IS and ambulation      8. GI / Nutrition:

## 2020-05-24 NOTE — PLAN OF CARE
Problem: Pain:  Goal: Pain level will decrease  Description: Pain level will decrease    Outcome: Ongoing   Pt. Demonstrates correct use of 0-10 pain scale; pt verbalizes and demonstrates correct understanding that he is to notify this RN when pain is not controlled or of new onset pain; PRN pain medications given this shift for mucositis-related mouth pain - see MAR; Pt satisfied with pain control medications. Call light/table in reach. Will continue to monitor. Problem: Falls - Risk of:  Goal: Will remain free from falls  Description: Will remain free from falls    Outcome: Ongoing   Orthostatic vital signs obtained at start of shift - see flowsheet for details. Pt does not meet criteria for orthostasis and continues to ambulate with steady gait. Pt is a Med fall risk. See Robles Older Fall Score and ABCDS Injury Risk assessments. Problem: Falls - Risk of:  Goal: Absence of physical injury  Description: Absence of physical injury  Outcome: Ongoing     Problem: Infection - Central Venous Catheter-Associated Bloodstream Infection:  Goal: Will show no infection signs and symptoms  Description: Will show no infection signs and symptoms    Outcome: Ongoing   CVC site remains free of signs/symptoms of infection. No drainage, edema, erythema, pain, or warmth noted at site. Dressing changes continue per protocol and on an as needed basis - see flowsheet. Problem: Venous Thromboembolism:  Goal: Will show no signs or symptoms of venous thromboembolism  Description: Will show no signs or symptoms of venous thromboembolism    Outcome: Ongoing   Adherent with DVT Prevention: Pt is at risk for DVT d/t decreased mobility and cancer treatment. Pt educated on importance of activity. Pt has orders for Subcut prophylactic lovenox - not being given at this time while pt has low platelet count less than 100 - see results. Pt verbalizes understanding of need for prophylaxis while inpatient.      Problem: Bleeding:  Goal:

## 2020-05-24 NOTE — PROGRESS NOTES
This RN addressed pt's BP this AM and informed Bradford Briceño MD face to face that pt does not normally take BP medications at home and that BP was lower but stable this AM and that this RN was concerned about pt's BP dropping with administration of amlodipine; per Bradford Briceño MD: d/c amlodipine. See orders. Will continue to monitor.

## 2020-05-25 ENCOUNTER — APPOINTMENT (OUTPATIENT)
Dept: CT IMAGING | Age: 55
DRG: 014 | End: 2020-05-25
Attending: INTERNAL MEDICINE
Payer: COMMERCIAL

## 2020-05-25 ENCOUNTER — APPOINTMENT (OUTPATIENT)
Dept: GENERAL RADIOLOGY | Age: 55
DRG: 014 | End: 2020-05-25
Attending: INTERNAL MEDICINE
Payer: COMMERCIAL

## 2020-05-25 LAB
ALBUMIN SERPL-MCNC: 3.8 G/DL (ref 3.4–5)
ALP BLD-CCNC: 68 U/L (ref 40–129)
ALT SERPL-CCNC: 10 U/L (ref 10–40)
ANION GAP SERPL CALCULATED.3IONS-SCNC: 9 MMOL/L (ref 3–16)
APTT: 29.4 SEC (ref 24.2–36.2)
AST SERPL-CCNC: 8 U/L (ref 15–37)
BILIRUB SERPL-MCNC: 0.6 MG/DL (ref 0–1)
BILIRUBIN DIRECT: <0.2 MG/DL (ref 0–0.3)
BILIRUBIN, INDIRECT: ABNORMAL MG/DL (ref 0–1)
BLOOD BANK DISPENSE STATUS: NORMAL
BLOOD BANK PRODUCT CODE: NORMAL
BPU ID: NORMAL
BUN BLDV-MCNC: 10 MG/DL (ref 7–20)
CALCIUM SERPL-MCNC: 9 MG/DL (ref 8.3–10.6)
CHLORIDE BLD-SCNC: 103 MMOL/L (ref 99–110)
CO2: 25 MMOL/L (ref 21–32)
CREAT SERPL-MCNC: 0.6 MG/DL (ref 0.9–1.3)
DESCRIPTION BLOOD BANK: NORMAL
GFR AFRICAN AMERICAN: >60
GFR NON-AFRICAN AMERICAN: >60
GLUCOSE BLD-MCNC: 99 MG/DL (ref 70–99)
HCT VFR BLD CALC: 19.7 % (ref 40.5–52.5)
HEMOGLOBIN: 7 G/DL (ref 13.5–17.5)
INR BLD: 1.15 (ref 0.86–1.14)
LACTATE DEHYDROGENASE: 124 U/L (ref 100–190)
MAGNESIUM: 1.3 MG/DL (ref 1.8–2.4)
MCH RBC QN AUTO: 33.4 PG (ref 26–34)
MCHC RBC AUTO-ENTMCNC: 35.6 G/DL (ref 31–36)
MCV RBC AUTO: 93.7 FL (ref 80–100)
PDW BLD-RTO: 13.3 % (ref 12.4–15.4)
PHOSPHORUS: 3.2 MG/DL (ref 2.5–4.9)
PLATELET # BLD: 18 K/UL (ref 135–450)
PMV BLD AUTO: 8.3 FL (ref 5–10.5)
POTASSIUM SERPL-SCNC: 4.5 MMOL/L (ref 3.5–5.1)
PROTHROMBIN TIME: 13.4 SEC (ref 10–13.2)
RBC # BLD: 2.1 M/UL (ref 4.2–5.9)
SODIUM BLD-SCNC: 137 MMOL/L (ref 136–145)
TACROLIMUS BLOOD: 11.8 NG/ML (ref 5–20)
TOTAL PROTEIN: 6 G/DL (ref 6.4–8.2)
URIC ACID, SERUM: 3.1 MG/DL (ref 3.5–7.2)
WBC # BLD: 0 K/UL (ref 4–11)

## 2020-05-25 PROCEDURE — 85610 PROTHROMBIN TIME: CPT

## 2020-05-25 PROCEDURE — 83615 LACTATE (LD) (LDH) ENZYME: CPT

## 2020-05-25 PROCEDURE — 2500000003 HC RX 250 WO HCPCS: Performed by: INTERNAL MEDICINE

## 2020-05-25 PROCEDURE — 80048 BASIC METABOLIC PNL TOTAL CA: CPT

## 2020-05-25 PROCEDURE — 6370000000 HC RX 637 (ALT 250 FOR IP): Performed by: NURSE PRACTITIONER

## 2020-05-25 PROCEDURE — 84100 ASSAY OF PHOSPHORUS: CPT

## 2020-05-25 PROCEDURE — 2060000000 HC ICU INTERMEDIATE R&B

## 2020-05-25 PROCEDURE — 6360000002 HC RX W HCPCS: Performed by: NURSE PRACTITIONER

## 2020-05-25 PROCEDURE — 2580000003 HC RX 258: Performed by: NURSE PRACTITIONER

## 2020-05-25 PROCEDURE — 85025 COMPLETE CBC W/AUTO DIFF WBC: CPT

## 2020-05-25 PROCEDURE — 36430 TRANSFUSION BLD/BLD COMPNT: CPT

## 2020-05-25 PROCEDURE — 2580000003 HC RX 258: Performed by: INTERNAL MEDICINE

## 2020-05-25 PROCEDURE — 85730 THROMBOPLASTIN TIME PARTIAL: CPT

## 2020-05-25 PROCEDURE — 6360000002 HC RX W HCPCS: Performed by: INTERNAL MEDICINE

## 2020-05-25 PROCEDURE — 36592 COLLECT BLOOD FROM PICC: CPT

## 2020-05-25 PROCEDURE — 84550 ASSAY OF BLOOD/URIC ACID: CPT

## 2020-05-25 PROCEDURE — 6370000000 HC RX 637 (ALT 250 FOR IP): Performed by: INTERNAL MEDICINE

## 2020-05-25 PROCEDURE — 80076 HEPATIC FUNCTION PANEL: CPT

## 2020-05-25 PROCEDURE — 83735 ASSAY OF MAGNESIUM: CPT

## 2020-05-25 PROCEDURE — 74177 CT ABD & PELVIS W/CONTRAST: CPT

## 2020-05-25 PROCEDURE — 99254 IP/OBS CNSLTJ NEW/EST MOD 60: CPT | Performed by: SURGERY

## 2020-05-25 PROCEDURE — 71045 X-RAY EXAM CHEST 1 VIEW: CPT

## 2020-05-25 PROCEDURE — 80197 ASSAY OF TACROLIMUS: CPT

## 2020-05-25 PROCEDURE — 6360000004 HC RX CONTRAST MEDICATION: Performed by: INTERNAL MEDICINE

## 2020-05-25 PROCEDURE — 6370000000 HC RX 637 (ALT 250 FOR IP): Performed by: STUDENT IN AN ORGANIZED HEALTH CARE EDUCATION/TRAINING PROGRAM

## 2020-05-25 RX ORDER — 0.9 % SODIUM CHLORIDE 0.9 %
20 INTRAVENOUS SOLUTION INTRAVENOUS ONCE
Status: COMPLETED | OUTPATIENT
Start: 2020-05-25 | End: 2020-05-25

## 2020-05-25 RX ORDER — LIDOCAINE HYDROCHLORIDE 20 MG/ML
JELLY TOPICAL 2 TIMES DAILY
Status: DISCONTINUED | OUTPATIENT
Start: 2020-05-25 | End: 2020-05-25

## 2020-05-25 RX ADMIN — TACROLIMUS 3.5 MG: 1 CAPSULE ORAL at 08:54

## 2020-05-25 RX ADMIN — MAGNESIUM SULFATE HEPTAHYDRATE 4 G: 40 INJECTION, SOLUTION INTRAVENOUS at 04:55

## 2020-05-25 RX ADMIN — URSODIOL 500 MG: 250 TABLET, FILM COATED ORAL at 08:54

## 2020-05-25 RX ADMIN — MORPHINE SULFATE 4 MG: 4 INJECTION INTRAVENOUS at 18:07

## 2020-05-25 RX ADMIN — OXYCODONE 10 MG: 5 TABLET ORAL at 18:25

## 2020-05-25 RX ADMIN — FLUCONAZOLE 400 MG: 200 TABLET ORAL at 08:54

## 2020-05-25 RX ADMIN — Medication 1 PACKET: at 13:00

## 2020-05-25 RX ADMIN — VALACYCLOVIR HYDROCHLORIDE 500 MG: 500 TABLET, FILM COATED ORAL at 08:54

## 2020-05-25 RX ADMIN — MORPHINE SULFATE 4 MG: 4 INJECTION INTRAVENOUS at 08:48

## 2020-05-25 RX ADMIN — IOPAMIDOL 80 ML: 755 INJECTION, SOLUTION INTRAVENOUS at 11:39

## 2020-05-25 RX ADMIN — MORPHINE SULFATE 4 MG: 4 INJECTION INTRAVENOUS at 22:37

## 2020-05-25 RX ADMIN — Medication 1 MG: at 08:55

## 2020-05-25 RX ADMIN — SODIUM CHLORIDE: 900 INJECTION INTRAVENOUS at 17:40

## 2020-05-25 RX ADMIN — Medication 10 ML: at 20:37

## 2020-05-25 RX ADMIN — MEROPENEM 1 G: 1 INJECTION, POWDER, FOR SOLUTION INTRAVENOUS at 00:45

## 2020-05-25 RX ADMIN — SODIUM CHLORIDE 15 ML: 900 IRRIGANT IRRIGATION at 20:38

## 2020-05-25 RX ADMIN — IOHEXOL 50 ML: 240 INJECTION, SOLUTION INTRATHECAL; INTRAVASCULAR; INTRAVENOUS; ORAL at 11:39

## 2020-05-25 RX ADMIN — URSODIOL 500 MG: 250 TABLET, FILM COATED ORAL at 20:36

## 2020-05-25 RX ADMIN — MEROPENEM 1 G: 1 INJECTION, POWDER, FOR SOLUTION INTRAVENOUS at 16:53

## 2020-05-25 RX ADMIN — SODIUM CHLORIDE 15 ML: 900 IRRIGANT IRRIGATION at 06:52

## 2020-05-25 RX ADMIN — SODIUM CHLORIDE 20 ML: 9 INJECTION, SOLUTION INTRAVENOUS at 05:00

## 2020-05-25 RX ADMIN — LIDOCAINE HYDROCHLORIDE: 20 JELLY TOPICAL at 13:14

## 2020-05-25 RX ADMIN — MORPHINE SULFATE 4 MG: 4 INJECTION INTRAVENOUS at 10:44

## 2020-05-25 RX ADMIN — Medication 10 ML: at 08:54

## 2020-05-25 RX ADMIN — METRONIDAZOLE 500 MG: 500 INJECTION, SOLUTION INTRAVENOUS at 17:40

## 2020-05-25 RX ADMIN — OXYCODONE 10 MG: 5 TABLET ORAL at 07:17

## 2020-05-25 RX ADMIN — PANTOPRAZOLE SODIUM 40 MG: 40 TABLET, DELAYED RELEASE ORAL at 06:51

## 2020-05-25 RX ADMIN — VALACYCLOVIR HYDROCHLORIDE 500 MG: 500 TABLET, FILM COATED ORAL at 20:36

## 2020-05-25 RX ADMIN — TAMSULOSIN HYDROCHLORIDE 0.4 MG: 0.4 CAPSULE ORAL at 08:54

## 2020-05-25 RX ADMIN — OXYCODONE 10 MG: 5 TABLET ORAL at 23:15

## 2020-05-25 RX ADMIN — METRONIDAZOLE 500 MG: 500 INJECTION, SOLUTION INTRAVENOUS at 09:53

## 2020-05-25 RX ADMIN — MEROPENEM 1 G: 1 INJECTION, POWDER, FOR SOLUTION INTRAVENOUS at 08:55

## 2020-05-25 RX ADMIN — HYDROCORTISONE: 25 CREAM TOPICAL at 08:55

## 2020-05-25 RX ADMIN — TACROLIMUS 3 MG: 1 CAPSULE ORAL at 20:36

## 2020-05-25 ASSESSMENT — PAIN DESCRIPTION - ONSET
ONSET: ON-GOING

## 2020-05-25 ASSESSMENT — PAIN DESCRIPTION - PROGRESSION
CLINICAL_PROGRESSION: GRADUALLY WORSENING
CLINICAL_PROGRESSION: NOT CHANGED
CLINICAL_PROGRESSION: RAPIDLY WORSENING
CLINICAL_PROGRESSION: NOT CHANGED
CLINICAL_PROGRESSION: GRADUALLY WORSENING
CLINICAL_PROGRESSION: NOT CHANGED
CLINICAL_PROGRESSION: NOT CHANGED

## 2020-05-25 ASSESSMENT — PAIN DESCRIPTION - DESCRIPTORS
DESCRIPTORS: PRESSURE

## 2020-05-25 ASSESSMENT — PAIN SCALES - GENERAL
PAINLEVEL_OUTOF10: 8
PAINLEVEL_OUTOF10: 8
PAINLEVEL_OUTOF10: 7
PAINLEVEL_OUTOF10: 8
PAINLEVEL_OUTOF10: 4
PAINLEVEL_OUTOF10: 7
PAINLEVEL_OUTOF10: 4
PAINLEVEL_OUTOF10: 8
PAINLEVEL_OUTOF10: 0
PAINLEVEL_OUTOF10: 9
PAINLEVEL_OUTOF10: 6
PAINLEVEL_OUTOF10: 8
PAINLEVEL_OUTOF10: 6
PAINLEVEL_OUTOF10: 4

## 2020-05-25 ASSESSMENT — PAIN DESCRIPTION - LOCATION
LOCATION: RECTUM

## 2020-05-25 ASSESSMENT — PAIN DESCRIPTION - PAIN TYPE
TYPE: ACUTE PAIN

## 2020-05-25 ASSESSMENT — PAIN DESCRIPTION - ORIENTATION
ORIENTATION: MID

## 2020-05-25 ASSESSMENT — PAIN DESCRIPTION - FREQUENCY
FREQUENCY: CONTINUOUS
FREQUENCY: INTERMITTENT
FREQUENCY: CONTINUOUS
FREQUENCY: CONTINUOUS

## 2020-05-25 ASSESSMENT — PAIN - FUNCTIONAL ASSESSMENT
PAIN_FUNCTIONAL_ASSESSMENT: ACTIVITIES ARE NOT PREVENTED
PAIN_FUNCTIONAL_ASSESSMENT: PREVENTS OR INTERFERES SOME ACTIVE ACTIVITIES AND ADLS
PAIN_FUNCTIONAL_ASSESSMENT: PREVENTS OR INTERFERES SOME ACTIVE ACTIVITIES AND ADLS
PAIN_FUNCTIONAL_ASSESSMENT: ACTIVITIES ARE NOT PREVENTED
PAIN_FUNCTIONAL_ASSESSMENT: ACTIVITIES ARE NOT PREVENTED
PAIN_FUNCTIONAL_ASSESSMENT: PREVENTS OR INTERFERES SOME ACTIVE ACTIVITIES AND ADLS

## 2020-05-25 NOTE — PROGRESS NOTES
Bradford Briceño MD notified via perfectserve that keith could not be placed in pt d/t pain and resistance during insertion; MD notified pt was bladder scanned and PVR shows >365 mls in bladder; pt's manny and anus/rectal area examined by AUNG Cooley and Charge RNs and found tender, painful, slight swelling and pressure. Awaiting response at this time.

## 2020-05-25 NOTE — PROGRESS NOTES
Spoke with wife, Raúl Avendaño, on the phone and gave her the results of the CT scan- no sign of abscess. Will continue to call with any changes or updates to plan of care.      Daryl Skiff, RN

## 2020-05-25 NOTE — PLAN OF CARE
Will show no signs and symptoms of excessive bleeding  Description: Will show no signs and symptoms of excessive bleeding    Outcome: Ongoing     Patient's hemoglobin this AM:   Recent Labs     05/25/20  0320   HGB 7.0*     Patient's platelet count this AM:   Recent Labs     05/25/20  0320   PLT 18*    Thrombocytopenia Precautions in place. Patient showing no signs or symptoms of active bleeding. Patient transfused blood products per orders - see flowsheet. Patient verbalizes understanding of all instructions. Will continue to assess and implement POC. Call light within reach and hourly rounding in place. Problem: Anxiety:  Goal: Level of anxiety will decrease  Description: Level of anxiety will decrease    Outcome: Ongoing   Pt. Has been calm/cooperative/hopeful throughout shift and states \"I can just get over these hurdles and we'll be good from there. \"; pt denies spiritual or emotional needs at this time; pt does not state or express symptoms of anxiety at this time; pt verbalizes understanding that he can report symptoms of anxiety to RN staff; call light/table in reach. Will continue to monitor. Problem: Nausea/Vomiting:  Goal: Absence of nausea/vomiting  Description: Absence of nausea/vomiting    Outcome: Ongoing   Pt has denied N/V throughout shift; pt verbalizes understanding that he is to report s/s of nausea to RN staff for antiemetics; pt has been tolerating diet throughout shift and been finishing meals - see flowsheets; call light/table in reach. Will continue to monitor. Problem: PROTECTIVE PRECAUTIONS  Goal: Patient will remain free of nosocomial Infections    Outcome: Ongoing   Pt. Demonstrates correct handwashing and hand hygiene and is following bathing protocol this shift; RN staff and all staff entering pt's room using proper PPE and hand hygiene when touching patient or caring for patient. Will continue to monitor.      Problem: Discharge Planning:  Goal: Discharged to

## 2020-05-25 NOTE — CONSULTS
Colorectal Surgery   Resident Consult Note    Reason for Consult: Rectal pain    History of Present Illness:   Armida Jaime is a 63 y.o. male  who is admitted for chemotherapy and stem cell transplant for MDS who has been complaining of rectal pain for approximately 1 week. The pain has acutely worsened over the past couple of days and now has become exquisitely tender. He states that it is sharp and constant and worsened with BM's. The pain is at time accompanied by itchiness and burning. He does endorse normal BM's but states that they have been harder to pass secondary to pain. He believes he was told he has a hemorrhoid in the past. Last colonoscopy was in 2011 which demonstrated some polyps that were removed at the time of the procedure. He otherwise has had no other colorectal problems to date. Abdominal surgical history includes appendectomy at age 12. Past Medical History:    No past medical history on file. Past Surgical History:    No past surgical history on file. Allergies:  Pcn [penicillins]    Medications:   Home Meds  No current facility-administered medications on file prior to encounter.       Current Outpatient Medications on File Prior to Encounter   Medication Sig Dispense Refill    ursodiol (BOYD) 250 MG tablet Take 500 mg by mouth 2 times daily      ibuprofen (ADVIL;MOTRIN) 200 MG tablet Take 200 mg by mouth every 6 hours as needed for Pain         Current Meds  metronidazole (FLAGYL) 500 mg in NaCl 100 mL IVPB premix, Q8H  psyllium (HYDROCIL) 95 % packet 1 packet, BID  lidocaine (XYLOCAINE) 2 % jelly, BID  tamsulosin (FLOMAX) capsule 0.4 mg, Daily  morphine injection 4 mg, Q4H PRN    Or  morphine (PF) injection 6 mg, Q4H PRN  tacrolimus (proGRAF) capsule 3.5 mg, Daily  tacrolimus (PROGRAF) capsule 3 mg, Nightly  oxyCODONE (ROXICODONE) immediate release tablet 5 mg, Q4H PRN    Or  oxyCODONE (ROXICODONE) immediate release tablet 10 mg, Q4H PRN  acetaminophen (TYLENOL) tablet 650 mg, Q4H PRN  meropenem (MERREM) 1 g in sodium chloride 0.9 % 100 mL IVPB (mini-bag), Q8H  dexamethasone 0.5 MG/5ML solution 1 mg, 2 times per day  magic (miracle) mouthwash, 4x Daily PRN  loperamide (IMODIUM) capsule 2 mg, PRN  traMADol (ULTRAM) tablet 50 mg, Q6H PRN  0.9 % sodium chloride infusion, Continuous PRN  potassium phosphate 20 mmol in dextrose 5 % 250 mL IVPB, PRN  pantoprazole (PROTONIX) tablet 40 mg, QAM AC  calcium carbonate (TUMS) chewable tablet 500 mg, TID PRN  Hydrocerin cream CREA, PRN  glucose (GLUTOSE) 40 % oral gel 15 g, PRN  dextrose 50 % IV solution, PRN  glucagon (rDNA) injection 1 mg, PRN  dextrose 5 % solution, PRN  0.9 % sodium chloride infusion, Continuous PRN  alteplase (CATHFLO) injection 2 mg, PRN  fluconazole (DIFLUCAN) tablet 400 mg, Daily  magnesium hydroxide (MILK OF MAGNESIA) 400 MG/5ML suspension 10 mL, Daily PRN  magnesium sulfate 4 g in 100 mL IVPB premix, PRN  potassium chloride 20 mEq/50 mL IVPB (Central Line), PRN  Saline Mouthwash 15 mL, 4x Daily AC & HS  Saline Mouthwash 15 mL, Q4H PRN  sodium chloride flush 0.9 % injection 10 mL, 2 times per day  valACYclovir (VALTREX) tablet 500 mg, BID  ursodiol (ACTIGALL) tablet 500 mg, BID  prochlorperazine (COMPAZINE) tablet 10 mg, Q4H PRN    Or  prochlorperazine (COMPAZINE) injection 10 mg, Q4H PRN  LORazepam (ATIVAN) tablet 0.5 mg, Q4H PRN    Or  LORazepam (ATIVAN) injection 0.5 mg, Q4H PRN  0.9 % sodium chloride infusion, Continuous        Family History:   No family history on file. Social History:   TOBACCO:   reports that he has quit smoking. His smoking use included cigarettes. He quit after 8.00 years of use. He has never used smokeless tobacco.  ETOH:   has no history on file for alcohol. DRUGS:   has no history on file for drug. Review of Systems:     Constitutional: Negative. HENT: Negative. Eyes: Negative. Respiratory: Negative. Cardiovascular: Negative.   Gastrointestinal: Negative except for severe rectal pain.  Genitourinary: Negative. Musculoskeletal: Negative. Skin: Negative. Endocrine: Negative. Allergic/Immunologic: Negative. Neurological: Negative. Hematological: Negative. Psychiatric/Behavioral: Negative.       Physical exam:    Vitals:    05/25/20 0506 05/25/20 0527 05/25/20 0720 05/25/20 1049   BP: (!) 139/90 (!) 130/92 136/83 135/82   Pulse: 81 78 85 87   Resp: 18 18 18 18   Temp: 98.2 °F (36.8 °C) 98.1 °F (36.7 °C) 98.3 °F (36.8 °C) 98 °F (36.7 °C)   TempSrc: Oral Oral Oral Oral   SpO2: 96% 98% 100% 100%   Weight:   174 lb 1.6 oz (79 kg)    Height:           General appearance: alert, no acute distress, grooming appropriate  Eyes: EOMI, no scleral icterus  Neck: trachea midline, no JVD  Chest/Lungs: no wheezes, normal effort without accessory muscle use, on RA  Cardiovascular: RRR  Abdomen: soft, non-tender, non-distended,  no guarding/rigidity  Skin: warm and dry, no rashes  Extremities: no edema, no cyanosis  Neuro: A&Ox3, no focal deficits, sensation intact  Rectal: no external lesions, no fissures, no bleeding, good rectal tone; no erythema, induration, drainage, severe tenderness with rectal exam    Labs:    CBC:   Recent Labs     05/23/20  0347 05/24/20  0316 05/25/20  0320   WBC 0.0* 0.0* 0.0*   HGB 7.4* 7.3* 7.0*   HCT 21.2* 20.4* 19.7*   MCV 95.7 93.8 93.7   PLT 12* 15* 18*     BMP:   Recent Labs     05/23/20  0347 05/24/20  0316 05/25/20  0320    141 137   K 4.2 4.4 4.5    106 103   CO2 23 25 25   PHOS 3.3 3.4 3.2   BUN 10 11 10   CREATININE 0.6* 0.7* 0.6*     PT/INR:   Recent Labs     05/25/20  0320   PROTIME 13.4*   INR 1.15*     APTT:   Recent Labs     05/25/20  0320   APTT 29.4     Liver Profile:   Lab Results   Component Value Date    AST 8 05/25/2020    ALT 10 05/25/2020    BILIDIR <0.2 05/25/2020    BILITOT 0.6 05/25/2020    ALKPHOS 68 05/25/2020   No results found for: CHOL, HDL, TRIG  UA:   Lab Results   Component Value Date    COLORU Yellow 05/24/2020    PHUR 7.5

## 2020-05-25 NOTE — PROGRESS NOTES
[facility] Progress Note    2020    [place]     :  1965    MRN:  [id number]    Subjective:  Rectal pain much worse. Having intermittent urinary hesitancy.   Overall feels much worse than yesterday          ECOG PS:(2) Ambulatory and capable of self care, unable to carry out work activity, up and about > 50% or waking hours      KPS: 70% Cares for self; unable to carry on normal activity or to do active work      Isolation:  None     Medications    Scheduled Meds:   metroNIDAZOLE  500 mg Intravenous Q8H    tamsulosin  0.4 mg Oral Daily    tacrolimus  3.5 mg Oral Daily    tacrolimus  3 mg Oral Nightly    hydrocortisone   Rectal BID    meropenem  1 g Intravenous Q8H    dexamethasone  1 mg Swish & Spit 2 times per day    pantoprazole  40 mg Oral QAM AC    fluconazole  400 mg Oral Daily    Saline Mouthwash  15 mL Swish & Spit 4x Daily AC & HS    sodium chloride flush  10 mL Intravenous 2 times per day    valACYclovir  500 mg Oral BID    ursodiol  500 mg Oral BID     Continuous Infusions:   sodium chloride      dextrose      sodium chloride      sodium chloride 75 mL/hr at 20 1525     PRN Meds:morphine **OR** morphine, oxyCODONE **OR** oxyCODONE, acetaminophen, magic (miracle) mouthwash, [COMPLETED] loperamide **FOLLOWED BY** loperamide, traMADol, sodium chloride, potassium phosphate IVPB, calcium carbonate, Hydrocerin, glucose, dextrose, glucagon (rDNA), dextrose, sodium chloride, alteplase, magnesium hydroxide, magnesium sulfate, potassium chloride, Saline Mouthwash, prochlorperazine **OR** prochlorperazine, LORazepam **OR** LORazepam    ROS:  As noted above, otherwise remainder of 10-point ROS negative    Physical Exam:    I&O:      Intake/Output Summary (Last 24 hours) at 2020 0855  Last data filed at 2020 0720  Gross per 24 hour   Intake 2544 ml   Output 3260 ml   Net -716 ml       Vital Signs:  /83   Pulse 85   Temp 98.3 °F

## 2020-05-25 NOTE — PROGRESS NOTES
Spoke with wife, Xuan Grijalva, and informed her that CT results are still pending and surgery team ordered fiber supplements to help soften BMs, sitz baths and lidocaine jelly to help with rectal pain. All questions answered at this time. Will continue to notify with updates.      Amanda Ferguson RN

## 2020-05-26 LAB
ANION GAP SERPL CALCULATED.3IONS-SCNC: 14 MMOL/L (ref 3–16)
BILIRUB SERPL-MCNC: 0.8 MG/DL (ref 0–1)
BUN BLDV-MCNC: 10 MG/DL (ref 7–20)
CALCIUM SERPL-MCNC: 9.3 MG/DL (ref 8.3–10.6)
CHLORIDE BLD-SCNC: 100 MMOL/L (ref 99–110)
CO2: 22 MMOL/L (ref 21–32)
CREAT SERPL-MCNC: 0.7 MG/DL (ref 0.9–1.3)
GFR AFRICAN AMERICAN: >60
GFR NON-AFRICAN AMERICAN: >60
GLUCOSE BLD-MCNC: 120 MG/DL (ref 70–99)
HCT VFR BLD CALC: 25.6 % (ref 40.5–52.5)
HEMOGLOBIN: 9 G/DL (ref 13.5–17.5)
MAGNESIUM: 1.7 MG/DL (ref 1.8–2.4)
MCH RBC QN AUTO: 32.1 PG (ref 26–34)
MCHC RBC AUTO-ENTMCNC: 35.3 G/DL (ref 31–36)
MCV RBC AUTO: 91.2 FL (ref 80–100)
PDW BLD-RTO: 15.4 % (ref 12.4–15.4)
PHOSPHORUS: 3.2 MG/DL (ref 2.5–4.9)
PLATELET # BLD: 24 K/UL (ref 135–450)
PMV BLD AUTO: 7.8 FL (ref 5–10.5)
POTASSIUM SERPL-SCNC: 4.5 MMOL/L (ref 3.5–5.1)
RBC # BLD: 2.81 M/UL (ref 4.2–5.9)
SODIUM BLD-SCNC: 136 MMOL/L (ref 136–145)
WBC # BLD: 0 K/UL (ref 4–11)

## 2020-05-26 PROCEDURE — 83735 ASSAY OF MAGNESIUM: CPT

## 2020-05-26 PROCEDURE — 2060000000 HC ICU INTERMEDIATE R&B

## 2020-05-26 PROCEDURE — 6370000000 HC RX 637 (ALT 250 FOR IP): Performed by: NURSE PRACTITIONER

## 2020-05-26 PROCEDURE — 82247 BILIRUBIN TOTAL: CPT

## 2020-05-26 PROCEDURE — 6370000000 HC RX 637 (ALT 250 FOR IP): Performed by: STUDENT IN AN ORGANIZED HEALTH CARE EDUCATION/TRAINING PROGRAM

## 2020-05-26 PROCEDURE — 80048 BASIC METABOLIC PNL TOTAL CA: CPT

## 2020-05-26 PROCEDURE — 6360000002 HC RX W HCPCS: Performed by: INTERNAL MEDICINE

## 2020-05-26 PROCEDURE — 6370000000 HC RX 637 (ALT 250 FOR IP): Performed by: INTERNAL MEDICINE

## 2020-05-26 PROCEDURE — 84100 ASSAY OF PHOSPHORUS: CPT

## 2020-05-26 PROCEDURE — 87081 CULTURE SCREEN ONLY: CPT

## 2020-05-26 PROCEDURE — 2580000003 HC RX 258: Performed by: NURSE PRACTITIONER

## 2020-05-26 PROCEDURE — 36592 COLLECT BLOOD FROM PICC: CPT

## 2020-05-26 PROCEDURE — 2500000003 HC RX 250 WO HCPCS: Performed by: INTERNAL MEDICINE

## 2020-05-26 PROCEDURE — 99231 SBSQ HOSP IP/OBS SF/LOW 25: CPT | Performed by: SURGERY

## 2020-05-26 PROCEDURE — 2580000003 HC RX 258: Performed by: INTERNAL MEDICINE

## 2020-05-26 PROCEDURE — 85025 COMPLETE CBC W/AUTO DIFF WBC: CPT

## 2020-05-26 RX ADMIN — Medication 10 ML: at 08:47

## 2020-05-26 RX ADMIN — SODIUM CHLORIDE 15 ML: 900 IRRIGANT IRRIGATION at 17:27

## 2020-05-26 RX ADMIN — SODIUM CHLORIDE: 900 INJECTION INTRAVENOUS at 06:07

## 2020-05-26 RX ADMIN — METRONIDAZOLE 500 MG: 500 INJECTION, SOLUTION INTRAVENOUS at 09:59

## 2020-05-26 RX ADMIN — MORPHINE SULFATE 6 MG: 10 INJECTION, SOLUTION INTRAMUSCULAR; INTRAVENOUS at 10:30

## 2020-05-26 RX ADMIN — MEROPENEM 1 G: 1 INJECTION, POWDER, FOR SOLUTION INTRAVENOUS at 08:39

## 2020-05-26 RX ADMIN — Medication 1 MG: at 08:48

## 2020-05-26 RX ADMIN — MORPHINE SULFATE 6 MG: 10 INJECTION, SOLUTION INTRAMUSCULAR; INTRAVENOUS at 19:39

## 2020-05-26 RX ADMIN — SODIUM CHLORIDE: 900 INJECTION INTRAVENOUS at 20:30

## 2020-05-26 RX ADMIN — Medication 1 PACKET: at 08:45

## 2020-05-26 RX ADMIN — MORPHINE SULFATE 6 MG: 10 INJECTION, SOLUTION INTRAMUSCULAR; INTRAVENOUS at 03:57

## 2020-05-26 RX ADMIN — MEROPENEM 1 G: 1 INJECTION, POWDER, FOR SOLUTION INTRAVENOUS at 17:26

## 2020-05-26 RX ADMIN — TACROLIMUS 3 MG: 1 CAPSULE ORAL at 20:30

## 2020-05-26 RX ADMIN — URSODIOL 500 MG: 250 TABLET, FILM COATED ORAL at 08:48

## 2020-05-26 RX ADMIN — SODIUM CHLORIDE 15 ML: 900 IRRIGANT IRRIGATION at 20:30

## 2020-05-26 RX ADMIN — VALACYCLOVIR HYDROCHLORIDE 500 MG: 500 TABLET, FILM COATED ORAL at 20:30

## 2020-05-26 RX ADMIN — OXYCODONE 5 MG: 5 TABLET ORAL at 05:05

## 2020-05-26 RX ADMIN — TACROLIMUS 3.5 MG: 1 CAPSULE ORAL at 08:48

## 2020-05-26 RX ADMIN — OXYCODONE 10 MG: 5 TABLET ORAL at 10:58

## 2020-05-26 RX ADMIN — DILTIAZEM HYDROCHLORIDE: 30 TABLET, FILM COATED ORAL at 08:52

## 2020-05-26 RX ADMIN — MORPHINE SULFATE 6 MG: 10 INJECTION, SOLUTION INTRAMUSCULAR; INTRAVENOUS at 14:30

## 2020-05-26 RX ADMIN — SODIUM CHLORIDE 15 ML: 900 IRRIGANT IRRIGATION at 10:54

## 2020-05-26 RX ADMIN — FLUCONAZOLE 400 MG: 200 TABLET ORAL at 08:48

## 2020-05-26 RX ADMIN — OXYCODONE 10 MG: 5 TABLET ORAL at 20:30

## 2020-05-26 RX ADMIN — VALACYCLOVIR HYDROCHLORIDE 500 MG: 500 TABLET, FILM COATED ORAL at 08:47

## 2020-05-26 RX ADMIN — MEROPENEM 1 G: 1 INJECTION, POWDER, FOR SOLUTION INTRAVENOUS at 00:45

## 2020-05-26 RX ADMIN — PANTOPRAZOLE SODIUM 40 MG: 40 TABLET, DELAYED RELEASE ORAL at 06:07

## 2020-05-26 RX ADMIN — Medication 1 PACKET: at 20:30

## 2020-05-26 RX ADMIN — TAMSULOSIN HYDROCHLORIDE 0.4 MG: 0.4 CAPSULE ORAL at 08:48

## 2020-05-26 RX ADMIN — METRONIDAZOLE 500 MG: 500 INJECTION, SOLUTION INTRAVENOUS at 01:15

## 2020-05-26 RX ADMIN — URSODIOL 500 MG: 250 TABLET, FILM COATED ORAL at 20:30

## 2020-05-26 RX ADMIN — OXYCODONE 10 MG: 5 TABLET ORAL at 15:28

## 2020-05-26 RX ADMIN — Medication 10 ML: at 20:30

## 2020-05-26 RX ADMIN — DILTIAZEM HYDROCHLORIDE: 30 TABLET, FILM COATED ORAL at 20:30

## 2020-05-26 ASSESSMENT — PAIN SCALES - GENERAL
PAINLEVEL_OUTOF10: 5
PAINLEVEL_OUTOF10: 4
PAINLEVEL_OUTOF10: 8
PAINLEVEL_OUTOF10: 8
PAINLEVEL_OUTOF10: 7
PAINLEVEL_OUTOF10: 8
PAINLEVEL_OUTOF10: 7
PAINLEVEL_OUTOF10: 7
PAINLEVEL_OUTOF10: 6
PAINLEVEL_OUTOF10: 8
PAINLEVEL_OUTOF10: 5
PAINLEVEL_OUTOF10: 8

## 2020-05-26 ASSESSMENT — PAIN DESCRIPTION - PAIN TYPE
TYPE: ACUTE PAIN

## 2020-05-26 ASSESSMENT — PAIN DESCRIPTION - LOCATION
LOCATION: RECTUM

## 2020-05-26 ASSESSMENT — PAIN DESCRIPTION - FREQUENCY
FREQUENCY: INTERMITTENT

## 2020-05-26 ASSESSMENT — PAIN DESCRIPTION - PROGRESSION
CLINICAL_PROGRESSION: GRADUALLY WORSENING
CLINICAL_PROGRESSION: GRADUALLY WORSENING
CLINICAL_PROGRESSION: NOT CHANGED
CLINICAL_PROGRESSION: GRADUALLY WORSENING

## 2020-05-26 ASSESSMENT — PAIN - FUNCTIONAL ASSESSMENT
PAIN_FUNCTIONAL_ASSESSMENT: ACTIVITIES ARE NOT PREVENTED
PAIN_FUNCTIONAL_ASSESSMENT: PREVENTS OR INTERFERES SOME ACTIVE ACTIVITIES AND ADLS
PAIN_FUNCTIONAL_ASSESSMENT: ACTIVITIES ARE NOT PREVENTED

## 2020-05-26 ASSESSMENT — PAIN DESCRIPTION - ORIENTATION
ORIENTATION: MID
ORIENTATION: INNER
ORIENTATION: MID
ORIENTATION: MID

## 2020-05-26 ASSESSMENT — PAIN DESCRIPTION - ONSET
ONSET: ON-GOING

## 2020-05-26 ASSESSMENT — PAIN DESCRIPTION - DESCRIPTORS
DESCRIPTORS: SORE;PRESSURE
DESCRIPTORS: PRESSURE
DESCRIPTORS: SORE;PRESSURE
DESCRIPTORS: SORE;PRESSURE

## 2020-05-26 NOTE — PROGRESS NOTES
98.8 °F (37.1 °C) (Oral)   Resp 18   Ht 5' 9.8\" (1.773 m)   Wt 172 lb 11.2 oz (78.3 kg)   SpO2 100%   BMI 24.92 kg/m²     Weight:    Wt Readings from Last 3 Encounters:   05/26/20 172 lb 11.2 oz (78.3 kg)   03/31/20 184 lb 9.6 oz (83.7 kg)   02/17/20 180 lb (81.6 kg)       General: Awake, alert and oriented. HEENT: normocephalic, PERRL, no scleral erythema or icterus, Oral mucosa erythematous w/ large white ulceration on left buccal mucosa   NECK: supple without palpable adenopathy  BACK: Straight  SKIN: warm dry and intact without lesions rashes or masses  CHEST: CTA bilaterally without use of accessory muscles  CV: Normal S1 S2, RRR, no MRG  ABD: NT, ND, normoactive BS, no palpable masses or hepatosplenomegaly. Rectal exam:  Exquisitely tender at 4 o'clock with fullness. EXTREMITIES: without edema, denies calf tenderness  NEURO: CN II - XII grossly intact. REsting tremor  CATHETER: Left IJ TLH (5/13/20, IR) - CDI & Right IJ PAC (2/17/20, TCH) - CDI    Data:   CBC:   Recent Labs     05/24/20 0316 05/25/20 0320 05/26/20 0415   WBC 0.0* 0.0* 0.0*   HGB 7.3* 7.0* 9.0*   HCT 20.4* 19.7* 25.6*   MCV 93.8 93.7 91.2   PLT 15* 18* 24*     BMP/Mag:  Recent Labs     05/24/20 0316 05/25/20 0320 05/26/20  0415    137 136   K 4.4 4.5 4.5    103 100   CO2 25 25 22   PHOS 3.4 3.2 3.2   BUN 11 10 10   CREATININE 0.7* 0.6* 0.7*   MG 1.60* 1.30* 1.70*     LIVP:   Recent Labs     05/24/20 0316 05/25/20 0320 05/26/20  0415   AST  --  8*  --    ALT  --  10  --    BILIDIR  --  <0.2  --    BILITOT 0.3 0.6 0.8   ALKPHOS  --  68  --      Uric Acid:    Recent Labs     05/25/20  0320   LABURIC 3.1*     Coags:   Recent Labs     05/25/20 0320   PROTIME 13.4*   INR 1.15*   APTT 29.4     Tacro:    Recent Labs     05/25/20  0908   TACROLEV 11.8     CMV Quant DNA by PCR: No results found for: CMVDNAQNT    PROBLEM LIST:        1. MDS  2. Anxiety and depression  3. HLD  4. H/o shingles (2004, RLQ)  5.  Cheryal Client syndrome    Post- Transplant Complications:  1. Resting tremor   2. Steroid Induced Hyperglycemia   3. Hypervolemia   4. Constipation   5. Nausea   6. Headache   7. Mucositis  8. Diarrhea / Abdominal Cramping   9. Neutropenic Fever   10. HTN     TREATMENT:       1. Decitabine x3 cycles - 2/17-4/14/20   2. Targeted Busulfan & Fludarabine followed by MUD Allo PBSC infusion 5/11/20  Preparative Regimen: Targeted Busulfan & Fludarabine  Date of BMT: 5/11/2020  Source of stem cells: PBSC - 6.7 x10^6 [id number]/kg  Donor/Recipient Blood Type: A+ / A+  Donor Sex: Male, follow VNTR (DID# [id number])  CMV Donor / Recipient: Pos / Pos     ASSESSMENT AND PLAN:         1. Myelodysplastic Syndrome: Stable disease  - Most recent BM Bx/Asp (4/7/20) - ongoing disease  - S/p Bu/Flu & MUD-pb BMT  - Busulfan AUC low at 4672, therefore busulfan dose increased from 252 mg to 315 mg, for doses 3 and 4. Day + 15     2. ID: Now afebrile, he had neutropenic fever, unidentified infection  - Pan - cx (5/20/20) - NGTD  - CXR (5/20/20) - No acute process   - Cont Valtrex & Diflucan ppx   - Merrem Day + 8 (started 5/20/20)  - Flagyl Day 2     Donor/Recipient CMV: Pos / Pos  - Check CMV PCR weekly once WBC 1.0  - Start letermovir ppx if started on high-dose steroids     3. Heme:  Pancytopenia from chemotherapy   - Transfuse for Hgb < 7 and Platelets < 36F.    - no transfusion today      4. Metabolic:  stable renal fxn and e-lytes, mild hypoMg  - Cont IVF:  NS @ 75 mL/hr   - Replace potassium and magnesium per policy.     5. Graft versus host disease: No evidence of disease   - S/p Post-Transplant Cytoxan days + 3 & 4   - Cont tacrolimus 2.5 mg bid     Tacro Level:  Lab Results   Component Value Date    TACROLEV 11.8 05/25/2020    TACROLEV 7.2 05/22/2020    TACROLEV 7.3 05/20/2020       6.  VOD:  No evidence of VOD  Recent Labs     05/25/20  0320 05/26/20  0415   BILIDIR <0.2  --    BILITOT 0.6 0.8     Admission Weight: 185 lb (83.9 kg)  Current Weight: Weight: 172 lb 11.2 oz (78.3 kg). - Cont Actigall. 7. Pulmonary: No active issues. - Encourage IS and ambulation      8. GI / Nutrition:    Mild Malnutrition:  Appetite & intake has decreased; he is only eating small amounts    - Cont low microbial diet  - Dietician to follow  Constipation:  Resolved  - No response to MOM (5/11/12)  - S/p Dulcolax (5/12/20) w/ BM  Nausea:  Mild nausea, no vomiting   - Cont PRN compazine, zofran and ativan  Mucositis:  Improved    Rectal pain:  Surgery following for fissure    9. Neuro:    - H/o resting tremor, possibly from Busulfan   - S/p scheduled Ativan w/ Buslufan  - Cont to monitor   Headaches:  Intermittent headaches  - CT head (5/19/20) - no acute process   - Cont PRN Tylenol & Tramadol as needed     10. Cardiac:  Intermittent hypertension, not previously on treatment  -  Start Norvasc 2.5 mg daily (5/22/20)      - DVT Prophylaxis: Platelets <54,335 cells/dL - prophylactic lovenox on hold and mechanical prophylaxis with bilateral SCDs while in bed in place.   Contraindications to pharmacologic prophylaxis: None  Contraindications to mechanical prophylaxis: None      - Disposition:  Once ANC >1 and recovered from toxicities of transplant.     Hollis Lockett MD  [facility]  Please contact me through [facility]

## 2020-05-26 NOTE — PLAN OF CARE
Problem: Pain:  Goal: Pain level will decrease  Description: Pain level will decrease  Outcome: Ongoing   Pt continues with pain from rectal swelling. Pt medicated with morphine and oxycodone. Pt has cream for area. Pt has air cushion to sit on in chair. Encourage pt to call out with any needs or status changes. Will monitor. Problem: Falls - Risk of:  Goal: Will remain free from falls  Description: Will remain free from falls  Outcome: Ongoing    Pt with activity orders for up ad savannah. Encouraged pt to be up OOB as much as possible throughout the day and for all meals. Encouraged frequent short naps as necessary to preserve energy but instructed that while awake, pt should be OOB. Pt is visualized to be OOB 1-25% of the time this shift. Will continue to encourage frequent activity. Problem: Infection - Central Venous Catheter-Associated Bloodstream Infection:  Goal: Will show no infection signs and symptoms  Description: Will show no infection signs and symptoms  Outcome: Ongoing   CVC site remains free of signs/symptoms of infection. No drainage, edema, erythema, pain, or warmth noted at site. Dressing changes continue per protocol and on an as needed basis - see flowsheet. Performed CHG bath today per [facility] protocol utilizing CHG solution in the shower. CVC site cleansed with CHG wipe over dressing, skin surrounding dressing, and first 6\" of IV tubing. Pt tolerated well. Continued to encourage daily CHG bathing per [facility] protocol. Problem: Bleeding:  Goal: Will show no signs and symptoms of excessive bleeding  Description: Will show no signs and symptoms of excessive bleeding  Outcome: Ongoing   Patient's hemoglobin this AM:   Recent Labs     05/26/20  0415   HGB 9.0*     Patient's platelet count this AM:   Recent Labs     05/26/20  0415   PLT 24*    Thrombocytopenia Precautions in place. Patient showing no signs or symptoms of active bleeding. Transfusion not indicated at this time. Patient verbalizes understanding of all instructions. Will continue to assess and implement POC. Call light within reach and hourly rounding in place. Problem: Anxiety:  Goal: Level of anxiety will decrease  Description: Level of anxiety will decrease  Outcome: Ongoing   Pt denies anxiety at this time. Encourage pt to call out with any needs or status changes. Will monitor. Problem: Nausea/Vomiting:  Goal: Absence of nausea/vomiting  Description: Absence of nausea/vomiting  Outcome: Ongoing   Pt denies nausea at this time. Will monitor. Problem: PROTECTIVE PRECAUTIONS  Goal: Patient will remain free of nosocomial Infections  Outcome: Ongoing   Pt verbalize understanding of precautions. Will monitor. Problem: Discharge Planning:  Goal: Discharged to appropriate level of care  Description: Discharged to appropriate level of care  Outcome: Ongoing   Pt verbalize understanding of treatment plan. Will monitor. Problem: Nutrition Deficit:  Goal: Ability to achieve adequate nutritional intake will improve  Description: Ability to achieve adequate nutritional intake will improve  Outcome: Ongoing   Encourage small frequent meals. Will monitor.

## 2020-05-26 NOTE — CARE COORDINATION
Type of Admission  MDS  Fully Ablative MUD Allogeneic SCT ( Male Donor, PBSC's)  T:0---> 5/11/20  Day +15( Busulfan+Fludara)        Central venous catheter  Left IJ TLC ( 5/520, IR)        Plan  Fully Ablative MUD Allogeneic SCT ( 5/11/20)        Update  5/5/20: Planned admission for MUD Allogeneic transplant. 5/8/20: Staying active by walking in hallway. 5/12/20: Tolerated infusion of stem cells without proble, but reports that he has had insomnia that last few days. , states \"I think it has been my nerves\". Remains active by walking in the hallway. 5/15/20:  Maintaining activity by walking in hallway. Seen by Dietitian  5/18/29:  Maintaining activity by walking in hallway, reports fatigue. 5/22/20:  Afebrile, prn oxycodone for mucositis. 5/26/20: Having manny-rectal pain, CT done over the weekend, negative for abscess. States bowel movement are small does not feel he needs a stool softener at this time. Education  Patient and caregiver have been through the educational process for allogeneic bone marrow transplantation including the allogeneic family meeting, preadmission teaching and preadmission physician office Waves Console, RN BMT Coordinator    Patient and caregiver verbalize understanding of treatment regimen, possible adverse events, length of stay, and risk and benefits of transplantation and are agreeable to proceed. Patient and caregiver have been given an opportunity to ask, and to have their questions answered to their satisfaction. Documentation for above education can be found in the [facility], [facility] office chart. 5/5/20 Confirmed  Home Pharmacy. Reviewed infusion of stem cells, telemetry & frequent vital signs during infusion of stem cells.   Discussed medication management as he prepares for home have spoken to his spouse, lAlie Anguiano on the phone, re-introduced myself, reinforced my contact information & the [facility] unit phone #.  5/8/20:  Discussed volume of infusion of stem cells( Total Volume-=280 ml)  5/18/20: I have maintained contact with spouse via phone & e-mail, I have responded to questions regarding discahrge/count recovery, I have also informed patient. 5/22/20:  I have called spouse regarding home medications that have been called in & are ready for . Discharge  When [facility] [place] >1.0 & without toxicities      Pending  5/15/20:  The following medications have been called into [address] ( [phone])  Tacrolimus 0.5 mg bid #60 with 3 refills  Tacrolimus 1 mg cap ( 2 caps) bid #120 with 3 refills  Fluconazole 200 mg ( 2 tablets) daily #60 with 3 refills  Valtrex 500 mg bid #60 with 3 refills  Cost Pending--> 5/18: VM left regarding status----> 5/22/20:  No cost --> [place] Re imformed

## 2020-05-26 NOTE — PLAN OF CARE
excessive bleeding  Description: Will show no signs and symptoms of excessive bleeding    Outcome: Ongoing     Patient's hemoglobin this AM:   Recent Labs     05/26/20 0415   HGB 9.0*     Patient's platelet count this AM:   Recent Labs     05/26/20 0415   PLT 24*    Thrombocytopenia Precautions in place. Patient showing no signs or symptoms of active bleeding. Transfusion not indicated at this time. Patient verbalizes understanding of all instructions. Will continue to assess and implement POC. Call light within reach and hourly rounding in place. Problem: Anxiety:  Goal: Level of anxiety will decrease  Description: Level of anxiety will decrease    Outcome: Ongoing   Pt. Has been calm/cooperative/hopeful throughout shift and states \"I can just get over these hurdles and we'll be good from there. \"; pt denies spiritual or emotional needs at this time; pt does not state or express symptoms of anxiety at this time; pt verbalizes understanding that he can report symptoms of anxiety to RN staff; call light/table in reach. Will continue to monitor. Problem: Nausea/Vomiting:  Goal: Absence of nausea/vomiting  Description: Absence of nausea/vomiting    Outcome: Ongoing   Pt has denied N/V throughout shift; pt verbalizes understanding that he is to report s/s of nausea to RN staff for antiemetics; pt has been tolerating diet throughout shift and been finishing meals - see flowsheets; call light/table in reach. Will continue to monitor. Problem: PROTECTIVE PRECAUTIONS  Goal: Patient will remain free of nosocomial Infections    Outcome: Ongoing   Pt. Demonstrates correct handwashing and hand hygiene and is following bathing protocol this shift; RN staff and all staff entering pt's room using proper PPE and hand hygiene when touching patient or caring for patient. Will continue to monitor.      Problem: Discharge Planning:  Goal: Discharged to appropriate level of care  Description: Discharged to appropriate level of care    Outcome: Ongoing   Pt. Verbalizes understanding of plan of care at this time and is actively involved in plan of care and demonstrates understanding of role in care/recovery; pt denies further questions a this time. Will continue to monitor. Problem: Nutrition Deficit:  Goal: Ability to achieve adequate nutritional intake will improve  Description: Ability to achieve adequate nutritional intake will improve  Outcome: Ongoing     Problem: Skin Integrity:  Goal: Absence of new skin breakdown  Description: Absence of new skin breakdown  Outcome: Ongoing   Pt does not show any new signs of skin breakdown at this time; Fidel scale score greater than 18; will continue to monitor.      Problem: Diarrhea:  Goal: Passage of soft, formed stool  Description: Passage of soft, formed stool  Outcome: Ongoing     Problem: Diarrhea:  Goal: Establishment of normal bowel function will improve to within specified parameters  Description: Establishment of normal bowel function will improve to within specified parameters  Outcome: Ongoing

## 2020-05-27 LAB
ALBUMIN SERPL-MCNC: 3.8 G/DL (ref 3.4–5)
ALP BLD-CCNC: 70 U/L (ref 40–129)
ALT SERPL-CCNC: 8 U/L (ref 10–40)
ANION GAP SERPL CALCULATED.3IONS-SCNC: 10 MMOL/L (ref 3–16)
AST SERPL-CCNC: 8 U/L (ref 15–37)
BILIRUB SERPL-MCNC: 0.6 MG/DL (ref 0–1)
BILIRUBIN DIRECT: <0.2 MG/DL (ref 0–0.3)
BILIRUBIN, INDIRECT: ABNORMAL MG/DL (ref 0–1)
BUN BLDV-MCNC: 11 MG/DL (ref 7–20)
CALCIUM SERPL-MCNC: 9.1 MG/DL (ref 8.3–10.6)
CHLORIDE BLD-SCNC: 103 MMOL/L (ref 99–110)
CO2: 24 MMOL/L (ref 21–32)
CREAT SERPL-MCNC: 0.7 MG/DL (ref 0.9–1.3)
GFR AFRICAN AMERICAN: >60
GFR NON-AFRICAN AMERICAN: >60
GLUCOSE BLD-MCNC: 111 MG/DL (ref 70–99)
HCT VFR BLD CALC: 22.6 % (ref 40.5–52.5)
HEMOGLOBIN: 7.8 G/DL (ref 13.5–17.5)
LACTATE DEHYDROGENASE: 139 U/L (ref 100–190)
MAGNESIUM: 1.3 MG/DL (ref 1.8–2.4)
MCH RBC QN AUTO: 32.1 PG (ref 26–34)
MCHC RBC AUTO-ENTMCNC: 34.6 G/DL (ref 31–36)
MCV RBC AUTO: 92.6 FL (ref 80–100)
PDW BLD-RTO: 15.7 % (ref 12.4–15.4)
PHOSPHORUS: 3.2 MG/DL (ref 2.5–4.9)
PLATELET # BLD: 31 K/UL (ref 135–450)
PMV BLD AUTO: 8.6 FL (ref 5–10.5)
POTASSIUM SERPL-SCNC: 4.9 MMOL/L (ref 3.5–5.1)
RBC # BLD: 2.44 M/UL (ref 4.2–5.9)
SODIUM BLD-SCNC: 137 MMOL/L (ref 136–145)
TACROLIMUS BLOOD: 16.9 NG/ML (ref 5–20)
TOTAL PROTEIN: 6.2 G/DL (ref 6.4–8.2)
URIC ACID, SERUM: 3.1 MG/DL (ref 3.5–7.2)
WBC # BLD: 0.1 K/UL (ref 4–11)

## 2020-05-27 PROCEDURE — 99231 SBSQ HOSP IP/OBS SF/LOW 25: CPT | Performed by: SURGERY

## 2020-05-27 PROCEDURE — 84100 ASSAY OF PHOSPHORUS: CPT

## 2020-05-27 PROCEDURE — 6370000000 HC RX 637 (ALT 250 FOR IP): Performed by: INTERNAL MEDICINE

## 2020-05-27 PROCEDURE — 2580000003 HC RX 258: Performed by: INTERNAL MEDICINE

## 2020-05-27 PROCEDURE — 83615 LACTATE (LD) (LDH) ENZYME: CPT

## 2020-05-27 PROCEDURE — 6370000000 HC RX 637 (ALT 250 FOR IP): Performed by: NURSE PRACTITIONER

## 2020-05-27 PROCEDURE — 6360000002 HC RX W HCPCS: Performed by: INTERNAL MEDICINE

## 2020-05-27 PROCEDURE — 6360000002 HC RX W HCPCS: Performed by: NURSE PRACTITIONER

## 2020-05-27 PROCEDURE — 2060000000 HC ICU INTERMEDIATE R&B

## 2020-05-27 PROCEDURE — 80048 BASIC METABOLIC PNL TOTAL CA: CPT

## 2020-05-27 PROCEDURE — 84550 ASSAY OF BLOOD/URIC ACID: CPT

## 2020-05-27 PROCEDURE — 85025 COMPLETE CBC W/AUTO DIFF WBC: CPT

## 2020-05-27 PROCEDURE — 2580000003 HC RX 258: Performed by: NURSE PRACTITIONER

## 2020-05-27 PROCEDURE — 83735 ASSAY OF MAGNESIUM: CPT

## 2020-05-27 PROCEDURE — 80076 HEPATIC FUNCTION PANEL: CPT

## 2020-05-27 PROCEDURE — 6370000000 HC RX 637 (ALT 250 FOR IP): Performed by: STUDENT IN AN ORGANIZED HEALTH CARE EDUCATION/TRAINING PROGRAM

## 2020-05-27 PROCEDURE — 36592 COLLECT BLOOD FROM PICC: CPT

## 2020-05-27 PROCEDURE — 80197 ASSAY OF TACROLIMUS: CPT

## 2020-05-27 RX ORDER — MORPHINE SULFATE 4 MG/ML
4 INJECTION, SOLUTION INTRAMUSCULAR; INTRAVENOUS
Status: DISCONTINUED | OUTPATIENT
Start: 2020-05-27 | End: 2020-05-29

## 2020-05-27 RX ORDER — LANOLIN ALCOHOL/MO/W.PET/CERES
400 CREAM (GRAM) TOPICAL 4 TIMES DAILY
Status: DISCONTINUED | OUTPATIENT
Start: 2020-05-27 | End: 2020-06-02

## 2020-05-27 RX ORDER — MORPHINE SULFATE 4 MG/ML
4 INJECTION, SOLUTION INTRAMUSCULAR; INTRAVENOUS
Status: DISCONTINUED | OUTPATIENT
Start: 2020-05-27 | End: 2020-05-27

## 2020-05-27 RX ADMIN — SODIUM CHLORIDE 15 ML: 900 IRRIGANT IRRIGATION at 17:49

## 2020-05-27 RX ADMIN — MEROPENEM 1 G: 1 INJECTION, POWDER, FOR SOLUTION INTRAVENOUS at 00:30

## 2020-05-27 RX ADMIN — SODIUM CHLORIDE 15 ML: 900 IRRIGANT IRRIGATION at 06:30

## 2020-05-27 RX ADMIN — MEROPENEM 1 G: 1 INJECTION, POWDER, FOR SOLUTION INTRAVENOUS at 15:56

## 2020-05-27 RX ADMIN — TACROLIMUS 3.5 MG: 1 CAPSULE ORAL at 09:18

## 2020-05-27 RX ADMIN — DILTIAZEM HYDROCHLORIDE: 30 TABLET, FILM COATED ORAL at 11:40

## 2020-05-27 RX ADMIN — MAGNESIUM SULFATE HEPTAHYDRATE 4 G: 40 INJECTION, SOLUTION INTRAVENOUS at 06:30

## 2020-05-27 RX ADMIN — Medication 1 MG: at 09:12

## 2020-05-27 RX ADMIN — TAMSULOSIN HYDROCHLORIDE 0.4 MG: 0.4 CAPSULE ORAL at 09:19

## 2020-05-27 RX ADMIN — MORPHINE SULFATE 4 MG: 4 INJECTION INTRAVENOUS at 20:22

## 2020-05-27 RX ADMIN — MORPHINE SULFATE 4 MG: 4 INJECTION INTRAVENOUS at 11:29

## 2020-05-27 RX ADMIN — MORPHINE SULFATE 4 MG: 4 INJECTION INTRAVENOUS at 15:58

## 2020-05-27 RX ADMIN — MORPHINE SULFATE 4 MG: 4 INJECTION INTRAVENOUS at 18:11

## 2020-05-27 RX ADMIN — VALACYCLOVIR HYDROCHLORIDE 500 MG: 500 TABLET, FILM COATED ORAL at 11:38

## 2020-05-27 RX ADMIN — Medication 400 MG: at 17:47

## 2020-05-27 RX ADMIN — MEROPENEM 1 G: 1 INJECTION, POWDER, FOR SOLUTION INTRAVENOUS at 23:38

## 2020-05-27 RX ADMIN — MORPHINE SULFATE 4 MG: 4 INJECTION INTRAVENOUS at 13:48

## 2020-05-27 RX ADMIN — Medication 1 PACKET: at 09:19

## 2020-05-27 RX ADMIN — Medication 10 ML: at 11:39

## 2020-05-27 RX ADMIN — VALACYCLOVIR HYDROCHLORIDE 500 MG: 500 TABLET, FILM COATED ORAL at 20:28

## 2020-05-27 RX ADMIN — Medication 400 MG: at 20:28

## 2020-05-27 RX ADMIN — MORPHINE SULFATE 4 MG: 4 INJECTION INTRAVENOUS at 23:35

## 2020-05-27 RX ADMIN — PANTOPRAZOLE SODIUM 40 MG: 40 TABLET, DELAYED RELEASE ORAL at 06:30

## 2020-05-27 RX ADMIN — DILTIAZEM HYDROCHLORIDE: 30 TABLET, FILM COATED ORAL at 20:29

## 2020-05-27 RX ADMIN — MORPHINE SULFATE 6 MG: 10 INJECTION, SOLUTION INTRAMUSCULAR; INTRAVENOUS at 06:17

## 2020-05-27 RX ADMIN — SODIUM CHLORIDE: 900 INJECTION INTRAVENOUS at 11:00

## 2020-05-27 RX ADMIN — Medication 1 PACKET: at 20:34

## 2020-05-27 RX ADMIN — Medication 10 ML: at 20:28

## 2020-05-27 RX ADMIN — SODIUM CHLORIDE 15 ML: 900 IRRIGANT IRRIGATION at 18:18

## 2020-05-27 RX ADMIN — Medication 1 MG: at 20:28

## 2020-05-27 RX ADMIN — FLUCONAZOLE 400 MG: 200 TABLET ORAL at 09:19

## 2020-05-27 RX ADMIN — OXYCODONE 10 MG: 5 TABLET ORAL at 07:30

## 2020-05-27 RX ADMIN — URSODIOL 500 MG: 250 TABLET, FILM COATED ORAL at 09:19

## 2020-05-27 RX ADMIN — Medication: at 09:26

## 2020-05-27 RX ADMIN — MEROPENEM 1 G: 1 INJECTION, POWDER, FOR SOLUTION INTRAVENOUS at 09:14

## 2020-05-27 RX ADMIN — Medication 400 MG: at 14:46

## 2020-05-27 RX ADMIN — SODIUM CHLORIDE 15 ML: 900 IRRIGANT IRRIGATION at 20:29

## 2020-05-27 RX ADMIN — URSODIOL 500 MG: 250 TABLET, FILM COATED ORAL at 20:28

## 2020-05-27 ASSESSMENT — PAIN DESCRIPTION - ORIENTATION
ORIENTATION: INNER

## 2020-05-27 ASSESSMENT — PAIN DESCRIPTION - PAIN TYPE
TYPE: ACUTE PAIN

## 2020-05-27 ASSESSMENT — PAIN DESCRIPTION - PROGRESSION
CLINICAL_PROGRESSION: GRADUALLY WORSENING

## 2020-05-27 ASSESSMENT — PAIN DESCRIPTION - FREQUENCY
FREQUENCY: INTERMITTENT

## 2020-05-27 ASSESSMENT — PAIN DESCRIPTION - ONSET
ONSET: ON-GOING

## 2020-05-27 ASSESSMENT — PAIN DESCRIPTION - LOCATION
LOCATION: RECTUM
LOCATION: THROAT
LOCATION: RECTUM
LOCATION: RECTUM

## 2020-05-27 ASSESSMENT — PAIN SCALES - GENERAL
PAINLEVEL_OUTOF10: 8
PAINLEVEL_OUTOF10: 7
PAINLEVEL_OUTOF10: 6
PAINLEVEL_OUTOF10: 5
PAINLEVEL_OUTOF10: 8
PAINLEVEL_OUTOF10: 4
PAINLEVEL_OUTOF10: 8
PAINLEVEL_OUTOF10: 7
PAINLEVEL_OUTOF10: 8
PAINLEVEL_OUTOF10: 4
PAINLEVEL_OUTOF10: 5
PAINLEVEL_OUTOF10: 8
PAINLEVEL_OUTOF10: 6

## 2020-05-27 ASSESSMENT — PAIN DESCRIPTION - DESCRIPTORS
DESCRIPTORS: SORE;PRESSURE
DESCRIPTORS: PRESSURE;DISCOMFORT;SORE
DESCRIPTORS: PRESSURE;SORE
DESCRIPTORS: SORE;PRESSURE;BURNING
DESCRIPTORS: PRESSURE;SORE;BURNING
DESCRIPTORS: BURNING;SORE;PRESSURE

## 2020-05-27 ASSESSMENT — PAIN - FUNCTIONAL ASSESSMENT

## 2020-05-27 NOTE — PROGRESS NOTES
Wt 172 lb 11.2 oz (78.3 kg)   SpO2 98%   BMI 24.92 kg/m²     Weight:    Wt Readings from Last 3 Encounters:   05/26/20 172 lb 11.2 oz (78.3 kg)   03/31/20 184 lb 9.6 oz (83.7 kg)   02/17/20 180 lb (81.6 kg)       General: Awake, alert and oriented. HEENT: normocephalic, PERRL, no scleral erythema or icterus, Oral mucosa erythematous w/ large white ulceration on left buccal mucosa   NECK: supple without palpable adenopathy  BACK: Straight  SKIN: erythematous papular rash right buttock and back. CHEST: CTA bilaterally without use of accessory muscles  CV: Normal S1 S2, RRR, no MRG  ABD: NT, ND, normoactive BS, no palpable masses or hepatosplenomegaly. Rectal exam:  Exquisitely tender at 4 o'clock with fullness. EXTREMITIES: without edema, denies calf tenderness  NEURO: CN II - XII grossly intact. REsting tremor  CATHETER: Left IJ TLH (5/13/20, IR) - CDI & Right IJ PAC (2/17/20, TCH) - erythema at exist site without pain to palpation. Data:   CBC:   Recent Labs     05/25/20 0320 05/26/20 0415 05/27/20  0430   WBC 0.0* 0.0* 0.1*   HGB 7.0* 9.0* 7.8*   HCT 19.7* 25.6* 22.6*   MCV 93.7 91.2 92.6   PLT 18* 24* 31*     BMP/Mag:  Recent Labs     05/25/20 0320 05/26/20 0415 05/27/20  0430    136 137   K 4.5 4.5 4.9    100 103   CO2 25 22 24   PHOS 3.2 3.2 3.2   BUN 10 10 11   CREATININE 0.6* 0.7* 0.7*   MG 1.30* 1.70* 1.30*     LIVP:   Recent Labs     05/25/20 0320 05/26/20 0415 05/27/20  0430   AST 8*  --  8*   ALT 10  --  8*   BILIDIR <0.2  --  <0.2   BILITOT 0.6 0.8 0.6   ALKPHOS 68  --  70     Uric Acid:    Recent Labs     05/27/20  0430   LABURIC 3.1*     Coags:   Recent Labs     05/25/20  0320   PROTIME 13.4*   INR 1.15*   APTT 29.4     Tacro:    Recent Labs     05/25/20  0908   TACROLEV 11.8     CMV Quant DNA by PCR: No results found for: CMVDNAQNT    PROBLEM LIST:        1. MDS  2. Anxiety and depression  3. HLD  4. H/o shingles (2004, RLQ)  5.  Dellis Buddle syndrome    Post- Transplant kg)  Current Weight: Weight: 172 lb 11.2 oz (78.3 kg). - Cont Actigall. 7. Pulmonary: No active issues. - Encourage IS and ambulation      8. GI / Nutrition:    Mild Malnutrition:  Appetite & intake has decreased; he is only eating small amounts    - Cont low microbial diet  - Dietician to follow  Constipation:  Resolved  - No response to MOM (5/11/12)  - S/p Dulcolax (5/12/20) w/ BM  Nausea:  Mild nausea, no vomiting   - Cont PRN compazine, zofran and ativan  Mucositis:  Improved    Rectal pain:  Surgery following for fissure    9. Neuro:    - H/o resting tremor, possibly from Busulfan   - S/p scheduled Ativan w/ Buslufan  - Cont to monitor   Headaches:  Intermittent headaches  - CT head (5/19/20) - no acute process   - Cont PRN Tylenol & Tramadol as needed     10. Cardiac:  Intermittent hypertension, not previously on treatment  -  s/p Norvasc 2.5 mg daily     - DVT Prophylaxis: Platelets <23,413 cells/dL - prophylactic lovenox on hold and mechanical prophylaxis with bilateral SCDs while in bed in place.   Contraindications to pharmacologic prophylaxis: None  Contraindications to mechanical prophylaxis: None      - Disposition:  Once ANC >1 and recovered from toxicities of transplant.     ODALIS Freeman - CNP    Danelle Osei MD  [facility]  Please contact me through [facility]

## 2020-05-27 NOTE — PLAN OF CARE
Problem: Falls - Risk of:  Goal: Will remain free from falls  Description: Will remain free from falls  Outcome: Ongoing  Note: Pt with steady gait, up ad savannah, no issues at this time, instructed pt to call out for assistance as needed, pt verbalized understanding, bed in low position, side rails up, call light in reach, will continue to monitor. Problem: Infection - Central Venous Catheter-Associated Bloodstream Infection:  Goal: Will show no infection signs and symptoms  Description: Will show no infection signs and symptoms  Outcome: Ongoing  Note: Pt afebrile, no S/S of infection, CVC site free from S/S of infection, no drainage, edema, redness, swelling, or tenderness, flushes easily with brisk blood return, dressing changes continue per protocol and on an as needed basis - see flowsheet, will continue to monitor. Problem: Bleeding:  Goal: Will show no signs and symptoms of excessive bleeding  Description: Will show no signs and symptoms of excessive bleeding  Outcome: Ongoing  Note: Pt at risk for bleeding, no S/S of bleeding noted, PLT count with AM labs are unknown at this time, orders to transfuse if equal to or less then 10, will continue to monitor. Problem: PROTECTIVE PRECAUTIONS  Goal: Patient will remain free of nosocomial Infections  Outcome: Ongoing  Note: Pt afebrile, no S/S of infection, will continue to monitor. Compliant with [facility] Bath Protocol:  Performed CHG bath today per BCC protocol utilizing CHG solution. CVC site cleansed with CHG wipe over dressing, skin surrounding dressing, and first 6\" of IV tubing. Pt tolerated well. Continued to encourage daily CHG bathing per [facility] protocol. Problem: Activity:  Goal: Ability to tolerate increased activity will improve  Description: Ability to tolerate increased activity will improve   Outcome: Ongoing  Note: Stable/No Isolation Precautions:  Pt with activity orders for up ad savannah.   Encouraged pt to be up OOB as much as possible

## 2020-05-27 NOTE — CARE COORDINATION
cells.  Discussed medication management as he prepares for home have spoken to his spouse, Jim Liz on the phone, re-introduced myself, reinforced my contact information & the [facility] unit phone #.  5/8/20:  Discussed volume of infusion of stem cells( Total Volume-=280 ml)  5/18/20: I have maintained contact with spouse via phone & e-mail, I have responded to questions regarding discahrge/count recovery, I have also informed patient. 5/22/20:  I have called spouse regarding home medications that have been called in & are ready for .  5/27/20:  Spouse, [facility] given update via phone. Discharge  When [facility] >1.0 & without toxicities      Pending  5/15/20:  The following medications have been called into [address] ( [phone])  Tacrolimus 0.5 mg bid #60 with 3 refills  Tacrolimus 1 mg cap ( 2 caps) bid #120 with 3 refills  Fluconazole 200 mg ( 2 tablets) daily #60 with 3 refills  Valtrex 500 mg bid #60 with 3 refills  Cost Pending--> 5/18: VM left regarding status----> 5/22/20:  No cost --> [facility] imformed

## 2020-05-27 NOTE — PROGRESS NOTES
Clinical Pharmacy Progress Note    Patient Name: Elda Jennings  YOB: 1965  Diagnosis: MDS - Allogeneic MUD transplant PBSCT- Busulfan/Fludarabine plus post transplant Cytoxan/Progra    GVHD Prophylaxis:  Post transplant Cytoxan on Day+3 and 4  Tacrolimus (Prograf) starting Day +5  · Adjusted according to levels - drawn via red lumen      Tacrolimus (Prograf) levels starting day +9  Tacrolimus (Prograf) goal level: 8-15 ng/mL    Date SCr Bili Prograf Dose Prograf Level Adjustments / Comments   5/5/2020;   day -6 0.8 0.7 - -  Patient admitted for Allogeneic MUD transplant- will start oral Prograf 2.5 mg po bid on 5/16 with 1st level on 5/20 and then MWF thereafter. 5/20  Day +9 0.6 0.7 2.5 mg po BID 7.3 Level slightly subtherapeutic, but patient recently started taking it. Will wait until next level to increase dose. 5/22  Day +11 0.6 0.4 2.5 mg po BID 7.2 Level therapeutic, will increase dose by 30% to 3.5 mg po qAM and 3 mg po nightly   5/27  Day +16   0.7 0.6 3.5 mg po qAM  3 mg po nightly 16.9 Level supratherapeutic, will hold x 1 dose this evening. Will return to previous dose of 2.5 mg BID at this time. Next level Friday, 5/29. Please call with questions.     Jose Ramon Wilson, PharmD  PGY1 Pharmacy Resident   Wireless: [phone]  5/27/2020 2:41 PM

## 2020-05-27 NOTE — PLAN OF CARE
Problem: Nutrition  Goal: Optimal nutrition therapy  Outcome: Ongoing   Nutrition Problem: Inadequate oral intake  Intervention: Food and/or Nutrient Delivery: Continue current diet, Continue current ONS  Nutritional Goals: Pt to meet >50% of nutritional requirements from diet.

## 2020-05-28 LAB
ANION GAP SERPL CALCULATED.3IONS-SCNC: 14 MMOL/L (ref 3–16)
APTT: 28.4 SEC (ref 24.2–36.2)
BILIRUB SERPL-MCNC: 0.9 MG/DL (ref 0–1)
BUN BLDV-MCNC: 10 MG/DL (ref 7–20)
CALCIUM SERPL-MCNC: 8.8 MG/DL (ref 8.3–10.6)
CHLORIDE BLD-SCNC: 102 MMOL/L (ref 99–110)
CO2: 21 MMOL/L (ref 21–32)
CREAT SERPL-MCNC: 0.8 MG/DL (ref 0.9–1.3)
GFR AFRICAN AMERICAN: >60
GFR NON-AFRICAN AMERICAN: >60
GLUCOSE BLD-MCNC: 126 MG/DL (ref 70–99)
HCT VFR BLD CALC: 21.6 % (ref 40.5–52.5)
HEMOGLOBIN: 7.6 G/DL (ref 13.5–17.5)
INR BLD: 1.34 (ref 0.86–1.14)
MAGNESIUM: 1.6 MG/DL (ref 1.8–2.4)
MCH RBC QN AUTO: 32.2 PG (ref 26–34)
MCHC RBC AUTO-ENTMCNC: 35.1 G/DL (ref 31–36)
MCV RBC AUTO: 91.6 FL (ref 80–100)
PDW BLD-RTO: 15.6 % (ref 12.4–15.4)
PHOSPHORUS: 2.6 MG/DL (ref 2.5–4.9)
PLATELET # BLD: 32 K/UL (ref 135–450)
PMV BLD AUTO: 7.9 FL (ref 5–10.5)
POTASSIUM SERPL-SCNC: 4.5 MMOL/L (ref 3.5–5.1)
PROTHROMBIN TIME: 15.6 SEC (ref 10–13.2)
RBC # BLD: 2.36 M/UL (ref 4.2–5.9)
SODIUM BLD-SCNC: 137 MMOL/L (ref 136–145)
VRE CULTURE: NORMAL
WBC # BLD: 0.2 K/UL (ref 4–11)

## 2020-05-28 PROCEDURE — 2060000000 HC ICU INTERMEDIATE R&B

## 2020-05-28 PROCEDURE — 6370000000 HC RX 637 (ALT 250 FOR IP): Performed by: INTERNAL MEDICINE

## 2020-05-28 PROCEDURE — 82247 BILIRUBIN TOTAL: CPT

## 2020-05-28 PROCEDURE — 2580000003 HC RX 258: Performed by: NURSE PRACTITIONER

## 2020-05-28 PROCEDURE — 84100 ASSAY OF PHOSPHORUS: CPT

## 2020-05-28 PROCEDURE — 83735 ASSAY OF MAGNESIUM: CPT

## 2020-05-28 PROCEDURE — 6360000002 HC RX W HCPCS: Performed by: INTERNAL MEDICINE

## 2020-05-28 PROCEDURE — 6370000000 HC RX 637 (ALT 250 FOR IP): Performed by: STUDENT IN AN ORGANIZED HEALTH CARE EDUCATION/TRAINING PROGRAM

## 2020-05-28 PROCEDURE — 2580000003 HC RX 258: Performed by: INTERNAL MEDICINE

## 2020-05-28 PROCEDURE — 6370000000 HC RX 637 (ALT 250 FOR IP): Performed by: NURSE PRACTITIONER

## 2020-05-28 PROCEDURE — 36592 COLLECT BLOOD FROM PICC: CPT

## 2020-05-28 PROCEDURE — 85025 COMPLETE CBC W/AUTO DIFF WBC: CPT

## 2020-05-28 PROCEDURE — 85610 PROTHROMBIN TIME: CPT

## 2020-05-28 PROCEDURE — 80048 BASIC METABOLIC PNL TOTAL CA: CPT

## 2020-05-28 PROCEDURE — 85730 THROMBOPLASTIN TIME PARTIAL: CPT

## 2020-05-28 PROCEDURE — 99231 SBSQ HOSP IP/OBS SF/LOW 25: CPT | Performed by: SURGERY

## 2020-05-28 RX ADMIN — MEROPENEM 1 G: 1 INJECTION, POWDER, FOR SOLUTION INTRAVENOUS at 16:59

## 2020-05-28 RX ADMIN — SODIUM CHLORIDE: 900 INJECTION INTRAVENOUS at 14:41

## 2020-05-28 RX ADMIN — MORPHINE SULFATE 4 MG: 4 INJECTION INTRAVENOUS at 06:08

## 2020-05-28 RX ADMIN — FLUCONAZOLE 400 MG: 200 TABLET ORAL at 08:22

## 2020-05-28 RX ADMIN — SODIUM CHLORIDE 15 ML: 900 IRRIGANT IRRIGATION at 07:22

## 2020-05-28 RX ADMIN — MORPHINE SULFATE 4 MG: 4 INJECTION INTRAVENOUS at 14:41

## 2020-05-28 RX ADMIN — Medication 400 MG: at 13:01

## 2020-05-28 RX ADMIN — Medication 1 MG: at 21:53

## 2020-05-28 RX ADMIN — DILTIAZEM HYDROCHLORIDE: 30 TABLET, FILM COATED ORAL at 08:23

## 2020-05-28 RX ADMIN — Medication 400 MG: at 20:01

## 2020-05-28 RX ADMIN — VALACYCLOVIR HYDROCHLORIDE 500 MG: 500 TABLET, FILM COATED ORAL at 20:01

## 2020-05-28 RX ADMIN — SODIUM CHLORIDE 15 ML: 900 IRRIGANT IRRIGATION at 17:00

## 2020-05-28 RX ADMIN — MEROPENEM 1 G: 1 INJECTION, POWDER, FOR SOLUTION INTRAVENOUS at 08:15

## 2020-05-28 RX ADMIN — Medication 1 MG: at 08:48

## 2020-05-28 RX ADMIN — SODIUM CHLORIDE: 900 INJECTION INTRAVENOUS at 01:56

## 2020-05-28 RX ADMIN — MORPHINE SULFATE 4 MG: 4 INJECTION INTRAVENOUS at 11:09

## 2020-05-28 RX ADMIN — VALACYCLOVIR HYDROCHLORIDE 500 MG: 500 TABLET, FILM COATED ORAL at 08:22

## 2020-05-28 RX ADMIN — Medication 400 MG: at 17:00

## 2020-05-28 RX ADMIN — Medication 10 ML: at 08:33

## 2020-05-28 RX ADMIN — TAMSULOSIN HYDROCHLORIDE 0.4 MG: 0.4 CAPSULE ORAL at 08:22

## 2020-05-28 RX ADMIN — URSODIOL 500 MG: 250 TABLET, FILM COATED ORAL at 20:01

## 2020-05-28 RX ADMIN — TACROLIMUS 2.5 MG: 1 CAPSULE ORAL at 08:30

## 2020-05-28 RX ADMIN — DILTIAZEM HYDROCHLORIDE: 30 TABLET, FILM COATED ORAL at 22:07

## 2020-05-28 RX ADMIN — MORPHINE SULFATE 4 MG: 4 INJECTION INTRAVENOUS at 08:16

## 2020-05-28 RX ADMIN — MORPHINE SULFATE 4 MG: 4 INJECTION INTRAVENOUS at 01:45

## 2020-05-28 RX ADMIN — MORPHINE SULFATE 4 MG: 4 INJECTION INTRAVENOUS at 19:59

## 2020-05-28 RX ADMIN — Medication 10 ML: at 20:01

## 2020-05-28 RX ADMIN — Medication 400 MG: at 08:22

## 2020-05-28 RX ADMIN — PANTOPRAZOLE SODIUM 40 MG: 40 TABLET, DELAYED RELEASE ORAL at 07:21

## 2020-05-28 RX ADMIN — SODIUM CHLORIDE 15 ML: 900 IRRIGANT IRRIGATION at 20:01

## 2020-05-28 RX ADMIN — MORPHINE SULFATE 4 MG: 4 INJECTION INTRAVENOUS at 22:06

## 2020-05-28 RX ADMIN — SODIUM CHLORIDE 15 ML: 900 IRRIGANT IRRIGATION at 11:12

## 2020-05-28 RX ADMIN — TACROLIMUS 2.5 MG: 1 CAPSULE ORAL at 20:00

## 2020-05-28 RX ADMIN — MORPHINE SULFATE 4 MG: 4 INJECTION INTRAVENOUS at 03:57

## 2020-05-28 RX ADMIN — URSODIOL 500 MG: 250 TABLET, FILM COATED ORAL at 08:22

## 2020-05-28 ASSESSMENT — PAIN DESCRIPTION - FREQUENCY
FREQUENCY: CONTINUOUS

## 2020-05-28 ASSESSMENT — PAIN DESCRIPTION - LOCATION
LOCATION: RECTUM

## 2020-05-28 ASSESSMENT — PAIN DESCRIPTION - PROGRESSION
CLINICAL_PROGRESSION: NOT CHANGED

## 2020-05-28 ASSESSMENT — PAIN SCALES - GENERAL
PAINLEVEL_OUTOF10: 6
PAINLEVEL_OUTOF10: 6
PAINLEVEL_OUTOF10: 7
PAINLEVEL_OUTOF10: 0
PAINLEVEL_OUTOF10: 9
PAINLEVEL_OUTOF10: 5
PAINLEVEL_OUTOF10: 0
PAINLEVEL_OUTOF10: 5
PAINLEVEL_OUTOF10: 6
PAINLEVEL_OUTOF10: 7
PAINLEVEL_OUTOF10: 5
PAINLEVEL_OUTOF10: 6

## 2020-05-28 ASSESSMENT — PAIN DESCRIPTION - DESCRIPTORS
DESCRIPTORS: PRESSURE
DESCRIPTORS: CONSTANT;PRESSURE;SHOOTING
DESCRIPTORS: PRESSURE
DESCRIPTORS: CONSTANT;PRESSURE;SORE

## 2020-05-28 ASSESSMENT — PAIN DESCRIPTION - ONSET
ONSET: ON-GOING

## 2020-05-28 ASSESSMENT — PAIN DESCRIPTION - ORIENTATION
ORIENTATION: INNER

## 2020-05-28 ASSESSMENT — PAIN DESCRIPTION - PAIN TYPE
TYPE: ACUTE PAIN

## 2020-05-28 NOTE — PLAN OF CARE
with DVT Prevention: Pt is at risk for DVT d/t decreased mobility and cancer treatment. Pt educated on importance of activity. Pt has orders for SCDs while in bed. Pt verbalizes understanding of need for prophylaxis while inpatient. Problem: Bleeding:  Goal: Will show no signs and symptoms of excessive bleeding  Description: Will show no signs and symptoms of excessive bleeding  5/28/2020 0456 by Sharon Beth RN  Outcome: Ongoing  Note: Pt platelet count 32. Pt educated/reminded about bleeding precautions. Pt verbalizes understanding. No signs of active bleeding this shift. Will continue to monitor. Problem: PROTECTIVE PRECAUTIONS  Goal: Patient will remain free of nosocomial Infections  5/28/2020 0456 by Sharon Beth RN  Outcome: Ongoing  Note: Pt remains in protective precautions. Pt educated on wearing mask when in hallways. Pt, staff, and visitors adhering to handwashing guidelines. Pt educated to shower or bathe daily with chlorhexidine and linens changed daily per protocol. Pt verbalizes understanding of low microbial diet. Will continue to monitor. Problem: Discharge Planning:  Goal: Discharged to appropriate level of care  Description: Discharged to appropriate level of care  5/28/2020 0456 by Sharon Beth RN  Outcome: Ongoing  Note: Pt is up to date on plan of care.

## 2020-05-28 NOTE — PROGRESS NOTES
at 5/28/2020 0612  Gross per 24 hour   Intake 1885 ml   Output 2900 ml   Net -1015 ml       Vital Signs:  BP (!) 148/87   Pulse 94   Temp 98.3 °F (36.8 °C) (Oral)   Resp 14   Ht 5' 9.8\" (1.773 m)   Wt 176 lb 1.6 oz (79.9 kg)   SpO2 99%   BMI 25.41 kg/m²     Weight:    Wt Readings from Last 3 Encounters:   05/27/20 176 lb 1.6 oz (79.9 kg)   03/31/20 184 lb 9.6 oz (83.7 kg)   02/17/20 180 lb (81.6 kg)       General: Awake, alert and oriented. HEENT: normocephalic, alopecia, PERRL, no scleral erythema or icterus, Oral mucosa pale, ulcer left buccal mucosa is healing. NECK: supple without palpable adenopathy  BACK: Straight  SKIN: erythematous papular rash right buttock and back. CHEST: CTA bilaterally without use of accessory muscles  CV: Normal S1 S2, RRR, no MRG  ABD: NT, slightly distended w/ normoactive BS, no palpable masses or hepatosplenomegaly. EXTREMITIES: without edema, denies calf tenderness  NEURO: CN II - XII grossly intact. REsting tremor  CATHETER: Left IJ TLH (5/13/20, IR) - CDI & Right IJ PAC (2/17/20, TCH) - erythema at exist site without pain to palpation.     Data:   CBC:   Recent Labs     05/26/20 0415 05/27/20  0430 05/28/20  0350   WBC 0.0* 0.1* 0.2*   HGB 9.0* 7.8* 7.6*   HCT 25.6* 22.6* 21.6*   MCV 91.2 92.6 91.6   PLT 24* 31* 32*     BMP/Mag:  Recent Labs     05/26/20 0415 05/27/20  0430 05/28/20  0350    137 137   K 4.5 4.9 4.5    103 102   CO2 22 24 21   PHOS 3.2 3.2 2.6   BUN 10 11 10   CREATININE 0.7* 0.7* 0.8*   MG 1.70* 1.30* 1.60*     LIVP:   Recent Labs     05/26/20 0415 05/27/20  0430 05/28/20  0350   AST  --  8*  --    ALT  --  8*  --    BILIDIR  --  <0.2  --    BILITOT 0.8 0.6 0.9   ALKPHOS  --  70  --      Uric Acid:    Recent Labs     05/27/20  0430   LABURIC 3.1*     Coags:   Recent Labs     05/28/20  0350   PROTIME 15.6*   INR 1.34*   APTT 28.4     Tacro:    Recent Labs     05/25/20  0908 05/27/20  0830   TACROLEV 11.8 16.9     CMV Quant DNA by PCR: No results found for: CMVDNAQNT    PROBLEM LIST:        1. MDS  2. Anxiety and depression  3. HLD  4. H/o shingles (2004, RLQ)  5. Gilbert syndrome    Post- Transplant Complications:  1. Resting tremor   2. Steroid Induced Hyperglycemia   3. Hypervolemia   4. Constipation   5. Nausea   6. Headache   7. Mucositis  8. Diarrhea / Abdominal Cramping   9. Neutropenic Fever   10. HTN  11. Rectal Pain      TREATMENT:       1. Decitabine x3 cycles - 2/17-4/14/20   2. Targeted Busulfan & Fludarabine followed by MUD Allo PBSC infusion 5/11/20  Preparative Regimen: Targeted Busulfan & Fludarabine  Date of BMT: 5/11/2020  Source of stem cells: PBSC - 6.7 x10^6 [id number]/kg  Donor/Recipient Blood Type: A+ / A+  Donor Sex: Male, follow VNTR (DID# [id number])  CMV Donor / Recipient: Pos / Pos     ASSESSMENT AND PLAN:         1. Myelodysplastic Syndrome: Stable disease  - Most recent BM Bx/Asp (4/7/20) - ongoing disease  - S/p Bu/Flu & MUD-pb BMT  - Busulfan AUC low at 4672, therefore busulfan dose increased from 252 mg to 315 mg, for doses 3 and 4. Day + 17     2. ID: Now afebrile, he had neutropenic fever, unidentified infection  - Pan - cx (5/20/20) - NGTD  - CXR (5/20/20) - No acute process   - Cont Valtrex & Diflucan ppx   - Merrem Day + 10 (started 5/20/20)    Abx history:  Flagyl x 2 days     Donor/Recipient CMV: Pos / Pos  - Check CMV PCR weekly once WBC 1.0  - Start letermovir ppx if started on high-dose steroids     3. Heme:  Pancytopenia from chemotherapy   - Transfuse for Hgb < 7 and Platelets < 09R.    - No transfusion today      4.   Metabolic:  stable renal fxn and e-lytes,  hypoMg  - Cont Mag Ox 400 mg QID (started 5/27/20)   - Cont IVF:  NS @ 75 mL/hr   - Replace potassium and magnesium per policy.     5. 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

## 2020-05-28 NOTE — PLAN OF CARE
HGB 7.8*     Patient's platelet count this AM:   Recent Labs     05/27/20  0430   PLT 31*    Thrombocytopenia Precautions in place. Patient showing no signs or symptoms of active bleeding. Transfusion not indicated at this time. Patient verbalizes understanding of all instructions. Will continue to assess and implement POC. Call light within reach and hourly rounding in place. Problem: Anxiety:  Goal: Level of anxiety will decrease  Description: Level of anxiety will decrease  Outcome: Ongoing   Patient anxious about the pain he is experiencing. Emotional support provided. Problem: PROTECTIVE PRECAUTIONS  Goal: Patient will remain free of nosocomial Infections  Outcome: Ongoing   Pt remains in protective precautions. Pt educated on wearing mask when in hallways. Pt, staff, and visitors adhering to handwashing guidelines. Pt educated to shower or bathe daily with chlorhexidine and linens changed daily per protocol. Pt verbalizes understanding of low microbial diet. Will continue to monitor. Problem: Activity:  Goal: Ability to tolerate increased activity will improve  Description: Ability to tolerate increased activity will improve   Outcome: Ongoing   Patient resting today due to pain. Patient is walking into the bathroom. Educated pt regarding reserving energy at this time. Will continue to monitor. Problem: Skin Integrity:  Goal: Absence of new skin breakdown  Description: Absence of new skin breakdown  Outcome: Ongoing   Patient has redness and rash on torso. Monitoring for potential GVH of skin. CVC site is also red and irritated. Physician assessed with continue to monitor.

## 2020-05-28 NOTE — PLAN OF CARE
Problem: Pain:  Goal: Pain level will decrease  Description: Pain level will decrease  5/28/2020 1650 by August Urrutia RN  Outcome: Ongoing  Note: Pt continues with rectal pain/pressure that is worse after straining. Administered PRN morphine and set up for sitz bath, pt verbalizes some relief and decent pain control. Will continue to monitor. Problem: Falls - Risk of:  Goal: Will remain free from falls  Description: Will remain free from falls  5/28/2020 1650 by August Urrutia RN  Outcome: Ongoing  Note: Orthostatic vital signs obtained at start of shift - see flowsheet for details. Pt does not meet criteria for orthostasis. Pt is a medium fall risk. See Yun Pac Fall Score and ABCDS Injury Risk assessments. Pt bed is in low position, side rails up, call light and belongings are in reach. Fall risk light is on outside pts room. Pt encouraged to call for assistance as needed. Will continue with hourly rounds for PO intake, pain needs, toileting and repositioning as needed. Problem: Infection - Central Venous Catheter-Associated Bloodstream Infection:  Goal: Will show no infection signs and symptoms  Description: Will show no infection signs and symptoms  5/28/2020 1650 by August Urrutia RN  Outcome: Ongoing  Note: CVC site remains free of signs/symptoms of infection. No drainage, edema, erythema, pain, or warmth noted at site. Dressing changes continue per protocol and on an as needed basis - see flowsheet. Compliant with BCC Bath Protocol:  Performed CHG bath today per [facility] protocol utilizing CHG solution in the shower. CVC site cleansed with CHG wipe over dressing, skin surrounding dressing, and first 6\" of IV tubing. Pt tolerated well. Continued to encourage daily CHG bathing per [facility] protocol.            Problem: Venous Thromboembolism:  Goal: Will show no signs or symptoms of venous thromboembolism  Description: Will show no signs or symptoms of venous

## 2020-05-29 LAB
ALBUMIN SERPL-MCNC: 3.3 G/DL (ref 3.4–5)
ALP BLD-CCNC: 58 U/L (ref 40–129)
ALT SERPL-CCNC: 7 U/L (ref 10–40)
ANION GAP SERPL CALCULATED.3IONS-SCNC: 9 MMOL/L (ref 3–16)
AST SERPL-CCNC: 7 U/L (ref 15–37)
BILIRUB SERPL-MCNC: 0.6 MG/DL (ref 0–1)
BILIRUBIN DIRECT: <0.2 MG/DL (ref 0–0.3)
BILIRUBIN, INDIRECT: ABNORMAL MG/DL (ref 0–1)
BUN BLDV-MCNC: 10 MG/DL (ref 7–20)
CALCIUM SERPL-MCNC: 8.6 MG/DL (ref 8.3–10.6)
CHLORIDE BLD-SCNC: 106 MMOL/L (ref 99–110)
CO2: 23 MMOL/L (ref 21–32)
CREAT SERPL-MCNC: 0.7 MG/DL (ref 0.9–1.3)
GFR AFRICAN AMERICAN: >60
GFR NON-AFRICAN AMERICAN: >60
GLUCOSE BLD-MCNC: 96 MG/DL (ref 70–99)
HCT VFR BLD CALC: 20.4 % (ref 40.5–52.5)
HEMOGLOBIN: 7.2 G/DL (ref 13.5–17.5)
LACTATE DEHYDROGENASE: 141 U/L (ref 100–190)
MAGNESIUM: 1.4 MG/DL (ref 1.8–2.4)
MCH RBC QN AUTO: 32.2 PG (ref 26–34)
MCHC RBC AUTO-ENTMCNC: 35 G/DL (ref 31–36)
MCV RBC AUTO: 91.9 FL (ref 80–100)
PDW BLD-RTO: 15 % (ref 12.4–15.4)
PHOSPHORUS: 2.4 MG/DL (ref 2.5–4.9)
PLATELET # BLD: 36 K/UL (ref 135–450)
PMV BLD AUTO: 8.1 FL (ref 5–10.5)
POTASSIUM SERPL-SCNC: 4.9 MMOL/L (ref 3.5–5.1)
RBC # BLD: 2.22 M/UL (ref 4.2–5.9)
SODIUM BLD-SCNC: 138 MMOL/L (ref 136–145)
TACROLIMUS BLOOD: 15.9 NG/ML (ref 5–20)
TOTAL PROTEIN: 5.7 G/DL (ref 6.4–8.2)
URIC ACID, SERUM: 3.8 MG/DL (ref 3.5–7.2)
WBC # BLD: 0.4 K/UL (ref 4–11)

## 2020-05-29 PROCEDURE — 2580000003 HC RX 258: Performed by: NURSE PRACTITIONER

## 2020-05-29 PROCEDURE — 6370000000 HC RX 637 (ALT 250 FOR IP): Performed by: INTERNAL MEDICINE

## 2020-05-29 PROCEDURE — 80076 HEPATIC FUNCTION PANEL: CPT

## 2020-05-29 PROCEDURE — 83735 ASSAY OF MAGNESIUM: CPT

## 2020-05-29 PROCEDURE — 6370000000 HC RX 637 (ALT 250 FOR IP): Performed by: STUDENT IN AN ORGANIZED HEALTH CARE EDUCATION/TRAINING PROGRAM

## 2020-05-29 PROCEDURE — 84100 ASSAY OF PHOSPHORUS: CPT

## 2020-05-29 PROCEDURE — 36592 COLLECT BLOOD FROM PICC: CPT

## 2020-05-29 PROCEDURE — 80197 ASSAY OF TACROLIMUS: CPT

## 2020-05-29 PROCEDURE — 6360000002 HC RX W HCPCS: Performed by: INTERNAL MEDICINE

## 2020-05-29 PROCEDURE — 80048 BASIC METABOLIC PNL TOTAL CA: CPT

## 2020-05-29 PROCEDURE — 6370000000 HC RX 637 (ALT 250 FOR IP): Performed by: NURSE PRACTITIONER

## 2020-05-29 PROCEDURE — 2580000003 HC RX 258: Performed by: INTERNAL MEDICINE

## 2020-05-29 PROCEDURE — 6360000002 HC RX W HCPCS: Performed by: NURSE PRACTITIONER

## 2020-05-29 PROCEDURE — 99231 SBSQ HOSP IP/OBS SF/LOW 25: CPT | Performed by: SURGERY

## 2020-05-29 PROCEDURE — 83615 LACTATE (LD) (LDH) ENZYME: CPT

## 2020-05-29 PROCEDURE — 2060000000 HC ICU INTERMEDIATE R&B

## 2020-05-29 PROCEDURE — 85025 COMPLETE CBC W/AUTO DIFF WBC: CPT

## 2020-05-29 PROCEDURE — 84550 ASSAY OF BLOOD/URIC ACID: CPT

## 2020-05-29 RX ORDER — TACROLIMUS 1 MG/1
2 CAPSULE ORAL 2 TIMES DAILY
Status: DISCONTINUED | OUTPATIENT
Start: 2020-05-29 | End: 2020-06-05 | Stop reason: HOSPADM

## 2020-05-29 RX ORDER — 0.9 % SODIUM CHLORIDE 0.9 %
1000 INTRAVENOUS SOLUTION INTRAVENOUS ONCE
Status: COMPLETED | OUTPATIENT
Start: 2020-05-29 | End: 2020-05-29

## 2020-05-29 RX ORDER — TRAMADOL HYDROCHLORIDE 50 MG/1
50 TABLET ORAL EVERY 6 HOURS PRN
Status: DISCONTINUED | OUTPATIENT
Start: 2020-05-29 | End: 2020-06-05 | Stop reason: HOSPADM

## 2020-05-29 RX ORDER — MORPHINE SULFATE 2 MG/ML
2 INJECTION, SOLUTION INTRAMUSCULAR; INTRAVENOUS
Status: DISCONTINUED | OUTPATIENT
Start: 2020-05-29 | End: 2020-06-05 | Stop reason: HOSPADM

## 2020-05-29 RX ADMIN — VALACYCLOVIR HYDROCHLORIDE 500 MG: 500 TABLET, FILM COATED ORAL at 20:44

## 2020-05-29 RX ADMIN — SODIUM CHLORIDE 15 ML: 900 IRRIGANT IRRIGATION at 16:44

## 2020-05-29 RX ADMIN — TRAMADOL HYDROCHLORIDE 50 MG: 50 TABLET, FILM COATED ORAL at 10:30

## 2020-05-29 RX ADMIN — SODIUM CHLORIDE: 900 INJECTION INTRAVENOUS at 10:30

## 2020-05-29 RX ADMIN — FLUCONAZOLE 400 MG: 200 TABLET ORAL at 08:56

## 2020-05-29 RX ADMIN — Medication 400 MG: at 12:06

## 2020-05-29 RX ADMIN — VALACYCLOVIR HYDROCHLORIDE 500 MG: 500 TABLET, FILM COATED ORAL at 08:56

## 2020-05-29 RX ADMIN — MAGNESIUM SULFATE HEPTAHYDRATE 4 G: 40 INJECTION, SOLUTION INTRAVENOUS at 05:58

## 2020-05-29 RX ADMIN — SODIUM CHLORIDE 15 ML: 900 IRRIGANT IRRIGATION at 20:45

## 2020-05-29 RX ADMIN — Medication 1 MG: at 10:19

## 2020-05-29 RX ADMIN — Medication 400 MG: at 20:44

## 2020-05-29 RX ADMIN — Medication 10 ML: at 20:44

## 2020-05-29 RX ADMIN — TACROLIMUS 2 MG: 1 CAPSULE ORAL at 20:44

## 2020-05-29 RX ADMIN — URSODIOL 500 MG: 250 TABLET, FILM COATED ORAL at 20:44

## 2020-05-29 RX ADMIN — Medication 400 MG: at 08:56

## 2020-05-29 RX ADMIN — URSODIOL 500 MG: 250 TABLET, FILM COATED ORAL at 08:56

## 2020-05-29 RX ADMIN — MEROPENEM 1 G: 1 INJECTION, POWDER, FOR SOLUTION INTRAVENOUS at 16:42

## 2020-05-29 RX ADMIN — DILTIAZEM HYDROCHLORIDE: 30 TABLET, FILM COATED ORAL at 21:45

## 2020-05-29 RX ADMIN — SODIUM CHLORIDE 1000 ML: 9 INJECTION, SOLUTION INTRAVENOUS at 09:07

## 2020-05-29 RX ADMIN — MEROPENEM 1 G: 1 INJECTION, POWDER, FOR SOLUTION INTRAVENOUS at 08:56

## 2020-05-29 RX ADMIN — MORPHINE SULFATE 4 MG: 4 INJECTION INTRAVENOUS at 01:06

## 2020-05-29 RX ADMIN — SODIUM CHLORIDE 15 ML: 900 IRRIGANT IRRIGATION at 10:19

## 2020-05-29 RX ADMIN — TRAMADOL HYDROCHLORIDE 50 MG: 50 TABLET, FILM COATED ORAL at 19:03

## 2020-05-29 RX ADMIN — MORPHINE SULFATE 4 MG: 4 INJECTION INTRAVENOUS at 04:18

## 2020-05-29 RX ADMIN — Medication 1 PACKET: at 20:46

## 2020-05-29 RX ADMIN — Medication 10 ML: at 08:56

## 2020-05-29 RX ADMIN — PANTOPRAZOLE SODIUM 40 MG: 40 TABLET, DELAYED RELEASE ORAL at 07:12

## 2020-05-29 RX ADMIN — DILTIAZEM HYDROCHLORIDE: 30 TABLET, FILM COATED ORAL at 10:35

## 2020-05-29 RX ADMIN — SODIUM CHLORIDE: 900 INJECTION INTRAVENOUS at 04:32

## 2020-05-29 RX ADMIN — TAMSULOSIN HYDROCHLORIDE 0.4 MG: 0.4 CAPSULE ORAL at 08:56

## 2020-05-29 RX ADMIN — TACROLIMUS 2.5 MG: 1 CAPSULE ORAL at 08:55

## 2020-05-29 RX ADMIN — SODIUM CHLORIDE: 900 INJECTION INTRAVENOUS at 18:12

## 2020-05-29 RX ADMIN — MEROPENEM 1 G: 1 INJECTION, POWDER, FOR SOLUTION INTRAVENOUS at 00:16

## 2020-05-29 RX ADMIN — Medication 400 MG: at 16:42

## 2020-05-29 ASSESSMENT — PAIN SCALES - GENERAL
PAINLEVEL_OUTOF10: 5
PAINLEVEL_OUTOF10: 6
PAINLEVEL_OUTOF10: 5
PAINLEVEL_OUTOF10: 7
PAINLEVEL_OUTOF10: 4
PAINLEVEL_OUTOF10: 7
PAINLEVEL_OUTOF10: 5
PAINLEVEL_OUTOF10: 6
PAINLEVEL_OUTOF10: 4

## 2020-05-29 ASSESSMENT — PAIN DESCRIPTION - ORIENTATION
ORIENTATION: INNER

## 2020-05-29 ASSESSMENT — PAIN DESCRIPTION - ONSET
ONSET: ON-GOING

## 2020-05-29 ASSESSMENT — PAIN DESCRIPTION - FREQUENCY
FREQUENCY: CONTINUOUS

## 2020-05-29 ASSESSMENT — PAIN DESCRIPTION - PROGRESSION
CLINICAL_PROGRESSION: NOT CHANGED

## 2020-05-29 ASSESSMENT — PAIN DESCRIPTION - DESCRIPTORS
DESCRIPTORS: PRESSURE
DESCRIPTORS: PRESSURE;BURNING

## 2020-05-29 ASSESSMENT — PAIN DESCRIPTION - PAIN TYPE
TYPE: ACUTE PAIN

## 2020-05-29 ASSESSMENT — PAIN DESCRIPTION - LOCATION
LOCATION: RECTUM

## 2020-05-29 NOTE — PLAN OF CARE
Junaid Dougherty, RN  Outcome: Ongoing  Note: Pt at increased risk for DVT due to prolonged hospital stay, decrease in normal activity levels, and disease status. Educated on prevention of DVT. Pt not a candidate for anticoagulant therapy, refusing pnuematic compression device despite encouragement. Pt ambulates throughout day. No signs or symptoms of DVT noted. Will continue to monitor. Problem: Bleeding:  Goal: Will show no signs and symptoms of excessive bleeding  Description: Will show no signs and symptoms of excessive bleeding  5/28/2020 1650 by Junaid Dougherty RN  Outcome: Ongoing  Note: Patient's hemoglobin this AM:   Recent Labs     05/29/20  0334   HGB 7.2*     Patient's platelet count this AM:   Recent Labs     05/29/20  0334   PLT 36*    Thrombocytopenia Precautions in place. Patient showing no signs or symptoms of active bleeding. Transfusion not indicated at this time. Patient verbalizes understanding of all instructions. Will continue to assess and implement POC. Call light within reach and hourly rounding in place. Problem: Nutrition Deficit:  Goal: Ability to achieve adequate nutritional intake will improve  Description: Ability to achieve adequate nutritional intake will improve  Outcome: Ongoing  Note: Low intake this shift. Pt reports loss of appetite. Denies any nausea/vomiting. Educated on increasing intake with small/frequent meals. Pt verbalizes understanding.

## 2020-05-29 NOTE — CARE COORDINATION
Type of Admission  MDS  Fully Ablative MUD Allogeneic SCT ( Male Donor, PBSC's)  T:0---> 5/11/20  Day +18( Busulfan+Fludara)        Central venous catheter  Left IJ TLC ( 5/520, IR)        Plan  Fully Ablative MUD Allogeneic SCT ( 5/11/20)        Update  5/5/20: Planned admission for MUD Allogeneic transplant. 5/8/20: Staying active by walking in hallway. 5/12/20: Tolerated infusion of stem cells without proble, but reports that he has had insomnia that last few days. , states \"I think it has been my nerves\". Remains active by walking in the hallway. 5/15/20:  Maintaining activity by walking in hallway. Seen by Dietitian  5/18/29:  Maintaining activity by walking in hallway, reports fatigue. 5/22/20:  Afebrile, prn oxycodone for mucositis. 5/26/20: Having manny-rectal pain, CT done over the weekend, negative for abscess. States bowel movement are small does not feel he needs a stool softener at this time. 5/27/20: Continues to have manny-rectal pain, can now have IV Morphine every 4 hours as needed,  Seen by General surgery regarding rectal fissure, no intervention at this time, have recommended  Sitz baths at this time. 5/29/20: WBC count up t0 0.4. Afebrile. Switching to tramadol today for pain, states per-rectal pain improved. Education  Patient and caregiver have been through the educational process for allogeneic bone marrow transplantation including the allogeneic family meeting, preadmission teaching and preadmission physician office Glory Dolan RN BMT Coordinator    Patient and caregiver verbalize understanding of treatment regimen, possible adverse events, length of stay, and risk and benefits of transplantation and are agreeable to proceed. Patient and caregiver have been given an opportunity to ask, and to have their questions answered to their satisfaction. Documentation for above education can be found in the [facility]cology [facility], [facility] office chart.   5/5/20 Confirmed

## 2020-05-29 NOTE — PROGRESS NOTES
Clinical Pharmacy Progress Note    Patient Name: Deshaun Camacho  YOB: 1965  Diagnosis: MDS - Allogeneic MUD transplant PBSCT- Busulfan/Fludarabine plus post transplant Cytoxan/Progra    GVHD Prophylaxis:  Post transplant Cytoxan on Day+3 and 4  Tacrolimus (Prograf) starting Day +5  · Adjusted according to levels - drawn via red lumen      Tacrolimus (Prograf) levels starting day +9  Tacrolimus (Prograf) goal level: 8-15 ng/mL    Date SCr Bili Prograf Dose Prograf Level Adjustments / Comments   5/5/2020;   day -6 0.8 0.7 - -  Patient admitted for Allogeneic MUD transplant- will start oral Prograf 2.5 mg po bid on 5/16 with 1st level on 5/20 and then MWF thereafter. 5/20  Day +9 0.6 0.7 2.5 mg po BID 7.3 Level slightly subtherapeutic, but patient recently started taking it. Will wait until next level to increase dose. 5/22  Day +11 0.6 0.4 2.5 mg po BID 7.2 Level therapeutic, will increase dose by 30% to 3.5 mg po qAM and 3 mg po nightly   5/27  Day +16   0.7 0.6 3.5 mg po qAM  3 mg po nightly 16.9 Level supratherapeutic, will hold x 1 dose this evening. Will return to previous dose of 2.5 mg BID at this time. Next level Friday, 5/29.   5/29/20   d+18 0.7 0.6 2.5 mg po bid 15.9 Level still above desired range. Will reduce dose to 2 mg po bid with next level Monday                                       Please call with questions.     Gerald Hills, PharmD  PGY1 Pharmacy Resident   Wireless: [phone]  5/29/2020 2:51 PM

## 2020-05-29 NOTE — PROGRESS NOTES
shingles (2004, RLQ)  5. Gilbert syndrome    Post- Transplant Complications:  1. Resting tremor   2. Steroid Induced Hyperglycemia   3. Hypervolemia   4. Constipation   5. Nausea   6. Headache   7. Mucositis  8. Diarrhea / Abdominal Cramping   9. Neutropenic Fever   10. HTN  11. Rectal Pain / Fissure      TREATMENT:       1. Decitabine x3 cycles - 2/17-4/14/20   2. Targeted Busulfan & Fludarabine followed by MUD Allo PBSC infusion 5/11/20  Preparative Regimen: Targeted Busulfan & Fludarabine  Date of BMT: 5/11/2020  Source of stem cells: PBSC - 6.7 x10^6 [id number]/kg  Donor/Recipient Blood Type: A+ / A+  Donor Sex: Male, follow VNTR (DID# [id number])  CMV Donor / Recipient: Pos / Pos     ASSESSMENT AND PLAN:         1. Myelodysplastic Syndrome: Stable disease  - Most recent BM Bx/Asp (4/7/20) - ongoing disease  - S/p Bu/Flu & MUD-pb BMT  - Busulfan AUC low at 4672, therefore busulfan dose increased from 252 mg to 315 mg, for doses 3 and 4. Day + 18     2. ID: Now afebrile, he had neutropenic fever, possibly from rectal inflammation/fissure  - Pan - cx (5/20/20) - NGTD  - CXR (5/20/20) - No acute process   - Cont Valtrex & Diflucan ppx   - Merrem Day + 11 (started 5/20/20)    Abx history:  Flagyl x 2 days     Donor/Recipient CMV: Pos / Pos  - Check CMV PCR weekly once WBC 1.0  - Start letermovir ppx if started on high-dose steroids     3. Heme:  Pancytopenia from chemotherapy, platelets & WBC recovering    - Transfuse for Hgb < 7 and Platelets < 46V.    - No transfusion today      4.   Metabolic:  stable renal fxn and e-lytes,  hypoMg  - Cont Mag Ox 400 mg QID (started 5/27/20)   - Cont IVF:  NS @ 75 mL/hr   - Replace potassium and magnesium per policy.     5. 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 VOD:  No evidence of VOD  Recent Labs     05/29/20  0334   BILIDIR <0.2   BILITOT 0.6     Admission Weight: 185 lb (83.9 kg)  Current Weight: Weight: 174 lb 12.8 oz (79.3 kg). - Cont Actigall.     7. GI / Nutrition:    Mild Malnutrition:  Appetite & intake has decreased; he is only eating small amounts    - Cont low microbial diet  - Dietician to follow  Constipation:  Resolved  - S/p Dulcolax (5/12/20) w/ BM  Nausea:  Mild nausea, no vomiting   - Cont PRN compazine, Zofran and ativan  Mucositis:  Improving   - Cont Dex swish  Rectal pain:  Ongoing rectal pain, but no signs of abscess. Surgery treating as though is a fissure  - Surgery following, appreciate recs  - CT pelvis (5/25/20) - Small bilateral renal cysts. Otherwise unremarkable study. In particular, no signs of rectal or perirectal abscess. - Cont Sitz baths & lidocaine ointment   - Cont psyllium bid   - Change to Tramadol as needed and try to limit IV Morphine 2 mg prn    8. Neuro:    - H/o resting tremor  Tremor:  Resolved and possibly from Busulfan   - S/p scheduled Ativan  - Cont to monitor   Headaches:  No recent c/o HA  - CT head (5/19/20) - no acute process   - Cont PRN Tylenol & Tramadol as needed      9. Cardiac:  Intermittent hypertension, but w/ symptomatic orthostatic hypotension this morning (5/29/20)  - NS x 1 liter (5/29/20)    - DVT Prophylaxis: Platelets <06,838 cells/dL - prophylactic lovenox on hold and mechanical prophylaxis with bilateral SCDs while in bed in place. Contraindications to pharmacologic prophylaxis: None  Contraindications to mechanical prophylaxis: None      - Disposition:  Once ANC >1 and recovered from toxicities of transplant.     ODALIS Luo - STEWART Gant. Star Fields DO, MS  [facility]    Please contact via:  1. Perfect Serve  2.   Cell Phone:  [phone]    5/29/2020   10:47 AM

## 2020-05-30 LAB
ABO/RH: NORMAL
ANION GAP SERPL CALCULATED.3IONS-SCNC: 10 MMOL/L (ref 3–16)
ANTIBODY SCREEN: NORMAL
BASOPHILS ABSOLUTE: 0 K/UL (ref 0–0.2)
BASOPHILS RELATIVE PERCENT: 0.1 %
BILIRUB SERPL-MCNC: 0.6 MG/DL (ref 0–1)
BLOOD BANK DISPENSE STATUS: NORMAL
BLOOD BANK PRODUCT CODE: NORMAL
BPU ID: NORMAL
BUN BLDV-MCNC: 8 MG/DL (ref 7–20)
CALCIUM SERPL-MCNC: 8.6 MG/DL (ref 8.3–10.6)
CHLORIDE BLD-SCNC: 107 MMOL/L (ref 99–110)
CO2: 22 MMOL/L (ref 21–32)
CREAT SERPL-MCNC: 0.7 MG/DL (ref 0.9–1.3)
DESCRIPTION BLOOD BANK: NORMAL
EOSINOPHILS ABSOLUTE: 0 K/UL (ref 0–0.6)
EOSINOPHILS RELATIVE PERCENT: 0 %
GFR AFRICAN AMERICAN: >60
GFR NON-AFRICAN AMERICAN: >60
GLUCOSE BLD-MCNC: 100 MG/DL (ref 70–99)
HCT VFR BLD CALC: 20 % (ref 40.5–52.5)
HEMOGLOBIN: 6.9 G/DL (ref 13.5–17.5)
LYMPHOCYTES ABSOLUTE: 0.1 K/UL (ref 1–5.1)
LYMPHOCYTES RELATIVE PERCENT: 8.7 %
MAGNESIUM: 1.5 MG/DL (ref 1.8–2.4)
MCH RBC QN AUTO: 31.9 PG (ref 26–34)
MCHC RBC AUTO-ENTMCNC: 34.7 G/DL (ref 31–36)
MCV RBC AUTO: 91.8 FL (ref 80–100)
MONOCYTES ABSOLUTE: 0.2 K/UL (ref 0–1.3)
MONOCYTES RELATIVE PERCENT: 36 %
NEUTROPHILS ABSOLUTE: 0.3 K/UL (ref 1.7–7.7)
NEUTROPHILS RELATIVE PERCENT: 55.2 %
PDW BLD-RTO: 14.9 % (ref 12.4–15.4)
PHOSPHORUS: 2.5 MG/DL (ref 2.5–4.9)
PLATELET # BLD: 39 K/UL (ref 135–450)
PLATELET SLIDE REVIEW: ADEQUATE
PMV BLD AUTO: 7.5 FL (ref 5–10.5)
POTASSIUM SERPL-SCNC: 4.4 MMOL/L (ref 3.5–5.1)
RBC # BLD: 2.18 M/UL (ref 4.2–5.9)
SODIUM BLD-SCNC: 139 MMOL/L (ref 136–145)
WBC # BLD: 0.6 K/UL (ref 4–11)

## 2020-05-30 PROCEDURE — 86850 RBC ANTIBODY SCREEN: CPT

## 2020-05-30 PROCEDURE — 6370000000 HC RX 637 (ALT 250 FOR IP): Performed by: INTERNAL MEDICINE

## 2020-05-30 PROCEDURE — 36430 TRANSFUSION BLD/BLD COMPNT: CPT

## 2020-05-30 PROCEDURE — 82247 BILIRUBIN TOTAL: CPT

## 2020-05-30 PROCEDURE — 2060000000 HC ICU INTERMEDIATE R&B

## 2020-05-30 PROCEDURE — 83735 ASSAY OF MAGNESIUM: CPT

## 2020-05-30 PROCEDURE — 86900 BLOOD TYPING SEROLOGIC ABO: CPT

## 2020-05-30 PROCEDURE — 86923 COMPATIBILITY TEST ELECTRIC: CPT

## 2020-05-30 PROCEDURE — 6360000002 HC RX W HCPCS: Performed by: INTERNAL MEDICINE

## 2020-05-30 PROCEDURE — 6370000000 HC RX 637 (ALT 250 FOR IP): Performed by: NURSE PRACTITIONER

## 2020-05-30 PROCEDURE — 86901 BLOOD TYPING SEROLOGIC RH(D): CPT

## 2020-05-30 PROCEDURE — 2580000003 HC RX 258: Performed by: INTERNAL MEDICINE

## 2020-05-30 PROCEDURE — P9040 RBC LEUKOREDUCED IRRADIATED: HCPCS

## 2020-05-30 PROCEDURE — 36592 COLLECT BLOOD FROM PICC: CPT

## 2020-05-30 PROCEDURE — 80048 BASIC METABOLIC PNL TOTAL CA: CPT

## 2020-05-30 PROCEDURE — 99231 SBSQ HOSP IP/OBS SF/LOW 25: CPT | Performed by: SURGERY

## 2020-05-30 PROCEDURE — 2580000003 HC RX 258: Performed by: NURSE PRACTITIONER

## 2020-05-30 PROCEDURE — 6370000000 HC RX 637 (ALT 250 FOR IP): Performed by: STUDENT IN AN ORGANIZED HEALTH CARE EDUCATION/TRAINING PROGRAM

## 2020-05-30 PROCEDURE — 84100 ASSAY OF PHOSPHORUS: CPT

## 2020-05-30 PROCEDURE — 85025 COMPLETE CBC W/AUTO DIFF WBC: CPT

## 2020-05-30 RX ORDER — 0.9 % SODIUM CHLORIDE 0.9 %
20 INTRAVENOUS SOLUTION INTRAVENOUS ONCE
Status: COMPLETED | OUTPATIENT
Start: 2020-05-30 | End: 2020-05-30

## 2020-05-30 RX ADMIN — TACROLIMUS 2 MG: 1 CAPSULE ORAL at 08:05

## 2020-05-30 RX ADMIN — MEROPENEM 1 G: 1 INJECTION, POWDER, FOR SOLUTION INTRAVENOUS at 16:23

## 2020-05-30 RX ADMIN — DILTIAZEM HYDROCHLORIDE: 30 TABLET, FILM COATED ORAL at 21:41

## 2020-05-30 RX ADMIN — VALACYCLOVIR HYDROCHLORIDE 500 MG: 500 TABLET, FILM COATED ORAL at 21:38

## 2020-05-30 RX ADMIN — Medication 400 MG: at 12:18

## 2020-05-30 RX ADMIN — TAMSULOSIN HYDROCHLORIDE 0.4 MG: 0.4 CAPSULE ORAL at 08:05

## 2020-05-30 RX ADMIN — PANTOPRAZOLE SODIUM 40 MG: 40 TABLET, DELAYED RELEASE ORAL at 06:54

## 2020-05-30 RX ADMIN — LOPERAMIDE HYDROCHLORIDE 2 MG: 2 CAPSULE ORAL at 16:23

## 2020-05-30 RX ADMIN — Medication 1 PACKET: at 08:12

## 2020-05-30 RX ADMIN — Medication 10 ML: at 08:04

## 2020-05-30 RX ADMIN — VALACYCLOVIR HYDROCHLORIDE 500 MG: 500 TABLET, FILM COATED ORAL at 08:05

## 2020-05-30 RX ADMIN — URSODIOL 500 MG: 250 TABLET, FILM COATED ORAL at 21:38

## 2020-05-30 RX ADMIN — MEROPENEM 1 G: 1 INJECTION, POWDER, FOR SOLUTION INTRAVENOUS at 00:08

## 2020-05-30 RX ADMIN — Medication 1 MG: at 21:38

## 2020-05-30 RX ADMIN — PROCHLORPERAZINE MALEATE 10 MG: 10 TABLET ORAL at 12:18

## 2020-05-30 RX ADMIN — SODIUM CHLORIDE: 900 INJECTION INTRAVENOUS at 06:54

## 2020-05-30 RX ADMIN — Medication 400 MG: at 08:05

## 2020-05-30 RX ADMIN — Medication 400 MG: at 21:38

## 2020-05-30 RX ADMIN — SODIUM CHLORIDE 20 ML: 9 INJECTION, SOLUTION INTRAVENOUS at 08:04

## 2020-05-30 RX ADMIN — URSODIOL 500 MG: 250 TABLET, FILM COATED ORAL at 08:05

## 2020-05-30 RX ADMIN — LOPERAMIDE HYDROCHLORIDE 2 MG: 2 CAPSULE ORAL at 21:38

## 2020-05-30 RX ADMIN — FLUCONAZOLE 400 MG: 200 TABLET ORAL at 08:05

## 2020-05-30 RX ADMIN — Medication 400 MG: at 16:23

## 2020-05-30 RX ADMIN — PROCHLORPERAZINE MALEATE 10 MG: 10 TABLET ORAL at 16:23

## 2020-05-30 RX ADMIN — Medication 1 MG: at 08:05

## 2020-05-30 RX ADMIN — DILTIAZEM HYDROCHLORIDE: 30 TABLET, FILM COATED ORAL at 08:12

## 2020-05-30 RX ADMIN — SODIUM CHLORIDE: 900 INJECTION INTRAVENOUS at 21:48

## 2020-05-30 RX ADMIN — PROCHLORPERAZINE EDISYLATE 10 MG: 5 INJECTION INTRAMUSCULAR; INTRAVENOUS at 20:27

## 2020-05-30 RX ADMIN — Medication 10 ML: at 21:38

## 2020-05-30 RX ADMIN — MEROPENEM 1 G: 1 INJECTION, POWDER, FOR SOLUTION INTRAVENOUS at 08:04

## 2020-05-30 RX ADMIN — TACROLIMUS 2 MG: 1 CAPSULE ORAL at 21:38

## 2020-05-30 ASSESSMENT — PAIN DESCRIPTION - PROGRESSION
CLINICAL_PROGRESSION: GRADUALLY IMPROVING
CLINICAL_PROGRESSION: NOT CHANGED
CLINICAL_PROGRESSION: NOT CHANGED

## 2020-05-30 ASSESSMENT — PAIN SCALES - GENERAL
PAINLEVEL_OUTOF10: 0
PAINLEVEL_OUTOF10: 0
PAINLEVEL_OUTOF10: 2
PAINLEVEL_OUTOF10: 4
PAINLEVEL_OUTOF10: 0
PAINLEVEL_OUTOF10: 2
PAINLEVEL_OUTOF10: 0

## 2020-05-30 ASSESSMENT — PAIN DESCRIPTION - ORIENTATION
ORIENTATION: INNER

## 2020-05-30 ASSESSMENT — PAIN DESCRIPTION - FREQUENCY
FREQUENCY: CONTINUOUS

## 2020-05-30 ASSESSMENT — PAIN DESCRIPTION - PAIN TYPE
TYPE: ACUTE PAIN

## 2020-05-30 ASSESSMENT — PAIN DESCRIPTION - ONSET
ONSET: ON-GOING

## 2020-05-30 ASSESSMENT — PAIN DESCRIPTION - DESCRIPTORS
DESCRIPTORS: SORE
DESCRIPTORS: PRESSURE;BURNING
DESCRIPTORS: PRESSURE;BURNING

## 2020-05-30 ASSESSMENT — PAIN DESCRIPTION - LOCATION
LOCATION: RECTUM

## 2020-05-30 ASSESSMENT — PAIN - FUNCTIONAL ASSESSMENT
PAIN_FUNCTIONAL_ASSESSMENT: PREVENTS OR INTERFERES SOME ACTIVE ACTIVITIES AND ADLS
PAIN_FUNCTIONAL_ASSESSMENT: PREVENTS OR INTERFERES SOME ACTIVE ACTIVITIES AND ADLS
PAIN_FUNCTIONAL_ASSESSMENT: ACTIVITIES ARE NOT PREVENTED

## 2020-05-30 NOTE — PLAN OF CARE
bleeding  Description: Will show no signs and symptoms of excessive bleeding  Outcome: Ongoing  Note: Patient's hemoglobin this AM:   Recent Labs     05/30/20  0345   HGB 6.9*     Patient's platelet count this AM:   Recent Labs     05/30/20  0345   PLT 39*    Thrombocytopenia Precautions in place. Patient showing no signs or symptoms of active bleeding. Pt will receive blood this AM.   Patient verbalizes understanding of all instructions. Will continue to assess and implement POC. Call light within reach and hourly rounding in place. Problem: Nausea/Vomiting:  Goal: Absence of nausea/vomiting  Description: Absence of nausea/vomiting  Outcome: Ongoing  Note: Pt denies nausea, will continue to monitor. Problem: PROTECTIVE PRECAUTIONS  Goal: Patient will remain free of nosocomial Infections  Outcome: Ongoing  Note: Pt currently in a private, positive pressure room. Educated pt on wearing a mask when neutropenic and/or leaving the floor. No living plants or flowers allowed. Also reinforced importance of hand hygiene. Pt following a low microbial diet. Surfaces throughout room cleaned with bleach wipes per unit policy. Problem: Discharge Planning:  Goal: Discharged to appropriate level of care  Description: Discharged to appropriate level of care  Outcome: Ongoing  Note: Pt aware of discharge plan, will continue to monitor. Problem: Skin Integrity:  Goal: Absence of new skin breakdown  Description: Absence of new skin breakdown  Outcome: Ongoing  Note: Skin remains clean, dry, and intact. Will continue to monitor.

## 2020-05-30 NOTE — PROGRESS NOTES
[facility] Progress Note    2020    Eward File    :  1965    MRN:  [id number]      Subjective:   Slowly feeling better; asthenia and fatigue remain      ECOG PS:(2) Ambulatory and capable of self care, unable to carry out work activity, up and about > 50% or waking hours      KPS: 70% Cares for self; unable to carry on normal activity or to do active work      Isolation:  None       Medications    Scheduled Meds:   tacrolimus  2 mg Oral BID    magnesium oxide  400 mg Oral 4x Daily    psyllium  1 packet Oral BID    rectal mixture builder   Rectal BID    tamsulosin  0.4 mg Oral Daily    meropenem  1 g Intravenous Q8H    dexamethasone  1 mg Swish & Spit 2 times per day    pantoprazole  40 mg Oral QAM AC    fluconazole  400 mg Oral Daily    Saline Mouthwash  15 mL Swish & Spit 4x Daily AC & HS    sodium chloride flush  10 mL Intravenous 2 times per day    valACYclovir  500 mg Oral BID    ursodiol  500 mg Oral BID     Continuous Infusions:   sodium chloride      dextrose      sodium chloride      sodium chloride 75 mL/hr at 20 0654     PRN Meds:traMADol, morphine **OR** [DISCONTINUED] morphine, acetaminophen, magic (miracle) mouthwash, [COMPLETED] loperamide **FOLLOWED BY** loperamide, sodium chloride, potassium phosphate IVPB, calcium carbonate, Hydrocerin, glucose, dextrose, glucagon (rDNA), dextrose, sodium chloride, alteplase, magnesium hydroxide, magnesium sulfate, potassium chloride, Saline Mouthwash, prochlorperazine **OR** prochlorperazine, LORazepam **OR** LORazepam    ROS:  As noted above, otherwise remainder of 10-point ROS negative    Physical Exam:    I&O:      Intake/Output Summary (Last 24 hours) at 2020 1351  Last data filed at 2020 1207  Gross per 24 hour   Intake 3354 ml   Output 2975 ml   Net 379 ml       Vital Signs:  BP (!) 131/91   Pulse 78   Temp 98.8 °F (37.1 °C) (Oral)   Resp 18   Ht 5' 9.8\" (1.773 m)   Wt 172 lb (78 kg)   SpO2 99%   BMI 24.82 kg/m²     Weight:    Wt Readings from Last 3 Encounters:   05/30/20 172 lb (78 kg)   03/31/20 184 lb 9.6 oz (83.7 kg)   02/17/20 180 lb (81.6 kg)       General: Awake, alert and oriented. HEENT: normocephalic, alopecia, PERRL, no scleral erythema or icterus, Oral mucosa pale, ulcer left buccal mucosa is healing. NECK: supple without palpable adenopathy  BACK: Straight  SKIN: erythematous papular rash right buttock and back. CHEST: CTA bilaterally without use of accessory muscles  CV: Normal S1 S2, RRR, no MRG  ABD: NT, ND w/ normoactive BS, no palpable masses or hepatosplenomegaly. EXTREMITIES: without edema, denies calf tenderness  NEURO: CN II - XII grossly intact. CATHETER: Left IJ TLH (5/13/20, IR) - CDI & Right IJ PAC (2/17/20, T[facility]) - erythema at exist site without pain to palpation. Data:   CBC:   Recent Labs     05/28/20 0350 05/29/20 0334 05/30/20 0345   WBC 0.2* 0.4* 0.6*   HGB 7.6* 7.2* 6.9*   HCT 21.6* 20.4* 20.0*   MCV 91.6 91.9 91.8   PLT 32* 36* 39*     BMP/Mag:  Recent Labs     05/28/20 0350 05/29/20 0334 05/30/20 0345    138 139   K 4.5 4.9 4.4    106 107   CO2 21 23 22   PHOS 2.6 2.4* 2.5   BUN 10 10 8   CREATININE 0.8* 0.7* 0.7*   MG 1.60* 1.40* 1.50*     LIVP:   Recent Labs     05/28/20 0350 05/29/20 0334 05/30/20 0345   AST  --  7*  --    ALT  --  7*  --    BILIDIR  --  <0.2  --    BILITOT 0.9 0.6 0.6   ALKPHOS  --  58  --      Uric Acid:    Recent Labs     05/29/20 0334   LABURIC 3.8     Coags:   Recent Labs     05/28/20  0350   PROTIME 15.6*   INR 1.34*   APTT 28.4     Tacro:    Recent Labs     05/29/20  0830   TACROLEV 15.9     CMV Quant DNA by PCR: No results found for: CMVDNAQNT    PROBLEM LIST:        1. MDS  2. Anxiety and depression  3. HLD  4. H/o shingles (2004, RLQ)  5. Gilbert syndrome    Post- Transplant Complications:  1. Resting tremor   2. Steroid Induced Hyperglycemia   3. Hypervolemia   4. Constipation   5.

## 2020-05-30 NOTE — PROGRESS NOTES
05/29/20  0334 05/30/20  0345   BILIDIR <0.2  --    BILITOT 0.6 0.6     Admission Weight: 185 lb (83.9 kg)  Current Weight: Weight: 172 lb (78 kg). - Cont Actigall.     7. GI / Nutrition:    Mild Malnutrition:  Appetite & intake has decreased; he is only eating small amounts    - Cont low microbial diet  - Dietician to follow  Constipation:  Resolved  - S/p Dulcolax (5/12/20) w/ BM  Nausea:  Mild nausea, no vomiting   - Cont PRN compazine, Zofran and ativan  Mucositis:  Improving   - Cont Dex swish  Rectal pain:  Ongoing rectal pain, but no signs of abscess. Surgery treating as though is a fissure  - Surgery following, appreciate recs  - CT pelvis (5/25/20) - Small bilateral renal cysts. Otherwise unremarkable study. In particular, no signs of rectal or perirectal abscess. - Cont Sitz baths & lidocaine ointment   - Cont psyllium bid   - Change to Tramadol as needed and try to limit IV Morphine 2 mg prn    8. Neuro:    - H/o resting tremor  Tremor:  Resolved and possibly from Busulfan   - S/p scheduled Ativan  - Cont to monitor   Headaches:  No recent c/o HA  - CT head (5/19/20) - no acute process   - Cont PRN Tylenol & Tramadol as needed      9. Cardiac:  Intermittent hypertension, but w/ symptomatic orthostatic hypotension this morning (5/29/20)  - NS x 1 liter (5/29/20)        - DVT Prophylaxis: Platelets <68,409 cells/dL - prophylactic lovenox on hold and mechanical prophylaxis with bilateral SCDs while in bed in place. Contraindications to pharmacologic prophylaxis: None  Contraindications to mechanical prophylaxis: None    - Disposition:  Once ANC >1 and recovered from toxicities of transplant.         Colton Collado DO, MS  [facility]    Please contact via:  1. Perfect Serve  2.   Cell Phone:  [phone]    5/30/2020   2:00 PM

## 2020-05-30 NOTE — PROGRESS NOTES
Colorectal Surgery   Daily Progress Note    CC: Anal pain, neutropenia    SUBJECTIVE:  No acute events overnight. Passed one BM. Pain is stable. ROS:   A 14 point review of systems was conducted, significant findings as noted above. All other systems negative. OBJECTIVE:  PHYSICAL EXAM:  Vitals:    05/29/20 2001 05/29/20 2053 05/30/20 0007 05/30/20 0342   BP: 129/88 (!) 148/86 132/79 131/81   Pulse: 77  84 81   Resp: 18  18 18   Temp: 99.4 °F (37.4 °C)  98.6 °F (37 °C) 98.7 °F (37.1 °C)   TempSrc: Oral  Oral Oral   SpO2: 100%  99% 97%   Weight:       Height:         General appearance: alert, no acute distress  Eyes: EOMI, no scleral icterus  Neck: trachea midline, no JVD  Chest/Lungs: Normal effort, breathing room air, equal chest rise  Cardiovascular: Regular rate and rhythm, normotensive  Abdomen: soft, non-tender, non-distended,  no guarding/rigidity  Skin: warm and dry, no rashes  Extremities: no edema, no cyanosis  Neuro: A&Ox3, no focal deficits, sensation intact  Rectal: No obvious lesions, no erythema, and no area of fluctuance of the perianal area. MARY was not performed secondary to leukopenia     ASSESSMENT & PLAN:   This is a 63 y.o. male with Hx of MDS who is admitted for chemotherapy and stem cell transplant who complains of 1 week of rectal pain acutely worsening, will treat as fissure. As WBC count recovers may develop abscess. - Continue daily external/perianal exams   - Continue to apply pea-sized amount of diltiazem+lidocaine ointment to the anal verge twice daily  - Sitz baths 2x daily and PRN  - Further management per primary team    Bela Ashley MD, MPH  PGY-1 General Surgery  05/30/20  7:06 AM     I have seen, examined, and reviewed the patients chart. I agree with the residents assessment and have made appropriate changes.     Omid Navarro

## 2020-05-30 NOTE — PLAN OF CARE
Prevention: Pt is at risk for DVT d/t decreased mobility and cancer treatment. Pt educated on importance of activity. Pt has orders for Subcut prophylactic lovenox, however pt currently refusing treatment. Reviewed risks of DVT & PE development while inpatient. Provider aware of patient's refusal and re-education of importance of prophylaxis. No new orders at this time. Will continue to re-instruct patient and intervene as appropriate. Problem: Bleeding:  Goal: Will show no signs and symptoms of excessive bleeding  Description: Will show no signs and symptoms of excessive bleeding  5/30/2020 1315 by Vanessa Mitchell RN  Outcome: Ongoing   Patient's hemoglobin this AM:   Recent Labs     05/30/20  0345   HGB 6.9*     Patient's platelet count this AM:   Recent Labs     05/30/20  0345   PLT 39*    Thrombocytopenia Precautions in place. Patient showing no signs or symptoms of active bleeding. Patient transfused blood products per orders - see flowsheet. Patient verbalizes understanding of all instructions. Will continue to assess and implement POC. Call light within reach and hourly rounding in place. Problem: Nausea/Vomiting:  Goal: Absence of nausea/vomiting  Description: Absence of nausea/vomiting  5/30/2020 1315 by Vanessa Mitchell RN  Outcome: Ongoing   Pt complained of nausea once this shift, gave compazine as ordered,PT without vomiting at this time. Will continue to monitor,   Problem: PROTECTIVE PRECAUTIONS  Goal: Patient will remain free of nosocomial Infections  5/30/2020 1315 by Vanessa Mitchell RN  Outcome: Ongoing   Pt remains in neutropenic precautions per floor policy. Pt, visitors, and staff noted to be following precautions appropriately. Handwashing in place; pt wearing mask in hallway per protocol. Pt in private room. Low microbial diet in place. Will continue to monitor. Problem:  Activity:  Goal: Ability to tolerate increased activity will improve  Description: Ability to

## 2020-05-31 LAB
ANION GAP SERPL CALCULATED.3IONS-SCNC: 11 MMOL/L (ref 3–16)
ANISOCYTOSIS: ABNORMAL
BASOPHILS ABSOLUTE: 0 K/UL (ref 0–0.2)
BASOPHILS RELATIVE PERCENT: 0 %
BILIRUB SERPL-MCNC: 0.6 MG/DL (ref 0–1)
BUN BLDV-MCNC: 9 MG/DL (ref 7–20)
CALCIUM SERPL-MCNC: 8.5 MG/DL (ref 8.3–10.6)
CHLORIDE BLD-SCNC: 107 MMOL/L (ref 99–110)
CO2: 21 MMOL/L (ref 21–32)
CREAT SERPL-MCNC: 0.8 MG/DL (ref 0.9–1.3)
EOSINOPHILS ABSOLUTE: 0 K/UL (ref 0–0.6)
EOSINOPHILS RELATIVE PERCENT: 0 %
GFR AFRICAN AMERICAN: >60
GFR NON-AFRICAN AMERICAN: >60
GLUCOSE BLD-MCNC: 130 MG/DL (ref 70–99)
HCT VFR BLD CALC: 21.9 % (ref 40.5–52.5)
HEMOGLOBIN: 7.6 G/DL (ref 13.5–17.5)
LYMPHOCYTES ABSOLUTE: 0.1 K/UL (ref 1–5.1)
LYMPHOCYTES RELATIVE PERCENT: 10 %
MAGNESIUM: 1.3 MG/DL (ref 1.8–2.4)
MCH RBC QN AUTO: 31.7 PG (ref 26–34)
MCHC RBC AUTO-ENTMCNC: 34.9 G/DL (ref 31–36)
MCV RBC AUTO: 90.8 FL (ref 80–100)
MONOCYTES ABSOLUTE: 0.2 K/UL (ref 0–1.3)
MONOCYTES RELATIVE PERCENT: 25 %
NEUTROPHILS ABSOLUTE: 0.5 K/UL (ref 1.7–7.7)
NEUTROPHILS RELATIVE PERCENT: 65 %
NUCLEATED RED BLOOD CELLS: 1 /100 WBC
NUCLEATED RED BLOOD CELLS: 1 /100 WBC
PDW BLD-RTO: 15 % (ref 12.4–15.4)
PHOSPHORUS: 2.3 MG/DL (ref 2.5–4.9)
PLATELET # BLD: 43 K/UL (ref 135–450)
PMV BLD AUTO: 7.7 FL (ref 5–10.5)
POTASSIUM SERPL-SCNC: 4 MMOL/L (ref 3.5–5.1)
RBC # BLD: 2.41 M/UL (ref 4.2–5.9)
SODIUM BLD-SCNC: 139 MMOL/L (ref 136–145)
WBC # BLD: 0.8 K/UL (ref 4–11)

## 2020-05-31 PROCEDURE — 6370000000 HC RX 637 (ALT 250 FOR IP): Performed by: INTERNAL MEDICINE

## 2020-05-31 PROCEDURE — 36592 COLLECT BLOOD FROM PICC: CPT

## 2020-05-31 PROCEDURE — 6370000000 HC RX 637 (ALT 250 FOR IP): Performed by: NURSE PRACTITIONER

## 2020-05-31 PROCEDURE — 80048 BASIC METABOLIC PNL TOTAL CA: CPT

## 2020-05-31 PROCEDURE — 83735 ASSAY OF MAGNESIUM: CPT

## 2020-05-31 PROCEDURE — 85025 COMPLETE CBC W/AUTO DIFF WBC: CPT

## 2020-05-31 PROCEDURE — 99231 SBSQ HOSP IP/OBS SF/LOW 25: CPT | Performed by: SURGERY

## 2020-05-31 PROCEDURE — 2060000000 HC ICU INTERMEDIATE R&B

## 2020-05-31 PROCEDURE — 2580000003 HC RX 258: Performed by: NURSE PRACTITIONER

## 2020-05-31 PROCEDURE — 2580000003 HC RX 258: Performed by: INTERNAL MEDICINE

## 2020-05-31 PROCEDURE — 84100 ASSAY OF PHOSPHORUS: CPT

## 2020-05-31 PROCEDURE — 6360000002 HC RX W HCPCS: Performed by: INTERNAL MEDICINE

## 2020-05-31 PROCEDURE — 6370000000 HC RX 637 (ALT 250 FOR IP): Performed by: STUDENT IN AN ORGANIZED HEALTH CARE EDUCATION/TRAINING PROGRAM

## 2020-05-31 PROCEDURE — 82247 BILIRUBIN TOTAL: CPT

## 2020-05-31 PROCEDURE — 6360000002 HC RX W HCPCS: Performed by: NURSE PRACTITIONER

## 2020-05-31 RX ADMIN — VALACYCLOVIR HYDROCHLORIDE 500 MG: 500 TABLET, FILM COATED ORAL at 21:04

## 2020-05-31 RX ADMIN — PROCHLORPERAZINE MALEATE 10 MG: 10 TABLET ORAL at 08:40

## 2020-05-31 RX ADMIN — TAMSULOSIN HYDROCHLORIDE 0.4 MG: 0.4 CAPSULE ORAL at 08:41

## 2020-05-31 RX ADMIN — Medication 10 ML: at 21:05

## 2020-05-31 RX ADMIN — URSODIOL 500 MG: 250 TABLET, FILM COATED ORAL at 08:41

## 2020-05-31 RX ADMIN — Medication 400 MG: at 13:00

## 2020-05-31 RX ADMIN — URSODIOL 500 MG: 250 TABLET, FILM COATED ORAL at 21:05

## 2020-05-31 RX ADMIN — TACROLIMUS 2 MG: 1 CAPSULE ORAL at 21:04

## 2020-05-31 RX ADMIN — Medication 400 MG: at 21:05

## 2020-05-31 RX ADMIN — MEROPENEM 1 G: 1 INJECTION, POWDER, FOR SOLUTION INTRAVENOUS at 00:38

## 2020-05-31 RX ADMIN — PANTOPRAZOLE SODIUM 40 MG: 40 TABLET, DELAYED RELEASE ORAL at 06:57

## 2020-05-31 RX ADMIN — TACROLIMUS 2 MG: 1 CAPSULE ORAL at 08:41

## 2020-05-31 RX ADMIN — PROCHLORPERAZINE MALEATE 10 MG: 10 TABLET ORAL at 21:05

## 2020-05-31 RX ADMIN — Medication 400 MG: at 08:41

## 2020-05-31 RX ADMIN — Medication 400 MG: at 16:21

## 2020-05-31 RX ADMIN — MEROPENEM 1 G: 1 INJECTION, POWDER, FOR SOLUTION INTRAVENOUS at 08:41

## 2020-05-31 RX ADMIN — PROCHLORPERAZINE MALEATE 10 MG: 10 TABLET ORAL at 00:38

## 2020-05-31 RX ADMIN — FLUCONAZOLE 400 MG: 200 TABLET ORAL at 08:41

## 2020-05-31 RX ADMIN — VALACYCLOVIR HYDROCHLORIDE 500 MG: 500 TABLET, FILM COATED ORAL at 08:41

## 2020-05-31 RX ADMIN — PROCHLORPERAZINE MALEATE 10 MG: 10 TABLET ORAL at 16:21

## 2020-05-31 RX ADMIN — MAGNESIUM SULFATE HEPTAHYDRATE 4 G: 40 INJECTION, SOLUTION INTRAVENOUS at 07:16

## 2020-05-31 RX ADMIN — Medication 10 ML: at 08:42

## 2020-05-31 RX ADMIN — DILTIAZEM HYDROCHLORIDE: 30 TABLET, FILM COATED ORAL at 21:08

## 2020-05-31 RX ADMIN — PROCHLORPERAZINE MALEATE 10 MG: 10 TABLET ORAL at 04:50

## 2020-05-31 RX ADMIN — MEROPENEM 1 G: 1 INJECTION, POWDER, FOR SOLUTION INTRAVENOUS at 16:21

## 2020-05-31 ASSESSMENT — PAIN SCALES - GENERAL
PAINLEVEL_OUTOF10: 0

## 2020-05-31 ASSESSMENT — PAIN DESCRIPTION - PROGRESSION
CLINICAL_PROGRESSION: GRADUALLY IMPROVING
CLINICAL_PROGRESSION: GRADUALLY IMPROVING

## 2020-05-31 NOTE — PROGRESS NOTES
Colorectal Surgery   Daily Progress Note    CC: Anal pain, neutropenia    SUBJECTIVE:  No acute events overnight. Pain continues to improve, tolerating diet, having BMs.     ROS:   A 14 point review of systems was conducted, significant findings as noted above. All other systems negative. OBJECTIVE:  PHYSICAL EXAM:  Vitals:    05/30/20 1620 05/30/20 2133 05/31/20 0037 05/31/20 0448   BP: (!) 141/86 (!) 146/86 127/71 (!) 150/90   Pulse: 76 90 83 81   Resp: 18 16 18 16   Temp: 99.6 °F (37.6 °C) 98.7 °F (37.1 °C) 99.3 °F (37.4 °C) 98.8 °F (37.1 °C)   TempSrc: Oral Oral Oral Oral   SpO2: 99% 98% 97% 96%   Weight:       Height:         General appearance: alert, no acute distress  Eyes: EOMI, no scleral icterus  Neck: trachea midline, no JVD  Chest/Lungs: Normal effort, breathing room air, equal chest rise  Cardiovascular: Regular rate and rhythm, normotensive  Abdomen: soft, non-tender, non-distended,  no guarding/rigidity  Skin: warm and dry, no rashes  Extremities: no edema, no cyanosis  Neuro: A&Ox3, no focal deficits, sensation intact  Rectal: No obvious lesions, no erythema, and no area of fluctuance of the perianal area. MARY was not performed secondary to leukopenia     ASSESSMENT & PLAN:   This is a 63 y.o. male with Hx of MDS who is admitted for chemotherapy and stem cell transplant who complains of 1 week of rectal pain acutely worsening, will treat as fissure. As WBC count recovers may develop abscess. - Continue daily external/perianal exams   - Continue to apply pea-sized amount of diltiazem+lidocaine ointment to the anal verge twice daily  - Sitz baths 2x daily and PRN  - Further management per primary team    Star Chin DO PGY1  General Surgery Resident  05/31/20 7:36 AM  [phone]    I am post-call and off campus today.  If you have any questions or concerns regarding this patient's care today, please page:   Crystal Bocanegra DO PGY-1 [phone]    I have seen, examined, and reviewed the patients chart. I agree with the residents assessment and have made appropriate changes.     Jesse Mcintyre

## 2020-05-31 NOTE — PLAN OF CARE
of excessive bleeding  5/31/2020 0211 by Dangelo Welch RN  Outcome: Ongoing    Patient's hemoglobin this AM:   Recent Labs     05/31/20  0445   HGB 7.6*     Patient's platelet count this AM:   Recent Labs     05/31/20  0445   PLT 43*    Thrombocytopenia Precautions in place. Patient showing no signs or symptoms of active bleeding. Patient transfused blood products per orders - see flowsheet. Patient verbalizes understanding of all instructions. Will continue to assess and implement POC. Call light within reach and hourly rounding in place. Problem: Nausea/Vomiting:  Goal: Absence of nausea/vomiting  Description: Absence of nausea/vomiting  5/31/2020 0211 by Dangelo Welch RN  Outcome: Ongoing    Pt had nausea and dry heaves during this shift. Compazine given per orders. Problem: PROTECTIVE PRECAUTIONS  Goal: Patient will remain free of nosocomial Infections  5/31/2020 0211 by Dangelo Welch RN  Outcome: Ongoing     Problem: Discharge Planning:  Goal: Discharged to appropriate level of care  Description: Discharged to appropriate level of care  5/31/2020 0211 by Dangelo Welch RN  Outcome: Ongoing     Problem: Skin Integrity:  Goal: Absence of new skin breakdown  Description: Absence of new skin breakdown  5/31/2020 0211 by Dangelo Welch RN  Outcome: Ongoing    Pt has rash on skin. No new skin breakdown during this shift. Will continue to monitor.

## 2020-05-31 NOTE — PLAN OF CARE
Ability to tolerate increased activity will improve   Outcome: Ongoing   Stable/No Isolation Precautions:  Pt with activity orders for up ad savannah. Encouraged pt to be up OOB as much as possible throughout the day and for all meals. Encouraged frequent short naps as necessary to preserve energy but instructed that while awake, pt should be OOB. Encouraged pt to ambulate in halls. Pt seen up ad savannah in halls once this shift. Pt is visualized to be OOB 1-25% of the time this shift. Will continue to encourage frequent activity.

## 2020-05-31 NOTE — PROGRESS NOTES
ml   Output 2400 ml   Net -303 ml       Vital Signs:  BP (!) 151/85   Pulse 83   Temp 98.9 °F (37.2 °C) (Oral)   Resp 18   Ht 5' 9.8\" (1.773 m)   Wt 170 lb (77.1 kg)   SpO2 100%   BMI 24.53 kg/m²     Weight:    Wt Readings from Last 3 Encounters:   05/31/20 170 lb (77.1 kg)   03/31/20 184 lb 9.6 oz (83.7 kg)   02/17/20 180 lb (81.6 kg)       General: Awake, alert and oriented. HEENT: normocephalic, alopecia, PERRL, no scleral erythema or icterus, Oral mucosa pale, ulcer left buccal mucosa is healing. NECK: supple without palpable adenopathy  BACK: Straight  SKIN: erythematous papular rash right buttock and back. CHEST: CTA bilaterally without use of accessory muscles  CV: Normal S1 S2, RRR, no MRG  ABD: NT, ND w/ normoactive BS, no palpable masses or hepatosplenomegaly. EXTREMITIES: without edema, denies calf tenderness  NEURO: CN II - XII grossly intact. CATHETER: Left IJ TLH (5/13/20, IR) - CDI & Right IJ PAC (2/17/20, [facility]) - erythema at exist site without pain to palpation. Data:   CBC:   Recent Labs     05/29/20 0334 05/30/20 0345 05/31/20 0445   WBC 0.4* 0.6* 0.8*   HGB 7.2* 6.9* 7.6*   HCT 20.4* 20.0* 21.9*   MCV 91.9 91.8 90.8   PLT 36* 39* 43*     BMP/Mag:  Recent Labs     05/29/20 0334 05/30/20 0345 05/31/20 0445    139 139   K 4.9 4.4 4.0    107 107   CO2 23 22 21   PHOS 2.4* 2.5 2.3*   BUN 10 8 9   CREATININE 0.7* 0.7* 0.8*   MG 1.40* 1.50* 1.30*     LIVP:   Recent Labs     05/29/20 0334 05/30/20 0345 05/31/20 0445   AST 7*  --   --    ALT 7*  --   --    BILIDIR <0.2  --   --    BILITOT 0.6 0.6 0.6   ALKPHOS 58  --   --      Uric Acid:    Recent Labs     05/29/20  0334   LABURIC 3.8     Coags:   No results for input(s): PROTIME, INR, APTT in the last 72 hours. Tacro:    Recent Labs     05/29/20  0830   TACROLEV 15.9     CMV Quant DNA by PCR: No results found for: CMVDNAQNT    PROBLEM LIST:        1. MDS  2. Anxiety and depression  3.  HLD  4. H/o shingles (2004, engrafts     Tacro Level:  Lab Results   Component Value Date    TACROLEV 15.9 05/29/2020    TACROLEV 16.9 05/27/2020    TACROLEV 11.8 05/25/2020       6. VOD:  No evidence of VOD  Recent Labs     05/29/20  0334  05/31/20  0445   BILIDIR <0.2  --   --    BILITOT 0.6   < > 0.6    < > = values in this interval not displayed. Admission Weight: 185 lb (83.9 kg)  Current Weight: Weight: 170 lb (77.1 kg). - Cont Actigall.     7. GI / Nutrition:    Mild Malnutrition:  Appetite & intake has decreased; he is only eating small amounts    - Cont low microbial diet  - Dietician to follow  Constipation:  Resolved  - S/p Dulcolax (5/12/20) w/ BM  Nausea:  Mild nausea, no vomiting   - Cont PRN compazine, Zofran and ativan  Mucositis:  Improving   - Cont Dex swish  Rectal pain:  Ongoing rectal pain, but no signs of abscess. Surgery treating as though is a fissure  - Surgery following, appreciate recs  - CT pelvis (5/25/20) - Small bilateral renal cysts. Otherwise unremarkable study. In particular, no signs of rectal or perirectal abscess. - Cont Sitz baths & lidocaine ointment   - Cont psyllium bid   - Change to Tramadol as needed and try to limit IV Morphine 2 mg prn    8. Neuro:    - H/o resting tremor  Tremor:  Resolved and possibly from Busulfan   - S/p scheduled Ativan  - Cont to monitor   Headaches:  No recent c/o HA  - CT head (5/19/20) - no acute process   - Cont PRN Tylenol & Tramadol as needed      9. Cardiac:  Intermittent hypertension, but w/ symptomatic orthostatic hypotension this morning (5/29/20)  - NS x 1 liter (5/29/20)        - DVT Prophylaxis: Platelets <65,836 cells/dL - prophylactic lovenox on hold and mechanical prophylaxis with bilateral SCDs while in bed in place. Contraindications to pharmacologic prophylaxis: None  Contraindications to mechanical prophylaxis: None    - Disposition:  Once ANC >1 and recovered from toxicities of transplant.         Colton Harding DO, MS  Oncology/Hematology

## 2020-06-01 ENCOUNTER — APPOINTMENT (OUTPATIENT)
Dept: GENERAL RADIOLOGY | Age: 55
DRG: 014 | End: 2020-06-01
Attending: INTERNAL MEDICINE
Payer: COMMERCIAL

## 2020-06-01 LAB
ALBUMIN SERPL-MCNC: 3.3 G/DL (ref 3.4–5)
ALP BLD-CCNC: 57 U/L (ref 40–129)
ALT SERPL-CCNC: 7 U/L (ref 10–40)
ANION GAP SERPL CALCULATED.3IONS-SCNC: 12 MMOL/L (ref 3–16)
ANISOCYTOSIS: ABNORMAL
APTT: 28.3 SEC (ref 24.2–36.2)
AST SERPL-CCNC: 8 U/L (ref 15–37)
BANDED NEUTROPHILS RELATIVE PERCENT: 6 % (ref 0–7)
BASOPHILS ABSOLUTE: 0 K/UL (ref 0–0.2)
BASOPHILS RELATIVE PERCENT: 1 %
BILIRUB SERPL-MCNC: 0.5 MG/DL (ref 0–1)
BILIRUBIN DIRECT: <0.2 MG/DL (ref 0–0.3)
BILIRUBIN URINE: NEGATIVE
BILIRUBIN, INDIRECT: ABNORMAL MG/DL (ref 0–1)
BLOOD, URINE: NEGATIVE
BUN BLDV-MCNC: 7 MG/DL (ref 7–20)
CALCIUM SERPL-MCNC: 8.4 MG/DL (ref 8.3–10.6)
CHLORIDE BLD-SCNC: 106 MMOL/L (ref 99–110)
CLARITY: CLEAR
CO2: 22 MMOL/L (ref 21–32)
COLOR: YELLOW
CREAT SERPL-MCNC: 0.8 MG/DL (ref 0.9–1.3)
EOSINOPHILS ABSOLUTE: 0 K/UL (ref 0–0.6)
EOSINOPHILS RELATIVE PERCENT: 0 %
GFR AFRICAN AMERICAN: >60
GFR NON-AFRICAN AMERICAN: >60
GLUCOSE BLD-MCNC: 91 MG/DL (ref 70–99)
GLUCOSE URINE: NEGATIVE MG/DL
HCT VFR BLD CALC: 22.4 % (ref 40.5–52.5)
HEMOGLOBIN: 7.7 G/DL (ref 13.5–17.5)
HYPOCHROMIA: ABNORMAL
INR BLD: 1.32 (ref 0.86–1.14)
KETONES, URINE: 40 MG/DL
LACTATE DEHYDROGENASE: 173 U/L (ref 100–190)
LEUKOCYTE ESTERASE, URINE: NEGATIVE
LYMPHOCYTES ABSOLUTE: 0.1 K/UL (ref 1–5.1)
LYMPHOCYTES RELATIVE PERCENT: 8 %
MAGNESIUM: 1.6 MG/DL (ref 1.8–2.4)
MCH RBC QN AUTO: 31.3 PG (ref 26–34)
MCHC RBC AUTO-ENTMCNC: 34.4 G/DL (ref 31–36)
MCV RBC AUTO: 90.8 FL (ref 80–100)
MICROSCOPIC EXAMINATION: ABNORMAL
MONOCYTES ABSOLUTE: 0.4 K/UL (ref 0–1.3)
MONOCYTES RELATIVE PERCENT: 44 %
NEUTROPHILS ABSOLUTE: 0.4 K/UL (ref 1.7–7.7)
NEUTROPHILS RELATIVE PERCENT: 41 %
NITRITE, URINE: NEGATIVE
PDW BLD-RTO: 14.9 % (ref 12.4–15.4)
PH UA: 7.5 (ref 5–8)
PHOSPHORUS: 2.6 MG/DL (ref 2.5–4.9)
PLATELET # BLD: 46 K/UL (ref 135–450)
PMV BLD AUTO: 7.6 FL (ref 5–10.5)
POIKILOCYTES: ABNORMAL
POLYCHROMASIA: ABNORMAL
POTASSIUM SERPL-SCNC: 4.4 MMOL/L (ref 3.5–5.1)
PROTEIN UA: NEGATIVE MG/DL
PROTHROMBIN TIME: 15.3 SEC (ref 10–13.2)
RBC # BLD: 2.46 M/UL (ref 4.2–5.9)
SODIUM BLD-SCNC: 140 MMOL/L (ref 136–145)
SPECIFIC GRAVITY UA: 1.01 (ref 1–1.03)
TACROLIMUS BLOOD: 10.6 NG/ML (ref 5–20)
TOTAL PROTEIN: 5.1 G/DL (ref 6.4–8.2)
URIC ACID, SERUM: 4.3 MG/DL (ref 3.5–7.2)
URINE TYPE: ABNORMAL
UROBILINOGEN, URINE: 0.2 E.U./DL
WBC # BLD: 0.9 K/UL (ref 4–11)

## 2020-06-01 PROCEDURE — 83735 ASSAY OF MAGNESIUM: CPT

## 2020-06-01 PROCEDURE — 71045 X-RAY EXAM CHEST 1 VIEW: CPT

## 2020-06-01 PROCEDURE — 2580000003 HC RX 258: Performed by: NURSE PRACTITIONER

## 2020-06-01 PROCEDURE — 83615 LACTATE (LD) (LDH) ENZYME: CPT

## 2020-06-01 PROCEDURE — 84550 ASSAY OF BLOOD/URIC ACID: CPT

## 2020-06-01 PROCEDURE — 85025 COMPLETE CBC W/AUTO DIFF WBC: CPT

## 2020-06-01 PROCEDURE — 84100 ASSAY OF PHOSPHORUS: CPT

## 2020-06-01 PROCEDURE — 6370000000 HC RX 637 (ALT 250 FOR IP): Performed by: INTERNAL MEDICINE

## 2020-06-01 PROCEDURE — 6370000000 HC RX 637 (ALT 250 FOR IP): Performed by: NURSE PRACTITIONER

## 2020-06-01 PROCEDURE — 6360000002 HC RX W HCPCS: Performed by: INTERNAL MEDICINE

## 2020-06-01 PROCEDURE — 80048 BASIC METABOLIC PNL TOTAL CA: CPT

## 2020-06-01 PROCEDURE — 2580000003 HC RX 258: Performed by: INTERNAL MEDICINE

## 2020-06-01 PROCEDURE — 85730 THROMBOPLASTIN TIME PARTIAL: CPT

## 2020-06-01 PROCEDURE — 81003 URINALYSIS AUTO W/O SCOPE: CPT

## 2020-06-01 PROCEDURE — 80076 HEPATIC FUNCTION PANEL: CPT

## 2020-06-01 PROCEDURE — 80197 ASSAY OF TACROLIMUS: CPT

## 2020-06-01 PROCEDURE — 2060000000 HC ICU INTERMEDIATE R&B

## 2020-06-01 PROCEDURE — 85610 PROTHROMBIN TIME: CPT

## 2020-06-01 PROCEDURE — 36592 COLLECT BLOOD FROM PICC: CPT

## 2020-06-01 PROCEDURE — 99231 SBSQ HOSP IP/OBS SF/LOW 25: CPT | Performed by: SURGERY

## 2020-06-01 PROCEDURE — 6370000000 HC RX 637 (ALT 250 FOR IP): Performed by: STUDENT IN AN ORGANIZED HEALTH CARE EDUCATION/TRAINING PROGRAM

## 2020-06-01 RX ORDER — LANOLIN ALCOHOL/MO/W.PET/CERES
800 CREAM (GRAM) TOPICAL 2 TIMES DAILY
Qty: 120 TABLET | Refills: 3 | Status: CANCELLED | OUTPATIENT
Start: 2020-06-01

## 2020-06-01 RX ORDER — LEVOFLOXACIN 500 MG/1
500 TABLET, FILM COATED ORAL NIGHTLY
Status: DISCONTINUED | OUTPATIENT
Start: 2020-06-01 | End: 2020-06-03

## 2020-06-01 RX ORDER — FLUCONAZOLE 200 MG/1
400 TABLET ORAL DAILY
Qty: 60 TABLET | Refills: 3 | Status: CANCELLED | OUTPATIENT
Start: 2020-06-02

## 2020-06-01 RX ORDER — ACETAMINOPHEN 325 MG/1
650 TABLET ORAL EVERY 4 HOURS PRN
Status: DISCONTINUED | OUTPATIENT
Start: 2020-06-01 | End: 2020-06-05 | Stop reason: HOSPADM

## 2020-06-01 RX ADMIN — LEVOFLOXACIN 500 MG: 500 TABLET, FILM COATED ORAL at 21:30

## 2020-06-01 RX ADMIN — Medication 1 PACKET: at 08:30

## 2020-06-01 RX ADMIN — SODIUM CHLORIDE: 900 INJECTION INTRAVENOUS at 03:08

## 2020-06-01 RX ADMIN — SODIUM CHLORIDE 15 ML: 900 IRRIGANT IRRIGATION at 10:51

## 2020-06-01 RX ADMIN — TAMSULOSIN HYDROCHLORIDE 0.4 MG: 0.4 CAPSULE ORAL at 08:30

## 2020-06-01 RX ADMIN — PROCHLORPERAZINE MALEATE 10 MG: 10 TABLET ORAL at 11:34

## 2020-06-01 RX ADMIN — HYDROCORTISONE: 25 CREAM TOPICAL at 21:37

## 2020-06-01 RX ADMIN — PANTOPRAZOLE SODIUM 40 MG: 40 TABLET, DELAYED RELEASE ORAL at 06:53

## 2020-06-01 RX ADMIN — Medication 400 MG: at 16:56

## 2020-06-01 RX ADMIN — DILTIAZEM HYDROCHLORIDE: 30 TABLET, FILM COATED ORAL at 21:37

## 2020-06-01 RX ADMIN — PROCHLORPERAZINE MALEATE 10 MG: 10 TABLET ORAL at 16:56

## 2020-06-01 RX ADMIN — FLUCONAZOLE 400 MG: 200 TABLET ORAL at 08:29

## 2020-06-01 RX ADMIN — VALACYCLOVIR HYDROCHLORIDE 500 MG: 500 TABLET, FILM COATED ORAL at 21:30

## 2020-06-01 RX ADMIN — VALACYCLOVIR HYDROCHLORIDE 500 MG: 500 TABLET, FILM COATED ORAL at 08:29

## 2020-06-01 RX ADMIN — Medication 400 MG: at 08:29

## 2020-06-01 RX ADMIN — MEROPENEM 1 G: 1 INJECTION, POWDER, FOR SOLUTION INTRAVENOUS at 08:30

## 2020-06-01 RX ADMIN — URSODIOL 500 MG: 250 TABLET, FILM COATED ORAL at 08:29

## 2020-06-01 RX ADMIN — Medication 10 ML: at 21:30

## 2020-06-01 RX ADMIN — TACROLIMUS 2 MG: 1 CAPSULE ORAL at 08:29

## 2020-06-01 RX ADMIN — SODIUM CHLORIDE 15 ML: 900 IRRIGANT IRRIGATION at 16:57

## 2020-06-01 RX ADMIN — Medication 400 MG: at 13:16

## 2020-06-01 RX ADMIN — URSODIOL 500 MG: 250 TABLET, FILM COATED ORAL at 21:30

## 2020-06-01 RX ADMIN — ACETAMINOPHEN 650 MG: 325 TABLET ORAL at 09:25

## 2020-06-01 RX ADMIN — TACROLIMUS 2 MG: 1 CAPSULE ORAL at 21:30

## 2020-06-01 RX ADMIN — HYDROCORTISONE: 25 CREAM TOPICAL at 10:51

## 2020-06-01 RX ADMIN — Medication 400 MG: at 21:30

## 2020-06-01 RX ADMIN — SODIUM CHLORIDE: 900 INJECTION INTRAVENOUS at 21:30

## 2020-06-01 RX ADMIN — MEROPENEM 1 G: 1 INJECTION, POWDER, FOR SOLUTION INTRAVENOUS at 01:06

## 2020-06-01 RX ADMIN — DILTIAZEM HYDROCHLORIDE: 30 TABLET, FILM COATED ORAL at 08:30

## 2020-06-01 RX ADMIN — Medication 10 ML: at 08:29

## 2020-06-01 RX ADMIN — PROCHLORPERAZINE MALEATE 10 MG: 10 TABLET ORAL at 01:06

## 2020-06-01 RX ADMIN — PROCHLORPERAZINE MALEATE 10 MG: 10 TABLET ORAL at 06:52

## 2020-06-01 ASSESSMENT — PAIN DESCRIPTION - LOCATION
LOCATION: RECTUM
LOCATION: HEAD

## 2020-06-01 ASSESSMENT — PAIN DESCRIPTION - DESCRIPTORS
DESCRIPTORS: HEADACHE
DESCRIPTORS: SORE

## 2020-06-01 ASSESSMENT — PAIN SCALES - GENERAL
PAINLEVEL_OUTOF10: 4
PAINLEVEL_OUTOF10: 0
PAINLEVEL_OUTOF10: 1
PAINLEVEL_OUTOF10: 0
PAINLEVEL_OUTOF10: 1

## 2020-06-01 ASSESSMENT — PAIN DESCRIPTION - ORIENTATION
ORIENTATION: INNER
ORIENTATION: ANTERIOR

## 2020-06-01 ASSESSMENT — PAIN DESCRIPTION - PAIN TYPE
TYPE: ACUTE PAIN
TYPE: ACUTE PAIN

## 2020-06-01 ASSESSMENT — PAIN DESCRIPTION - PROGRESSION
CLINICAL_PROGRESSION: GRADUALLY IMPROVING
CLINICAL_PROGRESSION: NOT CHANGED
CLINICAL_PROGRESSION: GRADUALLY IMPROVING

## 2020-06-01 ASSESSMENT — PAIN DESCRIPTION - FREQUENCY
FREQUENCY: CONTINUOUS
FREQUENCY: INTERMITTENT

## 2020-06-01 ASSESSMENT — PAIN DESCRIPTION - ONSET
ONSET: ON-GOING
ONSET: ON-GOING

## 2020-06-01 NOTE — PROGRESS NOTES
[facility] Progress Note    2020    Armida Prude    :  1965    MRN:  [id number]      Subjective: C/o burning and tenderness on finger, thumbs and heels of feet. He cont to report decreased appetite, but is trying to eat and drink. He denies diarrhea or constipation. Rectal pain is improving.   Urine flow much improved      ECOG PS:(2) Ambulatory and capable of self care, unable to carry out work activity, up and about > 50% or waking hours      KPS: 70% Cares for self; unable to carry on normal activity or to do active work      Isolation:  None       Medications    Scheduled Meds:   tacrolimus  2 mg Oral BID    magnesium oxide  400 mg Oral 4x Daily    rectal mixture builder   Rectal BID    tamsulosin  0.4 mg Oral Daily    meropenem  1 g Intravenous Q8H    pantoprazole  40 mg Oral QAM AC    fluconazole  400 mg Oral Daily    Saline Mouthwash  15 mL Swish & Spit 4x Daily AC & HS    sodium chloride flush  10 mL Intravenous 2 times per day    valACYclovir  500 mg Oral BID    ursodiol  500 mg Oral BID     Continuous Infusions:   sodium chloride      dextrose      sodium chloride      sodium chloride 75 mL/hr at 20 0308     PRN Meds:psyllium, traMADol, morphine **OR** [DISCONTINUED] morphine, acetaminophen, magic (miracle) mouthwash, [COMPLETED] loperamide **FOLLOWED BY** loperamide, sodium chloride, potassium phosphate IVPB, calcium carbonate, Hydrocerin, glucose, dextrose, glucagon (rDNA), dextrose, sodium chloride, alteplase, magnesium hydroxide, magnesium sulfate, potassium chloride, Saline Mouthwash, prochlorperazine **OR** prochlorperazine, LORazepam **OR** LORazepam    ROS:  As noted above, otherwise remainder of 10-point ROS negative    Physical Exam:    I&O:      Intake/Output Summary (Last 24 hours) at 2020 0718  Last data filed at 2020 0310  Gross per 24 hour   Intake 2097 ml   Output 3225 ml   Net -1128 ml       Vital Signs:  BP (!) Complications:  1. Resting tremor   2. Steroid Induced Hyperglycemia   3. Hypervolemia   4. Constipation   5. Nausea   6. Headache   7. Mucositis  8. Diarrhea / Abdominal Cramping   9. Neutropenic Fever   10. HTN  11. Rectal Pain / Fissure   12. Urinary Retention   13. Rash     TREATMENT:       1. Decitabine x3 cycles - 2/17-4/14/20   2. Targeted Busulfan & Fludarabine followed by MUD Allo PBSC infusion 5/11/20  Preparative Regimen: Targeted Busulfan & Fludarabine  Date of BMT: 5/11/2020  Source of stem cells: PBSC - 6.7 x10^6 [id number]/kg  Donor/Recipient Blood Type: A+ / A+  Donor Sex: Male, follow VNTR (DID# [id number])  CMV Donor / Recipient: Pos / Pos     ASSESSMENT AND PLAN:         1. Myelodysplastic Syndrome: Stable disease  - Most recent BM Bx/Asp (4/7/20) - ongoing disease  - S/p Bu/Flu & MUD-pb BMT  - Busulfan AUC low at 4672, therefore busulfan dose increased from 252 mg to 315 mg, for doses 3 and 4. Day + 21     2. ID: Now afebrile, cont treatment from neutropenic fever, possibly from rectal inflammation/fissure  - Pan - cx (5/20/20) - NGTD  - CXR (5/20/20) - No acute process   - Cont Valtrex & Diflucan ppx, start Levaquin ppx. If ANC > 1 then start Biaxin & Dapson   - S/p Merrem x 14 days (5/20/20 - 6/1/20)    Abx history:  Flagyl x 2 days     Donor/Recipient CMV: Pos / Pos  - Check CMV PCR weekly once WBC 1.0  - Start letermovir ppx if started on high-dose steroids     CMV:  6/1/20 - Pending     3. Heme:  Pancytopenia from chemotherapy, platelets & WBC recovering    - Transfuse for Hgb < 7 and Platelets < 92T.    - No transfusion today      4. Metabolic:  stable renal fxn and e-lytes,  hypoMg  - Cont Mag Ox 400 mg QID (started 5/27/20)   - Decrease IVF:  NS @ 50 mL/hr   - Replace potassium and magnesium per policy.     5. 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  Mostly on areas that touch the bed   - S/p Post-Transplant Cytoxan days + 3

## 2020-06-01 NOTE — PLAN OF CARE
Problem: Nutrition  Goal: Optimal nutrition therapy  Outcome: Ongoing   Nutrition Problem: Inadequate oral intake  Intervention: Food and/or Nutrient Delivery: Continue current diet, Modify current ONS  Nutritional Goals: Pt to meet >50% of nutritional requirements from diet.

## 2020-06-01 NOTE — PROGRESS NOTES
Clinical Pharmacy Progress Note    Patient Name: Jeniffer Morales  YOB: 1965  Diagnosis: MDS - Allogeneic MUD transplant PBSCT- Busulfan/Fludarabine plus post transplant Cytoxan/Prograf    GVHD Prophylaxis:  Post transplant Cytoxan on d+3+4  Tacrolimus (Prograf)   · Adjusted according to levels - drawn via red lumen    Tacrolimus (Prograf) goal level: 8-15 ng/mL    Date SCr Bili Prograf Dose Prograf Level Adjustments / Comments   5/5/2020;   day -6 0.8 0.7 - -  Patient admitted for Allogeneic MUD transplant- will start oral Prograf 2.5 mg po bid on 5/16 with 1st level on 5/20 and then MWF thereafter. 5/20  Day +9 0.6 0.7 2.5 mg po BID 7.3 Level slightly subtherapeutic, but patient recently started taking it. Will wait until next level to increase dose. 5/22  Day +11 0.6 0.4 2.5 mg po BID 7.2 Level therapeutic, will increase dose by 30% to 3.5 mg po qAM and 3 mg po nightly   5/27  Day +16   0.7 0.6 3.5 mg po qAM  3 mg po nightly 16.9 Level supratherapeutic, will hold x 1 dose this evening. Will return to previous dose of 2.5 mg BID at this time. Next level Friday, 5/29.   5/29/20   d+18 0.7 0.6 2.5 mg po bid 15.9 Level still above desired range. Will reduce dose to 2 mg po bid with next level Monday 6/1; d21 0.8 0.5 2 mg po bid 10.6 Tacrolimus level appropriate after two dose decreases in succession. Next tacrolimus level Wed 6/3. Please call with questions.     Olga Candelario PharmD, [address]  Oncology Pharmacist  Office: [phone]  Wireless: [phone]    6/1/2020 8:36 AM

## 2020-06-02 LAB
ANION GAP SERPL CALCULATED.3IONS-SCNC: 11 MMOL/L (ref 3–16)
BASOPHILS ABSOLUTE: 0 K/UL (ref 0–0.2)
BASOPHILS RELATIVE PERCENT: 0 %
BILIRUB SERPL-MCNC: 0.5 MG/DL (ref 0–1)
BUN BLDV-MCNC: 8 MG/DL (ref 7–20)
CALCIUM SERPL-MCNC: 8.1 MG/DL (ref 8.3–10.6)
CHLORIDE BLD-SCNC: 106 MMOL/L (ref 99–110)
CO2: 23 MMOL/L (ref 21–32)
CREAT SERPL-MCNC: 0.8 MG/DL (ref 0.9–1.3)
EOSINOPHILS ABSOLUTE: 0 K/UL (ref 0–0.6)
EOSINOPHILS RELATIVE PERCENT: 0 %
GFR AFRICAN AMERICAN: >60
GFR NON-AFRICAN AMERICAN: >60
GLUCOSE BLD-MCNC: 95 MG/DL (ref 70–99)
HCT VFR BLD CALC: 22.4 % (ref 40.5–52.5)
HEMOGLOBIN: 7.9 G/DL (ref 13.5–17.5)
LYMPHOCYTES ABSOLUTE: 0.1 K/UL (ref 1–5.1)
LYMPHOCYTES RELATIVE PERCENT: 11 %
MAGNESIUM: 1.2 MG/DL (ref 1.8–2.4)
MCH RBC QN AUTO: 31.8 PG (ref 26–34)
MCHC RBC AUTO-ENTMCNC: 35.4 G/DL (ref 31–36)
MCV RBC AUTO: 89.9 FL (ref 80–100)
MONOCYTES ABSOLUTE: 0.5 K/UL (ref 0–1.3)
MONOCYTES RELATIVE PERCENT: 46 %
NEUTROPHILS ABSOLUTE: 0.4 K/UL (ref 1.7–7.7)
NEUTROPHILS RELATIVE PERCENT: 43 %
NUCLEATED RED BLOOD CELLS: 2 /100 WBC
NUCLEATED RED BLOOD CELLS: 2 /100 WBC
PDW BLD-RTO: 15.1 % (ref 12.4–15.4)
PHOSPHORUS: 3.2 MG/DL (ref 2.5–4.9)
PLATELET # BLD: 54 K/UL (ref 135–450)
PMV BLD AUTO: 8 FL (ref 5–10.5)
POTASSIUM SERPL-SCNC: 4.5 MMOL/L (ref 3.5–5.1)
RBC # BLD: 2.5 M/UL (ref 4.2–5.9)
SODIUM BLD-SCNC: 140 MMOL/L (ref 136–145)
WBC # BLD: 1 K/UL (ref 4–11)

## 2020-06-02 PROCEDURE — 36592 COLLECT BLOOD FROM PICC: CPT

## 2020-06-02 PROCEDURE — 83735 ASSAY OF MAGNESIUM: CPT

## 2020-06-02 PROCEDURE — 6370000000 HC RX 637 (ALT 250 FOR IP): Performed by: NURSE PRACTITIONER

## 2020-06-02 PROCEDURE — 6370000000 HC RX 637 (ALT 250 FOR IP): Performed by: INTERNAL MEDICINE

## 2020-06-02 PROCEDURE — 80048 BASIC METABOLIC PNL TOTAL CA: CPT

## 2020-06-02 PROCEDURE — 2580000003 HC RX 258: Performed by: NURSE PRACTITIONER

## 2020-06-02 PROCEDURE — 2060000000 HC ICU INTERMEDIATE R&B

## 2020-06-02 PROCEDURE — 6360000002 HC RX W HCPCS: Performed by: NURSE PRACTITIONER

## 2020-06-02 PROCEDURE — 6360000002 HC RX W HCPCS: Performed by: INTERNAL MEDICINE

## 2020-06-02 PROCEDURE — 82247 BILIRUBIN TOTAL: CPT

## 2020-06-02 PROCEDURE — 84100 ASSAY OF PHOSPHORUS: CPT

## 2020-06-02 PROCEDURE — 85025 COMPLETE CBC W/AUTO DIFF WBC: CPT

## 2020-06-02 PROCEDURE — 6370000000 HC RX 637 (ALT 250 FOR IP): Performed by: STUDENT IN AN ORGANIZED HEALTH CARE EDUCATION/TRAINING PROGRAM

## 2020-06-02 PROCEDURE — 99231 SBSQ HOSP IP/OBS SF/LOW 25: CPT | Performed by: SURGERY

## 2020-06-02 PROCEDURE — 87081 CULTURE SCREEN ONLY: CPT

## 2020-06-02 RX ORDER — LANOLIN ALCOHOL/MO/W.PET/CERES
800 CREAM (GRAM) TOPICAL 3 TIMES DAILY
Status: DISCONTINUED | OUTPATIENT
Start: 2020-06-02 | End: 2020-06-05 | Stop reason: HOSPADM

## 2020-06-02 RX ORDER — ONDANSETRON HYDROCHLORIDE 8 MG/1
8 TABLET, FILM COATED ORAL EVERY 8 HOURS PRN
Status: DISCONTINUED | OUTPATIENT
Start: 2020-06-02 | End: 2020-06-05 | Stop reason: HOSPADM

## 2020-06-02 RX ADMIN — URSODIOL 500 MG: 250 TABLET, FILM COATED ORAL at 21:02

## 2020-06-02 RX ADMIN — SODIUM CHLORIDE 15 ML: 900 IRRIGANT IRRIGATION at 10:51

## 2020-06-02 RX ADMIN — TACROLIMUS 2 MG: 1 CAPSULE ORAL at 21:03

## 2020-06-02 RX ADMIN — PROCHLORPERAZINE MALEATE 10 MG: 10 TABLET ORAL at 06:29

## 2020-06-02 RX ADMIN — VALACYCLOVIR HYDROCHLORIDE 500 MG: 500 TABLET, FILM COATED ORAL at 21:03

## 2020-06-02 RX ADMIN — Medication: at 21:03

## 2020-06-02 RX ADMIN — MAGNESIUM SULFATE HEPTAHYDRATE 4 G: 40 INJECTION, SOLUTION INTRAVENOUS at 06:24

## 2020-06-02 RX ADMIN — SODIUM CHLORIDE 15 ML: 900 IRRIGANT IRRIGATION at 16:21

## 2020-06-02 RX ADMIN — LEVOFLOXACIN 500 MG: 500 TABLET, FILM COATED ORAL at 21:03

## 2020-06-02 RX ADMIN — ACETAMINOPHEN 650 MG: 325 TABLET ORAL at 00:35

## 2020-06-02 RX ADMIN — SODIUM CHLORIDE: 900 INJECTION INTRAVENOUS at 16:19

## 2020-06-02 RX ADMIN — FLUCONAZOLE 400 MG: 200 TABLET ORAL at 09:43

## 2020-06-02 RX ADMIN — Medication 800 MG: at 09:43

## 2020-06-02 RX ADMIN — Medication 800 MG: at 13:16

## 2020-06-02 RX ADMIN — TACROLIMUS 2 MG: 1 CAPSULE ORAL at 09:44

## 2020-06-02 RX ADMIN — SODIUM CHLORIDE 15 ML: 900 IRRIGANT IRRIGATION at 21:03

## 2020-06-02 RX ADMIN — DILTIAZEM HYDROCHLORIDE: 30 TABLET, FILM COATED ORAL at 09:43

## 2020-06-02 RX ADMIN — URSODIOL 500 MG: 250 TABLET, FILM COATED ORAL at 09:44

## 2020-06-02 RX ADMIN — Medication 10 ML: at 21:02

## 2020-06-02 RX ADMIN — HYDROCORTISONE: 25 CREAM TOPICAL at 21:09

## 2020-06-02 RX ADMIN — ONDANSETRON HYDROCHLORIDE 8 MG: 8 TABLET, FILM COATED ORAL at 10:51

## 2020-06-02 RX ADMIN — DILTIAZEM HYDROCHLORIDE: 30 TABLET, FILM COATED ORAL at 21:06

## 2020-06-02 RX ADMIN — Medication 800 MG: at 21:03

## 2020-06-02 RX ADMIN — VALACYCLOVIR HYDROCHLORIDE 500 MG: 500 TABLET, FILM COATED ORAL at 09:43

## 2020-06-02 RX ADMIN — Medication 10 ML: at 09:44

## 2020-06-02 RX ADMIN — TBO-FILGRASTIM 300 MCG: 300 INJECTION, SOLUTION SUBCUTANEOUS at 18:18

## 2020-06-02 RX ADMIN — HYDROCORTISONE: 25 CREAM TOPICAL at 10:50

## 2020-06-02 RX ADMIN — PANTOPRAZOLE SODIUM 40 MG: 40 TABLET, DELAYED RELEASE ORAL at 06:24

## 2020-06-02 ASSESSMENT — PAIN SCALES - GENERAL
PAINLEVEL_OUTOF10: 0
PAINLEVEL_OUTOF10: 3
PAINLEVEL_OUTOF10: 0

## 2020-06-02 ASSESSMENT — PAIN DESCRIPTION - PROGRESSION
CLINICAL_PROGRESSION: NOT CHANGED
CLINICAL_PROGRESSION: NOT CHANGED

## 2020-06-02 ASSESSMENT — PAIN DESCRIPTION - DESCRIPTORS: DESCRIPTORS: HEADACHE

## 2020-06-02 ASSESSMENT — PAIN DESCRIPTION - ONSET: ONSET: ON-GOING

## 2020-06-02 ASSESSMENT — PAIN - FUNCTIONAL ASSESSMENT: PAIN_FUNCTIONAL_ASSESSMENT: ACTIVITIES ARE NOT PREVENTED

## 2020-06-02 ASSESSMENT — PAIN DESCRIPTION - FREQUENCY: FREQUENCY: INTERMITTENT

## 2020-06-02 ASSESSMENT — PAIN DESCRIPTION - PAIN TYPE: TYPE: ACUTE PAIN

## 2020-06-02 ASSESSMENT — PAIN DESCRIPTION - LOCATION: LOCATION: HEAD

## 2020-06-02 ASSESSMENT — PAIN DESCRIPTION - ORIENTATION: ORIENTATION: UPPER

## 2020-06-02 NOTE — PLAN OF CARE
Thromboembolism:  Goal: Will show no signs or symptoms of venous thromboembolism  Description: Will show no signs or symptoms of venous thromboembolism  Outcome: Ongoing  Note: Adherent with DVT Prevention: Pt is at risk for DVT d/t decreased mobility and cancer treatment. Pt educated on importance of activity. Pt has orders for SCDs while in bed (ambulatory). Pt verbalizes understanding of need for prophylaxis while inpatient. Problem: Bleeding:  Goal: Will show no signs and symptoms of excessive bleeding  Description: Will show no signs and symptoms of excessive bleeding  Outcome: Ongoing  Note: Patient's hemoglobin this AM:   Recent Labs     06/02/20  0430   HGB 7.9*     Patient's platelet count this AM:   Recent Labs     06/02/20  0430   PLT 54*    Thrombocytopenia Precautions in place. Patient showing no signs or symptoms of active bleeding. Transfusion not indicated at this time. Patient verbalizes understanding of all instructions. Will continue to assess and implement POC. Call light within reach and hourly rounding in place. Problem: Nausea/Vomiting:  Goal: Absence of nausea/vomiting  Description: Absence of nausea/vomiting  Outcome: Ongoing  Note: Pt continues with ongoing nausea; no emesis noted so far this shift. PRN Zofran administered per eMar. Will continue to monitor. Problem: PROTECTIVE PRECAUTIONS  Goal: Patient will remain free of nosocomial Infections  Outcome: Ongoing  Note: Pt remains in neutropenic precautions per floor policy. Pt, visitors, and staff noted to be following precautions appropriately. Handwashing in place; pt wearing mask in hallway per protocol. Pt in private room. Low microbial diet in place. Will continue to monitor.       Problem: Nutrition Deficit:  Goal: Ability to achieve adequate nutritional intake will improve  Description: Ability to achieve adequate nutritional intake will improve  Outcome: Ongoing  Note: Pt with decreased appetite d/t nausea; see I&O's for details. Will continue to monitor. Problem: Skin Integrity:  Goal: Absence of new skin breakdown  Description: Absence of new skin breakdown  Outcome: Ongoing  Note: Pt continues with rash to lower back and buttocks; Hydrocortisone cream applied per eMar. Pt also reports redness and peeling to scrotum; applying barrier cream per self as needed. Pt up ad savannah and turns/repositions self. Will continue to monitor.

## 2020-06-02 NOTE — PROGRESS NOTES
Colorectal Surgery   Daily Progress Note    CC: Anal pain, neutropenia    SUBJECTIVE:  No acute events overnight. Continues to report interval improvement in perianal pain. States that he is now only experiencing pain after prolonged sitting. Tolerating diet with no nausea and no vomiting. Passing BMs.     ROS:   A 14 point review of systems was conducted, significant findings as noted above. All other systems negative. OBJECTIVE:  PHYSICAL EXAM:  Vitals:    06/01/20 1655 06/01/20 2122 06/02/20 0030 06/02/20 0433   BP: (!) 163/93 (!) 168/83 (!) 147/76 137/88   Pulse: 76 79 81 62   Resp: 18 16 16 18   Temp: 97.8 °F (36.6 °C) 98.3 °F (36.8 °C) 99.2 °F (37.3 °C) 97.7 °F (36.5 °C)   TempSrc: Oral Oral Oral Oral   SpO2:   98% 97%   Weight:       Height:         General appearance: Sitting up at bedside couch this AM in no acute distress  Eyes: EOMI, no scleral icterus  Neck: trachea midline, no JVD  Chest/Lungs: Normal effort, breathing room air, equal chest rise  Cardiovascular: Regular rate and rhythm, normotensive  Abdomen: soft, non-tender, non-distended,  no guarding/rigidity  Skin: warm and dry, no rashes  Extremities: no edema, no cyanosis  Neuro: A&Ox3, no focal deficits, sensation intact  Rectal: No obvious lesions, no erythema, and no area of fluctuance of the perianal area. MARY was not performed secondary to leukopenia     ASSESSMENT & PLAN:   This is a 63 y.o. male with Hx of MDS who is admitted for chemotherapy and stem cell transplant who complains of 1 week of rectal pain acutely worsening, will treat as fissure.  As WBC count recovers may develop abscess, though there are no signs of an abscess as of this time.    - No acute surgical intervention indicated at this time  - Continue daily external/perianal exams   - Continue to apply pea-sized amount of diltiazem+lidocaine ointment to the anal verge twice daily  - Sitz baths 2x daily and PRN  - Further management per primary team  - Please contact Surgery if any changes in exam are noted and/or for any questions or concerns    Armond Briones MD, MPH  PGY-1 General Surgery  06/02/20  6:52 AM  [phone]

## 2020-06-02 NOTE — PROGRESS NOTES
[facility] Progress Note    2020    Armida Prude    :  1965    MRN:  [id number]      Subjective: No new complaints. Having trouble eating due to food aversion. Walking and drinking well. Rash improved.     ECOG PS:(2) Ambulatory and capable of self care, unable to carry out work activity, up and about > 50% or waking hours      KPS: 70% Cares for self; unable to carry on normal activity or to do active work      Isolation:  None       Medications    Scheduled Meds:   levoFLOXacin  500 mg Oral Nightly    hydrocortisone   Topical BID    tacrolimus  2 mg Oral BID    magnesium oxide  400 mg Oral 4x Daily    rectal mixture builder   Rectal BID    pantoprazole  40 mg Oral QAM AC    fluconazole  400 mg Oral Daily    Saline Mouthwash  15 mL Swish & Spit 4x Daily AC & HS    sodium chloride flush  10 mL Intravenous 2 times per day    valACYclovir  500 mg Oral BID    ursodiol  500 mg Oral BID     Continuous Infusions:   sodium chloride      dextrose      sodium chloride      sodium chloride 50 mL/hr at 20 2130     PRN Meds:psyllium, acetaminophen, traMADol, morphine **OR** [DISCONTINUED] morphine, magic (miracle) mouthwash, [COMPLETED] loperamide **FOLLOWED BY** loperamide, sodium chloride, potassium phosphate IVPB, calcium carbonate, Hydrocerin, glucose, dextrose, glucagon (rDNA), dextrose, sodium chloride, alteplase, magnesium hydroxide, magnesium sulfate, potassium chloride, Saline Mouthwash, prochlorperazine **OR** prochlorperazine, LORazepam **OR** LORazepam    ROS:  As noted above, otherwise remainder of 10-point ROS negative    Physical Exam:    I&O:      Intake/Output Summary (Last 24 hours) at 2020 0735  Last data filed at 2020 1640  Gross per 24 hour   Intake 240 ml   Output 975 ml   Net -735 ml       Vital Signs:  /88   Pulse 62   Temp 97.7 °F (36.5 °C) (Oral)   Resp 18   Ht 5' 9.8\" (1.773 m)   Wt 170 lb 14.4 oz (77.5 kg)

## 2020-06-03 LAB
ALBUMIN SERPL-MCNC: 3.5 G/DL (ref 3.4–5)
ALP BLD-CCNC: 64 U/L (ref 40–129)
ALT SERPL-CCNC: 8 U/L (ref 10–40)
ANION GAP SERPL CALCULATED.3IONS-SCNC: 9 MMOL/L (ref 3–16)
AST SERPL-CCNC: 9 U/L (ref 15–37)
BASOPHILS ABSOLUTE: 0 K/UL (ref 0–0.2)
BASOPHILS RELATIVE PERCENT: 0.2 %
BILIRUB SERPL-MCNC: 0.4 MG/DL (ref 0–1)
BILIRUBIN DIRECT: <0.2 MG/DL (ref 0–0.3)
BILIRUBIN, INDIRECT: ABNORMAL MG/DL (ref 0–1)
BUN BLDV-MCNC: 8 MG/DL (ref 7–20)
CALCIUM SERPL-MCNC: 8.6 MG/DL (ref 8.3–10.6)
CHLORIDE BLD-SCNC: 108 MMOL/L (ref 99–110)
CO2: 22 MMOL/L (ref 21–32)
CREAT SERPL-MCNC: 0.9 MG/DL (ref 0.9–1.3)
EOSINOPHILS ABSOLUTE: 0 K/UL (ref 0–0.6)
EOSINOPHILS RELATIVE PERCENT: 0 %
GFR AFRICAN AMERICAN: >60
GFR NON-AFRICAN AMERICAN: >60
GLUCOSE BLD-MCNC: 98 MG/DL (ref 70–99)
HCT VFR BLD CALC: 24.2 % (ref 40.5–52.5)
HEMOGLOBIN: 8.4 G/DL (ref 13.5–17.5)
LACTATE DEHYDROGENASE: 201 U/L (ref 100–190)
LYMPHOCYTES ABSOLUTE: 0.1 K/UL (ref 1–5.1)
LYMPHOCYTES RELATIVE PERCENT: 1.9 %
MAGNESIUM: 1.5 MG/DL (ref 1.8–2.4)
MCH RBC QN AUTO: 31.7 PG (ref 26–34)
MCHC RBC AUTO-ENTMCNC: 34.6 G/DL (ref 31–36)
MCV RBC AUTO: 91.8 FL (ref 80–100)
MONOCYTES ABSOLUTE: 0.6 K/UL (ref 0–1.3)
MONOCYTES RELATIVE PERCENT: 15.5 %
NEUTROPHILS ABSOLUTE: 3.3 K/UL (ref 1.7–7.7)
NEUTROPHILS RELATIVE PERCENT: 82.4 %
PDW BLD-RTO: 15.1 % (ref 12.4–15.4)
PHOSPHORUS: 2.7 MG/DL (ref 2.5–4.9)
PLATELET # BLD: 76 K/UL (ref 135–450)
PMV BLD AUTO: 9.5 FL (ref 5–10.5)
POTASSIUM SERPL-SCNC: 4.6 MMOL/L (ref 3.5–5.1)
RBC # BLD: 2.64 M/UL (ref 4.2–5.9)
SODIUM BLD-SCNC: 139 MMOL/L (ref 136–145)
TACROLIMUS BLOOD: 9.9 NG/ML (ref 5–20)
TOTAL PROTEIN: 5.5 G/DL (ref 6.4–8.2)
URIC ACID, SERUM: 3.9 MG/DL (ref 3.5–7.2)
WBC # BLD: 4 K/UL (ref 4–11)

## 2020-06-03 PROCEDURE — 6370000000 HC RX 637 (ALT 250 FOR IP): Performed by: INTERNAL MEDICINE

## 2020-06-03 PROCEDURE — 84550 ASSAY OF BLOOD/URIC ACID: CPT

## 2020-06-03 PROCEDURE — 6370000000 HC RX 637 (ALT 250 FOR IP): Performed by: NURSE PRACTITIONER

## 2020-06-03 PROCEDURE — 80048 BASIC METABOLIC PNL TOTAL CA: CPT

## 2020-06-03 PROCEDURE — 85025 COMPLETE CBC W/AUTO DIFF WBC: CPT

## 2020-06-03 PROCEDURE — 83615 LACTATE (LD) (LDH) ENZYME: CPT

## 2020-06-03 PROCEDURE — 80197 ASSAY OF TACROLIMUS: CPT

## 2020-06-03 PROCEDURE — 80076 HEPATIC FUNCTION PANEL: CPT

## 2020-06-03 PROCEDURE — 83735 ASSAY OF MAGNESIUM: CPT

## 2020-06-03 PROCEDURE — 36592 COLLECT BLOOD FROM PICC: CPT

## 2020-06-03 PROCEDURE — 6370000000 HC RX 637 (ALT 250 FOR IP): Performed by: STUDENT IN AN ORGANIZED HEALTH CARE EDUCATION/TRAINING PROGRAM

## 2020-06-03 PROCEDURE — 84100 ASSAY OF PHOSPHORUS: CPT

## 2020-06-03 PROCEDURE — 2580000003 HC RX 258: Performed by: NURSE PRACTITIONER

## 2020-06-03 PROCEDURE — 2060000000 HC ICU INTERMEDIATE R&B

## 2020-06-03 PROCEDURE — 6360000002 HC RX W HCPCS: Performed by: INTERNAL MEDICINE

## 2020-06-03 RX ORDER — CLARITHROMYCIN 500 MG/1
250 TABLET, COATED ORAL DAILY
Status: DISCONTINUED | OUTPATIENT
Start: 2020-06-03 | End: 2020-06-05 | Stop reason: HOSPADM

## 2020-06-03 RX ORDER — DAPSONE 100 MG/1
100 TABLET ORAL DAILY
Status: DISCONTINUED | OUTPATIENT
Start: 2020-06-03 | End: 2020-06-05 | Stop reason: HOSPADM

## 2020-06-03 RX ORDER — FLUCONAZOLE 200 MG/1
400 TABLET ORAL DAILY
Status: DISCONTINUED | OUTPATIENT
Start: 2020-06-03 | End: 2020-06-05 | Stop reason: HOSPADM

## 2020-06-03 RX ADMIN — Medication 800 MG: at 13:40

## 2020-06-03 RX ADMIN — URSODIOL 500 MG: 250 TABLET, FILM COATED ORAL at 08:32

## 2020-06-03 RX ADMIN — PANTOPRAZOLE SODIUM 40 MG: 40 TABLET, DELAYED RELEASE ORAL at 06:25

## 2020-06-03 RX ADMIN — VALACYCLOVIR HYDROCHLORIDE 500 MG: 500 TABLET, FILM COATED ORAL at 21:28

## 2020-06-03 RX ADMIN — DILTIAZEM HYDROCHLORIDE: 30 TABLET, FILM COATED ORAL at 08:33

## 2020-06-03 RX ADMIN — SODIUM CHLORIDE 15 ML: 900 IRRIGANT IRRIGATION at 06:25

## 2020-06-03 RX ADMIN — Medication 800 MG: at 08:32

## 2020-06-03 RX ADMIN — DAPSONE 100 MG: 100 TABLET ORAL at 08:32

## 2020-06-03 RX ADMIN — Medication 800 MG: at 21:28

## 2020-06-03 RX ADMIN — CLARITHROMYCIN 250 MG: 500 TABLET, COATED ORAL at 08:32

## 2020-06-03 RX ADMIN — Medication 10 ML: at 08:32

## 2020-06-03 RX ADMIN — TACROLIMUS 2 MG: 1 CAPSULE ORAL at 21:28

## 2020-06-03 RX ADMIN — LORAZEPAM 0.5 MG: 0.5 TABLET ORAL at 21:34

## 2020-06-03 RX ADMIN — VALACYCLOVIR HYDROCHLORIDE 500 MG: 500 TABLET, FILM COATED ORAL at 08:32

## 2020-06-03 RX ADMIN — FLUCONAZOLE 400 MG: 200 TABLET ORAL at 11:41

## 2020-06-03 RX ADMIN — TACROLIMUS 2 MG: 1 CAPSULE ORAL at 08:32

## 2020-06-03 RX ADMIN — URSODIOL 500 MG: 250 TABLET, FILM COATED ORAL at 21:28

## 2020-06-03 RX ADMIN — SODIUM CHLORIDE 15 ML: 900 IRRIGANT IRRIGATION at 16:27

## 2020-06-03 RX ADMIN — SODIUM CHLORIDE 15 ML: 900 IRRIGANT IRRIGATION at 11:42

## 2020-06-03 ASSESSMENT — PAIN SCALES - GENERAL
PAINLEVEL_OUTOF10: 3
PAINLEVEL_OUTOF10: 5
PAINLEVEL_OUTOF10: 0
PAINLEVEL_OUTOF10: 3
PAINLEVEL_OUTOF10: 0

## 2020-06-03 ASSESSMENT — PAIN DESCRIPTION - PROGRESSION
CLINICAL_PROGRESSION: NOT CHANGED

## 2020-06-03 ASSESSMENT — PAIN DESCRIPTION - ONSET
ONSET: ON-GOING
ONSET: ON-GOING
ONSET: SUDDEN

## 2020-06-03 ASSESSMENT — PAIN DESCRIPTION - DESCRIPTORS
DESCRIPTORS: SHARP;SHOOTING
DESCRIPTORS: SHARP;SHOOTING
DESCRIPTORS: DISCOMFORT;SORE

## 2020-06-03 ASSESSMENT — PAIN DESCRIPTION - ORIENTATION
ORIENTATION: RIGHT;LEFT
ORIENTATION: LEFT

## 2020-06-03 ASSESSMENT — PAIN DESCRIPTION - LOCATION
LOCATION: RECTUM
LOCATION: LEG;FOOT
LOCATION: LEG

## 2020-06-03 ASSESSMENT — PAIN DESCRIPTION - PAIN TYPE
TYPE: ACUTE PAIN

## 2020-06-03 ASSESSMENT — PAIN DESCRIPTION - FREQUENCY
FREQUENCY: CONTINUOUS
FREQUENCY: INTERMITTENT
FREQUENCY: INTERMITTENT

## 2020-06-03 ASSESSMENT — PAIN - FUNCTIONAL ASSESSMENT
PAIN_FUNCTIONAL_ASSESSMENT: PREVENTS OR INTERFERES SOME ACTIVE ACTIVITIES AND ADLS

## 2020-06-03 NOTE — PLAN OF CARE
Problem: Pain:  Goal: Pain level will decrease  Description: Pain level will decrease  Outcome: Met This Shift     Problem: Falls - Risk of:  Goal: Will remain free from falls  Description: Will remain free from falls  Outcome: Ongoing  Note: Orthostatic vital signs obtained at start of shift - see flowsheet for details. Pt does not meet criteria for orthostasis. Pt is a Med fall risk. See Sharad Jennifer Fall Score and ABCDS Injury Risk assessments. - Screening for Orthostasis AND not a [place] Risk per PHAM/ABCDS: Pt bed is in low position, side rails up, call light and belongings are in reach. Fall risk light is on outside pts room. Pt encouraged to call for assistance as needed. Will continue with hourly rounds for PO intake, pain needs, toileting and repositioning as needed. Problem: Infection - Central Venous Catheter-Associated Bloodstream Infection:  Goal: Will show no infection signs and symptoms  Description: Will show no infection signs and symptoms  Outcome: Ongoing  Note: CVC site remains free of signs/symptoms of infection. No drainage, edema, erythema, pain, or warmth noted at site. Dressing changes continue per protocol and on an as needed basis - see flowsheet. Pt afebrile this shift. Problem: Venous Thromboembolism:  Goal: Will show no signs or symptoms of venous thromboembolism  Description: Will show no signs or symptoms of venous thromboembolism  Outcome: Ongoing  Note: Refusing DVT Prevention: Pt is at risk for DVT d/t decreased mobility and cancer treatment. Pt educated on importance of activity. Pt has orders for SCDs while in bed, however pt currently refusing treatment. Reviewed risks of DVT & PE development while inpatient. Provider aware of patient's refusal and re-education of importance of prophylaxis. No new orders at this time. Will continue to re-instruct patient and intervene as appropriate.        Problem: Bleeding:  Goal: Will show no signs and symptoms of

## 2020-06-03 NOTE — PLAN OF CARE
nutritional intake will improve  Description: Ability to achieve adequate nutritional intake will improve  Outcome: Ongoing  Note: Pt with decreased appetite d/t nausea; see I&O's for details. Will continue to monitor. Problem: Skin Integrity:  Goal: Absence of new skin breakdown  Description: Absence of new skin breakdown  Outcome: Ongoing  Note: Pt continues with rash to lower back and buttocks; improving and pt declining scheduled hydrocortisone cream this AM. Pt also reports redness and peeling to scrotum; applying barrier cream per self as needed. Pt up ad savannah and turns/repositions self. Will continue to monitor.

## 2020-06-03 NOTE — PROGRESS NOTES
[address] Allogeneic Progress Note    6/3/2020    Mone Ceron    :  1965    MRN:  [id number]      Subjective: Having bone pain, prob from Granix. Walking and drinking well. Rash improved.     ECOG PS:(2) Ambulatory and capable of self care, unable to carry out work activity, up and about > 50% or waking hours      KPS: 70% Cares for self; unable to carry on normal activity or to do active work      Isolation:  None       Medications    Scheduled Meds:   magnesium oxide  800 mg Oral TID    tbo-filgrastim  300 mcg Subcutaneous QPM    levoFLOXacin  500 mg Oral Nightly    hydrocortisone   Topical BID    tacrolimus  2 mg Oral BID    rectal mixture builder   Rectal BID    pantoprazole  40 mg Oral QAM AC    fluconazole  400 mg Oral Daily    Saline Mouthwash  15 mL Swish & Spit 4x Daily AC & HS    sodium chloride flush  10 mL Intravenous 2 times per day    valACYclovir  500 mg Oral BID    ursodiol  500 mg Oral BID     Continuous Infusions:   sodium chloride      dextrose      sodium chloride      sodium chloride 50 mL/hr at 20 1619     PRN Meds:ondansetron, psyllium, acetaminophen, traMADol, morphine **OR** [DISCONTINUED] morphine, magic (miracle) mouthwash, [COMPLETED] loperamide **FOLLOWED BY** loperamide, sodium chloride, potassium phosphate IVPB, calcium carbonate, Hydrocerin, glucose, dextrose, glucagon (rDNA), dextrose, sodium chloride, alteplase, magnesium hydroxide, magnesium sulfate, potassium chloride, Saline Mouthwash, prochlorperazine **OR** prochlorperazine, LORazepam **OR** LORazepam    ROS:  As noted above, otherwise remainder of 10-point ROS negative    Physical Exam:    I&O:      Intake/Output Summary (Last 24 hours) at 6/3/2020 0733  Last data filed at 6/3/2020 8485  Gross per 24 hour   Intake 2563 ml   Output 3925 ml   Net -1362 ml       Vital Signs:  /73   Pulse 75   Temp 99.1 °F (37.3 °C) (Oral)   Resp 20   Ht 5' 9.8\" (1.773 m)   Wt

## 2020-06-03 NOTE — PROGRESS NOTES
Clinical Pharmacy Progress Note    Patient Name: Edilia Medina  YOB: 1965  Diagnosis: MDS - Allogeneic MUD transplant PBSCT- Busulfan/Fludarabine plus post transplant Cytoxan/Prograf    GVHD Prophylaxis:  Post transplant Cytoxan on d+3+4  Tacrolimus (Prograf)   · Adjusted according to levels - drawn via red lumen    Tacrolimus (Prograf) goal level: 8-15 ng/mL    Date SCr Bili Prograf Dose Prograf Level Adjustments / Comments   5/5/2020;   day -6 0.8 0.7 - -  Patient admitted for Allogeneic MUD transplant- will start oral Prograf 2.5 mg po bid on 5/16 with 1st level on 5/20 and then MWF thereafter. 5/20  Day +9 0.6 0.7 2.5 mg po BID 7.3 Level slightly subtherapeutic, but patient recently started taking it. Will wait until next level to increase dose. 5/22  Day +11 0.6 0.4 2.5 mg po BID 7.2 Level therapeutic, will increase dose by 30% to 3.5 mg po qAM and 3 mg po nightly   5/27  Day +16   0.7 0.6 3.5 mg po qAM  3 mg po nightly 16.9 Level supratherapeutic, will hold x 1 dose this evening. Will return to previous dose of 2.5 mg BID at this time. Next level Friday, 5/29.   5/29/20   d+18 0.7 0.6 2.5 mg po bid 15.9 Level still above desired range. Will reduce dose to 2 mg po bid with next level Monday 6/1; d21 0.8 0.5 2 mg po bid 10.6 Tacrolimus level appropriate after two dose decreases in succession. Next tacrolimus level Wed 6/3.   6/3; d23 0.9 0.4 2 mg po bid 9.9 No change. Please call with questions.     Jean Weeks, PharmD, [address]  Oncology Pharmacist  Office: [phone]  Wireless: [phone]    6/3/2020 9:01 AM

## 2020-06-03 NOTE — CARE COORDINATION
including the allogeneic family meeting, preadmission teaching and preadmission physician office Sandi Travis RN BMT Coordinator    Patient and caregiver verbalize understanding of treatment regimen, possible adverse events, length of stay, and risk and benefits of transplantation and are agreeable to proceed. Patient and caregiver have been given an opportunity to ask, and to have their questions answered to their satisfaction. Documentation for above education can be found in the Oncology [facility], [facility] office chart. 5/5/20 Confirmed  Home Pharmacy. Reviewed infusion of stem cells, telemetry & frequent vital signs during infusion of stem cells. Discussed medication management as he prepares for home have spoken to his spouse, Edouard Postal on the phone, re-introduced myself, reinforced my contact information & the [facility] unit phone #.  5/8/20:  Discussed volume of infusion of stem cells( Total Volume-=280 ml)  5/18/20: I have maintained contact with spouse via phone & e-mail, I have responded to questions regarding discahrge/count recovery, I have also informed patient. 5/22/20:  I have called spouse regarding home medications that have been called in & are ready for .  5/27/20:  Spouse, Gretta Brennan given update via phone. Discharge  When [facility] >1.0 & without toxicities      Pending  5/15/20:  The following medications have been called into [facility] ( [phone])  Tacrolimus 0.5 mg bid #60 with 3 refills  Tacrolimus 1 mg cap ( 2 caps) bid #120 with 3 refills  Fluconazole 200 mg ( 2 tablets) daily #60 with 3 refills  Valtrex 500 mg bid #60 with 3 refills  Cost Pending--> 5/18: VM left regarding status----> 5/22/20:  No cost --> Gretta Brennan imformed

## 2020-06-04 LAB
ANION GAP SERPL CALCULATED.3IONS-SCNC: 17 MMOL/L (ref 3–16)
APTT: 31 SEC (ref 24.2–36.2)
BASOPHILS ABSOLUTE: 0 K/UL (ref 0–0.2)
BASOPHILS RELATIVE PERCENT: 0.5 %
BILIRUB SERPL-MCNC: 0.6 MG/DL (ref 0–1)
BUN BLDV-MCNC: 12 MG/DL (ref 7–20)
CALCIUM SERPL-MCNC: 9.3 MG/DL (ref 8.3–10.6)
CHLORIDE BLD-SCNC: 104 MMOL/L (ref 99–110)
CO2: 18 MMOL/L (ref 21–32)
CREAT SERPL-MCNC: 1.3 MG/DL (ref 0.9–1.3)
EOSINOPHILS ABSOLUTE: 0 K/UL (ref 0–0.6)
EOSINOPHILS RELATIVE PERCENT: 0 %
GFR AFRICAN AMERICAN: >60
GFR NON-AFRICAN AMERICAN: 57
GLUCOSE BLD-MCNC: 152 MG/DL (ref 70–99)
HCT VFR BLD CALC: 28 % (ref 40.5–52.5)
HEMOGLOBIN: 9.6 G/DL (ref 13.5–17.5)
INR BLD: 1.22 (ref 0.86–1.14)
LYMPHOCYTES ABSOLUTE: 0.2 K/UL (ref 1–5.1)
LYMPHOCYTES RELATIVE PERCENT: 2.4 %
MAGNESIUM: 1.5 MG/DL (ref 1.8–2.4)
MCH RBC QN AUTO: 31.6 PG (ref 26–34)
MCHC RBC AUTO-ENTMCNC: 34.5 G/DL (ref 31–36)
MCV RBC AUTO: 91.7 FL (ref 80–100)
MONOCYTES ABSOLUTE: 1.1 K/UL (ref 0–1.3)
MONOCYTES RELATIVE PERCENT: 13.7 %
NEUTROPHILS ABSOLUTE: 6.9 K/UL (ref 1.7–7.7)
NEUTROPHILS RELATIVE PERCENT: 83.4 %
PDW BLD-RTO: 15.5 % (ref 12.4–15.4)
PHOSPHORUS: 3.3 MG/DL (ref 2.5–4.9)
PLATELET # BLD: 107 K/UL (ref 135–450)
PMV BLD AUTO: 9.5 FL (ref 5–10.5)
POTASSIUM SERPL-SCNC: 4 MMOL/L (ref 3.5–5.1)
PROTHROMBIN TIME: 14.2 SEC (ref 10–13.2)
RBC # BLD: 3.05 M/UL (ref 4.2–5.9)
SODIUM BLD-SCNC: 139 MMOL/L (ref 136–145)
VRE CULTURE: NORMAL
WBC # BLD: 8.3 K/UL (ref 4–11)

## 2020-06-04 PROCEDURE — 83735 ASSAY OF MAGNESIUM: CPT

## 2020-06-04 PROCEDURE — 2060000000 HC ICU INTERMEDIATE R&B

## 2020-06-04 PROCEDURE — 82247 BILIRUBIN TOTAL: CPT

## 2020-06-04 PROCEDURE — 6370000000 HC RX 637 (ALT 250 FOR IP): Performed by: NURSE PRACTITIONER

## 2020-06-04 PROCEDURE — 2580000003 HC RX 258: Performed by: NURSE PRACTITIONER

## 2020-06-04 PROCEDURE — 6370000000 HC RX 637 (ALT 250 FOR IP): Performed by: INTERNAL MEDICINE

## 2020-06-04 PROCEDURE — 84100 ASSAY OF PHOSPHORUS: CPT

## 2020-06-04 PROCEDURE — 85610 PROTHROMBIN TIME: CPT

## 2020-06-04 PROCEDURE — 6370000000 HC RX 637 (ALT 250 FOR IP): Performed by: STUDENT IN AN ORGANIZED HEALTH CARE EDUCATION/TRAINING PROGRAM

## 2020-06-04 PROCEDURE — 85025 COMPLETE CBC W/AUTO DIFF WBC: CPT

## 2020-06-04 PROCEDURE — 6360000002 HC RX W HCPCS: Performed by: INTERNAL MEDICINE

## 2020-06-04 PROCEDURE — 36592 COLLECT BLOOD FROM PICC: CPT

## 2020-06-04 PROCEDURE — 80048 BASIC METABOLIC PNL TOTAL CA: CPT

## 2020-06-04 PROCEDURE — 85730 THROMBOPLASTIN TIME PARTIAL: CPT

## 2020-06-04 RX ORDER — FLUCONAZOLE 200 MG/1
400 TABLET ORAL DAILY
Qty: 60 TABLET | Refills: 0
Start: 2020-06-05 | End: 2020-08-09

## 2020-06-04 RX ORDER — LOPERAMIDE HYDROCHLORIDE 2 MG/1
2 CAPSULE ORAL PRN
Status: ON HOLD | COMMUNITY
Start: 2020-06-04 | End: 2020-06-25

## 2020-06-04 RX ORDER — LORAZEPAM 0.5 MG/1
0.5 TABLET ORAL
Status: DISCONTINUED | OUTPATIENT
Start: 2020-06-04 | End: 2020-06-05 | Stop reason: HOSPADM

## 2020-06-04 RX ORDER — DAPSONE 100 MG/1
100 TABLET ORAL DAILY
Qty: 30 TABLET | Refills: 3 | Status: SHIPPED | OUTPATIENT
Start: 2020-06-04

## 2020-06-04 RX ORDER — LORAZEPAM 0.5 MG/1
0.5 TABLET ORAL 3 TIMES DAILY PRN
Qty: 30 TABLET | Refills: 0 | Status: CANCELLED | OUTPATIENT
Start: 2020-06-04 | End: 2020-06-19

## 2020-06-04 RX ORDER — PROCHLORPERAZINE MALEATE 10 MG
10 TABLET ORAL EVERY 4 HOURS PRN
Qty: 60 TABLET | Refills: 3 | Status: SHIPPED | OUTPATIENT
Start: 2020-06-04 | End: 2020-08-09

## 2020-06-04 RX ORDER — TACROLIMUS 1 MG/1
2 CAPSULE ORAL 2 TIMES DAILY
Qty: 60 CAPSULE | Refills: 3 | Status: ON HOLD
Start: 2020-06-04 | End: 2020-06-25 | Stop reason: HOSPADM

## 2020-06-04 RX ORDER — 0.9 % SODIUM CHLORIDE 0.9 %
1000 INTRAVENOUS SOLUTION INTRAVENOUS ONCE
Status: COMPLETED | OUTPATIENT
Start: 2020-06-04 | End: 2020-06-04

## 2020-06-04 RX ORDER — CLARITHROMYCIN 250 MG/1
250 TABLET, FILM COATED ORAL DAILY
Qty: 30 TABLET | Refills: 3 | Status: SHIPPED | OUTPATIENT
Start: 2020-06-04

## 2020-06-04 RX ORDER — LANOLIN ALCOHOL/MO/W.PET/CERES
800 CREAM (GRAM) TOPICAL 3 TIMES DAILY
Qty: 180 TABLET | Refills: 3 | Status: ON HOLD | OUTPATIENT
Start: 2020-06-04 | End: 2020-06-25 | Stop reason: HOSPADM

## 2020-06-04 RX ORDER — VALACYCLOVIR HYDROCHLORIDE 500 MG/1
500 TABLET, FILM COATED ORAL 2 TIMES DAILY
Qty: 60 TABLET | Refills: 3
Start: 2020-06-04

## 2020-06-04 RX ORDER — ACETAMINOPHEN 325 MG/1
650 TABLET ORAL EVERY 4 HOURS PRN
Qty: 120 TABLET | Refills: 3 | COMMUNITY
Start: 2020-06-04

## 2020-06-04 RX ADMIN — TACROLIMUS 2 MG: 1 CAPSULE ORAL at 20:26

## 2020-06-04 RX ADMIN — HYDROCORTISONE: 25 CREAM TOPICAL at 09:04

## 2020-06-04 RX ADMIN — SODIUM CHLORIDE 1000 ML: 9 INJECTION, SOLUTION INTRAVENOUS at 09:11

## 2020-06-04 RX ADMIN — DILTIAZEM HYDROCHLORIDE: 30 TABLET, FILM COATED ORAL at 09:03

## 2020-06-04 RX ADMIN — VALACYCLOVIR HYDROCHLORIDE 500 MG: 500 TABLET, FILM COATED ORAL at 20:26

## 2020-06-04 RX ADMIN — URSODIOL 500 MG: 250 TABLET, FILM COATED ORAL at 09:02

## 2020-06-04 RX ADMIN — CLARITHROMYCIN 250 MG: 500 TABLET, COATED ORAL at 09:02

## 2020-06-04 RX ADMIN — Medication 800 MG: at 16:45

## 2020-06-04 RX ADMIN — LORAZEPAM 0.5 MG: 0.5 TABLET ORAL at 16:45

## 2020-06-04 RX ADMIN — TACROLIMUS 2 MG: 1 CAPSULE ORAL at 09:01

## 2020-06-04 RX ADMIN — HYDROCORTISONE: 25 CREAM TOPICAL at 21:12

## 2020-06-04 RX ADMIN — VALACYCLOVIR HYDROCHLORIDE 500 MG: 500 TABLET, FILM COATED ORAL at 09:02

## 2020-06-04 RX ADMIN — PROCHLORPERAZINE MALEATE 10 MG: 10 TABLET ORAL at 17:31

## 2020-06-04 RX ADMIN — Medication 10 ML: at 09:04

## 2020-06-04 RX ADMIN — DILTIAZEM HYDROCHLORIDE: 30 TABLET, FILM COATED ORAL at 21:12

## 2020-06-04 RX ADMIN — URSODIOL 500 MG: 250 TABLET, FILM COATED ORAL at 20:27

## 2020-06-04 RX ADMIN — Medication 800 MG: at 20:26

## 2020-06-04 RX ADMIN — SODIUM CHLORIDE 15 ML: 900 IRRIGANT IRRIGATION at 09:06

## 2020-06-04 RX ADMIN — ONDANSETRON HYDROCHLORIDE 8 MG: 8 TABLET, FILM COATED ORAL at 10:46

## 2020-06-04 RX ADMIN — Medication 800 MG: at 09:02

## 2020-06-04 RX ADMIN — PANTOPRAZOLE SODIUM 40 MG: 40 TABLET, DELAYED RELEASE ORAL at 06:28

## 2020-06-04 RX ADMIN — FLUCONAZOLE 400 MG: 200 TABLET ORAL at 09:01

## 2020-06-04 RX ADMIN — LORAZEPAM 0.5 MG: 0.5 TABLET ORAL at 10:46

## 2020-06-04 RX ADMIN — SODIUM CHLORIDE 15 ML: 900 IRRIGANT IRRIGATION at 16:46

## 2020-06-04 RX ADMIN — DAPSONE 100 MG: 100 TABLET ORAL at 09:02

## 2020-06-04 ASSESSMENT — PAIN DESCRIPTION - PROGRESSION
CLINICAL_PROGRESSION: NOT CHANGED

## 2020-06-04 ASSESSMENT — PAIN SCALES - GENERAL
PAINLEVEL_OUTOF10: 0

## 2020-06-04 NOTE — DISCHARGE SUMMARY
[facility] Discharge Summary             Attending Physician: Jennifer Guzman MD    Name: Maximo Montenegro :  1965  MRN:  [id number]    Admission: 2020   Discharge:       Date: 2020    Diagnosis on admit: Myelodysplastic Syndrome    Procedures: Routine chest x-ray, Chemotherapy, Growth factor, TLH removal / placement, CT head, CT abdomen and pelvis, laboratories, EKG, IV fluid hydration, Panculture for fevers, IV antimicrobial therapy, Respiratory therapy, Oxygen therapy, Blood Product Infusions    Consultations:   1. General Surgery:  Dr. Damien Hutchinson    Medications:    Yonas Jane, [address] [place] [address] Medication Instructions ONH:[id number]    Printed on:20 3276   Medication Information                      acetaminophen (TYLENOL) 325 MG tablet  Take 2 tablets by mouth every 4 hours as needed for Pain (headache)             clarithromycin (BIAXIN) 250 MG tablet  Take 1 tablet by mouth daily             dapsone 100 MG tablet  Take 1 tablet by mouth daily             fluconazole (DIFLUCAN) 200 MG tablet  Take 2 tablets by mouth daily             hydrocortisone 2.5 % cream  Apply topically 2 times daily to hands, heels, low back and buttocks. loperamide (IMODIUM) 2 MG capsule  Take 1 capsule by mouth as needed for Diarrhea             LORazepam (ATIVAN) 0.5 MG tablet  Take 1 tablet by mouth 3 times daily as needed for Anxiety (nausea) for up to 30 days.              magnesium oxide (MAG-OX) 400 (240 Mg) MG tablet  Take 2 tablets by mouth 3 times daily             prochlorperazine (COMPAZINE) 10 MG tablet  Take 1 tablet by mouth every 4 hours as needed (nausea)             tacrolimus (PROGRAF) 1 MG capsule  Take 2 capsules by mouth 2 times daily             ursodiol (BOYD) 250 MG tablet  Take 500 mg by mouth 2 times daily             valACYclovir (VALTREX) 500 MG tablet  Take 1 tablet by mouth 2 times daily                 Reason for Admission: Targeted Busulfan & Fludarabine preparative regimen followed by Matched Unrelated Donor Allogeneic Transplant     Past Medical History:    Martha Burnette is a 64 yo gentleman with h/o high risk Myelodysplastic Syndrome RAEB-1, R-IPSS 4.6. His PMH is also significant for anxiety, depression, HLD, shingles (2004, RLQ) & Gilbert syndrome. His MDS diagnosis was made (8/2019) after he was noted to have mild pancytopenia during a routine evaluation w/ his PCP.  At that time, his WBC was 2.7, Hgb 11.6 & Plts 450K. He was referred to Dr. Farooq Mcgraw and underwent a CT-guided bone marrow biopsy (8/29/19) that revealed a marrow cellularity of 85% w/ 5-6% blasts; FISH + for MLL; Cytogenetics significant for del11. He was monitored off therapy for several months w/out problems w/ infection and did not require transfusion. He was then referred to Dr. Karey Jimenez (1/2020) for consideration of allogeneic SCT. He was started on decitabine (2/17/20) pre-transplant and completed 3 cycles. A repeat BM Bx/Asp (4/7/20) revealed MDS-RAEB2; 6% blasts in aspirate but 15% blasts on touch imprints; Flow revealed 13% myeloid blasts. FISH was normal, however he continues to have findings on cytogenetics suggestive of possible ongoing MLL. He therefore is considered to have stable disease.        Hospital Course:     He was admitted (5/5/20) for targeted Busulfan & Fludarabine preparative regimen. His Busulfan AUC was low, 4672; therefore, his busulfan dose was increased from 252 mg to 315 mg, for doses 3 & 4. He then underwent a Matched (10:10 HLA-matched) unrelated  allogeneic transplant from a male [facility] donor using peripheral blood progenitor cells on 5/11/20 w/ 6.7 x10^6 dp23qswtx/kg. His GVHD prophylaxis included post-transplant Cytoxan on days + 3 & 4 and he continues on tacrolimus.   His transplant hospitalization has been complicated by the following:  resting tremor, steroid induced hyperglycemia,  hypervolemia, constipation, nausea, headache, mucositis, diarrhea / abdominal

## 2020-06-04 NOTE — PLAN OF CARE
will improve  Description: Ability to achieve adequate nutritional intake will improve  Outcome: Ongoing  Note: Pt ate 50-75% of meals this shift. Did have vomiting with lunch      Problem: Skin Integrity:  Goal: Absence of new skin breakdown  Description: Absence of new skin breakdown  Outcome: Ongoing  Note: Pt skin assessed this shift, no new s/s of skin breakdown noted at this time. Will continue to monitor.       Problem: Nutrition  Goal: Optimal nutrition therapy  6/4/2020 1850 by Chery Linares RN  Outcome: Ongoing

## 2020-06-04 NOTE — PROGRESS NOTES
NUTRITION ASSESSMENT  Admission Date: 5/5/2020     Type and Reason for Visit: Reassess    NUTRITION RECOMMENDATIONS:   1. PO Diet: Continue current general diet and encourage intake. 2. ONS: Discontinue as pt reports he can not tolerate them; refuses to drink. 3. Nutrition education: n/v mgt and fluid intake needs reviewed w/patient. NUTRITION ASSESSMENT:  Nutrition followup; MD consult to see pt- having difficulty w/PO intake d/t N/V. RD notes pt was eating fairly w/intermittent n/v and c/o taste changes. He had episode of emesis last night- pt reports consumed 3-32 oz jugs of water in short period of time. Upon RD visit today, pt reports he \"pushed himself\" at lunch and consumed \"all you but one bite\" of a hamburger before throwing up. Asked about antiemetics- new regimen today of Zofran/Ativan; pt then discloses he has just consumed 3-32 oz water pitchers in the last 2 hours. RD cautioned that this volume could induce n/v and he had 24 hours to consume 3L; family at bedside states \"He was told if he doesn't drink fluids, he can't go home. \" RD encouraged pt to have PO goals, encouraged pt to spread out his PO fluid intake to aid w/better n/v control. Pt requested to cancel ONS. RD to monitor.      MALNUTRITION ASSESSMENT  Context: Acute illness or injury   Malnutrition Status: Meets the criteria for moderate malnutrition  Findings of the 6 clinical characteristics of malnutrition (Minimum of 2 out of 6 clinical characteristics is required to make the diagnosis of moderate or severe Protein Calorie Malnutrition based on AND/ASPEN Guidelines):  Energy Intake %: Less than or equal to 75% of estimated energy requirement  Energy Intake Time: Greater than or equal to 7 days  Interpretation of Weight Loss %: 7.5% loss or greater  Interpretation of Weight Loss Time: in 1 month  Body Fat Status: Unable to assess(nfpe w/h d/t cv precautions and social distancing)     Muscle Mass Status: Unable to assess     Fluid Accumulation Status: No significant fluid accumulation  Fluid Accumulation Location: Extremities(Trace in BLE)  Reduced  Strength: Not measured    NUTRITION DIAGNOSIS   Problem: Inadequate Oral Intake  Etiology: Altered GI function   Signs & Symptoms: Intake 0-25%, Nausea, Vomiting  and Weight loss     NUTRITION INTERVENTION  Food and/or Nutrient Delivery:Continue Current diet  or Discontinue ONS   Nutrition education/counseling/coordination of care: Continue Inpatient Monitoring     NUTRITION MONITORING & EVALUATION:  Evaluation:Progress towards goal declining   Goals: Pt will improve PO intake to consume greater than 75% of meals offered with improvement to n/v mgt. Monitoring: Diet Tolerance , Meal Intake , Nausea , Weight  or Vomiting      OBJECTIVE DATA:  · Nutrition-Focused Physical Findings: emesis x1 today; c/o taste alterations; gagging w/foods. · Wounds None      No past medical history on file.      ANTHROPOMETRICS  Current Height: 5' 9.8\" (177.3 cm)  Current Weight: 169 lb 8 oz (76.9 kg)    Admission weight: 185 lb (83.9 kg)  Ideal Bodyweight 166 lb (75.5 kg)   Usual Bodyweight 180 lb per EMR   Adjusted Bodyweight n/a  Weight Changes -16 lb since admit       BMI BMI (Calculated): 24.5    Wt Readings from Last 50 Encounters:   06/04/20 169 lb 8 oz (76.9 kg)   03/31/20 184 lb 9.6 oz (83.7 kg)   02/17/20 180 lb (81.6 kg)       COMPARATIVE STANDARDS  Estimated Total Kcals/Day : 25-30  Current Bodyweight (77.5 kg) 1761-3275 kcal    Estimated Total Protein (g/day) : 1.2-1.5 Current Bodyweight (77.5 kg)  g/day  Estimated Daily Total Fluid (ml/day): 7913-9190 mL per day     Food / Nutrition-Related History  Pre-Admission / Home Diet:  Pre-Admission/Home Diet: General   Home Supplements / Herbals:    none noted  Food Restrictions / Cultural Requests:   none noted    Diet Orders / Intake / Nutrition Support  Current diet/supplement order: DIET GENERAL;     NSG Recorded PO:   PO Fluids P.O.: 125 mL(milk)  PO Meals PO Meals Eaten (%): 76 - 100%(cereal)   PO Intake: 26-50% and 51-75%   PO Supplement: Standard High Calorie   Frozen Oral Supplement    PO Supplement Intake: 0%  pt report.    IVF:  50 mL/hr     NUTRITION RISK LEVEL: Risk Level: KUMAR Harrison LD  [place]:  [phone]  Office:  [phone]

## 2020-06-04 NOTE — PROGRESS NOTES
Complications:  1. Resting tremor   2. Steroid Induced Hyperglycemia   3. Hypervolemia   4. Constipation   5. Nausea   6. Headache   7. Mucositis  8. Diarrhea / Abdominal Cramping   9. Neutropenic Fever   10. HTN  11. Rectal Pain / Fissure   12. Urinary Retention   13. Rash     TREATMENT:       1. Decitabine x3 cycles - 2/17-4/14/20   2. Targeted Busulfan & Fludarabine followed by MUD Allo PBSC infusion 5/11/20  Preparative Regimen: Targeted Busulfan & Fludarabine  Date of BMT: 5/11/2020  Source of stem cells: PBSC - 6.7 x10^6 [id number]/kg  Donor/Recipient Blood Type: A+ / A+  Donor Sex: Male, follow VNTR (DID# [id number])  CMV Donor / Recipient: Pos / Pos   - Busulfan AUC low at 4672, therefore busulfan dose increased from 252 mg to 315 mg, for doses 3 and 4. ASSESSMENT AND PLAN:         1. Myelodysplastic Syndrome: Stable disease  - Most recent BM Bx/Asp (4/7/20) - ongoing disease  - S/p Bu/Flu & MUD-pb BMT    Day + 24      2. ID: Afebrile and w/out evidence of infection   - Cont Diflucan, Valtrex, Biaxin & Dapsone    Abx history:  Merrem x 14 days (5/20/20 - 6/1/20)  Flagyl x 2 days     Donor/Recipient CMV: Pos / Pos  - Check CMV PCR weekly once WBC 1.0  - Start letermovir ppx if started on high-dose steroids     CMV:  6/1/20 - Pending     3. Heme:  Pancytopenia from chemotherapy, platelets & WBC recovering    - Transfuse for Hgb < 7 and Platelets < 13Z.    - No transfusion today      4. Metabolic:  Mild GRIFFIN, possibly from decreased oral intake and medications w/ met acidosis & hypoMg  - Cont Mag Ox 800 mg TID (started 5/27/20)   - 1 L NS bolus (6/4/20), encourage PO intake   - Replace potassium and magnesium per policy.     5. 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  Mostly on areas that touch the bed   - S/p Post-Transplant Cytoxan days + 3 & 4   - Cont tacrolimus 2 mg bid      Tacro Level:  Lab Results   Component Value Date    TACROLEV 9.9

## 2020-06-05 VITALS
RESPIRATION RATE: 16 BRPM | BODY MASS INDEX: 23.85 KG/M2 | HEART RATE: 83 BPM | OXYGEN SATURATION: 100 % | SYSTOLIC BLOOD PRESSURE: 136 MMHG | WEIGHT: 166.6 LBS | DIASTOLIC BLOOD PRESSURE: 84 MMHG | HEIGHT: 70 IN | TEMPERATURE: 98.2 F

## 2020-06-05 LAB
ALBUMIN SERPL-MCNC: 3.5 G/DL (ref 3.4–5)
ALP BLD-CCNC: 80 U/L (ref 40–129)
ALT SERPL-CCNC: 10 U/L (ref 10–40)
ANION GAP SERPL CALCULATED.3IONS-SCNC: 13 MMOL/L (ref 3–16)
AST SERPL-CCNC: 12 U/L (ref 15–37)
BASOPHILS ABSOLUTE: 0 K/UL (ref 0–0.2)
BASOPHILS RELATIVE PERCENT: 0.3 %
BILIRUB SERPL-MCNC: 0.4 MG/DL (ref 0–1)
BILIRUBIN DIRECT: <0.2 MG/DL (ref 0–0.3)
BILIRUBIN, INDIRECT: ABNORMAL MG/DL (ref 0–1)
BUN BLDV-MCNC: 9 MG/DL (ref 7–20)
CALCIUM SERPL-MCNC: 8.5 MG/DL (ref 8.3–10.6)
CHLORIDE BLD-SCNC: 105 MMOL/L (ref 99–110)
CMV DNA QNT PCR: <2.6 LOG CPY/ML
CMV DNA QUANTATATIVE INTERPRETATION: <2.4 LOG IU/ML
CMV DNA QUANTATATIVE INTERPRETATION: NOT DETECTED
CMV DNA QUANTITATIVE: <227 IU/ML
CMV SOURCE: NORMAL
CMVQ COPY/ML: <390 CPY/ML
CO2: 21 MMOL/L (ref 21–32)
CREAT SERPL-MCNC: 1 MG/DL (ref 0.9–1.3)
EOSINOPHILS ABSOLUTE: 0 K/UL (ref 0–0.6)
EOSINOPHILS RELATIVE PERCENT: 0.1 %
GFR AFRICAN AMERICAN: >60
GFR NON-AFRICAN AMERICAN: >60
GLUCOSE BLD-MCNC: 114 MG/DL (ref 70–99)
HCT VFR BLD CALC: 25.7 % (ref 40.5–52.5)
HEMOGLOBIN: 8.7 G/DL (ref 13.5–17.5)
LACTATE DEHYDROGENASE: 224 U/L (ref 100–190)
LYMPHOCYTES ABSOLUTE: 0.2 K/UL (ref 1–5.1)
LYMPHOCYTES RELATIVE PERCENT: 3.7 %
MAGNESIUM: 1.5 MG/DL (ref 1.8–2.4)
MCH RBC QN AUTO: 31.1 PG (ref 26–34)
MCHC RBC AUTO-ENTMCNC: 33.9 G/DL (ref 31–36)
MCV RBC AUTO: 91.8 FL (ref 80–100)
MONOCYTES ABSOLUTE: 0.8 K/UL (ref 0–1.3)
MONOCYTES RELATIVE PERCENT: 15.3 %
NEUTROPHILS ABSOLUTE: 4.1 K/UL (ref 1.7–7.7)
NEUTROPHILS RELATIVE PERCENT: 80.6 %
PDW BLD-RTO: 15.5 % (ref 12.4–15.4)
PHOSPHORUS: 4 MG/DL (ref 2.5–4.9)
PLATELET # BLD: 108 K/UL (ref 135–450)
PMV BLD AUTO: 9.6 FL (ref 5–10.5)
POTASSIUM SERPL-SCNC: 4.1 MMOL/L (ref 3.5–5.1)
RBC # BLD: 2.8 M/UL (ref 4.2–5.9)
SODIUM BLD-SCNC: 139 MMOL/L (ref 136–145)
TACROLIMUS BLOOD: 12.5 NG/ML (ref 5–20)
TOTAL PROTEIN: 5.6 G/DL (ref 6.4–8.2)
URIC ACID, SERUM: 4.9 MG/DL (ref 3.5–7.2)
WBC # BLD: 5 K/UL (ref 4–11)

## 2020-06-05 PROCEDURE — 6370000000 HC RX 637 (ALT 250 FOR IP): Performed by: INTERNAL MEDICINE

## 2020-06-05 PROCEDURE — 84100 ASSAY OF PHOSPHORUS: CPT

## 2020-06-05 PROCEDURE — 80048 BASIC METABOLIC PNL TOTAL CA: CPT

## 2020-06-05 PROCEDURE — 83735 ASSAY OF MAGNESIUM: CPT

## 2020-06-05 PROCEDURE — 84550 ASSAY OF BLOOD/URIC ACID: CPT

## 2020-06-05 PROCEDURE — 6370000000 HC RX 637 (ALT 250 FOR IP): Performed by: NURSE PRACTITIONER

## 2020-06-05 PROCEDURE — 83615 LACTATE (LD) (LDH) ENZYME: CPT

## 2020-06-05 PROCEDURE — 6360000002 HC RX W HCPCS: Performed by: INTERNAL MEDICINE

## 2020-06-05 PROCEDURE — 6370000000 HC RX 637 (ALT 250 FOR IP): Performed by: STUDENT IN AN ORGANIZED HEALTH CARE EDUCATION/TRAINING PROGRAM

## 2020-06-05 PROCEDURE — 85025 COMPLETE CBC W/AUTO DIFF WBC: CPT

## 2020-06-05 PROCEDURE — 36592 COLLECT BLOOD FROM PICC: CPT

## 2020-06-05 PROCEDURE — 80197 ASSAY OF TACROLIMUS: CPT

## 2020-06-05 PROCEDURE — 80076 HEPATIC FUNCTION PANEL: CPT

## 2020-06-05 PROCEDURE — 2580000003 HC RX 258: Performed by: NURSE PRACTITIONER

## 2020-06-05 PROCEDURE — 6360000002 HC RX W HCPCS: Performed by: NURSE PRACTITIONER

## 2020-06-05 RX ORDER — HEPARIN SODIUM (PORCINE) LOCK FLUSH IV SOLN 100 UNIT/ML 100 UNIT/ML
1500 SOLUTION INTRAVENOUS ONCE
Status: COMPLETED | OUTPATIENT
Start: 2020-06-05 | End: 2020-06-05

## 2020-06-05 RX ORDER — LORAZEPAM 0.5 MG/1
0.5 TABLET ORAL 3 TIMES DAILY PRN
Qty: 60 TABLET | Refills: 0 | Status: SHIPPED | OUTPATIENT
Start: 2020-06-05 | End: 2020-07-05

## 2020-06-05 RX ADMIN — FLUCONAZOLE 400 MG: 200 TABLET ORAL at 08:29

## 2020-06-05 RX ADMIN — VALACYCLOVIR HYDROCHLORIDE 500 MG: 500 TABLET, FILM COATED ORAL at 08:28

## 2020-06-05 RX ADMIN — Medication 1500 UNITS: at 11:04

## 2020-06-05 RX ADMIN — Medication 800 MG: at 08:29

## 2020-06-05 RX ADMIN — HYDROCORTISONE: 25 CREAM TOPICAL at 08:31

## 2020-06-05 RX ADMIN — TACROLIMUS 2 MG: 1 CAPSULE ORAL at 08:29

## 2020-06-05 RX ADMIN — LORAZEPAM 0.5 MG: 0.5 TABLET ORAL at 06:16

## 2020-06-05 RX ADMIN — PANTOPRAZOLE SODIUM 40 MG: 40 TABLET, DELAYED RELEASE ORAL at 06:16

## 2020-06-05 RX ADMIN — Medication 10 ML: at 08:32

## 2020-06-05 RX ADMIN — DILTIAZEM HYDROCHLORIDE: 30 TABLET, FILM COATED ORAL at 08:31

## 2020-06-05 RX ADMIN — CLARITHROMYCIN 250 MG: 500 TABLET, COATED ORAL at 08:29

## 2020-06-05 RX ADMIN — URSODIOL 500 MG: 250 TABLET, FILM COATED ORAL at 08:28

## 2020-06-05 RX ADMIN — DAPSONE 100 MG: 100 TABLET ORAL at 08:28

## 2020-06-05 ASSESSMENT — PAIN DESCRIPTION - PROGRESSION
CLINICAL_PROGRESSION: NOT CHANGED

## 2020-06-05 ASSESSMENT — PAIN SCALES - GENERAL
PAINLEVEL_OUTOF10: 0

## 2020-06-05 NOTE — PROGRESS NOTES
Reviewed discharge instructions with patient and  spouse. Reviewed discharge medications including dosing, schedule, indication, and adverse reactions. Reviewed which medications were already taken today and next dosage due for each medication. Reviewed signs and symptoms that prompt a call to the physician and appropriate phone numbers. Purple ER card given to the patient with explanations of its use. Reviewed follow up appointments that have been made in [facility] and Outpatient Oncology. Low microbial diet, activity restrictions, and increased risk of infection were reviewed. Patient is being discharged with IV access d/t need for ongoing therapy:      Type:  L benz                      Plan:continue   Next dressing change due on: 6/12/20  Cap changes due on: 6/12/20  CVC care and maintenance was reviewed with patient and spouse. Pt verbalizes understanding of line care and maintenance. Patient verbalized understanding of all instructions and questions were answered to his. satisfaction. Signed discharge instructions were given to the patient and a copy placed in the paper-lite chart. Patient discharged to home per wheelchair with spouse.       [facility]

## 2020-06-05 NOTE — CARE COORDINATION
Case Management Assessment            Discharge Note                    Date / Time of Note: 6/5/2020 9:13 AM                  Discharge Note Completed by: Caden Hernández    Patient Name: Nette Fountain   YOB: 1965  Diagnosis: Myelodysplastic syndrome ([facility] [address] [D46.9]  MDS (myelodysplastic syndrome) ([facility] [address] [D46.9]   Date / Time: 5/5/2020  8:30 AM    Current PCP: Bradford Nash MD  Clinic patient: No    Hospitalization in the last 30 days: Yes    Advance Directives:  Code Status: Full Code  [place] DNR form completed and on chart: No    Financial:  Payor: Maria Luisa Antonia / Plan: Justinandra Antonia / Product Type: Indemnity /      Pharmacy:    [address] [address]  Phone: [phone] Fax: [phone]      Assistance purchasing medications?: Potential Assistance Purchasing Medications: No  Assistance provided by Case Management: None at this time    Does patient want to participate in local refill/ meds to beds program?: No    Meds To Beds General Rules:  1. Can ONLY be done Monday- Friday between 8:30am-5pm  2. Prescription(s) must be in pharmacy by 3pm to be filled same day  3. Copy of patient's insurance/ prescription drug card and patient face sheet must be sent along with the prescription(s)  4. Cost of Rx cannot be added to hospital bill. If financial assistance is needed, please contact unit  or ;  or  CANNOT provide pharmacy voucher for patients co-pays  5.  Patients can then  the prescription on their way out of the hospital at discharge, or pharmacy can deliver to the bedside if staff is available. (payment due at time of pick-up or delivery - cash, check, or card accepted)     Able to afford home medications/ co-pay costs: Yes    ADLS:  Current PT AM-PAC Score:   /24  Current OT AM-PAC Score:   /24      DISCHARGE

## 2020-06-05 NOTE — PLAN OF CARE
Problem: Pain:  Goal: Pain level will decrease  Description: Pain level will decrease  Outcome: Met This Shift     Problem: Falls - Risk of:  Goal: Will remain free from falls  Description: Will remain free from falls  Outcome: Ongoing  Note: Orthostatic vital signs obtained at start of shift - see flowsheet for details. Pt does not meet criteria for orthostasis. Pt is a Med fall risk. See Nannie Kehr Fall Score and ABCDS Injury Risk assessments. - Screening for Orthostasis AND not a [place] Risk per PHAM/ABCDS: Pt bed is in low position, side rails up, call light and belongings are in reach. Fall risk light is on outside pts room. Pt encouraged to call for assistance as needed. Will continue with hourly rounds for PO intake, pain needs, toileting and repositioning as needed. Problem: Infection - Central Venous Catheter-Associated Bloodstream Infection:  Goal: Will show no infection signs and symptoms  Description: Will show no infection signs and symptoms  Outcome: Ongoing  Note: CVC site remains free of signs/symptoms of infection. No drainage, edema, erythema, pain, or warmth noted at site. Dressing changes continue per protocol and on an as needed basis - see flowsheet. Pt afebrile this shift. Problem: Venous Thromboembolism:  Goal: Will show no signs or symptoms of venous thromboembolism  Description: Will show no signs or symptoms of venous thromboembolism  Outcome: Ongoing  Note: Refusing DVT Prevention: Pt is at risk for DVT d/t decreased mobility and cancer treatment. Pt educated on importance of activity. Pt has orders for SCDs while in bed, however pt currently refusing treatment. Reviewed risks of DVT & PE development while inpatient. Provider aware of patient's refusal and re-education of importance of prophylaxis. No new orders at this time. Will continue to re-instruct patient and intervene as appropriate.        Problem: Bleeding:  Goal: Will show no signs and symptoms of excessive bleeding  Description: Will show no signs and symptoms of excessive bleeding  Outcome: Ongoing  Note: Patient's hemoglobin this AM:   Recent Labs     06/05/20  0330   HGB 8.7*     Patient's platelet count this AM:   Recent Labs     06/05/20  0330   *    Thrombocytopenia Precautions in place. Patient showing no signs or symptoms of active bleeding. Transfusion not indicated at this time. Patient verbalizes understanding of all instructions. Will continue to assess and implement POC. Call light within reach and hourly rounding in place. Problem: Anxiety:  Goal: Level of anxiety will decrease  Description: Level of anxiety will decrease  Outcome: Ongoing     Problem: Nausea/Vomiting:  Goal: Absence of nausea/vomiting  Description: Absence of nausea/vomiting  Outcome: Met This Shift     Problem: PROTECTIVE PRECAUTIONS  Goal: Patient will remain free of nosocomial Infections  Outcome: Ongoing  Note: Pt remains in neutropenic precautions per floor policy. Pt, visitors, and staff noted to be following precautions appropriately. Handwashing in place; pt wearing mask in hallway per protocol. Pt in private room. Low microbial diet in place. Will continue to monitor. Problem: Discharge Planning:  Goal: Discharged to appropriate level of care  Description: Discharged to appropriate level of care  Outcome: Ongoing     Problem: Activity:  Goal: Ability to tolerate increased activity will improve  Description: Ability to tolerate increased activity will improve   Outcome: Ongoing  Note: Stable/No Isolation Precautions:  Pt with activity orders for up ad [place]. Pt is visualized to be OOB 0% of the time this shift. Will continue to encourage frequent activity during the day.        Problem: Nutrition Deficit:  Goal: Ability to achieve adequate nutritional intake will improve  Description: Ability to achieve adequate nutritional intake will improve  Outcome: Ongoing  Note: Patient ate 100 percent of dinner.       Problem: Skin Integrity:  Goal: Absence of new skin breakdown  Description: Absence of new skin breakdown  Outcome: Ongoing

## 2020-06-08 ENCOUNTER — HOSPITAL ENCOUNTER (OUTPATIENT)
Dept: ONCOLOGY | Age: 55
Setting detail: INFUSION SERIES
Discharge: HOME | End: 2020-06-08
Payer: COMMERCIAL

## 2020-06-08 LAB
ANISOCYTOSIS: ABNORMAL
BANDED NEUTROPHILS RELATIVE PERCENT: 10 % (ref 0–7)
BASOPHILS ABSOLUTE: 0 K/UL (ref 0–0.2)
BASOPHILS RELATIVE PERCENT: 1 %
EOSINOPHILS ABSOLUTE: 0.1 K/UL (ref 0–0.6)
EOSINOPHILS RELATIVE PERCENT: 3 %
HCT VFR BLD CALC: 27.4 % (ref 40.5–52.5)
HEMOGLOBIN: 9.4 G/DL (ref 13.5–17.5)
LYMPHOCYTES ABSOLUTE: 0.2 K/UL (ref 1–5.1)
LYMPHOCYTES RELATIVE PERCENT: 5 %
MCH RBC QN AUTO: 31.5 PG (ref 26–34)
MCHC RBC AUTO-ENTMCNC: 34.2 G/DL (ref 31–36)
MCV RBC AUTO: 92 FL (ref 80–100)
MONOCYTES ABSOLUTE: 1.2 K/UL (ref 0–1.3)
MONOCYTES RELATIVE PERCENT: 33 %
NEUTROPHILS ABSOLUTE: 2.1 K/UL (ref 1.7–7.7)
NEUTROPHILS RELATIVE PERCENT: 48 %
PDW BLD-RTO: 15.7 % (ref 12.4–15.4)
PLATELET # BLD: 214 K/UL (ref 135–450)
PMV BLD AUTO: 8.4 FL (ref 5–10.5)
RBC # BLD: 2.98 M/UL (ref 4.2–5.9)
WBC # BLD: 3.7 K/UL (ref 4–11)

## 2020-06-08 PROCEDURE — 85025 COMPLETE CBC W/AUTO DIFF WBC: CPT

## 2020-06-09 ENCOUNTER — HOSPITAL ENCOUNTER (OUTPATIENT)
Dept: GENERAL RADIOLOGY | Age: 55
Discharge: HOME | End: 2020-06-09
Payer: COMMERCIAL

## 2020-06-09 PROCEDURE — 71046 X-RAY EXAM CHEST 2 VIEWS: CPT

## 2020-06-13 LAB
BLOOD CULTURE, ROUTINE: NORMAL
CULTURE, BLOOD 2: NORMAL
CULTURE, BLOOD 3: NORMAL

## 2020-06-14 ENCOUNTER — HOSPITAL ENCOUNTER (INPATIENT)
Age: 55
LOS: 11 days | Discharge: HOME | DRG: 391 | End: 2020-06-25
Attending: INTERNAL MEDICINE | Admitting: INTERNAL MEDICINE
Payer: COMMERCIAL

## 2020-06-14 ENCOUNTER — APPOINTMENT (OUTPATIENT)
Dept: GENERAL RADIOLOGY | Age: 55
DRG: 391 | End: 2020-06-14
Attending: INTERNAL MEDICINE
Payer: COMMERCIAL

## 2020-06-14 PROBLEM — N17.9 ACUTE KIDNEY INJURY (HCC): Status: ACTIVE | Noted: 2020-06-14

## 2020-06-14 LAB
ALBUMIN SERPL-MCNC: 3.7 G/DL (ref 3.4–5)
ALP BLD-CCNC: 80 U/L (ref 40–129)
ALT SERPL-CCNC: 11 U/L (ref 10–40)
ANION GAP SERPL CALCULATED.3IONS-SCNC: 10 MMOL/L (ref 3–16)
APTT: 27.2 SEC (ref 24.2–36.2)
AST SERPL-CCNC: 12 U/L (ref 15–37)
BASOPHILS ABSOLUTE: 0 K/UL (ref 0–0.2)
BASOPHILS RELATIVE PERCENT: 0.7 %
BILIRUB SERPL-MCNC: 0.5 MG/DL (ref 0–1)
BILIRUBIN DIRECT: <0.2 MG/DL (ref 0–0.3)
BILIRUBIN URINE: NEGATIVE
BILIRUBIN, INDIRECT: ABNORMAL MG/DL (ref 0–1)
BLOOD, URINE: NEGATIVE
BUN BLDV-MCNC: 17 MG/DL (ref 7–20)
C DIFF TOXIN/ANTIGEN: NORMAL
CALCIUM SERPL-MCNC: 8.5 MG/DL (ref 8.3–10.6)
CHLORIDE BLD-SCNC: 102 MMOL/L (ref 99–110)
CLARITY: CLEAR
CO2: 20 MMOL/L (ref 21–32)
COLOR: YELLOW
CREAT SERPL-MCNC: 1.5 MG/DL (ref 0.9–1.3)
EOSINOPHILS ABSOLUTE: 0.5 K/UL (ref 0–0.6)
EOSINOPHILS RELATIVE PERCENT: 8.2 %
GFR AFRICAN AMERICAN: 59
GFR NON-AFRICAN AMERICAN: 48
GLUCOSE BLD-MCNC: 134 MG/DL (ref 70–99)
GLUCOSE URINE: NEGATIVE MG/DL
HCT VFR BLD CALC: 24.2 % (ref 40.5–52.5)
HEMOGLOBIN: 8 G/DL (ref 13.5–17.5)
KETONES, URINE: NEGATIVE MG/DL
LACTATE DEHYDROGENASE: 332 U/L (ref 100–190)
LACTIC ACID: 1.8 MMOL/L (ref 0.4–2)
LEUKOCYTE ESTERASE, URINE: NEGATIVE
LYMPHOCYTES ABSOLUTE: 0.5 K/UL (ref 1–5.1)
LYMPHOCYTES RELATIVE PERCENT: 8.2 %
MAGNESIUM: 2.7 MG/DL (ref 1.8–2.4)
MCH RBC QN AUTO: 30.7 PG (ref 26–34)
MCHC RBC AUTO-ENTMCNC: 33.2 G/DL (ref 31–36)
MCV RBC AUTO: 92.4 FL (ref 80–100)
MICROSCOPIC EXAMINATION: NORMAL
MONOCYTES ABSOLUTE: 1.4 K/UL (ref 0–1.3)
MONOCYTES RELATIVE PERCENT: 20.3 %
NEUTROPHILS ABSOLUTE: 4.2 K/UL (ref 1.7–7.7)
NEUTROPHILS RELATIVE PERCENT: 62.6 %
NITRITE, URINE: NEGATIVE
PDW BLD-RTO: 17.1 % (ref 12.4–15.4)
PH UA: 5.5 (ref 5–8)
PHOSPHORUS: 3.8 MG/DL (ref 2.5–4.9)
PLATELET # BLD: 211 K/UL (ref 135–450)
PMV BLD AUTO: 7.8 FL (ref 5–10.5)
POTASSIUM SERPL-SCNC: 3.8 MMOL/L (ref 3.5–5.1)
PROTEIN UA: NEGATIVE MG/DL
RBC # BLD: 2.62 M/UL (ref 4.2–5.9)
SODIUM BLD-SCNC: 132 MMOL/L (ref 136–145)
SPECIFIC GRAVITY UA: 1.02 (ref 1–1.03)
TOTAL PROTEIN: 5.9 G/DL (ref 6.4–8.2)
URIC ACID, SERUM: 6.9 MG/DL (ref 3.5–7.2)
URINE TYPE: NORMAL
UROBILINOGEN, URINE: 0.2 E.U./DL
WBC # BLD: 6.6 K/UL (ref 4–11)

## 2020-06-14 PROCEDURE — 71046 X-RAY EXAM CHEST 2 VIEWS: CPT

## 2020-06-14 PROCEDURE — 80076 HEPATIC FUNCTION PANEL: CPT

## 2020-06-14 PROCEDURE — 83605 ASSAY OF LACTIC ACID: CPT

## 2020-06-14 PROCEDURE — 87086 URINE CULTURE/COLONY COUNT: CPT

## 2020-06-14 PROCEDURE — 87103 BLOOD FUNGUS CULTURE: CPT

## 2020-06-14 PROCEDURE — 87253 VIRUS INOCULATE TISSUE ADDL: CPT

## 2020-06-14 PROCEDURE — 87040 BLOOD CULTURE FOR BACTERIA: CPT

## 2020-06-14 PROCEDURE — 87449 NOS EACH ORGANISM AG IA: CPT

## 2020-06-14 PROCEDURE — 84550 ASSAY OF BLOOD/URIC ACID: CPT

## 2020-06-14 PROCEDURE — 83615 LACTATE (LD) (LDH) ENZYME: CPT

## 2020-06-14 PROCEDURE — 85730 THROMBOPLASTIN TIME PARTIAL: CPT

## 2020-06-14 PROCEDURE — 80048 BASIC METABOLIC PNL TOTAL CA: CPT

## 2020-06-14 PROCEDURE — 6360000002 HC RX W HCPCS: Performed by: INTERNAL MEDICINE

## 2020-06-14 PROCEDURE — 31720 CLEARANCE OF AIRWAYS: CPT

## 2020-06-14 PROCEDURE — 6370000000 HC RX 637 (ALT 250 FOR IP): Performed by: INTERNAL MEDICINE

## 2020-06-14 PROCEDURE — U0003 INFECTIOUS AGENT DETECTION BY NUCLEIC ACID (DNA OR RNA); SEVERE ACUTE RESPIRATORY SYNDROME CORONAVIRUS 2 (SARS-COV-2) (CORONAVIRUS DISEASE [COVID-19]), AMPLIFIED PROBE TECHNIQUE, MAKING USE OF HIGH THROUGHPUT TECHNOLOGIES AS DESCRIBED BY CMS-2020-01-R: HCPCS

## 2020-06-14 PROCEDURE — 85025 COMPLETE CBC W/AUTO DIFF WBC: CPT

## 2020-06-14 PROCEDURE — 87205 SMEAR GRAM STAIN: CPT

## 2020-06-14 PROCEDURE — 2060000000 HC ICU INTERMEDIATE R&B

## 2020-06-14 PROCEDURE — 87252 VIRUS INOCULATION TISSUE: CPT

## 2020-06-14 PROCEDURE — 83735 ASSAY OF MAGNESIUM: CPT

## 2020-06-14 PROCEDURE — 87102 FUNGUS ISOLATION CULTURE: CPT

## 2020-06-14 PROCEDURE — 36592 COLLECT BLOOD FROM PICC: CPT

## 2020-06-14 PROCEDURE — 2580000003 HC RX 258: Performed by: INTERNAL MEDICINE

## 2020-06-14 PROCEDURE — 81003 URINALYSIS AUTO W/O SCOPE: CPT

## 2020-06-14 PROCEDURE — 87081 CULTURE SCREEN ONLY: CPT

## 2020-06-14 PROCEDURE — 94150 VITAL CAPACITY TEST: CPT

## 2020-06-14 PROCEDURE — 84100 ASSAY OF PHOSPHORUS: CPT

## 2020-06-14 PROCEDURE — 93005 ELECTROCARDIOGRAM TRACING: CPT | Performed by: INTERNAL MEDICINE

## 2020-06-14 PROCEDURE — 80197 ASSAY OF TACROLIMUS: CPT

## 2020-06-14 PROCEDURE — 87324 CLOSTRIDIUM AG IA: CPT

## 2020-06-14 RX ORDER — MAGNESIUM SULFATE IN WATER 40 MG/ML
4 INJECTION, SOLUTION INTRAVENOUS PRN
Status: DISCONTINUED | OUTPATIENT
Start: 2020-06-14 | End: 2020-06-25 | Stop reason: HOSPADM

## 2020-06-14 RX ORDER — PROCHLORPERAZINE MALEATE 10 MG
10 TABLET ORAL EVERY 4 HOURS PRN
Status: DISCONTINUED | OUTPATIENT
Start: 2020-06-14 | End: 2020-06-25 | Stop reason: HOSPADM

## 2020-06-14 RX ORDER — SODIUM CHLORIDE 9 MG/ML
500 INJECTION, SOLUTION INTRAVENOUS CONTINUOUS PRN
Status: DISCONTINUED | OUTPATIENT
Start: 2020-06-14 | End: 2020-06-14

## 2020-06-14 RX ORDER — SODIUM CHLORIDE 9 MG/ML
INJECTION, SOLUTION INTRAVENOUS CONTINUOUS
Status: DISCONTINUED | OUTPATIENT
Start: 2020-06-14 | End: 2020-06-25 | Stop reason: HOSPADM

## 2020-06-14 RX ORDER — SODIUM CHLORIDE 0.9 % (FLUSH) 0.9 %
10 SYRINGE (ML) INJECTION EVERY 12 HOURS SCHEDULED
Status: DISCONTINUED | OUTPATIENT
Start: 2020-06-14 | End: 2020-06-25 | Stop reason: HOSPADM

## 2020-06-14 RX ORDER — SODIUM CHLORIDE 0.9 % (FLUSH) 0.9 %
10 SYRINGE (ML) INJECTION PRN
Status: DISCONTINUED | OUTPATIENT
Start: 2020-06-14 | End: 2020-06-25 | Stop reason: HOSPADM

## 2020-06-14 RX ORDER — VALACYCLOVIR HYDROCHLORIDE 500 MG/1
500 TABLET, FILM COATED ORAL 2 TIMES DAILY
Status: DISCONTINUED | OUTPATIENT
Start: 2020-06-14 | End: 2020-06-25 | Stop reason: HOSPADM

## 2020-06-14 RX ORDER — POTASSIUM CHLORIDE 29.8 MG/ML
20 INJECTION INTRAVENOUS PRN
Status: DISCONTINUED | OUTPATIENT
Start: 2020-06-14 | End: 2020-06-25 | Stop reason: HOSPADM

## 2020-06-14 RX ORDER — CLARITHROMYCIN 500 MG/1
250 TABLET, COATED ORAL DAILY
Status: DISCONTINUED | OUTPATIENT
Start: 2020-06-15 | End: 2020-06-14

## 2020-06-14 RX ORDER — URSODIOL 250 MG/1
500 TABLET, FILM COATED ORAL 2 TIMES DAILY
Status: DISCONTINUED | OUTPATIENT
Start: 2020-06-14 | End: 2020-06-25 | Stop reason: HOSPADM

## 2020-06-14 RX ORDER — LORAZEPAM 0.5 MG/1
0.5 TABLET ORAL 3 TIMES DAILY PRN
Status: DISCONTINUED | OUTPATIENT
Start: 2020-06-14 | End: 2020-06-25 | Stop reason: HOSPADM

## 2020-06-14 RX ORDER — LOPERAMIDE HYDROCHLORIDE 2 MG/1
2 CAPSULE ORAL PRN
Status: DISCONTINUED | OUTPATIENT
Start: 2020-06-14 | End: 2020-06-25 | Stop reason: HOSPADM

## 2020-06-14 RX ORDER — ACETAMINOPHEN 325 MG/1
650 TABLET ORAL EVERY 4 HOURS PRN
Status: DISCONTINUED | OUTPATIENT
Start: 2020-06-14 | End: 2020-06-25 | Stop reason: HOSPADM

## 2020-06-14 RX ORDER — POTASSIUM CHLORIDE 7.45 MG/ML
10 INJECTION INTRAVENOUS PRN
Status: DISCONTINUED | OUTPATIENT
Start: 2020-06-14 | End: 2020-06-25 | Stop reason: HOSPADM

## 2020-06-14 RX ORDER — DAPSONE 100 MG/1
100 TABLET ORAL DAILY
Status: DISCONTINUED | OUTPATIENT
Start: 2020-06-15 | End: 2020-06-25 | Stop reason: HOSPADM

## 2020-06-14 RX ORDER — FLUCONAZOLE 200 MG/1
400 TABLET ORAL DAILY
Status: DISCONTINUED | OUTPATIENT
Start: 2020-06-14 | End: 2020-06-18

## 2020-06-14 RX ORDER — SODIUM CHLORIDE 9 MG/ML
INJECTION, SOLUTION INTRAVENOUS
Status: DISCONTINUED
Start: 2020-06-14 | End: 2020-06-15

## 2020-06-14 RX ADMIN — Medication 10 ML: at 20:01

## 2020-06-14 RX ADMIN — ACETAMINOPHEN 650 MG: 325 TABLET ORAL at 19:07

## 2020-06-14 RX ADMIN — VALACYCLOVIR HYDROCHLORIDE 500 MG: 500 TABLET, FILM COATED ORAL at 20:01

## 2020-06-14 RX ADMIN — LORAZEPAM 0.5 MG: 0.5 TABLET ORAL at 15:52

## 2020-06-14 RX ADMIN — SODIUM CHLORIDE 15 ML: 900 IRRIGANT IRRIGATION at 13:15

## 2020-06-14 RX ADMIN — FLUCONAZOLE 400 MG: 200 TABLET ORAL at 13:15

## 2020-06-14 RX ADMIN — SODIUM CHLORIDE: 9 INJECTION, SOLUTION INTRAVENOUS at 13:12

## 2020-06-14 RX ADMIN — SODIUM CHLORIDE 15 ML: 900 IRRIGANT IRRIGATION at 15:53

## 2020-06-14 RX ADMIN — URSODIOL 500 MG: 250 TABLET, FILM COATED ORAL at 20:01

## 2020-06-14 RX ADMIN — SODIUM CHLORIDE: 9 INJECTION, SOLUTION INTRAVENOUS at 23:38

## 2020-06-14 RX ADMIN — MEROPENEM 1 G: 1 INJECTION, POWDER, FOR SOLUTION INTRAVENOUS at 19:07

## 2020-06-14 ASSESSMENT — PAIN DESCRIPTION - PROGRESSION
CLINICAL_PROGRESSION: NOT CHANGED

## 2020-06-14 ASSESSMENT — PAIN SCALES - GENERAL
PAINLEVEL_OUTOF10: 0

## 2020-06-14 NOTE — PROGRESS NOTES
Temperature 100.8. Pt symptomatic with chills. Notified Dr. Ewing Ahumada, pan culture intiated, pt started on Merrem. Clarithromycin discontinued per order.           Temp 100.8    Site(s) / Line Type Time Cultures obtained   Date 6/14/2020  1830  L TLH   White and Blue lumen  [facility]

## 2020-06-14 NOTE — H&P
< 7 and Platelets < 69Q.    - No transfusion today. 4.  Metabolic:  He has an GRIFFIN  - Start IVF: NS at 100 ml/hr  - Replace potassium and magnesium per policy. 5. 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 We will have a GI evaluation scheduled for that  - Post-Transplant Cytoxan days + 3 & 4 (5/14/20 & 5/15/20)  - Hold tacrolimus until level is available    Tacro Level:    5/20/20 - First Level   Lab Results   Component Value Date    TACROLEV 12.5 06/05/2020    TACROLEV 9.9 06/03/2020    TACROLEV 10.6 06/01/2020       6. VOD:  No evidence of VOD. Admission Weight: 185 lb (83.9 kg)  Current Weight: Weight: 166 lb 9.6 oz (75.6 kg). No results for input(s): BILIDIR, BILITOT in the last 72 hours. - Cont Actigall. 7. Pulmonary: No active issues. - Encourage IS and ambulation      8. GI / Nutrition: Appetite & intake good  - Start low microbial diet  - Dietician to follow    - DVT Prophylaxis: Platelets >61,372 cells/dL, - daily lovenox prophylaxis ordered  Contraindications to pharmacologic prophylaxis: None  Contraindications to mechanical prophylaxis: None    - Disposition:  Once ANC >1 and recovered from toxicities of transplant.         Peggy Thompson MD

## 2020-06-14 NOTE — PLAN OF CARE
this AM:   Recent Labs     06/14/20  1300       Thrombocytopenia not present at this time. Patient showing no signs or symptoms of active bleeding. Transfusion not indicated at this time. Patient verbalizes understanding of all instructions. Will continue to assess and implement POC. Call light within reach and hourly rounding in place. Problem: Nausea/Vomiting:  Goal: Ability to achieve adequate nutritional intake will improve  Description: Ability to achieve adequate nutritional intake will improve  Outcome: Ongoing  Note: Patient admitted with ongoing nausea/vomiting at home. Ativan administered prior to dinner. Pt able to intake all of dinner. Denies any nausea/vomiting this shift. Will continue to monitor.

## 2020-06-15 ENCOUNTER — APPOINTMENT (OUTPATIENT)
Dept: CT IMAGING | Age: 55
DRG: 391 | End: 2020-06-15
Attending: INTERNAL MEDICINE
Payer: COMMERCIAL

## 2020-06-15 LAB
ALBUMIN SERPL-MCNC: 3.4 G/DL (ref 3.4–5)
ALP BLD-CCNC: 71 U/L (ref 40–129)
ALT SERPL-CCNC: 9 U/L (ref 10–40)
ANION GAP SERPL CALCULATED.3IONS-SCNC: 8 MMOL/L (ref 3–16)
APTT: 24.3 SEC (ref 24.2–36.2)
AST SERPL-CCNC: 11 U/L (ref 15–37)
BASOPHILS ABSOLUTE: 0 K/UL (ref 0–0.2)
BASOPHILS RELATIVE PERCENT: 0.7 %
BILIRUB SERPL-MCNC: 0.6 MG/DL (ref 0–1)
BILIRUBIN DIRECT: <0.2 MG/DL (ref 0–0.3)
BILIRUBIN, INDIRECT: ABNORMAL MG/DL (ref 0–1)
BUN BLDV-MCNC: 14 MG/DL (ref 7–20)
CALCIUM SERPL-MCNC: 8.8 MG/DL (ref 8.3–10.6)
CHLORIDE BLD-SCNC: 108 MMOL/L (ref 99–110)
CO2: 20 MMOL/L (ref 21–32)
CREAT SERPL-MCNC: 1.3 MG/DL (ref 0.9–1.3)
EKG ATRIAL RATE: 67 BPM
EKG DIAGNOSIS: NORMAL
EKG P AXIS: 20 DEGREES
EKG P-R INTERVAL: 150 MS
EKG Q-T INTERVAL: 442 MS
EKG QRS DURATION: 80 MS
EKG QTC CALCULATION (BAZETT): 467 MS
EKG R AXIS: 21 DEGREES
EKG T AXIS: 22 DEGREES
EKG VENTRICULAR RATE: 67 BPM
EOSINOPHILS ABSOLUTE: 0.9 K/UL (ref 0–0.6)
EOSINOPHILS RELATIVE PERCENT: 14.6 %
GFR AFRICAN AMERICAN: >60
GFR NON-AFRICAN AMERICAN: 57
GLUCOSE BLD-MCNC: 92 MG/DL (ref 70–99)
HCT VFR BLD CALC: 22.1 % (ref 40.5–52.5)
HEMOGLOBIN: 7.6 G/DL (ref 13.5–17.5)
INR BLD: 1.11 (ref 0.86–1.14)
LACTATE DEHYDROGENASE: 326 U/L (ref 100–190)
LYMPHOCYTES ABSOLUTE: 0.4 K/UL (ref 1–5.1)
LYMPHOCYTES RELATIVE PERCENT: 6.4 %
MAGNESIUM: 1.9 MG/DL (ref 1.8–2.4)
MCH RBC QN AUTO: 31.4 PG (ref 26–34)
MCHC RBC AUTO-ENTMCNC: 34.2 G/DL (ref 31–36)
MCV RBC AUTO: 91.6 FL (ref 80–100)
MONOCYTES ABSOLUTE: 1.3 K/UL (ref 0–1.3)
MONOCYTES RELATIVE PERCENT: 20.4 %
NEUTROPHILS ABSOLUTE: 3.7 K/UL (ref 1.7–7.7)
NEUTROPHILS RELATIVE PERCENT: 57.9 %
PDW BLD-RTO: 16.9 % (ref 12.4–15.4)
PHOSPHORUS: 3.8 MG/DL (ref 2.5–4.9)
PLATELET # BLD: 190 K/UL (ref 135–450)
PMV BLD AUTO: 7.6 FL (ref 5–10.5)
POTASSIUM SERPL-SCNC: 4.3 MMOL/L (ref 3.5–5.1)
PROTHROMBIN TIME: 12.9 SEC (ref 10–13.2)
RBC # BLD: 2.41 M/UL (ref 4.2–5.9)
REPORT: NORMAL
SARS-COV-2: NOT DETECTED
SODIUM BLD-SCNC: 136 MMOL/L (ref 136–145)
TACROLIMUS BLOOD: 13.2 NG/ML (ref 5–20)
TACROLIMUS BLOOD: 17.5 NG/ML (ref 5–20)
THIS TEST SENT TO: NORMAL
TOTAL PROTEIN: 5.4 G/DL (ref 6.4–8.2)
URIC ACID, SERUM: 7 MG/DL (ref 3.5–7.2)
URINE CULTURE, ROUTINE: NORMAL
WBC # BLD: 6.4 K/UL (ref 4–11)

## 2020-06-15 PROCEDURE — 2060000000 HC ICU INTERMEDIATE R&B

## 2020-06-15 PROCEDURE — 6370000000 HC RX 637 (ALT 250 FOR IP): Performed by: INTERNAL MEDICINE

## 2020-06-15 PROCEDURE — 6360000002 HC RX W HCPCS: Performed by: NURSE PRACTITIONER

## 2020-06-15 PROCEDURE — 84550 ASSAY OF BLOOD/URIC ACID: CPT

## 2020-06-15 PROCEDURE — 80048 BASIC METABOLIC PNL TOTAL CA: CPT

## 2020-06-15 PROCEDURE — 85730 THROMBOPLASTIN TIME PARTIAL: CPT

## 2020-06-15 PROCEDURE — 80076 HEPATIC FUNCTION PANEL: CPT

## 2020-06-15 PROCEDURE — 93010 ELECTROCARDIOGRAM REPORT: CPT | Performed by: INTERNAL MEDICINE

## 2020-06-15 PROCEDURE — 80197 ASSAY OF TACROLIMUS: CPT

## 2020-06-15 PROCEDURE — 85025 COMPLETE CBC W/AUTO DIFF WBC: CPT

## 2020-06-15 PROCEDURE — 2580000003 HC RX 258: Performed by: INTERNAL MEDICINE

## 2020-06-15 PROCEDURE — 6360000002 HC RX W HCPCS: Performed by: INTERNAL MEDICINE

## 2020-06-15 PROCEDURE — 85610 PROTHROMBIN TIME: CPT

## 2020-06-15 PROCEDURE — 84100 ASSAY OF PHOSPHORUS: CPT

## 2020-06-15 PROCEDURE — 71250 CT THORAX DX C-: CPT

## 2020-06-15 PROCEDURE — 83735 ASSAY OF MAGNESIUM: CPT

## 2020-06-15 PROCEDURE — 36592 COLLECT BLOOD FROM PICC: CPT

## 2020-06-15 PROCEDURE — 83615 LACTATE (LD) (LDH) ENZYME: CPT

## 2020-06-15 RX ORDER — OXYCODONE HYDROCHLORIDE 5 MG/1
10 TABLET ORAL EVERY 6 HOURS PRN
Status: DISCONTINUED | OUTPATIENT
Start: 2020-06-15 | End: 2020-06-25 | Stop reason: HOSPADM

## 2020-06-15 RX ORDER — OXYCODONE HYDROCHLORIDE 5 MG/1
5 TABLET ORAL EVERY 6 HOURS PRN
Status: DISCONTINUED | OUTPATIENT
Start: 2020-06-15 | End: 2020-06-25 | Stop reason: HOSPADM

## 2020-06-15 RX ORDER — TACROLIMUS 1 MG/1
1 CAPSULE ORAL 2 TIMES DAILY
Status: DISCONTINUED | OUTPATIENT
Start: 2020-06-16 | End: 2020-06-18

## 2020-06-15 RX ORDER — TACROLIMUS 1 MG/1
1 CAPSULE ORAL DAILY
Status: DISCONTINUED | OUTPATIENT
Start: 2020-06-15 | End: 2020-06-15

## 2020-06-15 RX ADMIN — VALACYCLOVIR HYDROCHLORIDE 500 MG: 500 TABLET, FILM COATED ORAL at 20:58

## 2020-06-15 RX ADMIN — SODIUM CHLORIDE: 9 INJECTION, SOLUTION INTRAVENOUS at 23:05

## 2020-06-15 RX ADMIN — MEROPENEM 1 G: 1 INJECTION, POWDER, FOR SOLUTION INTRAVENOUS at 03:11

## 2020-06-15 RX ADMIN — MEROPENEM 1 G: 1 INJECTION, POWDER, FOR SOLUTION INTRAVENOUS at 18:30

## 2020-06-15 RX ADMIN — MEROPENEM 1 G: 1 INJECTION, POWDER, FOR SOLUTION INTRAVENOUS at 11:06

## 2020-06-15 RX ADMIN — Medication 10 ML: at 20:59

## 2020-06-15 RX ADMIN — URSODIOL 500 MG: 250 TABLET, FILM COATED ORAL at 20:58

## 2020-06-15 RX ADMIN — VALACYCLOVIR HYDROCHLORIDE 500 MG: 500 TABLET, FILM COATED ORAL at 08:21

## 2020-06-15 RX ADMIN — POLYETHYLENE GLYCOL 3350, SODIUM SULFATE ANHYDROUS, SODIUM BICARBONATE, SODIUM CHLORIDE, POTASSIUM CHLORIDE 4000 ML: 236; 22.74; 6.74; 5.86; 2.97 POWDER, FOR SOLUTION ORAL at 14:50

## 2020-06-15 RX ADMIN — ACETAMINOPHEN 650 MG: 325 TABLET ORAL at 16:31

## 2020-06-15 RX ADMIN — Medication 10 ML: at 08:20

## 2020-06-15 RX ADMIN — TACROLIMUS 1 MG: 1 CAPSULE ORAL at 08:29

## 2020-06-15 RX ADMIN — DAPSONE 100 MG: 100 TABLET ORAL at 08:21

## 2020-06-15 RX ADMIN — FLUCONAZOLE 400 MG: 200 TABLET ORAL at 08:21

## 2020-06-15 RX ADMIN — SODIUM CHLORIDE: 9 INJECTION, SOLUTION INTRAVENOUS at 10:46

## 2020-06-15 RX ADMIN — OXYCODONE 5 MG: 5 TABLET ORAL at 03:52

## 2020-06-15 RX ADMIN — URSODIOL 500 MG: 250 TABLET, FILM COATED ORAL at 08:21

## 2020-06-15 ASSESSMENT — PAIN SCALES - GENERAL
PAINLEVEL_OUTOF10: 6
PAINLEVEL_OUTOF10: 0

## 2020-06-15 ASSESSMENT — PAIN DESCRIPTION - ORIENTATION: ORIENTATION: DISTAL;LOWER

## 2020-06-15 ASSESSMENT — PAIN DESCRIPTION - PAIN TYPE: TYPE: ACUTE PAIN

## 2020-06-15 ASSESSMENT — PAIN DESCRIPTION - ONSET: ONSET: PROGRESSIVE

## 2020-06-15 ASSESSMENT — PAIN DESCRIPTION - LOCATION: LOCATION: HEAD

## 2020-06-15 ASSESSMENT — PAIN - FUNCTIONAL ASSESSMENT: PAIN_FUNCTIONAL_ASSESSMENT: ACTIVITIES ARE NOT PREVENTED

## 2020-06-15 ASSESSMENT — PAIN DESCRIPTION - FREQUENCY: FREQUENCY: INTERMITTENT

## 2020-06-15 ASSESSMENT — PAIN DESCRIPTION - PROGRESSION: CLINICAL_PROGRESSION: NOT CHANGED

## 2020-06-15 ASSESSMENT — PAIN DESCRIPTION - DESCRIPTORS: DESCRIPTORS: HEADACHE

## 2020-06-15 NOTE — H&P
(COMPAZINE) 10 MG tablet Take 1 tablet by mouth every 4 hours as needed (nausea) 6/4/20  Yes Christianne Miranda MD   hydrocortisone 2.5 % cream Apply topically 2 times daily to hands, heels, low back and buttocks. 6/4/20  Yes Christianne Miranda MD   acetaminophen (TYLENOL) 325 MG tablet Take 2 tablets by mouth every 4 hours as needed for Pain (headache) 6/4/20  Yes Christianne Miranda MD   clarithromycin [facility]) 250 MG tablet Take 1 tablet by mouth daily 6/4/20  Yes Christianne Miranda MD   dapsone 100 MG tablet Take 1 tablet by mouth daily 6/4/20  Yes Christianne Miranda MD   fluconazole (DIFLUCAN) 200 MG tablet Take 2 tablets by mouth daily 6/5/20  Yes Christianne Miranda MD   magnesium oxide (MAG-OX) 400 (240 Mg) MG tablet Take 2 tablets by mouth 3 times daily 6/4/20  Yes Christianne Miranda MD   ursodiol (BOYD) 250 MG tablet Take 500 mg by mouth 2 times daily   Yes Mitra Russell MD   LORazepam (ATIVAN) 0.5 MG tablet Take 1 tablet by mouth 3 times daily as needed for Anxiety (nausea) for up to 30 days. 6/5/20 7/5/20  Christianne Miranda MD       Allergies   Allergen Reactions    Pcn [Penicillins]        History reviewed. No pertinent family history.      Social History     Socioeconomic History    Marital status:      Spouse name: Not on file    Number of children: Not on file    Years of education: Not on file    Highest education level: Not on file   Occupational History    Not on file   Social Needs    Financial resource strain: Not on file    Food insecurity     Worry: Not on file     Inability: Not on file    Transportation needs     Medical: Not on file     Non-medical: Not on file   Tobacco Use    Smoking status: Former Smoker     Years: 8.00     Types: Cigarettes    Smokeless tobacco: Never Used    Tobacco comment: quit 8 years ago   Substance and Sexual Activity    Alcohol use: Not Currently    Drug use: Not Currently    Sexual activity: Yes Lifestyle    Physical activity     Days per week: Not on file     Minutes per session: Not on file    Stress: Not on file   Relationships    Social connections     Talks on phone: Not on file     Gets together: Not on file     Attends [employer] service: Not on file     Active member of club or organization: Not on file     Attends meetings of clubs or organizations: Not on file     Relationship status: Not on file    Intimate partner violence     Fear of current or ex partner: Not on file     Emotionally abused: Not on file     Physically abused: Not on file     Forced sexual activity: Not on file   Other Topics Concern    Not on file   Social History Narrative    Not on file        ROS:  As noted above, otherwise remainder of 10-point ROS negative    Physical Exam:    Vital Signs:  BP (!) 145/95   Pulse 80   Temp 98.8 °F (37.1 °C) (Oral)   Resp 18   Ht 5' 9\" (1.753 m)   Wt 165 lb 3.2 oz (74.9 kg)   SpO2 96%   BMI 24.40 kg/m²     Weight:    Wt Readings from Last 3 Encounters:   06/15/20 165 lb 3.2 oz (74.9 kg)   06/05/20 166 lb 9.6 oz (75.6 kg)   03/31/20 184 lb 9.6 oz (83.7 kg)       KPS: 90% Able to carry on normal activity; minor signs or symptoms of disease     ECOG PS:  (1) Restricted in physically strenuous activity, ambulatory and able to do work of light nature     General: Awake, alert and oriented.   HEENT: normocephalic, PERRL, no scleral erythema or icterus, Oral mucosa moist and intact, throat clear  NECK: supple without palpable adenopathy  BACK: Straight  SKIN: warm dry and intact without lesions rashes or masses  CHEST: CTA bilaterally without use of accessory muscles  CV: Normal S1 S2, RRR, no MRG  ABD: NT, ND, normoactive BS, no palpable masses or hepatosplenomegaly  EXTREMITIES: without edema, denies calf tenderness  NEURO: CN II - XII grossly intact  CATHETER: Left IJ TLH (5/5/20, [facility]) - CDI & Right IJ PAC (2/17/20, [address]) - CDI    Laboratory Data:   CBC:   Recent Labs ppx if started on high-dose steroids     3. Heme:  Neutropenia and anemia from disease   - Transfuse for Hgb < 7 and Platelets < 37S.    - No transfusion today. 4.  Metabolic:  He has an GRIFFIN  - Start IVF: NS at 100 ml/hr  - Replace potassium and magnesium per policy. 5. Graft versus host disease: Nausea vomiting and diarrhea may represent nezik-kbfzob-iqeu disease. We will have a GI evaluation scheduled for that  - Post-Transplant Cytoxan days + 3 & 4 (5/14/20 & 5/15/20)  - Hold tacrolimus until level is available    Tacro Level:    5/20/20 - First Level   Lab Results   Component Value Date    TACROLEV 12.5 06/05/2020    TACROLEV 9.9 06/03/2020    TACROLEV 10.6 06/01/2020       6. VOD:  No evidence of VOD. Admission Weight: 166 lb 9.6 oz (75.6 kg)  Current Weight: Weight: 165 lb 3.2 oz (74.9 kg). Recent Labs     06/15/20  0315   BILIDIR <0.2   BILITOT 0.6     - Cont Actigall. 7. Pulmonary: No active issues. - Encourage IS and ambulation      8. GI / Nutrition: Appetite & intake good  - Start low microbial diet  - Dietician to follow    - DVT Prophylaxis: Platelets >55,211 cells/dL, - daily lovenox prophylaxis ordered  Contraindications to pharmacologic prophylaxis: None  Contraindications to mechanical prophylaxis: None    - Disposition:  Once ANC >1 and recovered from toxicities of transplant.         Saud Jones MD

## 2020-06-15 NOTE — PROGRESS NOTES
Patient complained of headache 6/10 in back of head and neck area. Patient states that he has been getting headaches during the night for the last few days. MD Ewing Ahumada notified; Orders for PRN pain medication given. Will continue to monitor.

## 2020-06-15 NOTE — PLAN OF CARE
Problem: Falls - Risk of:  Goal: Will remain free from falls  Description: Will remain free from falls  Outcome: Met This Shift  Note: Orthostatic vital signs obtained at start of shift - see flowsheet for details. Pt does not meet criteria for orthostasis. Pt is a Med fall risk. See Pio Vaughn Fall Score and ABCDS Injury Risk assessments. Pt bed is in low position, side rails up, call light and belongings are in reach. Fall risk light is on outside pts room. Pt encouraged to call for assistance as needed. Will continue with hourly rounds for PO intake, pain needs, toileting and repositioning as needed. Problem: Infection - Central Venous Catheter-Associated Bloodstream Infection:  Goal: Will show no infection signs and symptoms  Description: Will show no infection signs and symptoms  Outcome: Met This Shift  Note: CVC site remains free of signs/symptoms of infection. No drainage, edema, erythema, pain, or warmth noted at site. Dressing changes continue per protocol and on an as needed basis - see flowsheet. Compliant with [facility] Bath Protocol:  Performed CHG bath today per [facility] protocol utilizing CHG solution in the shower. CVC site cleansed with CHG wipe over dressing, skin surrounding dressing, and first 6\" of IV tubing. Pt tolerated well. Continued to encourage daily CHG bathing per [facility] protocol. Problem: Pain:  Goal: Pain level will decrease  Description: Pain level will decrease  Outcome: Met This Shift  Note: Pt denies pain during shift. Problem: Venous Thromboembolism:  Goal: Will show no signs or symptoms of venous thromboembolism  Description: Will show no signs or symptoms of venous thromboembolism  Outcome: Ongoing  Note: Pt is at risk for DVT d/t decreased mobility and cancer treatment. Pt educated on importance of activity. Pt has orders for Subcut prophylactic lovenox. Pt verbalizes understanding of need for prophylaxis while inpatient.         Problem: Bleeding:  Goal: Will show no signs and symptoms of excessive bleeding  Description: Will show no signs and symptoms of excessive bleeding  Outcome: Ongoing  Note: Patient's hemoglobin this AM:   Recent Labs     06/15/20  0315   HGB 7.6*     Patient's platelet count this AM:   Recent Labs     06/15/20  0315       Thrombocytopenia Precautions in place. Patient showing no signs or symptoms of active bleeding. Transfusion not indicated at this time. Patient verbalizes understanding of all instructions. Will continue to assess and implement POC. Call light within reach and hourly rounding in place. Problem: Nausea/Vomiting:  Goal: Ability to achieve adequate nutritional intake will improve  Description: Ability to achieve adequate nutritional intake will improve  Outcome: Ongoing  Note: Pt denies N/V during shift. Problem: Diarrhea:  Goal: Bowel elimination is within specified parameters  Description: Bowel elimination is within specified parameters  Outcome: Ongoing  Note: Pt to have EGD and colonoscopy tomorrow. Problem: Skin Integrity:  Goal: Absence of new skin breakdown  Description: Absence of new skin breakdown  Outcome: Ongoing  Note: No acute skin changes during shift.

## 2020-06-15 NOTE — PROGRESS NOTES
Notified REJI Lambert that there is not an order for Patient's regularly scheduled Tacro dose placed for evening. Level was drawn today at 1300 and per Ken Kaur in Lab will not result until 6/15. Patient states that he took this regular am dose of Tacro 2 mg after this level was drawn. Per MD Bereket Lambert patient is not to have evening dose of Tacro, and dosing will be managed during rounds . Will continue to monitor.

## 2020-06-15 NOTE — PROGRESS NOTES
muscles  CV: Normal S1 S2, RRR, no MRG  ABD: NT, ND w/ normoactive BS, no palpable masses or hepatosplenomegaly. EXTREMITIES: without edema, erythema on fingertips bilaterally and heels  NEURO: CN II - XII grossly intact.    CATHETER: Left IJ TLH (5/13/20, IR) - CDI & Right IJ PAC (2/17/20, [facility]) - erythema at exist site without pain to palpation. Data:   CBC:   Recent Labs     06/14/20  1300 06/15/20  0315   WBC 6.6 6.4   HGB 8.0* 7.6*   HCT 24.2* 22.1*   MCV 92.4 91.6    190     BMP/Mag:  Recent Labs     06/14/20  1300 06/15/20  0315   * 136   K 3.8 4.3    108   CO2 20* 20*   PHOS 3.8 3.8   BUN 17 14   CREATININE 1.5* 1.3   MG 2.70* 1.90     LIVP:   Recent Labs     06/14/20  1300 06/15/20  0315   AST 12* 11*   ALT 11 9*   BILIDIR <0.2 <0.2   BILITOT 0.5 0.6   ALKPHOS 80 71     Uric Acid:    Recent Labs     06/15/20  0315   LABURIC 7.0     Coags:   Recent Labs     06/14/20  1300 06/15/20  0315   PROTIME  --  12.9   INR  --  1.11   APTT 27.2 24.3     Tacro:  No results for input(s): TACROLEV in the last 72 hours. CMV Quant DNA by PCR:   Lab Results   Component Value Date/Time    CMVDNAQNT <2.4 06/01/2020 08:28 AM    CMVDNAQNT Not Detected 06/01/2020 08:28 AM       PROBLEM LIST:        1. MDS  2. Anxiety and depression  3. HLD  4. H/o shingles (2004, RLQ)  5. Cheryal Client syndrome     Post- Transplant Complications:  1.  Resting tremor   2.  Steroid Induced Hyperglycemia   3.  Hypervolemia   4.  Constipation   5.  Nausea   6.  Headache   7.  Mucositis  8.  Diarrhea / Abdominal Cramping   9.  Neutropenic Fever   10.  HTN  11.  Rectal Pain / Fissure   12.  Urinary Retention   13.  Rash  14. Mild Malnutrition  15. Orthostatic Hypotension   16. N/V/D & GRIFFIN (6/2020)      TREATMENT:       1. Decitabine x 3 cycles (2/17/20 - 4/14/20)  2. MUD Allo-pb BMT (5/11/20)  Preparative Regimen: Targeted Busulfan & Fludarabine; Busulfan dose increased from 252 mg to 315 mg, for doses 3 and 4.   Date of BMT: 5/11/20  Source of stem cells: PBSC - 6.7 x10^6 [id number]/kg  Donor/Recipient Blood Type: A+ / A+  Donor Sex: Male (DID# [id number]), follow VNTR   CMV Donor / Recipient: Positive / Positive        ASSESSMENT AND PLAN:          1. Myelodysplastic Syndrome: Stable disease  - BM Bx/Asp (4/7/20) - ongoing disease  - S/p Bu/Flu & MUD-pb BMT    PLAN:    - Day 30 BMBx (6/10/20): pending   - Day 60 (7/10/20)  - Day 100 (8/19/20)     Day + 35      2. ID: Febrile, possibly right sided gram negative PNA (POA) - non contrast Chest CT.  - CXR (6/14/20) - small nodular opacities in the right lung w/ central lucency  - Pan-cx (6/14/20) - NGTD  - Cont Diflucan, Valtrex & Dapsone ppx (resume Biaxin)   - Merrem Day + 2 (started 6/14/20)      Donor/Recipient CMV: Pos / Pos  - CMV PCR weekly   - Start letermovir ppx if started on high-dose steroids      CMV:  6/1/20 - Negative  6/8/20 - Negative  6/15/20 - Pending      3.  Heme:  Anemia and thrombocytopenia from chemotherapy    - Transfuse for Hgb < 7 and Platelets < 24F.    - No transfusion today      4.  Metabolic:  Mild GRIFFIN, possibly from decreased oral intake, nausea. Vomiting and diarrhea w/ stable e-lytes   - S/p Mag Ox 800 mg TIDw/ loose stools   - Cont IVF:  NS 2 100 mL/hr  - Replace Mg and K+ per protocol      5. Graft versus host disease:  Increased nausea, vomiting and diarrhea may represent [id number]-[id number] disease.   - S/p Post-Transplant Cytoxan days + 3 & 4   - Tacro 1 mg bid (decreased 6/1/20) - currently on hold awaiting level  -  GI evaluation - Consult today - anant lim    Tacro Level:  6/9/20 - 13.6  6/10/20 - 8.6  6/12/20 - 9.8  6/14/20 - Pending  6/15/20 - Pending     6. VOD:  No evidence of VOD. Recent Labs     06/15/20  0315   BILIDIR <0.2   BILITOT 0.6     Admission Weight: 166 lb 9.6 oz (75.6 kg)  Current Weight: Weight: 166 lb 9.6 oz (75.6 kg).    - Cont Actigall.     7. GI / Nutrition:    Mild Malnutrition:  Appetite & intake is decreased d/t

## 2020-06-16 LAB
ANION GAP SERPL CALCULATED.3IONS-SCNC: 11 MMOL/L (ref 3–16)
ANISOCYTOSIS: ABNORMAL
BASOPHILS ABSOLUTE: 0.2 K/UL (ref 0–0.2)
BASOPHILS RELATIVE PERCENT: 2.8 %
BUN BLDV-MCNC: 11 MG/DL (ref 7–20)
CALCIUM SERPL-MCNC: 8.4 MG/DL (ref 8.3–10.6)
CHLORIDE BLD-SCNC: 107 MMOL/L (ref 99–110)
CO2: 20 MMOL/L (ref 21–32)
CREAT SERPL-MCNC: 1.3 MG/DL (ref 0.9–1.3)
EOSINOPHILS ABSOLUTE: 1.1 K/UL (ref 0–0.6)
EOSINOPHILS RELATIVE PERCENT: 17.3 %
GFR AFRICAN AMERICAN: >60
GFR NON-AFRICAN AMERICAN: 57
GLUCOSE BLD-MCNC: 89 MG/DL (ref 70–99)
HCT VFR BLD CALC: 21.6 % (ref 40.5–52.5)
HEMOGLOBIN: 7.4 G/DL (ref 13.5–17.5)
LYMPHOCYTES ABSOLUTE: 0.4 K/UL (ref 1–5.1)
LYMPHOCYTES RELATIVE PERCENT: 5.5 %
MAGNESIUM: 1.4 MG/DL (ref 1.8–2.4)
MCH RBC QN AUTO: 31.4 PG (ref 26–34)
MCHC RBC AUTO-ENTMCNC: 34.3 G/DL (ref 31–36)
MCV RBC AUTO: 91.7 FL (ref 80–100)
MONOCYTES ABSOLUTE: 1.3 K/UL (ref 0–1.3)
MONOCYTES RELATIVE PERCENT: 19.6 %
NEUTROPHILS ABSOLUTE: 3.5 K/UL (ref 1.7–7.7)
NEUTROPHILS RELATIVE PERCENT: 54.8 %
OVALOCYTES: ABNORMAL
PDW BLD-RTO: 17.1 % (ref 12.4–15.4)
PLATELET # BLD: 166 K/UL (ref 135–450)
PMV BLD AUTO: 7.6 FL (ref 5–10.5)
POTASSIUM SERPL-SCNC: 3.9 MMOL/L (ref 3.5–5.1)
RBC # BLD: 2.36 M/UL (ref 4.2–5.9)
SARS-COV-2, NAAT: ABNORMAL
SLIDE REVIEW: ABNORMAL
SODIUM BLD-SCNC: 138 MMOL/L (ref 136–145)
VRE CULTURE: NORMAL
WBC # BLD: 6.5 K/UL (ref 4–11)

## 2020-06-16 PROCEDURE — 99153 MOD SED SAME PHYS/QHP EA: CPT | Performed by: INTERNAL MEDICINE

## 2020-06-16 PROCEDURE — 99152 MOD SED SAME PHYS/QHP 5/>YRS: CPT | Performed by: INTERNAL MEDICINE

## 2020-06-16 PROCEDURE — 0DB68ZX EXCISION OF STOMACH, VIA NATURAL OR ARTIFICIAL OPENING ENDOSCOPIC, DIAGNOSTIC: ICD-10-PCS | Performed by: INTERNAL MEDICINE

## 2020-06-16 PROCEDURE — 2709999900 HC NON-CHARGEABLE SUPPLY: Performed by: INTERNAL MEDICINE

## 2020-06-16 PROCEDURE — 7100000010 HC PHASE II RECOVERY - FIRST 15 MIN: Performed by: INTERNAL MEDICINE

## 2020-06-16 PROCEDURE — 36592 COLLECT BLOOD FROM PICC: CPT

## 2020-06-16 PROCEDURE — 88305 TISSUE EXAM BY PATHOLOGIST: CPT

## 2020-06-16 PROCEDURE — 2060000000 HC ICU INTERMEDIATE R&B

## 2020-06-16 PROCEDURE — 83735 ASSAY OF MAGNESIUM: CPT

## 2020-06-16 PROCEDURE — 2580000003 HC RX 258: Performed by: INTERNAL MEDICINE

## 2020-06-16 PROCEDURE — 6360000002 HC RX W HCPCS: Performed by: INTERNAL MEDICINE

## 2020-06-16 PROCEDURE — 6370000000 HC RX 637 (ALT 250 FOR IP): Performed by: NURSE PRACTITIONER

## 2020-06-16 PROCEDURE — 0DBE8ZX EXCISION OF LARGE INTESTINE, VIA NATURAL OR ARTIFICIAL OPENING ENDOSCOPIC, DIAGNOSTIC: ICD-10-PCS | Performed by: INTERNAL MEDICINE

## 2020-06-16 PROCEDURE — 85025 COMPLETE CBC W/AUTO DIFF WBC: CPT

## 2020-06-16 PROCEDURE — 80048 BASIC METABOLIC PNL TOTAL CA: CPT

## 2020-06-16 PROCEDURE — 7100000011 HC PHASE II RECOVERY - ADDTL 15 MIN: Performed by: INTERNAL MEDICINE

## 2020-06-16 PROCEDURE — 6370000000 HC RX 637 (ALT 250 FOR IP): Performed by: INTERNAL MEDICINE

## 2020-06-16 PROCEDURE — 6360000002 HC RX W HCPCS: Performed by: NURSE PRACTITIONER

## 2020-06-16 PROCEDURE — 3609012400 HC EGD TRANSORAL BIOPSY SINGLE/MULTIPLE: Performed by: INTERNAL MEDICINE

## 2020-06-16 PROCEDURE — 3609010600 HC COLONOSCOPY POLYPECTOMY SNARE/COLD BIOPSY: Performed by: INTERNAL MEDICINE

## 2020-06-16 PROCEDURE — 0DBM8ZZ EXCISION OF DESCENDING COLON, VIA NATURAL OR ARTIFICIAL OPENING ENDOSCOPIC: ICD-10-PCS | Performed by: INTERNAL MEDICINE

## 2020-06-16 PROCEDURE — 88342 IMHCHEM/IMCYTCHM 1ST ANTB: CPT

## 2020-06-16 PROCEDURE — U0002 COVID-19 LAB TEST NON-CDC: HCPCS

## 2020-06-16 PROCEDURE — 3609010300 HC COLONOSCOPY W/BIOPSY SINGLE/MULTIPLE: Performed by: INTERNAL MEDICINE

## 2020-06-16 RX ORDER — HYDROXYZINE PAMOATE 25 MG/1
25 CAPSULE ORAL 4 TIMES DAILY PRN
Status: DISCONTINUED | OUTPATIENT
Start: 2020-06-16 | End: 2020-06-25 | Stop reason: HOSPADM

## 2020-06-16 RX ORDER — DIPHENHYDRAMINE HYDROCHLORIDE 50 MG/ML
INJECTION INTRAMUSCULAR; INTRAVENOUS PRN
Status: DISCONTINUED | OUTPATIENT
Start: 2020-06-16 | End: 2020-06-16 | Stop reason: ALTCHOICE

## 2020-06-16 RX ORDER — FENTANYL CITRATE 50 UG/ML
INJECTION, SOLUTION INTRAMUSCULAR; INTRAVENOUS PRN
Status: DISCONTINUED | OUTPATIENT
Start: 2020-06-16 | End: 2020-06-16 | Stop reason: ALTCHOICE

## 2020-06-16 RX ORDER — HYDROXYZINE HYDROCHLORIDE 10 MG/1
10 TABLET, FILM COATED ORAL 3 TIMES DAILY PRN
Status: DISCONTINUED | OUTPATIENT
Start: 2020-06-16 | End: 2020-06-16 | Stop reason: DRUGHIGH

## 2020-06-16 RX ORDER — MIDAZOLAM HYDROCHLORIDE 1 MG/ML
INJECTION INTRAMUSCULAR; INTRAVENOUS PRN
Status: DISCONTINUED | OUTPATIENT
Start: 2020-06-16 | End: 2020-06-16 | Stop reason: ALTCHOICE

## 2020-06-16 RX ADMIN — SODIUM CHLORIDE 15 ML: 900 IRRIGANT IRRIGATION at 21:06

## 2020-06-16 RX ADMIN — ACETAMINOPHEN 650 MG: 325 TABLET ORAL at 16:11

## 2020-06-16 RX ADMIN — MEROPENEM 1 G: 1 INJECTION, POWDER, FOR SOLUTION INTRAVENOUS at 19:09

## 2020-06-16 RX ADMIN — HYDROXYZINE PAMOATE 25 MG: 25 CAPSULE ORAL at 11:00

## 2020-06-16 RX ADMIN — HYDROXYZINE HYDROCHLORIDE 10 MG: 10 TABLET, FILM COATED ORAL at 08:08

## 2020-06-16 RX ADMIN — URSODIOL 500 MG: 250 TABLET, FILM COATED ORAL at 08:01

## 2020-06-16 RX ADMIN — LORAZEPAM 0.5 MG: 0.5 TABLET ORAL at 21:05

## 2020-06-16 RX ADMIN — Medication 10 ML: at 21:06

## 2020-06-16 RX ADMIN — ACETAMINOPHEN 650 MG: 325 TABLET ORAL at 03:30

## 2020-06-16 RX ADMIN — MEROPENEM 1 G: 1 INJECTION, POWDER, FOR SOLUTION INTRAVENOUS at 03:30

## 2020-06-16 RX ADMIN — Medication 10 ML: at 08:01

## 2020-06-16 RX ADMIN — SODIUM CHLORIDE 15 ML: 900 IRRIGANT IRRIGATION at 16:11

## 2020-06-16 RX ADMIN — URSODIOL 500 MG: 250 TABLET, FILM COATED ORAL at 21:05

## 2020-06-16 RX ADMIN — TACROLIMUS 1 MG: 1 CAPSULE ORAL at 08:01

## 2020-06-16 RX ADMIN — TACROLIMUS 1 MG: 1 CAPSULE ORAL at 21:05

## 2020-06-16 RX ADMIN — MEROPENEM 1 G: 1 INJECTION, POWDER, FOR SOLUTION INTRAVENOUS at 11:00

## 2020-06-16 RX ADMIN — DAPSONE 100 MG: 100 TABLET ORAL at 08:01

## 2020-06-16 RX ADMIN — VALACYCLOVIR HYDROCHLORIDE 500 MG: 500 TABLET, FILM COATED ORAL at 08:00

## 2020-06-16 RX ADMIN — ENOXAPARIN SODIUM 40 MG: 40 INJECTION SUBCUTANEOUS at 19:09

## 2020-06-16 RX ADMIN — MAGNESIUM SULFATE HEPTAHYDRATE 4 G: 40 INJECTION, SOLUTION INTRAVENOUS at 06:20

## 2020-06-16 RX ADMIN — VALACYCLOVIR HYDROCHLORIDE 500 MG: 500 TABLET, FILM COATED ORAL at 21:05

## 2020-06-16 RX ADMIN — FLUCONAZOLE 400 MG: 200 TABLET ORAL at 08:00

## 2020-06-16 RX ADMIN — OXYCODONE 10 MG: 5 TABLET ORAL at 19:09

## 2020-06-16 ASSESSMENT — PAIN SCALES - GENERAL
PAINLEVEL_OUTOF10: 0
PAINLEVEL_OUTOF10: 7

## 2020-06-16 ASSESSMENT — PAIN SCALES - WONG BAKER
WONGBAKER_NUMERICALRESPONSE: 0
WONGBAKER_NUMERICALRESPONSE: 0

## 2020-06-16 NOTE — PROGRESS NOTES
[facility] Progress Note    2020    Keller Severin    :  1965    MRN:  [id number]      Subjective:  Complaint of diffuse pruritis - has developed since admission. Scheduled for scopes this afternoon. ECOG PS:  (2) Ambulatory and capable of self care, unable to carry out work activity, up and about > 50% or waking hours    KPS: 80% Normal activity with effort; some signs or symptoms of disease    Isolation:  None     Medications    Scheduled Meds:   enoxaparin  40 mg Subcutaneous QPM    tacrolimus  1 mg Oral BID    dapsone  100 mg Oral Daily    fluconazole  400 mg Oral Daily    ursodiol  500 mg Oral BID    valACYclovir  500 mg Oral BID    sodium chloride flush  10 mL Intravenous 2 times per day    Saline Mouthwash  15 mL Swish & Spit 4x Daily AC & HS    meropenem  1 g Intravenous Q8H     Continuous Infusions:   sodium chloride 100 mL/hr at 06/15/20 2305     PRN Meds:oxyCODONE **OR** oxyCODONE, loperamide, LORazepam, prochlorperazine, sodium chloride flush, potassium chloride, potassium chloride, magnesium sulfate, magnesium hydroxide, Saline Mouthwash, alteplase, acetaminophen    ROS:  As noted above, otherwise remainder of 10-point ROS negative    Physical Exam:    I&O:      Intake/Output Summary (Last 24 hours) at 2020 0657  Last data filed at 6/15/2020 2306  Gross per 24 hour   Intake 2360.37 ml   Output 1750 ml   Net 610.37 ml       Vital Signs:  BP (!) 154/88   Pulse 81   Temp 100.8 °F (38.2 °C) (Oral)   Resp 20   Ht 5' 9\" (1.753 m)   Wt 165 lb 3.2 oz (74.9 kg)   SpO2 95%   BMI 24.40 kg/m²     Weight:    Wt Readings from Last 3 Encounters:   06/15/20 165 lb 3.2 oz (74.9 kg)   20 166 lb 9.6 oz (75.6 kg)   20 184 lb 9.6 oz (83.7 kg)       General: Awake, alert and oriented.   HEENT: normocephalic, alopecia, PERRL, no scleral erythema or icterus, Oral mucosa moist and intact   NECK: supple without palpable adenopathy  BACK: EGD/Colonoscopy (6/16/20):  Pending     PROBLEM LIST:        1. MDS  2. Anxiety and depression  3. HLD  4. H/o shingles (2004, RLQ)  5. Klaudia Kristin syndrome     Post- Transplant Complications:  1.  Resting tremor   2.  Steroid Induced Hyperglycemia   3.  Hypervolemia   4.  Constipation   5.  Nausea   6.  Headache   7.  Mucositis  8.  Diarrhea / Abdominal Cramping   9.  Neutropenic Fever   10.  HTN  11.  Rectal Pain / Fissure   12.  Urinary Retention   13.  Rash  14. Mild Malnutrition  15. Orthostatic Hypotension   16. N/V/D & GRIFFIN (6/2020)      TREATMENT:       1. Decitabine x 3 cycles (2/17/20 - 4/14/20)  2. MUD Allo-pb BMT (5/11/20)  Preparative Regimen: Targeted Busulfan & Fludarabine; Busulfan dose increased from 252 mg to 315 mg, for doses 3 and 4. Date of BMT: 5/11/20  Source of stem cells: PBSC - 6.7 x10^6 ep50bdsmv/kg  Donor/Recipient Blood Type: A+ / A+  Donor Sex: Male (DID# [id number]), follow VNTR   CMV Donor / Recipient: Positive / Positive        ASSESSMENT AND PLAN:          1. Myelodysplastic Syndrome: Stable disease  - BM Bx/Asp (4/7/20) - ongoing disease  - S/p Bu/Flu & MUD-pb BMT    PLAN:    - Day 30 BMBx (6/10/20): pending   - Day 60 (7/10/20)  - Day 100 (8/19/20)     Day + 36      2. ID: Febrile, possibly right sided gram negative PNA (POA) - non contrast Chest CT (6/15/20) - stable 4 mm pulmonary nodules. No discrete nodular opacity of the corresponding with findings from prior chest radiograph. - Pan-cx (6/14/20) - NGTD  - Cont Diflucan, Valtrex & Dapsone ppx (resume Biaxin)   - Merrem Day + 3 (started 6/14/20)      Donor/Recipient CMV: Pos / Pos  - CMV PCR weekly   - Start letermovir ppx if started on high-dose steroids      CMV:  6/1/20 - Negative  6/8/20 - Negative  6/15/20 - Pending      3.  Heme:  Anemia and thrombocytopenia from chemotherapy    - Transfuse for Hgb < 7 and Platelets < 06O.     - No transfusion today      4.  Metabolic:  Mild GRIFFIN, possibly from decreased oral intake, nausea. 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 Also, rash on chest and side of face  - EGD / Colonoscopy ([place], 6/16/20) - Pending    Previous Therapy:  - S/p Post-Transplant Cytoxan days + 3 & 4     Current Therapy:   - Cont Tacro 1 mg bid   - Await path prior to starting steroids for possible GVHD    Tacro Level:  Lab Results   Component Value Date    TACROLEV 13.2 06/15/2020    TACROLEV 17.5 06/14/2020    TACROLEV 12.5 06/05/2020 6/12/20 - 9.8  6/10/20 - 8.6  6/9/20 - 13.6      6. VOD:  No evidence of VOD. Recent Labs     06/15/20  0315   BILIDIR <0.2   BILITOT 0.6     Admission Weight: 166 lb 9.6 oz (75.6 kg)  Current Weight: Weight: 165 lb 3.2 oz (74.9 kg). - Cont Actigall.     7. GI / Nutrition:    Mild Malnutrition:  Appetite & intake is decreased d/t N/V/D and possible GVHD  - Cont low microbial diet  - Dietary to follow   Diarrhea:  Cont to have loose stools, but improving    - Colonoscopy (6/16/20) - Pending  - C. Diff (6/14/20) - Negative  - S/p Mag Ox   - Cont Imodium if needed  Nausea:  Mild nausea w/ intermittent vomiting   - EGD (6/16/20) - Pending   - Cont Compazine as needed  - Cont Ativan 0.5 mg TID prior to meals as needed   Rectal pain / Fissure:  Rectal pain has improved  - CT pelvis (5/25/20) - Small bilateral renal cysts.  Otherwise unremarkable study. In particular, no signs of rectal or perirectal abscess. 8.  Psych:  Increased anxiety over last couple weeks  - Psych following  - Cont Ativan as needed    9. Derm:  New erythematous pruritic rash on face and chest, possible GVHD, may also represent allergy to Merrem?   - Start Atarax as needed      - DVT Prophylaxis: Platelets >83,401 cells/dL, - daily lovenox prophylaxis ordered  Contraindications to pharmacologic prophylaxis: None  Contraindications to mechanical prophylaxis: None    - Disposition:  Once diarrhea and fevers improve     Oksana Madrid, APRN - [facility]EW[facility] Reyes.  Tamika Denny, [address] [place] [address] negative detailed exam mouth

## 2020-06-16 NOTE — PROGRESS NOTES
NUTRITION MONITORING & EVALUATION:  Evaluation:Goals set   Goals: pt will tolerate start of most appropriate form of nutrition to meet > 75% of pt nutrition needs through admission. Monitoring: Diarrhea , Diet advancement , GI Function , I/O, Nausea  or Vomiting      OBJECTIVE DATA:  · Nutrition-Focused Physical Findings: nausea; no emesis;   · Wounds None  RD notes rectal fissure    History reviewed. No pertinent past medical history. ANTHROPOMETRICS  Current Height: 5' 9\" (175.3 cm)  Current Weight: 165 lb 11.2 oz (75.2 kg)    Admission weight: 166 lb 9.6 oz (75.6 kg)  Ideal Bodyweight 160 lb    Usual Bodyweight 185 lb per pt    Weight Changes -19 lb in 3 mo; 10%       BMI BMI (Calculated): 24.5    Wt Readings from Last 50 Encounters:   06/16/20 165 lb 11.2 oz (75.2 kg)   06/05/20 166 lb 9.6 oz (75.6 kg)   03/31/20 184 lb 9.6 oz (83.7 kg)   02/17/20 180 lb (81.6 kg)       COMPARATIVE STANDARDS  Estimated Total Kcals/Day : 30-35 Current Bodyweight (75.2 kg) ~0005-4823 kcal    Estimated Total Protein (g/day) : 1.5-1.8 Current Bodyweight (75.2 kg) 113-135g/day  Estimated Daily Total Fluid (ml/day): 6287-0487 mL per day     Food / Nutrition-Related History  Pre-Admission / Home Diet:  Pre-Admission/Home Diet: General   Home Supplements / Herbals:   none noted  Food Restrictions / Cultural Requests:   none noted    Diet Orders / Intake / Nutrition Support  Current diet/supplement order: Diet NPO, After Midnight     NSG Recorded PO:   PO Fluids P.O.: 240 mL(water)  PO Meals PO Meals Eaten (%): 76 - 100%(100-chicken broth, lemon ice)   PO Intake: NPO  PO Supplement: None   PO Supplement Intake: n/a  IVF: NS 2 100 mL/hr    Consider PN pending GI findings:   PN Goal: Custom PN @ goal provides 1838 total volume, 2300kcal, 125g protein, 360g dextrose, 58 g lipids with 3.32 mg/kg/min GIR.      NUTRITION RISK LEVEL: Risk Level: KUMAR Harrison, LD  [place]fe:  [phone]  Office:  [phone]

## 2020-06-16 NOTE — H&P
History and Physical / Pre-Sedation Assessment    Carey Morse is a 63 y.o. male who presents today for EGD and colonoscopy procedure. PMHx:  History reviewed. No pertinent past medical history. Medications:    Prior to Admission medications    Medication Sig Start Date End Date Taking? Authorizing Provider   valACYclovir (VALTREX) 500 MG tablet Take 1 tablet by mouth 2 times daily 6/4/20  Yes Jorge Parisi MD   tacrolimus (PROGRAF) 1 MG capsule Take 2 capsules by mouth 2 times daily 6/4/20  Yes Jorge Parisi MD   prochlorperazine (COMPAZINE) 10 MG tablet Take 1 tablet by mouth every 4 hours as needed (nausea) 6/4/20  Yes Jorge Parisi MD   hydrocortisone 2.5 % cream Apply topically 2 times daily to hands, heels, low back and buttocks. 6/4/20  Yes Jorge Parisi MD   acetaminophen (TYLENOL) 325 MG tablet Take 2 tablets by mouth every 4 hours as needed for Pain (headache) 6/4/20  Yes Jorge Parisi MD   clarithromycin [facility]) 250 MG tablet Take 1 tablet by mouth daily 6/4/20  Yes Jorge Parisi MD   dapsone 100 MG tablet Take 1 tablet by mouth daily 6/4/20  Yes Jorge Parisi MD   fluconazole (DIFLUCAN) 200 MG tablet Take 2 tablets by mouth daily 6/5/20  Yes Jorge Parisi MD   magnesium oxide (MAG-OX) 400 (240 Mg) MG tablet Take 2 tablets by mouth 3 times daily 6/4/20  Yes Jorge Parisi MD   ursodiol (BOYD) 250 MG tablet Take 500 mg by mouth 2 times daily   Yes Historical Provider, MD   LORazepam (ATIVAN) 0.5 MG tablet Take 1 tablet by mouth 3 times daily as needed for Anxiety (nausea) for up to 30 days. 6/5/20 7/5/20  Jorge Parisi MD       Allergies: Allergies   Allergen Reactions    Pcn [Penicillins]        PSHx:  History reviewed. No pertinent surgical history.     Social Hx:    Social History     Socioeconomic History    Marital status:      Spouse name: Not on file    Number of children: Not on file colonoscopy, Less than 3 years since the patient's last colonoscopy due to medical reasons and Less than 3 years since the patient's last colonoscopy due to system reasons    Medical Reason for Colonoscopy before 3 years last colonoscopy incomplete, last colonoscopy had inadequate prep, piecemeal removal of adenomas and last colonoscopy found greater than 10 adenomas  System Reasons for Colonoscopy before 3 years: previous colonoscopy report unavailable or unable to locate    I assessed the patient and find that the patient is in satisfactory condition to proceed with the planned procedure and sedation plan. Risks/benefits/alternatives of procedure discussed with patient and any present family members. Risks including, but not limited to: bleeding, perforation, post polypectomy syndrome, splenic injury, need for additional procedures or surgery, risks of anesthesia. Patient understands it is their responsibility to call office for pathology results if they do not hear from my office within 1-2 weeks. All questions answered.     India Patel MD  6/16/2020

## 2020-06-16 NOTE — BRIEF OP NOTE
Brief Postoperative Note      Patient: Simone Patiño  YOB: 1965  MRN: [id number]    Date of Procedure: 6/16/2020    Pre-Op Diagnosis: GVHD, DIARRHEA    Post-Op Diagnosis: Same       Procedure(s):  EGD BIOPSY  COLONOSCOPY WITH BIOPSY  COLONOSCOPY POLYPECTOMY DESCENDING COLON POLYP/ BIOPSY    Surgeon(s):  Dilma Wilson MD    Assistant:  * No surgical staff found *    Anesthesia: IV Sedation    Estimated Blood Loss (mL): Minimal    Complications: None    Specimens:   ID Type Source Tests Collected by Time Destination   A : SMALL BOWEL BX FOR GVHD Tissue Duodenum SURGICAL PATHOLOGY Dilma Wilson MD 6/16/2020 1749    B : GASTRIC BX GASTRITIS/; R/O GVHD, H. PYLORI Gastric Gastric SURGICAL PATHOLOGY Dilma Wilson MD 6/16/2020 1751    C : RANDOM COLON BX R/O GVHD Tissue Colon SURGICAL PATHOLOGY Dilma Wilson MD 6/16/2020 1752    D : DESCENDING COLON POLYP Tissue Colon SURGICAL PATHOLOGY Dilma Wilson MD 6/16/2020 1754        Implants:  * No implants in log *      Drains: * No LDAs found *    Findings: 1. Gastritis 2. Colon polyps 3. hemmorrhoids 4.  No obvious enteritis/colitis path pending for GVHD resume diet and lovenox    Electronically signed by Kimberley Jimenez MD on 6/16/2020 at 6:11 PM

## 2020-06-17 ENCOUNTER — APPOINTMENT (OUTPATIENT)
Dept: GENERAL RADIOLOGY | Age: 55
DRG: 391 | End: 2020-06-17
Attending: INTERNAL MEDICINE
Payer: COMMERCIAL

## 2020-06-17 LAB
ABO/RH: NORMAL
ALBUMIN SERPL-MCNC: 3 G/DL (ref 3.4–5)
ALP BLD-CCNC: 64 U/L (ref 40–129)
ALT SERPL-CCNC: 10 U/L (ref 10–40)
ANION GAP SERPL CALCULATED.3IONS-SCNC: 12 MMOL/L (ref 3–16)
ANTIBODY SCREEN: NORMAL
AST SERPL-CCNC: 12 U/L (ref 15–37)
BASOPHILS ABSOLUTE: 0.1 K/UL (ref 0–0.2)
BASOPHILS RELATIVE PERCENT: 1.3 %
BILIRUB SERPL-MCNC: 0.5 MG/DL (ref 0–1)
BILIRUBIN DIRECT: <0.2 MG/DL (ref 0–0.3)
BILIRUBIN URINE: NEGATIVE
BILIRUBIN, INDIRECT: ABNORMAL MG/DL (ref 0–1)
BLOOD BANK DISPENSE STATUS: NORMAL
BLOOD BANK PRODUCT CODE: NORMAL
BLOOD, URINE: NEGATIVE
BPU ID: NORMAL
BUN BLDV-MCNC: 12 MG/DL (ref 7–20)
CALCIUM SERPL-MCNC: 8.3 MG/DL (ref 8.3–10.6)
CHLORIDE BLD-SCNC: 109 MMOL/L (ref 99–110)
CLARITY: CLEAR
CO2: 19 MMOL/L (ref 21–32)
COLOR: YELLOW
CREAT SERPL-MCNC: 1.1 MG/DL (ref 0.9–1.3)
DESCRIPTION BLOOD BANK: NORMAL
EOSINOPHILS ABSOLUTE: 0.8 K/UL (ref 0–0.6)
EOSINOPHILS RELATIVE PERCENT: 15.4 %
FINAL REPORT: NORMAL
GFR AFRICAN AMERICAN: >60
GFR NON-AFRICAN AMERICAN: >60
GLUCOSE BLD-MCNC: 85 MG/DL (ref 70–99)
GLUCOSE URINE: NEGATIVE MG/DL
HCT VFR BLD CALC: 20.5 % (ref 40.5–52.5)
HEMOGLOBIN: 6.9 G/DL (ref 13.5–17.5)
INR BLD: 1.22 (ref 0.86–1.14)
KETONES, URINE: ABNORMAL MG/DL
LACTATE DEHYDROGENASE: 322 U/L (ref 100–190)
LEUKOCYTE ESTERASE, URINE: NEGATIVE
LYMPHOCYTES ABSOLUTE: 0.3 K/UL (ref 1–5.1)
LYMPHOCYTES RELATIVE PERCENT: 5.9 %
MAGNESIUM: 1.6 MG/DL (ref 1.8–2.4)
MCH RBC QN AUTO: 31 PG (ref 26–34)
MCHC RBC AUTO-ENTMCNC: 33.8 G/DL (ref 31–36)
MCV RBC AUTO: 91.8 FL (ref 80–100)
MICROSCOPIC EXAMINATION: ABNORMAL
MONOCYTES ABSOLUTE: 1.1 K/UL (ref 0–1.3)
MONOCYTES RELATIVE PERCENT: 19.9 %
NEUTROPHILS ABSOLUTE: 3.1 K/UL (ref 1.7–7.7)
NEUTROPHILS RELATIVE PERCENT: 57.5 %
NITRITE, URINE: NEGATIVE
PDW BLD-RTO: 16.8 % (ref 12.4–15.4)
PH UA: 6 (ref 5–8)
PHOSPHORUS: 3.5 MG/DL (ref 2.5–4.9)
PLATELET # BLD: 139 K/UL (ref 135–450)
PMV BLD AUTO: 7.3 FL (ref 5–10.5)
POTASSIUM SERPL-SCNC: 4.3 MMOL/L (ref 3.5–5.1)
PRELIMINARY: NORMAL
PROTEIN UA: NEGATIVE MG/DL
PROTHROMBIN TIME: 14.2 SEC (ref 10–13.2)
RBC # BLD: 2.23 M/UL (ref 4.2–5.9)
SODIUM BLD-SCNC: 140 MMOL/L (ref 136–145)
SPECIFIC GRAVITY UA: 1.02 (ref 1–1.03)
TACROLIMUS BLOOD: 9.4 NG/ML (ref 5–20)
TOTAL PROTEIN: 5.2 G/DL (ref 6.4–8.2)
URIC ACID, SERUM: 6.4 MG/DL (ref 3.5–7.2)
URINE TYPE: ABNORMAL
UROBILINOGEN, URINE: 0.2 E.U./DL
WBC # BLD: 5.3 K/UL (ref 4–11)

## 2020-06-17 PROCEDURE — 6360000002 HC RX W HCPCS: Performed by: INTERNAL MEDICINE

## 2020-06-17 PROCEDURE — 85610 PROTHROMBIN TIME: CPT

## 2020-06-17 PROCEDURE — 87086 URINE CULTURE/COLONY COUNT: CPT

## 2020-06-17 PROCEDURE — 84550 ASSAY OF BLOOD/URIC ACID: CPT

## 2020-06-17 PROCEDURE — 80048 BASIC METABOLIC PNL TOTAL CA: CPT

## 2020-06-17 PROCEDURE — 36430 TRANSFUSION BLD/BLD COMPNT: CPT

## 2020-06-17 PROCEDURE — 80197 ASSAY OF TACROLIMUS: CPT

## 2020-06-17 PROCEDURE — 71045 X-RAY EXAM CHEST 1 VIEW: CPT

## 2020-06-17 PROCEDURE — 80076 HEPATIC FUNCTION PANEL: CPT

## 2020-06-17 PROCEDURE — 87102 FUNGUS ISOLATION CULTURE: CPT

## 2020-06-17 PROCEDURE — 6360000002 HC RX W HCPCS: Performed by: NURSE PRACTITIONER

## 2020-06-17 PROCEDURE — 81003 URINALYSIS AUTO W/O SCOPE: CPT

## 2020-06-17 PROCEDURE — 85025 COMPLETE CBC W/AUTO DIFF WBC: CPT

## 2020-06-17 PROCEDURE — 87253 VIRUS INOCULATE TISSUE ADDL: CPT

## 2020-06-17 PROCEDURE — 87205 SMEAR GRAM STAIN: CPT

## 2020-06-17 PROCEDURE — 6370000000 HC RX 637 (ALT 250 FOR IP): Performed by: INTERNAL MEDICINE

## 2020-06-17 PROCEDURE — 87252 VIRUS INOCULATION TISSUE: CPT

## 2020-06-17 PROCEDURE — 87103 BLOOD FUNGUS CULTURE: CPT

## 2020-06-17 PROCEDURE — 86900 BLOOD TYPING SEROLOGIC ABO: CPT

## 2020-06-17 PROCEDURE — 87070 CULTURE OTHR SPECIMN AEROBIC: CPT

## 2020-06-17 PROCEDURE — 2060000000 HC ICU INTERMEDIATE R&B

## 2020-06-17 PROCEDURE — P9040 RBC LEUKOREDUCED IRRADIATED: HCPCS

## 2020-06-17 PROCEDURE — 36592 COLLECT BLOOD FROM PICC: CPT

## 2020-06-17 PROCEDURE — 84100 ASSAY OF PHOSPHORUS: CPT

## 2020-06-17 PROCEDURE — 87040 BLOOD CULTURE FOR BACTERIA: CPT

## 2020-06-17 PROCEDURE — 86850 RBC ANTIBODY SCREEN: CPT

## 2020-06-17 PROCEDURE — 86923 COMPATIBILITY TEST ELECTRIC: CPT

## 2020-06-17 PROCEDURE — 2580000003 HC RX 258: Performed by: INTERNAL MEDICINE

## 2020-06-17 PROCEDURE — 83735 ASSAY OF MAGNESIUM: CPT

## 2020-06-17 PROCEDURE — 83615 LACTATE (LD) (LDH) ENZYME: CPT

## 2020-06-17 PROCEDURE — 86901 BLOOD TYPING SEROLOGIC RH(D): CPT

## 2020-06-17 RX ORDER — NYSTATIN 10B UNIT
POWDER (EA) MISCELLANEOUS 3 TIMES DAILY
Status: DISCONTINUED | OUTPATIENT
Start: 2020-06-17 | End: 2020-06-25 | Stop reason: HOSPADM

## 2020-06-17 RX ADMIN — MEROPENEM 1 G: 1 INJECTION, POWDER, FOR SOLUTION INTRAVENOUS at 03:34

## 2020-06-17 RX ADMIN — ACETAMINOPHEN 650 MG: 325 TABLET ORAL at 11:47

## 2020-06-17 RX ADMIN — TACROLIMUS 1 MG: 1 CAPSULE ORAL at 20:44

## 2020-06-17 RX ADMIN — FLUCONAZOLE 400 MG: 200 TABLET ORAL at 09:14

## 2020-06-17 RX ADMIN — SODIUM CHLORIDE: 9 INJECTION, SOLUTION INTRAVENOUS at 11:47

## 2020-06-17 RX ADMIN — ACETAMINOPHEN 650 MG: 325 TABLET ORAL at 20:59

## 2020-06-17 RX ADMIN — DAPSONE 100 MG: 100 TABLET ORAL at 09:00

## 2020-06-17 RX ADMIN — MEROPENEM 1 G: 1 INJECTION, POWDER, FOR SOLUTION INTRAVENOUS at 18:45

## 2020-06-17 RX ADMIN — Medication 10 ML: at 09:00

## 2020-06-17 RX ADMIN — Medication: at 15:42

## 2020-06-17 RX ADMIN — Medication 10 ML: at 20:44

## 2020-06-17 RX ADMIN — URSODIOL 500 MG: 250 TABLET, FILM COATED ORAL at 09:14

## 2020-06-17 RX ADMIN — SODIUM CHLORIDE 15 ML: 900 IRRIGANT IRRIGATION at 11:00

## 2020-06-17 RX ADMIN — TACROLIMUS 1 MG: 1 CAPSULE ORAL at 09:00

## 2020-06-17 RX ADMIN — MEROPENEM 1 G: 1 INJECTION, POWDER, FOR SOLUTION INTRAVENOUS at 11:54

## 2020-06-17 RX ADMIN — VALACYCLOVIR HYDROCHLORIDE 500 MG: 500 TABLET, FILM COATED ORAL at 09:00

## 2020-06-17 RX ADMIN — SODIUM CHLORIDE: 9 INJECTION, SOLUTION INTRAVENOUS at 23:10

## 2020-06-17 RX ADMIN — ENOXAPARIN SODIUM 40 MG: 40 INJECTION SUBCUTANEOUS at 18:45

## 2020-06-17 RX ADMIN — VALACYCLOVIR HYDROCHLORIDE 500 MG: 500 TABLET, FILM COATED ORAL at 20:44

## 2020-06-17 RX ADMIN — URSODIOL 500 MG: 250 TABLET, FILM COATED ORAL at 20:44

## 2020-06-17 ASSESSMENT — PAIN SCALES - GENERAL
PAINLEVEL_OUTOF10: 0

## 2020-06-17 NOTE — PROGRESS NOTES
call with questions.     Caroline Dewitt, PharmD, [address]  Oncology Pharmacist  Office: [phone]  Wireless: [phone]    6/17/2020 7:35 AM

## 2020-06-17 NOTE — PROGRESS NOTES
chest radiograph. Dilation of the ascending thoracic aorta 4.3 cm. Pathology:  1. BM bx/asp (6/10/20, [facility]):  Pending    2. EGD/Colonoscopy (6/16/20):  Pending     PROBLEM LIST:        1. MDS  2. Anxiety and depression  3. HLD  4. H/o shingles (2004, RLQ)  5. Cloria Brew syndrome     Post- Transplant Complications:  1.  Resting tremor   2.  Steroid Induced Hyperglycemia   3.  Hypervolemia   4.  Constipation   5.  Nausea   6.  Headache   7.  Mucositis  8.  Diarrhea / Abdominal Cramping   9.  Neutropenic Fever   10.  HTN  11.  Rectal Pain / Fissure   12.  Urinary Retention   13.  Rash  14. Mild Malnutrition  15. Orthostatic Hypotension   16. N/V/D & GRIFFIN (6/2020)      TREATMENT:       1. Decitabine x 3 cycles (2/17/20 - 4/14/20)  2. MUD Allo-pb BMT (5/11/20)  Preparative Regimen: Targeted Busulfan & Fludarabine; Busulfan dose increased from 252 mg to 315 mg, for doses 3 and 4. Date of BMT: 5/11/20  Source of stem cells: PBSC - 6.7 x10^6 if45pgoif/kg  Donor/Recipient Blood Type: A+ / A+  Donor Sex: Male (DID# [id number]), follow VNTR   CMV Donor / Recipient: Positive / Positive        ASSESSMENT AND PLAN:          1. Myelodysplastic Syndrome: Stable disease  - BM Bx/Asp (4/7/20) - ongoing disease  - S/p Bu/Flu & MUD-pb BMT    PLAN:    - Day 30 BMBx (6/10/20): pending   - Day 60 (7/10/20)  - Day 100 (8/19/20)     Day + 37     2. ID: Currently afebrile, possibly right sided gram negative PNA (POA) - non contrast Chest CT (6/15/20) - stable 4 mm pulmonary nodules. No discrete nodular opacity of the corresponding with findings from prior chest radiograph.   - Pan-cx (6/14/20) - NGTD  - Cont Diflucan, Valtrex & Dapsone ppx (resume Biaxin)   - Merrem Day + 4 (started 6/14/20)      Donor/Recipient CMV: Pos / Pos  - CMV PCR weekly   - Start letermovir ppx if started on high-dose steroids      CMV:  6/1/20 - Negative  6/8/20 - Negative  6/15/20 - Pending      3.  Heme:  Anemia and thrombocytopenia from chemotherapy    -

## 2020-06-18 PROBLEM — E43 SEVERE MALNUTRITION (HCC): Chronic | Status: ACTIVE | Noted: 2020-06-16

## 2020-06-18 PROBLEM — E43 SEVERE MALNUTRITION (HCC): Chronic | Status: ACTIVE | Noted: 2020-06-18

## 2020-06-18 LAB
ANION GAP SERPL CALCULATED.3IONS-SCNC: 12 MMOL/L (ref 3–16)
ANISOCYTOSIS: ABNORMAL
APTT: 29.2 SEC (ref 24.2–36.2)
BASOPHILS ABSOLUTE: 0.1 K/UL (ref 0–0.2)
BASOPHILS RELATIVE PERCENT: 1.3 %
BLOOD CULTURE, ROUTINE: NORMAL
BUN BLDV-MCNC: 10 MG/DL (ref 7–20)
CALCIUM SERPL-MCNC: 8.2 MG/DL (ref 8.3–10.6)
CHLORIDE BLD-SCNC: 103 MMOL/L (ref 99–110)
CMV DNA QNT PCR: <2.6 LOG CPY/ML
CMV DNA QUANTATATIVE INTERPRETATION: <2.4 LOG IU/ML
CMV DNA QUANTATATIVE INTERPRETATION: NOT DETECTED
CMV DNA QUANTITATIVE: <227 IU/ML
CMV SOURCE: NORMAL
CMVQ COPY/ML: <390 CPY/ML
CO2: 20 MMOL/L (ref 21–32)
CREAT SERPL-MCNC: 1.1 MG/DL (ref 0.9–1.3)
CULTURE, BLOOD 2: NORMAL
EOSINOPHILS ABSOLUTE: 1.7 K/UL (ref 0–0.6)
EOSINOPHILS RELATIVE PERCENT: 26 %
GFR AFRICAN AMERICAN: >60
GFR NON-AFRICAN AMERICAN: >60
GLUCOSE BLD-MCNC: 96 MG/DL (ref 70–99)
HCT VFR BLD CALC: 22.9 % (ref 40.5–52.5)
HEMOGLOBIN: 7.9 G/DL (ref 13.5–17.5)
LYMPHOCYTES ABSOLUTE: 0.3 K/UL (ref 1–5.1)
LYMPHOCYTES RELATIVE PERCENT: 4.8 %
MAGNESIUM: 1.2 MG/DL (ref 1.8–2.4)
MCH RBC QN AUTO: 31.3 PG (ref 26–34)
MCHC RBC AUTO-ENTMCNC: 34.5 G/DL (ref 31–36)
MCV RBC AUTO: 90.9 FL (ref 80–100)
MONOCYTES ABSOLUTE: 1.2 K/UL (ref 0–1.3)
MONOCYTES RELATIVE PERCENT: 18.5 %
NEUTROPHILS ABSOLUTE: 3.3 K/UL (ref 1.7–7.7)
NEUTROPHILS RELATIVE PERCENT: 49.4 %
PDW BLD-RTO: 16.3 % (ref 12.4–15.4)
PLATELET # BLD: 124 K/UL (ref 135–450)
PMV BLD AUTO: 7.9 FL (ref 5–10.5)
POLYCHROMASIA: ABNORMAL
POTASSIUM SERPL-SCNC: 3.7 MMOL/L (ref 3.5–5.1)
PROCALCITONIN: 0.45 NG/ML (ref 0–0.15)
RBC # BLD: 2.52 M/UL (ref 4.2–5.9)
SLIDE REVIEW: ABNORMAL
SODIUM BLD-SCNC: 135 MMOL/L (ref 136–145)
URINE CULTURE, ROUTINE: NORMAL
WBC # BLD: 6.7 K/UL (ref 4–11)

## 2020-06-18 PROCEDURE — 2580000003 HC RX 258: Performed by: NURSE PRACTITIONER

## 2020-06-18 PROCEDURE — 85025 COMPLETE CBC W/AUTO DIFF WBC: CPT

## 2020-06-18 PROCEDURE — 2580000003 HC RX 258: Performed by: INTERNAL MEDICINE

## 2020-06-18 PROCEDURE — 84145 PROCALCITONIN (PCT): CPT

## 2020-06-18 PROCEDURE — 2060000000 HC ICU INTERMEDIATE R&B

## 2020-06-18 PROCEDURE — 6360000002 HC RX W HCPCS: Performed by: NURSE PRACTITIONER

## 2020-06-18 PROCEDURE — 87305 ASPERGILLUS AG IA: CPT

## 2020-06-18 PROCEDURE — 85730 THROMBOPLASTIN TIME PARTIAL: CPT

## 2020-06-18 PROCEDURE — 6360000002 HC RX W HCPCS: Performed by: INTERNAL MEDICINE

## 2020-06-18 PROCEDURE — 6370000000 HC RX 637 (ALT 250 FOR IP): Performed by: NURSE PRACTITIONER

## 2020-06-18 PROCEDURE — 80048 BASIC METABOLIC PNL TOTAL CA: CPT

## 2020-06-18 PROCEDURE — 6370000000 HC RX 637 (ALT 250 FOR IP): Performed by: INTERNAL MEDICINE

## 2020-06-18 PROCEDURE — 83735 ASSAY OF MAGNESIUM: CPT

## 2020-06-18 PROCEDURE — 36592 COLLECT BLOOD FROM PICC: CPT

## 2020-06-18 RX ORDER — TACROLIMUS 0.5 MG/1
0.5 CAPSULE ORAL 2 TIMES DAILY
Status: DISCONTINUED | OUTPATIENT
Start: 2020-06-18 | End: 2020-06-19

## 2020-06-18 RX ORDER — VORICONAZOLE 200 MG/1
200 TABLET, FILM COATED ORAL EVERY 12 HOURS SCHEDULED
Status: DISCONTINUED | OUTPATIENT
Start: 2020-06-18 | End: 2020-06-19

## 2020-06-18 RX ORDER — 0.9 % SODIUM CHLORIDE 0.9 %
1000 INTRAVENOUS SOLUTION INTRAVENOUS ONCE
Status: COMPLETED | OUTPATIENT
Start: 2020-06-18 | End: 2020-06-18

## 2020-06-18 RX ORDER — POTASSIUM CHLORIDE 20 MEQ/1
20 TABLET, EXTENDED RELEASE ORAL 2 TIMES DAILY WITH MEALS
Status: DISCONTINUED | OUTPATIENT
Start: 2020-06-18 | End: 2020-06-19

## 2020-06-18 RX ADMIN — LORAZEPAM 0.5 MG: 0.5 TABLET ORAL at 03:36

## 2020-06-18 RX ADMIN — POTASSIUM CHLORIDE 20 MEQ: 20 TABLET, EXTENDED RELEASE ORAL at 17:38

## 2020-06-18 RX ADMIN — URSODIOL 500 MG: 250 TABLET, FILM COATED ORAL at 20:41

## 2020-06-18 RX ADMIN — MEROPENEM 1 G: 1 INJECTION, POWDER, FOR SOLUTION INTRAVENOUS at 03:37

## 2020-06-18 RX ADMIN — Medication 10 ML: at 20:42

## 2020-06-18 RX ADMIN — MEROPENEM 1 G: 1 INJECTION, POWDER, FOR SOLUTION INTRAVENOUS at 11:21

## 2020-06-18 RX ADMIN — TACROLIMUS 1 MG: 1 CAPSULE ORAL at 09:18

## 2020-06-18 RX ADMIN — MEROPENEM 1 G: 1 INJECTION, POWDER, FOR SOLUTION INTRAVENOUS at 18:16

## 2020-06-18 RX ADMIN — HYDROXYZINE PAMOATE 25 MG: 25 CAPSULE ORAL at 05:51

## 2020-06-18 RX ADMIN — SODIUM CHLORIDE 15 ML: 900 IRRIGANT IRRIGATION at 06:14

## 2020-06-18 RX ADMIN — ACETAMINOPHEN 650 MG: 325 TABLET ORAL at 12:47

## 2020-06-18 RX ADMIN — VORICONAZOLE 200 MG: 200 TABLET, FILM COATED ORAL at 20:42

## 2020-06-18 RX ADMIN — SODIUM CHLORIDE 15 ML: 900 IRRIGANT IRRIGATION at 20:42

## 2020-06-18 RX ADMIN — MAGNESIUM SULFATE HEPTAHYDRATE 4 G: 40 INJECTION, SOLUTION INTRAVENOUS at 06:00

## 2020-06-18 RX ADMIN — URSODIOL 500 MG: 250 TABLET, FILM COATED ORAL at 09:18

## 2020-06-18 RX ADMIN — Medication: at 20:43

## 2020-06-18 RX ADMIN — VORICONAZOLE 200 MG: 200 TABLET, FILM COATED ORAL at 11:22

## 2020-06-18 RX ADMIN — ACETAMINOPHEN 650 MG: 325 TABLET ORAL at 01:42

## 2020-06-18 RX ADMIN — DAPSONE 100 MG: 100 TABLET ORAL at 09:18

## 2020-06-18 RX ADMIN — SODIUM CHLORIDE: 9 INJECTION, SOLUTION INTRAVENOUS at 17:38

## 2020-06-18 RX ADMIN — TACROLIMUS 0.5 MG: 0.5 CAPSULE ORAL at 20:42

## 2020-06-18 RX ADMIN — VALACYCLOVIR HYDROCHLORIDE 500 MG: 500 TABLET, FILM COATED ORAL at 09:18

## 2020-06-18 RX ADMIN — VALACYCLOVIR HYDROCHLORIDE 500 MG: 500 TABLET, FILM COATED ORAL at 20:42

## 2020-06-18 RX ADMIN — POTASSIUM CHLORIDE 20 MEQ: 20 TABLET, EXTENDED RELEASE ORAL at 09:18

## 2020-06-18 RX ADMIN — FLUCONAZOLE 400 MG: 200 TABLET ORAL at 09:18

## 2020-06-18 RX ADMIN — SODIUM CHLORIDE 1000 ML: 9 INJECTION, SOLUTION INTRAVENOUS at 12:46

## 2020-06-18 RX ADMIN — ENOXAPARIN SODIUM 40 MG: 40 INJECTION SUBCUTANEOUS at 17:37

## 2020-06-18 ASSESSMENT — PAIN SCALES - GENERAL
PAINLEVEL_OUTOF10: 0

## 2020-06-18 NOTE — PLAN OF CARE
Problem: Nutrition  Goal: Optimal nutrition therapy  6/18/2020 1149 by Rennie Romberg, KUMAR, LD  Outcome: Ongoing  Note: Nutrition Problem: Inadequate oral intake  Intervention: Food and/or Nutrient Delivery: Continue current diet, Start ONS  Nutritional Goals: pt will improve PO intake to consume greater than 75% of meals and supplements offered through admission   6/18 pt consuming 26-75% of meals offered; RD adding ONS BID

## 2020-06-18 NOTE — PROGRESS NOTES
NUTRITION ASSESSMENT  Admission Date: 6/14/2020     Type and Reason for Visit: Reassess    NUTRITION RECOMMENDATIONS:   1. PO Diet: General diet. RD reviewed verbally foods to avoid w/gastritis. 2. ONS- Start Ensure Enlive BID; trial of P2go    Please note this patient does meet ASPEN criteria for severe malnutrition d/t weight loss and poor po intake. NUTRITION ASSESSMENT:  Pt s/p EGD/colonoscopy findings: Gastritis 2. Colon polyps 3. hemmorrhoids 4. No obvious enteritis/colitis path pending for GVHD; Diet advacned to general and pt has tolerated > 50% of meals x 3 in last 24 hours. Reports feeling \"food is just sitting on my stomach\". Accepted start of ensure and trial of p2go. RD discussed nutrition/fluid goals- still convinced he needs 3L daily and is struggling to do this. RD reviewed w/pt fluid needs and methods to increase PO fluid intake. Wife asking about ONS/nutrition for d/c planning- RD verbally reviewed. Pt denies n/v/d. RD will continue to monitor for improving PO intake and diet tolerance     Due to current CDC guidelines recommending 6-ft distancing for social isolation for COVID19 prevention, NFPE held. Pt with evidence of severe malnutrition per decreased energy intake and weight loss.      MALNUTRITION ASSESSMENT  Context: Chronic illness   Malnutrition Status: Meets the criteria for severe malnutrition  Findings of the 6 clinical characteristics of malnutrition (Minimum of 2 out of 6 clinical characteristics is required to make the diagnosis of moderate or severe Protein Calorie Malnutrition based on AND/ASPEN Guidelines):  Energy Intake %: Less than or equal to 75% of estimated energy requirement  Energy Intake Time: Greater than or equal to 1 month  Interpretation of Weight Loss %: 10% loss or greater  Interpretation of Weight Loss Time: in 3 months  Body Fat Status: Unable to assess     Muscle Mass Status: Unable to assess     Fluid Accumulation Status: No significant fluid accumulation Reduced  Strength: Not measured    NUTRITION DIAGNOSIS   Problem: Inadequate Oral Intake  Etiology: Altered GI function  Signs & Symptoms: Diet history of poor intake , Intake 26-50% and Intake 51-75%    NUTRITION INTERVENTION  Food and/or Nutrient Delivery:Continue Current diet  or Start ONS    Nutrition education/counseling/coordination of care: Continue Inpatient Monitoring     NUTRITION MONITORING & EVALUATION:  Evaluation:Modified current goal   Goals: pt will improve PO intake to consume greater than 75% of meals and supplements offered through admission   Monitoring: Diet Tolerance , Meal Intake  or Supplement Intake      OBJECTIVE DATA:  · Nutrition-Focused Physical Findings: no n/v; +BM this AM  · Wounds None  RD notes rectal fissure    History reviewed. No pertinent past medical history.      ANTHROPOMETRICS  Current Height: 5' 9\" (175.3 cm)  Current Weight: 164 lb 14.4 oz (74.8 kg)    Admission weight: 166 lb 9.6 oz (75.6 kg)  Ideal Bodyweight 160 lb    Usual Bodyweight 185 lb per pt    Weight Changes -19 lb in 3 mo; 10%       BMI BMI (Calculated): 24.4    Wt Readings from Last 50 Encounters:   06/17/20 164 lb 14.4 oz (74.8 kg)   06/05/20 166 lb 9.6 oz (75.6 kg)   03/31/20 184 lb 9.6 oz (83.7 kg)   02/17/20 180 lb (81.6 kg)       COMPARATIVE STANDARDS  Estimated Total Kcals/Day : 30-35 Current Bodyweight (75.2 kg) ~1506-8791 kcal    Estimated Total Protein (g/day) : 1.5-1.8 Current Bodyweight (75.2 kg) 113-135g/day  Estimated Daily Total Fluid (ml/day): 9857-6459 mL per day     Food / Nutrition-Related History  Pre-Admission / Home Diet:  Pre-Admission/Home Diet: General   Home Supplements / Herbals:   none noted  Food Restrictions / Cultural Requests:   none noted    Diet Orders / Intake / Nutrition Support  Current diet/supplement order: DIET GENERAL;  Dietary Nutrition Supplements: Standard High Calorie Oral Supplement  Dietary Nutrition Supplements: Protein Modular     NSG Recorded PO:   PO Fluids P.O.: 200 mL  PO Meals PO Meals Eaten (%): 76 - 100%   PO Intake: 26-50% and 51-75%  PO Supplement: None   PO Supplement Intake: n/a  IVF: NS @ 50 mL/hr    NUTRITION RISK LEVEL: Risk Level:  Moderate    Andres Hinojosa RD, LD  Vic:  [phone]  Office:  [phone]

## 2020-06-18 NOTE — PLAN OF CARE
Problem: Falls - Risk of:  Goal: Will remain free from falls  Description: Will remain free from falls  Outcome: Ongoing  Orthostatic vital signs obtained at start of shift - see flowsheet for details. Pt does not meet criteria for orthostasis. Pt is a Med fall risk. See Beryle Stephie Fall Score and ABCDS Injury Risk assessments. Pt bed is in low position, side rails up, call light and belongings are in reach. Fall risk light is on outside pts room. Pt encouraged to call for assistance as needed. Will continue with hourly rounds for PO intake, pain needs, toileting and repositioning as needed. Problem: Infection - Central Venous Catheter-Associated Bloodstream Infection:  Goal: Will show no infection signs and symptoms  Description: Will show no infection signs and symptoms  6/18/2020 0803 by Regina Garcia RN  Outcome: Ongoing  CVC site remains free of signs/symptoms of infection. No drainage, edema, erythema, pain, or warmth noted at site. Dressing changes continue per protocol and on an as needed basis - see flowsheet. Problem: Bleeding:  Goal: Will show no signs and symptoms of excessive bleeding  Description: Will show no signs and symptoms of excessive bleeding  Outcome: Ongoing  Patient's hemoglobin this AM:   Recent Labs     06/18/20  0315   HGB 7.9*     Patient's platelet count this AM:   Recent Labs     06/18/20  0315   *    Thrombocytopenia Precautions in place. Patient showing no signs or symptoms of active bleeding. Patient received transfusions per orders prior to this shift. Patient verbalizes understanding of all instructions. Will continue to assess and implement POC. Call light within reach and hourly rounding in place. Problem: Nausea/Vomiting:  Goal: Absence of nausea/vomiting  Description: Absence of nausea/vomiting  Outcome: Ongoing   Pt c/o nausea and bloating x1. Ativan 0.5 mg PO given with relief. Will continue to monitor.      Problem: Diarrhea:  Goal:

## 2020-06-18 NOTE — PROGRESS NOTES
Clinical Pharmacy Progress Note    Patient Name: Maximo Montenegro  YOB: 1965  Diagnosis: MDS - Allogeneic MUD transplant PBSCT- Busulfan/Fludarabine plus post transplant Cytoxan/Prograf    GVHD Prophylaxis:  Post transplant Cytoxan on d+3+4  Tacrolimus (Prograf)   · Adjusted according to levels - drawn via red lumen    Tacrolimus (Prograf) goal level: 8-15 ng/mL    Date SCr Bili Prograf Dose Prograf Level Adjustments / Comments   5/5/2020;   day -6 0.8 0.7 - -  Patient admitted for Allogeneic MUD transplant- will start oral Prograf 2.5 mg po bid on 5/16 with 1st level on 5/20 and then MWF thereafter. 5/20  Day +9 0.6 0.7 2.5 mg po BID 7.3 Level slightly subtherapeutic, but patient recently started taking it. Will wait until next level to increase dose. 5/22  Day +11 0.6 0.4 2.5 mg po BID 7.2 Level therapeutic, will increase dose by 30% to 3.5 mg po qAM and 3 mg po nightly   5/27  Day +16   0.7 0.6 3.5 mg po qAM  3 mg po nightly 16.9 Level supratherapeutic, will hold x 1 dose this evening. Will return to previous dose of 2.5 mg BID at this time. Next level Friday, 5/29.   5/29/20   d+18 0.7 0.6 2.5 mg po bid 15.9 Level still above desired range. Will reduce dose to 2 mg po bid with next level Monday 6/1; d21 0.8 0.5 2 mg po bid 10.6 Tacrolimus level appropriate after two dose decreases in succession. Next tacrolimus level Wed 6/3.   6/3; d23 0.9 0.4 2 mg po bid 9.9 No change. 6/5; d25 1 0.4 2 mg po bid 12.5 No change. 6/15; d35 1.3 0.6 1 mg po bid 13.2 Patient re-admitted 6/14 with n/v/d and GRIFFIN- improving. Serum creatinine down from 1.5 mg/dL on 6/14 (tacrolimus level down from 17.5 on 6/14). Hold tacrolimus this PM and resume at 1 mg po bid on 6/16; discussed with APN.  EGD/colonoscopy planned 6/16. Next tacrolimus level Wed 6/17.   6/17; d37 1.1 0.5 1 mg po bid 9.4 Renal function improved; tacrolimus level appropriate.   Next tacrolimus level Fri 6/19.   6/18/2020;d38 1.1 - 1 mg po

## 2020-06-18 NOTE — PROGRESS NOTES
adenopathy  BACK: Straight  SKIN: faint erythematous on side of face and scalp   CHEST: CTA bilaterally without use of accessory muscles  CV: Normal S1 S2, RRR, no MRG  ABD: NT, ND w/ normoactive BS, no palpable masses or hepatosplenomegaly. EXTREMITIES: without edema  NEURO: CN II - XII grossly intact.    CATHETER: Left IJ TLH (5/13/20, IR) - CDI & Right IJ PAC (2/17/20, [facility]) - erythema at exist site without pain to palpation. Data:   CBC:   Recent Labs     06/16/20  0335 06/17/20  0346 06/18/20  0315   WBC 6.5 5.3 6.7   HGB 7.4* 6.9* 7.9*   HCT 21.6* 20.5* 22.9*   MCV 91.7 91.8 90.9    139 124*     BMP/Mag:  Recent Labs     06/16/20  0335 06/17/20  0346 06/18/20  0315    140 135*   K 3.9 4.3 3.7    109 103   CO2 20* 19* 20*   PHOS  --  3.5  --    BUN 11 12 10   CREATININE 1.3 1.1 1.1   MG 1.40* 1.60* 1.20*     LIVP:   Recent Labs     06/17/20  0346   AST 12*   ALT 10   BILIDIR <0.2   BILITOT 0.5   ALKPHOS 64     Uric Acid:    Recent Labs     06/17/20  0346   LABURIC 6.4     Coags:   Recent Labs     06/17/20  0346 06/18/20  0315   PROTIME 14.2*  --    INR 1.22*  --    APTT  --  29.2     Tacro:    Recent Labs     06/15/20  0833 06/17/20  1038   TACROLEV 13.2 9.4     CMV Quant DNA by PCR:   Lab Results   Component Value Date/Time    CMVDNAQNT <2.4 06/01/2020 08:28 AM    CMVDNAQNT Not Detected 06/01/2020 08:28 AM     Diagnostics:  1. CT chest (6/15/20):  Pulmonary nodularity, as outlined above. Per most recent Fleischner Society Guidelines (Radiology 2017, DOI:10.1148/radiol. [id number]), for patients at low risk for lung cancer, no further imaging follow-up is recommended. For high risk patients (e.g.   smokers), a follow-up noncontrast chest CT in 12 months is optional.    No discrete nodular opacity of the corresponding with findings from prior chest radiograph. Dilation of the ascending thoracic aorta 4.3 cm. Pathology:  1. BM bx/asp (6/10/20, [facility]):  Pending    2.   EGD/Colonoscopy no signs of rectal or perirectal abscess. 8.  Psych:  Increased anxiety over last couple weeks  - Psych following  - Cont Ativan as needed    9. Derm:  Erythematous pruritic rash on face and chest (6/16/20) has resolved   - Cont Atarax as needed      - DVT Prophylaxis: Platelets >85,100 cells/dL, - daily lovenox prophylaxis ordered  Contraindications to pharmacologic prophylaxis: None  Contraindications to mechanical prophylaxis: None    - Disposition:  Once fevers improve     Rock Galindo, APRN - CNP     [place]ente.  Martha Cummings, [address] [place] [address]

## 2020-06-19 LAB
ALBUMIN SERPL-MCNC: 3.1 G/DL (ref 3.4–5)
ALP BLD-CCNC: 64 U/L (ref 40–129)
ALT SERPL-CCNC: 11 U/L (ref 10–40)
ANION GAP SERPL CALCULATED.3IONS-SCNC: 11 MMOL/L (ref 3–16)
ANISOCYTOSIS: ABNORMAL
AST SERPL-CCNC: 17 U/L (ref 15–37)
BASOPHILS ABSOLUTE: 0.1 K/UL (ref 0–0.2)
BASOPHILS RELATIVE PERCENT: 1 %
BILIRUB SERPL-MCNC: 0.6 MG/DL (ref 0–1)
BILIRUBIN DIRECT: <0.2 MG/DL (ref 0–0.3)
BILIRUBIN, INDIRECT: ABNORMAL MG/DL (ref 0–1)
BUN BLDV-MCNC: 8 MG/DL (ref 7–20)
CALCIUM SERPL-MCNC: 8.6 MG/DL (ref 8.3–10.6)
CHLORIDE BLD-SCNC: 105 MMOL/L (ref 99–110)
CO2: 20 MMOL/L (ref 21–32)
CREAT SERPL-MCNC: 1 MG/DL (ref 0.9–1.3)
EOSINOPHILS ABSOLUTE: 2.5 K/UL (ref 0–0.6)
EOSINOPHILS RELATIVE PERCENT: 33 %
FINAL REPORT: NORMAL
GFR AFRICAN AMERICAN: >60
GFR NON-AFRICAN AMERICAN: >60
GLUCOSE BLD-MCNC: 99 MG/DL (ref 70–99)
HCT VFR BLD CALC: 23.7 % (ref 40.5–52.5)
HEMOGLOBIN: 8 G/DL (ref 13.5–17.5)
INR BLD: 1.17 (ref 0.86–1.14)
LACTATE DEHYDROGENASE: 387 U/L (ref 100–190)
LYMPHOCYTES ABSOLUTE: 0.4 K/UL (ref 1–5.1)
LYMPHOCYTES RELATIVE PERCENT: 5 %
MAGNESIUM: 1.5 MG/DL (ref 1.8–2.4)
MCH RBC QN AUTO: 30.6 PG (ref 26–34)
MCHC RBC AUTO-ENTMCNC: 33.7 G/DL (ref 31–36)
MCV RBC AUTO: 90.6 FL (ref 80–100)
MONOCYTES ABSOLUTE: 0.5 K/UL (ref 0–1.3)
MONOCYTES RELATIVE PERCENT: 6 %
NEUTROPHILS ABSOLUTE: 4.2 K/UL (ref 1.7–7.7)
NEUTROPHILS RELATIVE PERCENT: 55 %
PDW BLD-RTO: 16.3 % (ref 12.4–15.4)
PHOSPHORUS: 1.8 MG/DL (ref 2.5–4.9)
PLATELET # BLD: 112 K/UL (ref 135–450)
PMV BLD AUTO: 8.4 FL (ref 5–10.5)
POTASSIUM SERPL-SCNC: 4.2 MMOL/L (ref 3.5–5.1)
PRELIMINARY: NORMAL
PROTHROMBIN TIME: 13.6 SEC (ref 10–13.2)
RBC # BLD: 2.61 M/UL (ref 4.2–5.9)
SODIUM BLD-SCNC: 136 MMOL/L (ref 136–145)
TACROLIMUS BLOOD: 11.7 NG/ML (ref 5–20)
THROAT CULTURE: NORMAL
TOTAL PROTEIN: 5.4 G/DL (ref 6.4–8.2)
URIC ACID, SERUM: 4.1 MG/DL (ref 3.5–7.2)
WBC # BLD: 7.7 K/UL (ref 4–11)

## 2020-06-19 PROCEDURE — 83615 LACTATE (LD) (LDH) ENZYME: CPT

## 2020-06-19 PROCEDURE — 80048 BASIC METABOLIC PNL TOTAL CA: CPT

## 2020-06-19 PROCEDURE — 80197 ASSAY OF TACROLIMUS: CPT

## 2020-06-19 PROCEDURE — 85025 COMPLETE CBC W/AUTO DIFF WBC: CPT

## 2020-06-19 PROCEDURE — 80076 HEPATIC FUNCTION PANEL: CPT

## 2020-06-19 PROCEDURE — 85610 PROTHROMBIN TIME: CPT

## 2020-06-19 PROCEDURE — 2580000003 HC RX 258: Performed by: NURSE PRACTITIONER

## 2020-06-19 PROCEDURE — 6360000002 HC RX W HCPCS: Performed by: INTERNAL MEDICINE

## 2020-06-19 PROCEDURE — 2500000003 HC RX 250 WO HCPCS: Performed by: NURSE PRACTITIONER

## 2020-06-19 PROCEDURE — 6370000000 HC RX 637 (ALT 250 FOR IP): Performed by: INTERNAL MEDICINE

## 2020-06-19 PROCEDURE — 36592 COLLECT BLOOD FROM PICC: CPT

## 2020-06-19 PROCEDURE — 84550 ASSAY OF BLOOD/URIC ACID: CPT

## 2020-06-19 PROCEDURE — 2580000003 HC RX 258: Performed by: INTERNAL MEDICINE

## 2020-06-19 PROCEDURE — 84100 ASSAY OF PHOSPHORUS: CPT

## 2020-06-19 PROCEDURE — 6360000002 HC RX W HCPCS: Performed by: NURSE PRACTITIONER

## 2020-06-19 PROCEDURE — 83735 ASSAY OF MAGNESIUM: CPT

## 2020-06-19 PROCEDURE — 2060000000 HC ICU INTERMEDIATE R&B

## 2020-06-19 PROCEDURE — 6370000000 HC RX 637 (ALT 250 FOR IP): Performed by: NURSE PRACTITIONER

## 2020-06-19 RX ORDER — TACROLIMUS 1 MG/1
1 CAPSULE ORAL 2 TIMES DAILY
Status: DISCONTINUED | OUTPATIENT
Start: 2020-06-19 | End: 2020-06-22 | Stop reason: DRUGHIGH

## 2020-06-19 RX ORDER — LANOLIN ALCOHOL/MO/W.PET/CERES
CREAM (GRAM) TOPICAL PRN
Status: DISCONTINUED | OUTPATIENT
Start: 2020-06-19 | End: 2020-06-25 | Stop reason: HOSPADM

## 2020-06-19 RX ORDER — POTASSIUM CHLORIDE 20 MEQ/1
20 TABLET, EXTENDED RELEASE ORAL
Status: DISCONTINUED | OUTPATIENT
Start: 2020-06-19 | End: 2020-06-22

## 2020-06-19 RX ORDER — FLUCONAZOLE 200 MG/1
400 TABLET ORAL DAILY
Status: DISCONTINUED | OUTPATIENT
Start: 2020-06-20 | End: 2020-06-25 | Stop reason: HOSPADM

## 2020-06-19 RX ORDER — CLARITHROMYCIN 500 MG/1
250 TABLET, COATED ORAL DAILY
Status: DISCONTINUED | OUTPATIENT
Start: 2020-06-19 | End: 2020-06-20

## 2020-06-19 RX ORDER — PANTOPRAZOLE SODIUM 40 MG/1
40 TABLET, DELAYED RELEASE ORAL
Status: DISCONTINUED | OUTPATIENT
Start: 2020-06-19 | End: 2020-06-25 | Stop reason: HOSPADM

## 2020-06-19 RX ADMIN — Medication 10 ML: at 08:23

## 2020-06-19 RX ADMIN — Medication 10 ML: at 21:22

## 2020-06-19 RX ADMIN — VALACYCLOVIR HYDROCHLORIDE 500 MG: 500 TABLET, FILM COATED ORAL at 08:22

## 2020-06-19 RX ADMIN — HYDROXYZINE PAMOATE 25 MG: 25 CAPSULE ORAL at 09:35

## 2020-06-19 RX ADMIN — Medication: at 15:27

## 2020-06-19 RX ADMIN — URSODIOL 500 MG: 250 TABLET, FILM COATED ORAL at 21:22

## 2020-06-19 RX ADMIN — SODIUM CHLORIDE 15 ML: 900 IRRIGANT IRRIGATION at 18:07

## 2020-06-19 RX ADMIN — ENOXAPARIN SODIUM 40 MG: 40 INJECTION SUBCUTANEOUS at 18:05

## 2020-06-19 RX ADMIN — VALACYCLOVIR HYDROCHLORIDE 500 MG: 500 TABLET, FILM COATED ORAL at 21:22

## 2020-06-19 RX ADMIN — TACROLIMUS 0.5 MG: 0.5 CAPSULE ORAL at 08:22

## 2020-06-19 RX ADMIN — TACROLIMUS 1 MG: 1 CAPSULE ORAL at 21:22

## 2020-06-19 RX ADMIN — ACETAMINOPHEN 650 MG: 325 TABLET ORAL at 15:17

## 2020-06-19 RX ADMIN — URSODIOL 500 MG: 250 TABLET, FILM COATED ORAL at 08:22

## 2020-06-19 RX ADMIN — POTASSIUM CHLORIDE 20 MEQ: 20 TABLET, EXTENDED RELEASE ORAL at 08:22

## 2020-06-19 RX ADMIN — MEROPENEM 1 G: 1 INJECTION, POWDER, FOR SOLUTION INTRAVENOUS at 03:21

## 2020-06-19 RX ADMIN — OXYCODONE 10 MG: 5 TABLET ORAL at 15:26

## 2020-06-19 RX ADMIN — PANTOPRAZOLE SODIUM 40 MG: 40 TABLET, DELAYED RELEASE ORAL at 08:22

## 2020-06-19 RX ADMIN — VORICONAZOLE 200 MG: 200 TABLET, FILM COATED ORAL at 08:23

## 2020-06-19 RX ADMIN — SODIUM CHLORIDE: 9 INJECTION, SOLUTION INTRAVENOUS at 15:19

## 2020-06-19 RX ADMIN — POTASSIUM PHOSPHATE, MONOBASIC AND POTASSIUM PHOSPHATE, DIBASIC 20 MMOL: 224; 236 INJECTION, SOLUTION, CONCENTRATE INTRAVENOUS at 08:25

## 2020-06-19 RX ADMIN — CLARITHROMYCIN: 500 TABLET, COATED ORAL at 10:15

## 2020-06-19 RX ADMIN — Medication: at 10:15

## 2020-06-19 RX ADMIN — DAPSONE 100 MG: 100 TABLET ORAL at 08:22

## 2020-06-19 ASSESSMENT — PAIN DESCRIPTION - LOCATION: LOCATION: ABDOMEN

## 2020-06-19 ASSESSMENT — PAIN SCALES - GENERAL
PAINLEVEL_OUTOF10: 7
PAINLEVEL_OUTOF10: 0
PAINLEVEL_OUTOF10: 2
PAINLEVEL_OUTOF10: 5
PAINLEVEL_OUTOF10: 0

## 2020-06-19 ASSESSMENT — PAIN - FUNCTIONAL ASSESSMENT: PAIN_FUNCTIONAL_ASSESSMENT: ACTIVITIES ARE NOT PREVENTED

## 2020-06-19 ASSESSMENT — PAIN DESCRIPTION - FREQUENCY: FREQUENCY: INTERMITTENT

## 2020-06-19 ASSESSMENT — PAIN DESCRIPTION - ORIENTATION: ORIENTATION: LEFT

## 2020-06-19 ASSESSMENT — PAIN DESCRIPTION - PAIN TYPE: TYPE: ACUTE PAIN

## 2020-06-19 ASSESSMENT — PAIN DESCRIPTION - ONSET: ONSET: PROGRESSIVE

## 2020-06-19 ASSESSMENT — PAIN DESCRIPTION - DESCRIPTORS: DESCRIPTORS: PRESSURE;CRAMPING

## 2020-06-19 ASSESSMENT — PAIN DESCRIPTION - PROGRESSION: CLINICAL_PROGRESSION: NOT CHANGED

## 2020-06-19 NOTE — PLAN OF CARE
Problem: Falls - Risk of:  Goal: Will remain free from falls  Description: Will remain free from falls  6/19/2020 1509 by Alan Gilford, RN  Outcome: Ongoing  Note: Orthostatic vital signs obtained at start of shift - see flowsheet for details. Pt does not meet criteria for orthostasis. Pt is a Med fall risk. See Diana Smiling Fall Score and ABCDS Injury Risk assessments. - Screening for Orthostasis AND not a [place] Risk per PHAM/ABCDS: Pt bed is in low position, side rails up, call light and belongings are in reach. Fall risk light is on outside pts room. Pt encouraged to call for assistance as needed. Will continue with hourly rounds for PO intake, pain needs, toileting and repositioning as needed. Problem: Venous Thromboembolism:  Goal: Will show no signs or symptoms of venous thromboembolism  Description: Will show no signs or symptoms of venous thromboembolism  6/19/2020 1509 by Alan Gilford, RN  Outcome: Ongoing  Note: Adherent with DVT Prevention: Pt is at risk for DVT d/t decreased mobility and cancer treatment. Pt educated on importance of activity. Pt has orders for Subcut prophylactic lovenox. Pt verbalizes understanding of need for prophylaxis while inpatient. Problem: Infection - Central Venous Catheter-Associated Bloodstream Infection:  Goal: Will show no infection signs and symptoms  Description: Will show no infection signs and symptoms  6/19/2020 1509 by Alan Gilford, RN  Outcome: Ongoing  Note: CVC site remains free of signs/symptoms of infection. No drainage, edema, erythema, pain, or warmth noted at site. Dressing changes continue per protocol and on an as needed basis - see flowsheet. Compliant with [facility] Bath Protocol:  Performed CHG bath today per [facility] protocol utilizing CHG solution in the shower. CVC site cleansed with CHG wipe over dressing, skin surrounding dressing, and first 6\" of IV tubing. Pt tolerated well.   Continued to encourage daily CHG bathing per [address]

## 2020-06-19 NOTE — PROGRESS NOTES
[facility] All[facility]ic Progress Note    2020    Babak Slot    :  [id number]    MRN:  [id number]      Subjective:   No new complaints. Diarrhea resolved, still with low grade temps (100's) but negative cultures.        ECOG PS:  (2) Ambulatory and capable of self care, unable to carry out work activity, up and about > 50% or waking hours    KPS: 80% Normal activity with effort; some signs or symptoms of disease    Isolation:  None     Medications    Scheduled Meds:   potassium phosphate IVPB  20 mmol Intravenous Once    potassium chloride  20 mEq Oral Daily with breakfast    voriconazole  200 mg Oral 2 times per day    tacrolimus  0.5 mg Oral BID    nystatin   Topical TID    enoxaparin  40 mg Subcutaneous QPM    dapsone  100 mg Oral Daily    ursodiol  500 mg Oral BID    valACYclovir  500 mg Oral BID    sodium chloride flush  10 mL Intravenous 2 times per day    Saline Mouthwash  15 mL Swish & Spit 4x Daily AC & HS    meropenem  1 g Intravenous Q8H     Continuous Infusions:   sodium chloride 50 mL/hr at 20 1738     PRN Meds:[START ON 2020] potassium phosphate IVPB, hydrOXYzine, oxyCODONE **OR** oxyCODONE, loperamide, LORazepam, prochlorperazine, sodium chloride flush, potassium chloride, potassium chloride, magnesium sulfate, magnesium hydroxide, Saline Mouthwash, alteplase, acetaminophen    ROS:  As noted above, otherwise remainder of 10-point ROS negative    Physical Exam:    I&O:      Intake/Output Summary (Last 24 hours) at 2020 0718  Last data filed at 2020 0313  Gross per 24 hour   Intake 800 ml   Output 2850 ml   Net -2050 ml       Vital Signs:  BP (!) 157/90   Pulse 88   Temp 99.1 °F (37.3 °C) (Oral)   Resp 16   Ht 5' 9\" (1.753 m)   Wt 164 lb 14.4 oz (74.8 kg)   SpO2 94%   BMI 24.35 kg/m²     Weight:    Wt Readings from Last 3 Encounters:   20 164 lb 14.4 oz (74.8 kg)   20 166 lb 9.6 oz (75.6 kg)   20 184 lb 9.6 oz (83.7 kg)       General: Awake, alert and oriented. HEENT: normocephalic, alopecia, PERRL, no scleral erythema or icterus, Oral mucosa moist and intact   NECK: supple without palpable adenopathy  BACK: Straight  SKIN: faint erythematous on side of face and scalp   CHEST: CTA bilaterally without use of accessory muscles  CV: Normal S1 S2, RRR, no MRG  ABD: NT, ND w/ normoactive BS, no palpable masses or hepatosplenomegaly. EXTREMITIES: without edema  NEURO: CN II - XII grossly intact.    CATHETER: Left IJ TLH (5/13/20, IR) - CDI & Right IJ PAC (2/17/20, [facility]) - erythema at exist site without pain to palpation. Data:   CBC:   Recent Labs     06/17/20 0346 06/18/20 0315 06/19/20  0325   WBC 5.3 6.7 7.7   HGB 6.9* 7.9* 8.0*   HCT 20.5* 22.9* 23.7*   MCV 91.8 90.9 90.6    124* 112*     BMP/Mag:  Recent Labs     06/17/20 0346 06/18/20 0315 06/19/20  0325    135* 136   K 4.3 3.7 4.2    103 105   CO2 19* 20* 20*   PHOS 3.5  --  1.8*   BUN 12 10 8   CREATININE 1.1 1.1 1.0   MG 1.60* 1.20* 1.50*     LIVP:   Recent Labs     06/17/20 0346 06/19/20  0325   AST 12* 17   ALT 10 11   BILIDIR <0.2 <0.2   BILITOT 0.5 0.6   ALKPHOS 64 64     Uric Acid:    Recent Labs     06/19/20  0325   LABURIC 4.1     Coags:   Recent Labs     06/17/20  0346 06/18/20  0315 06/19/20  0325   PROTIME 14.2*  --  13.6*   INR 1.22*  --  1.17*   APTT  --  29.2  --      Tacro:    Recent Labs     06/17/20  1038   TACROLEV 9.4     CMV Quant DNA by PCR:   Lab Results   Component Value Date/Time    CMVDNAQNT <2.4 06/15/2020 08:20 AM    CMVDNAQNT Not Detected 06/15/2020 08:20 AM     Diagnostics:  1. CT chest (6/15/20):  Pulmonary nodularity, as outlined above. Per most recent Fleischner Society Guidelines (Radiology 2017, DOI:10.1148/radiol. 3667117085), for patients at low risk for lung cancer, no further imaging follow-up is recommended.  For high risk patients (e.g.   smokers), a follow-up noncontrast chest CT in 12 months is optional.    No discrete nodular opacity of the corresponding with findings from prior chest radiograph. Dilation of the ascending thoracic aorta 4.3 cm. Pathology:  1. 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 Adequate megakaryocytes. Increased bone marrow storage iron. FISH:  Pending  Cytogenetics:  Pending  Engraftment: Pending    2. EGD/Colonoscopy (6/16/20): FINAL DIAGNOSIS:       A. Small bowel (evaluate for graft-vs.-host disease/GVHD):     - No diagnostic alterations.     - CMV immunostain negative.     - See Comment.       B. Gastric biopsy (gastritis; R/O Helicobacter and GVHD):     - No diagnostic alterations (no evidence of Helicobacter).    - CMV immunostain negative.     - See Comment.       C. Random colon biopsy (R/O GVHD):     - Numerous colonic mucosal fragments, one with hyperplastic polyp.     - No further diagnostic alterations.     - CMV immunostain negative.     - See Comment.       D. Descending colon polyp (R/O GVHD):     - One of three colonic mucosal fragments on slide with tubular adenoma       negative for high-grade dysplasia.     - CMV immunostain negative. COMMENT:   A: Sections contain proximal small bowel mucosa with a few  Brunner glands identifying the source location as duodenum, without  diagnostic alterations. As expected/normal for the biopsy location, the  villi, where well-presented, are tall, slender, and slightly scalloped,  and stromal plasma cells are plentiful. B: Sections contain antral gastric mucosa without diagnostic alterations. Helicobacter organisms are not apparent. C: Sections contain colonic mucosa, one fragment with the indicated  hyperplastic polyp, otherwise without diagnostic alterations.     A-D: Except as diagnosed and/or described above, abnormal infiltrates,  significant architectural alterations, viral cytopathy, granulomas,  dysplasia, and malignancy are absent, with CMV immunostain negative on  each.   BRATI/BRATI    PROBLEM LIST:        1. MDS  2. Anxiety and depression  3. HLD  4. H/o shingles (2004, RLQ)  5. [place] Risk syndrome     Post- Transplant Complications:  1.  Resting tremor   2.  Steroid Induced Hyperglycemia   3.  Hypervolemia   4.  Constipation   5.  Nausea   6.  Headache   7.  Mucositis  8.  Diarrhea / Abdominal Cramping   9.  Neutropenic Fever   10.  HTN  11.  Rectal Pain / Fissure   12.  Urinary Retention   13.  Rash  14. Mild Malnutrition  15. Orthostatic Hypotension   16. N/V/D & GRIFFIN (6/2020)  17. Fevers      TREATMENT:       1. Decitabine x 3 cycles (2/17/20 - 4/14/20)  2. MUD Allo-pb BMT (5/11/20)  Preparative Regimen: Targeted Busulfan & Fludarabine; Busulfan dose increased from 252 mg to 315 mg, for doses 3 and 4. Date of BMT: 5/11/20  Source of stem cells: PBSC - 6.7 x10^6 [id number]/kg  Donor/Recipient Blood Type: A+ / A+  Donor Sex: Male (DID# [id number]), follow VNTR   CMV Donor / Recipient: Positive / Positive        ASSESSMENT AND PLAN:          1. Myelodysplastic Syndrome: Stable disease  - BM Bx/Asp (4/7/20) - ongoing disease  - S/p Bu/Flu & MUD-pb BMT    PLAN:    - Day 30 BMBx (6/10/20): Mildly hypocellular (30%) w/ no evidence of residual increased myeloblasts. FISH, cytogenetics and engraftment pending  - Day 60 (7/10/20)  - Day 100 (8/19/20)     Day + 39     2. ID: Currently febrile, possibly right sided gram negative PNA (POA)  - CT chest (6/15/20) - stable 4 mm pulmonary nodules. No discrete nodular opacity of the corresponding with findings from prior chest radiograph.   - Pan-cx (6/14/20) - NGTD  - COVID (6/14/20) - negative   - Procalcitonin (6/18/20) - 0.45, galactomannan (6/18/20) - Pending   - Cont Valtrex, Dapsone ppx (resume Biaxin) & Diflucan  - Merrem Day + 6 (started 6/14/20) - stop 6/19     Donor/Recipient CMV: Pos / Pos  - CMV anxiety over last couple weeks  - Psych following  - Cont Ativan as needed    9. Derm:  Erythematous pruritic rash on face and chest (6/16/20) has resolved   - Cont Atarax as needed      - DVT Prophylaxis: Platelets >90,704 cells/dL, - daily lovenox prophylaxis ordered  Contraindications to pharmacologic prophylaxis: None  Contraindications to mechanical prophylaxis: None    - Disposition:  Once fevers improve, hopefully Monday     ODALIS Rolon - STEWART Aaron.  [place], [place]  [address] [facility]

## 2020-06-19 NOTE — BH NOTE
Psychology    Psychologist attended clinical rounds with team and remained after to speak with pt. He continues to struggle with anxiety over what might happen. Normalized his re-hospitalization and reminded him that recovery is the hardest part of transplant. He always likes to know what to expect and it's been tough for him to have every part of the transplant feel like a surprise or a mystery. Praised the nurses for their wonderful care and compassion. Provided emotional support, reassurance, and encouragement and will continue to see pt for routine visits through hospital stay.     Lizzy Figueroa Psy.D., ABPP

## 2020-06-20 ENCOUNTER — APPOINTMENT (OUTPATIENT)
Dept: GENERAL RADIOLOGY | Age: 55
DRG: 391 | End: 2020-06-20
Attending: INTERNAL MEDICINE
Payer: COMMERCIAL

## 2020-06-20 LAB
ANION GAP SERPL CALCULATED.3IONS-SCNC: 9 MMOL/L (ref 3–16)
ANISOCYTOSIS: ABNORMAL
ASPERGILLUS GALACTO AG: NEGATIVE
ASPERGILLUS GALACTO INDEX: 0.07
BASOPHILS ABSOLUTE: 0 K/UL (ref 0–0.2)
BASOPHILS RELATIVE PERCENT: 0 %
BILIRUBIN URINE: NEGATIVE
BLOOD, URINE: NEGATIVE
BUN BLDV-MCNC: 11 MG/DL (ref 7–20)
CALCIUM SERPL-MCNC: 8.5 MG/DL (ref 8.3–10.6)
CHLORIDE BLD-SCNC: 105 MMOL/L (ref 99–110)
CLARITY: CLEAR
CO2: 22 MMOL/L (ref 21–32)
COLOR: YELLOW
CREAT SERPL-MCNC: 1 MG/DL (ref 0.9–1.3)
EOSINOPHILS ABSOLUTE: 3.9 K/UL (ref 0–0.6)
EOSINOPHILS RELATIVE PERCENT: 39 %
GFR AFRICAN AMERICAN: >60
GFR NON-AFRICAN AMERICAN: >60
GLUCOSE BLD-MCNC: 112 MG/DL (ref 70–99)
GLUCOSE URINE: NEGATIVE MG/DL
HCT VFR BLD CALC: 24.2 % (ref 40.5–52.5)
HEMOGLOBIN: 8.2 G/DL (ref 13.5–17.5)
KETONES, URINE: NEGATIVE MG/DL
LEUKOCYTE ESTERASE, URINE: NEGATIVE
LYMPHOCYTES ABSOLUTE: 0.4 K/UL (ref 1–5.1)
LYMPHOCYTES RELATIVE PERCENT: 4 %
MAGNESIUM: 1.2 MG/DL (ref 1.8–2.4)
MCH RBC QN AUTO: 30.8 PG (ref 26–34)
MCHC RBC AUTO-ENTMCNC: 34 G/DL (ref 31–36)
MCV RBC AUTO: 90.4 FL (ref 80–100)
MICROSCOPIC EXAMINATION: NORMAL
MONOCYTES ABSOLUTE: 0.6 K/UL (ref 0–1.3)
MONOCYTES RELATIVE PERCENT: 6 %
NEUTROPHILS ABSOLUTE: 5 K/UL (ref 1.7–7.7)
NEUTROPHILS RELATIVE PERCENT: 51 %
NITRITE, URINE: NEGATIVE
OVALOCYTES: ABNORMAL
PDW BLD-RTO: 16.3 % (ref 12.4–15.4)
PH UA: 7 (ref 5–8)
PHOSPHORUS: 2.9 MG/DL (ref 2.5–4.9)
PLATELET # BLD: 122 K/UL (ref 135–450)
PMV BLD AUTO: 8.5 FL (ref 5–10.5)
POTASSIUM SERPL-SCNC: 4.9 MMOL/L (ref 3.5–5.1)
PROTEIN UA: NEGATIVE MG/DL
RBC # BLD: 2.68 M/UL (ref 4.2–5.9)
SCHISTOCYTES: ABNORMAL
SODIUM BLD-SCNC: 136 MMOL/L (ref 136–145)
SPECIFIC GRAVITY UA: 1.01 (ref 1–1.03)
TEAR DROP CELLS: ABNORMAL
URINE TYPE: NORMAL
UROBILINOGEN, URINE: 0.2 E.U./DL
WBC # BLD: 9.9 K/UL (ref 4–11)

## 2020-06-20 PROCEDURE — 87086 URINE CULTURE/COLONY COUNT: CPT

## 2020-06-20 PROCEDURE — 6360000002 HC RX W HCPCS: Performed by: INTERNAL MEDICINE

## 2020-06-20 PROCEDURE — 71045 X-RAY EXAM CHEST 1 VIEW: CPT

## 2020-06-20 PROCEDURE — 6370000000 HC RX 637 (ALT 250 FOR IP): Performed by: INTERNAL MEDICINE

## 2020-06-20 PROCEDURE — 87253 VIRUS INOCULATE TISSUE ADDL: CPT

## 2020-06-20 PROCEDURE — 2580000003 HC RX 258: Performed by: INTERNAL MEDICINE

## 2020-06-20 PROCEDURE — 36592 COLLECT BLOOD FROM PICC: CPT

## 2020-06-20 PROCEDURE — 80048 BASIC METABOLIC PNL TOTAL CA: CPT

## 2020-06-20 PROCEDURE — 6370000000 HC RX 637 (ALT 250 FOR IP): Performed by: NURSE PRACTITIONER

## 2020-06-20 PROCEDURE — 84100 ASSAY OF PHOSPHORUS: CPT

## 2020-06-20 PROCEDURE — 83735 ASSAY OF MAGNESIUM: CPT

## 2020-06-20 PROCEDURE — 85025 COMPLETE CBC W/AUTO DIFF WBC: CPT

## 2020-06-20 PROCEDURE — 81003 URINALYSIS AUTO W/O SCOPE: CPT

## 2020-06-20 PROCEDURE — 87103 BLOOD FUNGUS CULTURE: CPT

## 2020-06-20 PROCEDURE — 87205 SMEAR GRAM STAIN: CPT

## 2020-06-20 PROCEDURE — 87102 FUNGUS ISOLATION CULTURE: CPT

## 2020-06-20 PROCEDURE — 2060000000 HC ICU INTERMEDIATE R&B

## 2020-06-20 PROCEDURE — 87252 VIRUS INOCULATION TISSUE: CPT

## 2020-06-20 PROCEDURE — 2580000003 HC RX 258: Performed by: NURSE PRACTITIONER

## 2020-06-20 PROCEDURE — 6360000002 HC RX W HCPCS: Performed by: NURSE PRACTITIONER

## 2020-06-20 PROCEDURE — 87040 BLOOD CULTURE FOR BACTERIA: CPT

## 2020-06-20 PROCEDURE — 87070 CULTURE OTHR SPECIMN AEROBIC: CPT

## 2020-06-20 RX ORDER — DIAPER,BRIEF,INFANT-TODD,DISP
EACH MISCELLANEOUS 3 TIMES DAILY PRN
Status: DISCONTINUED | OUTPATIENT
Start: 2020-06-20 | End: 2020-06-25 | Stop reason: HOSPADM

## 2020-06-20 RX ADMIN — SODIUM CHLORIDE 15 ML: 900 IRRIGANT IRRIGATION at 12:03

## 2020-06-20 RX ADMIN — ACETAMINOPHEN 650 MG: 325 TABLET ORAL at 09:22

## 2020-06-20 RX ADMIN — VALACYCLOVIR HYDROCHLORIDE 500 MG: 500 TABLET, FILM COATED ORAL at 20:44

## 2020-06-20 RX ADMIN — MAGNESIUM SULFATE HEPTAHYDRATE 4 G: 40 INJECTION, SOLUTION INTRAVENOUS at 05:23

## 2020-06-20 RX ADMIN — DAPSONE 100 MG: 100 TABLET ORAL at 09:23

## 2020-06-20 RX ADMIN — HYDROXYZINE PAMOATE 25 MG: 25 CAPSULE ORAL at 08:35

## 2020-06-20 RX ADMIN — Medication 10 ML: at 21:03

## 2020-06-20 RX ADMIN — SODIUM CHLORIDE: 9 INJECTION, SOLUTION INTRAVENOUS at 15:26

## 2020-06-20 RX ADMIN — Medication: at 10:18

## 2020-06-20 RX ADMIN — URSODIOL 500 MG: 250 TABLET, FILM COATED ORAL at 20:45

## 2020-06-20 RX ADMIN — PANTOPRAZOLE SODIUM 40 MG: 40 TABLET, DELAYED RELEASE ORAL at 10:14

## 2020-06-20 RX ADMIN — CLARITHROMYCIN 250 MG: 500 TABLET, COATED ORAL at 09:23

## 2020-06-20 RX ADMIN — TACROLIMUS 1 MG: 1 CAPSULE ORAL at 20:45

## 2020-06-20 RX ADMIN — VALACYCLOVIR HYDROCHLORIDE 500 MG: 500 TABLET, FILM COATED ORAL at 09:23

## 2020-06-20 RX ADMIN — ENOXAPARIN SODIUM 40 MG: 40 INJECTION SUBCUTANEOUS at 17:21

## 2020-06-20 RX ADMIN — SODIUM CHLORIDE 15 ML: 900 IRRIGANT IRRIGATION at 17:36

## 2020-06-20 RX ADMIN — POTASSIUM CHLORIDE 20 MEQ: 20 TABLET, EXTENDED RELEASE ORAL at 09:22

## 2020-06-20 RX ADMIN — MEROPENEM 1 G: 1 INJECTION, POWDER, FOR SOLUTION INTRAVENOUS at 10:14

## 2020-06-20 RX ADMIN — ACETAMINOPHEN 650 MG: 325 TABLET ORAL at 18:46

## 2020-06-20 RX ADMIN — FLUCONAZOLE 400 MG: 200 TABLET ORAL at 09:22

## 2020-06-20 RX ADMIN — URSODIOL 500 MG: 250 TABLET, FILM COATED ORAL at 09:22

## 2020-06-20 RX ADMIN — Medication 10 ML: at 09:23

## 2020-06-20 RX ADMIN — MEROPENEM 1 G: 1 INJECTION, POWDER, FOR SOLUTION INTRAVENOUS at 18:44

## 2020-06-20 RX ADMIN — HYDROCORTISONE: 1 CREAM TOPICAL at 14:21

## 2020-06-20 RX ADMIN — HYDROXYZINE PAMOATE 25 MG: 25 CAPSULE ORAL at 23:13

## 2020-06-20 RX ADMIN — LORAZEPAM 0.5 MG: 0.5 TABLET ORAL at 19:39

## 2020-06-20 RX ADMIN — TACROLIMUS 1 MG: 1 CAPSULE ORAL at 09:23

## 2020-06-20 ASSESSMENT — PAIN SCALES - GENERAL
PAINLEVEL_OUTOF10: 0
PAINLEVEL_OUTOF10: 0

## 2020-06-20 NOTE — PLAN OF CARE
Problem: Falls - Risk of:  Goal: Will remain free from falls  Description: Will remain free from falls  6/20/2020 0042 by Madelyn Alvarez RN  Outcome: Met This Shift  Note: Patient remained free of falls during shift. Patient is a [place] Fall Risk: Medium (25-44); uses call light appropriately, ambulates with a steady gait and no assistive devices. Orthostatic blood pressures assessed and patient remains negative. Will continue to encourage ambulation and implement POC. Call light within reach and hourly rounding in place. Problem: Falls - Risk of:  Goal: Absence of physical injury  Description: Absence of physical injury  6/20/2020 0042 by Madelyn Alvarez RN  Outcome: Met This Shift     Problem: Infection - Central Venous Catheter-Associated Bloodstream Infection:  Goal: Will show no infection signs and symptoms  Description: Will show no infection signs and symptoms  6/20/2020 0042 by Madelyn Alvarez RN  Outcome: Met This Shift  Note: Patient's CVC site shows no signs or symptoms of infection. No drainage, edema, erythema, pain, or warmth noted at site. Vital signs stable and all lines flushed with blood return noted. Dressing changes continue per protocol and on an as needed basis - see flowsheet. Will continue to monitor for symptoms of infection, pain and/or discomfort. Call light within reach and hourly rounding in place.       Problem: Nausea/Vomiting:  Goal: Ability to achieve adequate nutritional intake will improve  Description: Ability to achieve adequate nutritional intake will improve  6/20/2020 0042 by Madelyn Alvarez RN  Outcome: Met This Shift     Problem: Nausea/Vomiting:  Goal: Absence of nausea/vomiting  Description: Absence of nausea/vomiting  6/20/2020 0042 by Madelyn Alvarez RN  Outcome: Met This Shift     Problem: Nausea/Vomiting:  Goal: Able to drink  Description: Able to drink  6/20/2020 0042 by Madelyn Alvarez RN  Outcome: Met This Shift     Problem: Nausea/Vomiting:  Goal: Able to eat  Description: Able to eat  6/20/2020 0042 by Nakia Dwyer RN  Outcome: Met This Shift     Problem: Nausea/Vomiting:  Goal: Able to eat  Description: Able to eat  6/20/2020 0042 by Nakia Dwyer RN  Outcome: Met This Shift     Problem: Pain:  Goal: Pain level will decrease  Description: Pain level will decrease  6/20/2020 0042 by Nakia Dwyer RN  Outcome: Met This Shift     Problem: Pain:  Goal: Control of acute pain  Description: Control of acute pain  6/20/2020 0042 by Nakia Dwyer RN  Outcome: Met This Shift     Problem: Pain:  Goal: Control of chronic pain  Description: Control of chronic pain  6/20/2020 0042 by Nakia Dwyer RN  Outcome: Met This Shift     Problem: Venous Thromboembolism:  Goal: Will show no signs or symptoms of venous thromboembolism  Description: Will show no signs or symptoms of venous thromboembolism  6/20/2020 0042 by Nakia Dwyer RN  Outcome: Ongoing     Problem: Diarrhea:  Goal: Passage of soft, formed stool  Description: Passage of soft, formed stool  6/20/2020 0042 by Nakia Dwyer RN  Outcome: Ongoing     Problem: Diarrhea:  Goal: Establishment of normal bowel function will improve to within specified parameters  Description: Establishment of normal bowel function will improve to within specified parameters  6/20/2020 0042 by Nakia Dwyer RN  Outcome: Ongoing     Problem: Skin Integrity:  Goal: Absence of new skin breakdown  Description: Absence of new skin breakdown  6/20/2020 0042 by Nakia Dwyer RN  Outcome: Ongoing  Note: Patient is free of new skin breakdown. Remains with dry flaky skin. Will continue to encourage position changes every 2 hours, ambulation, and implement POC. Call light within reach and hourly rounding in place.        Problem: Nutrition  Goal: Optimal nutrition therapy  6/20/2020 0042 by Nakia Dwyer RN  Outcome: Ongoing

## 2020-06-20 NOTE — PROGRESS NOTES
and oriented. HEENT: normocephalic, alopecia, PERRL, no scleral erythema or icterus, Oral mucosa moist and intact   NECK: supple without palpable adenopathy  BACK: Straight  SKIN: faint erythematous on side of face and scalp   CHEST: CTA bilaterally without use of accessory muscles  CV: Normal S1 S2, RRR, no MRG  ABD: NT, ND w/ normoactive BS, no palpable masses or hepatosplenomegaly. EXTREMITIES: without edema  NEURO: CN II - XII grossly intact.    CATHETER: Left IJ TLH (5/13/20, IR) - CDI & Right IJ PAC (2/17/20, [facility]) - erythema at exist site without pain to palpation. Data:   CBC:   Recent Labs     06/18/20 0315 06/19/20 0325 06/20/20  0405   WBC 6.7 7.7 9.9   HGB 7.9* 8.0* 8.2*   HCT 22.9* 23.7* 24.2*   MCV 90.9 90.6 90.4   * 112* 122*     BMP/Mag:  Recent Labs     06/18/20 0315 06/19/20  0325 06/20/20  0405   * 136 136   K 3.7 4.2 4.9    105 105   CO2 20* 20* 22   PHOS  --  1.8* 2.9   BUN 10 8 11   CREATININE 1.1 1.0 1.0   MG 1.20* 1.50* 1.20*     LIVP:   Recent Labs     06/19/20  0325   AST 17   ALT 11   BILIDIR <0.2   BILITOT 0.6   ALKPHOS 64     Uric Acid:    Recent Labs     06/19/20  0325   LABURIC 4.1     Coags:   Recent Labs     06/18/20 0315 06/19/20  0325   PROTIME  --  13.6*   INR  --  1.17*   APTT 29.2  --      Tacro:    Recent Labs     06/19/20  0832   TACROLEV 11.7     CMV Quant DNA by PCR:   Lab Results   Component Value Date/Time    CMVDNAQNT <2.4 06/15/2020 08:20 AM    CMVDNAQNT Not Detected 06/15/2020 08:20 AM     Diagnostics:  1. CT chest (6/15/20):  Pulmonary nodularity, as outlined above. Per most recent Fleischner Society Guidelines (Radiology 2017, DOI:10.1148/radiol. [id number]), for patients at low risk for lung cancer, no further imaging follow-up is recommended.  For high risk patients (e.g.   smokers), a follow-up noncontrast chest CT in 12 months is optional.    No discrete nodular opacity of the corresponding with findings from prior chest absent, with CMV immunostain negative on  each.   BRATI/BRATI    PROBLEM LIST:        1. MDS  2. Anxiety and depression  3. HLD  4. H/o shingles (2004, RLQ)  5. Fátima Rump syndrome     Post- Transplant Complications:  1.  Resting tremor   2.  Steroid Induced Hyperglycemia   3.  Hypervolemia   4.  Constipation   5.  Nausea   6.  Headache   7.  Mucositis  8.  Diarrhea / Abdominal Cramping   9.  Neutropenic Fever   10.  HTN  11.  Rectal Pain / Fissure   12.  Urinary Retention   13.  Rash  14. Mild Malnutrition  15. Orthostatic Hypotension   16. N/V/D & GRIFFIN (6/2020)  17. Fevers      TREATMENT:       1. Decitabine x 3 cycles (2/17/20 - 4/14/20)  2. MUD Allo-pb BMT (5/11/20)  Preparative Regimen: Targeted Busulfan & Fludarabine; Busulfan dose increased from 252 mg to 315 mg, for doses 3 and 4. Date of BMT: 5/11/20  Source of stem cells: PBSC - 6.7 x10^6 cb13yeqad/kg  Donor/Recipient Blood Type: A+ / A+  Donor Sex: Male (DID# [id number]), follow VNTR   CMV Donor / Recipient: Positive / Positive        ASSESSMENT AND PLAN:          1. Myelodysplastic Syndrome: Stable disease  - BM Bx/Asp (4/7/20) - ongoing disease  - S/p Bu/Flu & MUD-pb BMT    PLAN:    - Day 30 BMBx (6/10/20): Mildly hypocellular (30%) w/ no evidence of residual increased myeloblasts. FISH,  cytogenetics and engraftment pending  - Day 60 (7/10/20)  - Day 100 (8/19/20)     Day + 40     2. ID: Currently febrile, possibly right sided gram negative PNA (POA) + elevated procalcitonin (6/18)   - CT chest (6/15/20) - stable 4 mm pulmonary nodules. No discrete nodular opacity of the corresponding with findings from prior chest radiograph.   - Pan-cx (6/14/20) - NGTD  - COVID (6/14/20) - negative   - Procalcitonin (6/18/20) - 0.45, galactomannan (6/18/20) - Pending   - Cont Valtrex, Dapsone ppx (resume Biaxin) & Diflucan    - Merrem (started 6/14/20) - stop 6/19 and restarted on 6/20 due to recurrent fever   - re-Cx on 6/20   - CXR reviewed     - consider no signs of rectal or perirectal abscess. 8.  Psych:  Increased anxiety over last couple weeks  - Psych following  - Cont Ativan as needed    9.   Derm:  Erythematous pruritic rash on face and chest (6/16/20) has resolved   - Cont Atarax as needed      - DVT Prophylaxis: Platelets >90,370 cells/dL, - daily lovenox prophylaxis ordered  Contraindications to pharmacologic prophylaxis: None  Contraindications to mechanical prophylaxis: None    - Disposition:  Once fevers improve, hopefully Monday     Leidy Chan MD

## 2020-06-20 NOTE — PROGRESS NOTES
Neutropenic Pathway  If patient oral temp > or = to 38.0 or if axillary temp > or = to 37.4 initiate protocol per orders. Draw 2 sets of blood cultures from different sites. If patient remains febrile, redraw PAN culture every 68-72 hours.              Temp 100.4    Site(s) / Line Type Time Cultures obtained   Date 6/20/2020  11:21 AM    [facility] Blue lumen               Red lumen  0900   1808

## 2020-06-21 LAB
ANION GAP SERPL CALCULATED.3IONS-SCNC: 9 MMOL/L (ref 3–16)
BASOPHILS ABSOLUTE: 0.1 K/UL (ref 0–0.2)
BASOPHILS RELATIVE PERCENT: 0.7 %
BLOOD CULTURE, ROUTINE: NORMAL
BUN BLDV-MCNC: 12 MG/DL (ref 7–20)
CALCIUM SERPL-MCNC: 8.7 MG/DL (ref 8.3–10.6)
CHLORIDE BLD-SCNC: 106 MMOL/L (ref 99–110)
CO2: 22 MMOL/L (ref 21–32)
CREAT SERPL-MCNC: 1 MG/DL (ref 0.9–1.3)
CULTURE, BLOOD 2: NORMAL
EOSINOPHILS ABSOLUTE: 3.9 K/UL (ref 0–0.6)
EOSINOPHILS RELATIVE PERCENT: 44.2 %
GFR AFRICAN AMERICAN: >60
GFR NON-AFRICAN AMERICAN: >60
GLUCOSE BLD-MCNC: 100 MG/DL (ref 70–99)
HCT VFR BLD CALC: 22.5 % (ref 40.5–52.5)
HEMOGLOBIN: 7.5 G/DL (ref 13.5–17.5)
LYMPHOCYTES ABSOLUTE: 0.5 K/UL (ref 1–5.1)
LYMPHOCYTES RELATIVE PERCENT: 6 %
MAGNESIUM: 1.8 MG/DL (ref 1.8–2.4)
MCH RBC QN AUTO: 30.6 PG (ref 26–34)
MCHC RBC AUTO-ENTMCNC: 33.5 G/DL (ref 31–36)
MCV RBC AUTO: 91.4 FL (ref 80–100)
MONOCYTES ABSOLUTE: 1 K/UL (ref 0–1.3)
MONOCYTES RELATIVE PERCENT: 11.4 %
NEUTROPHILS ABSOLUTE: 3.4 K/UL (ref 1.7–7.7)
NEUTROPHILS RELATIVE PERCENT: 37.7 %
PDW BLD-RTO: 16.6 % (ref 12.4–15.4)
PHOSPHORUS: 3 MG/DL (ref 2.5–4.9)
PLATELET # BLD: 121 K/UL (ref 135–450)
PMV BLD AUTO: 8 FL (ref 5–10.5)
POTASSIUM SERPL-SCNC: 4.6 MMOL/L (ref 3.5–5.1)
RBC # BLD: 2.46 M/UL (ref 4.2–5.9)
SODIUM BLD-SCNC: 137 MMOL/L (ref 136–145)
URINE CULTURE, ROUTINE: NORMAL
WBC # BLD: 8.9 K/UL (ref 4–11)

## 2020-06-21 PROCEDURE — 2580000003 HC RX 258: Performed by: INTERNAL MEDICINE

## 2020-06-21 PROCEDURE — 85025 COMPLETE CBC W/AUTO DIFF WBC: CPT

## 2020-06-21 PROCEDURE — 80048 BASIC METABOLIC PNL TOTAL CA: CPT

## 2020-06-21 PROCEDURE — 6370000000 HC RX 637 (ALT 250 FOR IP): Performed by: INTERNAL MEDICINE

## 2020-06-21 PROCEDURE — 2580000003 HC RX 258: Performed by: NURSE PRACTITIONER

## 2020-06-21 PROCEDURE — 6360000002 HC RX W HCPCS: Performed by: INTERNAL MEDICINE

## 2020-06-21 PROCEDURE — 83735 ASSAY OF MAGNESIUM: CPT

## 2020-06-21 PROCEDURE — 2060000000 HC ICU INTERMEDIATE R&B

## 2020-06-21 PROCEDURE — 6370000000 HC RX 637 (ALT 250 FOR IP): Performed by: NURSE PRACTITIONER

## 2020-06-21 PROCEDURE — 36592 COLLECT BLOOD FROM PICC: CPT

## 2020-06-21 PROCEDURE — 6360000002 HC RX W HCPCS: Performed by: NURSE PRACTITIONER

## 2020-06-21 PROCEDURE — 84100 ASSAY OF PHOSPHORUS: CPT

## 2020-06-21 RX ORDER — HYDRALAZINE HYDROCHLORIDE 50 MG/1
25 TABLET, FILM COATED ORAL EVERY 6 HOURS PRN
Status: DISCONTINUED | OUTPATIENT
Start: 2020-06-21 | End: 2020-06-22

## 2020-06-21 RX ADMIN — Medication 10 ML: at 09:24

## 2020-06-21 RX ADMIN — MEROPENEM 1 G: 1 INJECTION, POWDER, FOR SOLUTION INTRAVENOUS at 10:43

## 2020-06-21 RX ADMIN — SODIUM CHLORIDE: 9 INJECTION, SOLUTION INTRAVENOUS at 13:09

## 2020-06-21 RX ADMIN — TACROLIMUS 1 MG: 1 CAPSULE ORAL at 21:05

## 2020-06-21 RX ADMIN — Medication 10 ML: at 21:04

## 2020-06-21 RX ADMIN — VALACYCLOVIR HYDROCHLORIDE 500 MG: 500 TABLET, FILM COATED ORAL at 09:23

## 2020-06-21 RX ADMIN — Medication: at 21:08

## 2020-06-21 RX ADMIN — VALACYCLOVIR HYDROCHLORIDE 500 MG: 500 TABLET, FILM COATED ORAL at 21:04

## 2020-06-21 RX ADMIN — MEROPENEM 1 G: 1 INJECTION, POWDER, FOR SOLUTION INTRAVENOUS at 18:43

## 2020-06-21 RX ADMIN — Medication: at 14:51

## 2020-06-21 RX ADMIN — ENOXAPARIN SODIUM 40 MG: 40 INJECTION SUBCUTANEOUS at 18:06

## 2020-06-21 RX ADMIN — MEROPENEM 1 G: 1 INJECTION, POWDER, FOR SOLUTION INTRAVENOUS at 03:30

## 2020-06-21 RX ADMIN — SODIUM CHLORIDE 15 ML: 900 IRRIGANT IRRIGATION at 18:09

## 2020-06-21 RX ADMIN — OXYCODONE 5 MG: 5 TABLET ORAL at 20:28

## 2020-06-21 RX ADMIN — Medication: at 09:24

## 2020-06-21 RX ADMIN — TACROLIMUS 1 MG: 1 CAPSULE ORAL at 09:23

## 2020-06-21 RX ADMIN — HYDRALAZINE HYDROCHLORIDE 25 MG: 50 TABLET, FILM COATED ORAL at 23:26

## 2020-06-21 RX ADMIN — PANTOPRAZOLE SODIUM 40 MG: 40 TABLET, DELAYED RELEASE ORAL at 06:53

## 2020-06-21 RX ADMIN — FLUCONAZOLE 400 MG: 200 TABLET ORAL at 09:23

## 2020-06-21 RX ADMIN — URSODIOL 500 MG: 250 TABLET, FILM COATED ORAL at 09:23

## 2020-06-21 RX ADMIN — HYDROXYZINE PAMOATE 25 MG: 25 CAPSULE ORAL at 18:05

## 2020-06-21 RX ADMIN — POTASSIUM CHLORIDE 20 MEQ: 20 TABLET, EXTENDED RELEASE ORAL at 09:23

## 2020-06-21 RX ADMIN — DAPSONE 100 MG: 100 TABLET ORAL at 09:23

## 2020-06-21 RX ADMIN — URSODIOL 500 MG: 250 TABLET, FILM COATED ORAL at 21:05

## 2020-06-21 ASSESSMENT — PAIN DESCRIPTION - PAIN TYPE
TYPE_2: ACUTE PAIN
TYPE_2: ACUTE PAIN
TYPE: ACUTE PAIN
TYPE: ACUTE PAIN

## 2020-06-21 ASSESSMENT — PAIN DESCRIPTION - PROGRESSION
CLINICAL_PROGRESSION_2: NOT CHANGED
CLINICAL_PROGRESSION: NOT CHANGED
CLINICAL_PROGRESSION: NOT CHANGED
CLINICAL_PROGRESSION_2: NOT CHANGED

## 2020-06-21 ASSESSMENT — PAIN SCALES - GENERAL
PAINLEVEL_OUTOF10: 7
PAINLEVEL_OUTOF10: 7
PAINLEVEL_OUTOF10: 5
PAINLEVEL_OUTOF10: 0
PAINLEVEL_OUTOF10: 0
PAINLEVEL_OUTOF10: 7
PAINLEVEL_OUTOF10: 0
PAINLEVEL_OUTOF10: 0

## 2020-06-21 ASSESSMENT — PAIN DESCRIPTION - LOCATION
LOCATION: HEAD
LOCATION: HEAD
LOCATION_2: THROAT
LOCATION_2: THROAT

## 2020-06-21 ASSESSMENT — PAIN DESCRIPTION - ONSET
ONSET_2: ON-GOING
ONSET_2: ON-GOING
ONSET: PROGRESSIVE
ONSET: PROGRESSIVE

## 2020-06-21 ASSESSMENT — PAIN DESCRIPTION - DESCRIPTORS
DESCRIPTORS: HEADACHE
DESCRIPTORS_2: SORE
DESCRIPTORS: HEADACHE
DESCRIPTORS_2: SORE

## 2020-06-21 ASSESSMENT — PAIN DESCRIPTION - DURATION
DURATION_2: INTERMITTENT
DURATION_2: INTERMITTENT

## 2020-06-21 ASSESSMENT — PAIN DESCRIPTION - ORIENTATION
ORIENTATION_2: INNER
ORIENTATION: INNER
ORIENTATION: INNER

## 2020-06-21 ASSESSMENT — PAIN DESCRIPTION - FREQUENCY
FREQUENCY: INTERMITTENT
FREQUENCY: INTERMITTENT

## 2020-06-21 ASSESSMENT — PAIN DESCRIPTION - INTENSITY
RATING_2: 7
RATING_2: 5

## 2020-06-21 NOTE — PLAN OF CARE
bleeding  Description: Will show no signs and symptoms of excessive bleeding  6/21/2020 1333 by Martin Rehman RN  Outcome: Ongoing  Note: Patient's hemoglobin this AM:   Recent Labs     06/21/20  0336   HGB 7.5*     Patient's platelet count this AM:   Recent Labs     06/21/20  0336   *    Thrombocytopenia Precautions in place. Patient showing no signs or symptoms of active bleeding. Transfusion not indicated at this time. Patient verbalizes understanding of all instructions. Will continue to assess and implement POC. Call light within reach and hourly rounding in place. Problem: Nausea/Vomiting:  Goal: Ability to achieve adequate nutritional intake will improve  Description: Ability to achieve adequate nutritional intake will improve  Outcome: Ongoing  Note: Guilherme Luke has not complained of nausea or vomiting during this shift. Will continue to monitor. Problem: Diarrhea:  Goal: Bowel elimination is within specified parameters  Description: Bowel elimination is within specified parameters  6/21/2020 1333 by Martin Rehman RN  Outcome: Ongoing  Note: Guilherme Luke has had 1 bowel movement that was formed this morning. Will continue to monitor. Problem: Diarrhea:  Goal: Establishment of normal bowel function will improve to within specified parameters  Description: Establishment of normal bowel function will improve to within specified parameters  Outcome: Ongoing     Problem: Skin Integrity:  Goal: Absence of new skin breakdown  Description: Absence of new skin breakdown  6/21/2020 1333 by Martin Rehman RN  Outcome: Ongoing  Note: Guilherme Luke continues to have a rash on his face, neck, and trunk. Will continue to monitor for worsening conditions. Problem: Pain:  Goal: Pain level will decrease  Description: Pain level will decrease  6/21/2020 1333 by Martin Rehman RN  Outcome: Ongoing  Note: Guilherme Luke has not complained of pain during this shift. Will continue to monitor.        Problem: Nutrition  Goal: Optimal nutrition therapy  6/21/2020 1333 by Clare Kraus RN  Outcome: Ongoing  Note: Naomi Arroyo has had a good appetite all shift. Will continue to monitor and suggest foods the patient likes.

## 2020-06-21 NOTE — PROGRESS NOTES
[facility] Allogeneic Progress Note    2020    Armida Prude    :  1965    MRN:  [id number]    Subjective:  Malaise, fatigue marginally better; pruritis about the same     ECOG PS:  (2) Ambulatory and capable of self care, unable to carry out work activity, up and about > 50% or waking hours    KPS: 80% Normal activity with effort; some signs or symptoms of disease    Isolation:  None     Medications    Scheduled Meds:   meropenem  1 g Intravenous Q8H    potassium chloride  20 mEq Oral Daily with breakfast    pantoprazole  40 mg Oral QAM AC    fluconazole  400 mg Oral Daily    tacrolimus  1 mg Oral BID    nystatin   Topical TID    enoxaparin  40 mg Subcutaneous QPM    dapsone  100 mg Oral Daily    ursodiol  500 mg Oral BID    valACYclovir  500 mg Oral BID    sodium chloride flush  10 mL Intravenous 2 times per day    Saline Mouthwash  15 mL Swish & Spit 4x Daily AC & HS     Continuous Infusions:   sodium chloride 50 mL/hr at 20 1526     PRN Meds:hydrocortisone, potassium phosphate IVPB, Hydrocerin, hydrOXYzine, oxyCODONE **OR** oxyCODONE, loperamide, LORazepam, prochlorperazine, sodium chloride flush, potassium chloride, potassium chloride, magnesium sulfate, magnesium hydroxide, Saline Mouthwash, alteplase, acetaminophen    ROS:  As noted above, otherwise remainder of 10-point ROS negative    Physical Exam:    I&O:      Intake/Output Summary (Last 24 hours) at 2020 1151  Last data filed at 2020 0919  Gross per 24 hour   Intake 240 ml   Output 2850 ml   Net -2610 ml       Vital Signs:  /79   Pulse 92   Temp 98.9 °F (37.2 °C) (Oral)   Resp 18   Ht 5' 9\" (1.753 m)   Wt 166 lb 8 oz (75.5 kg)   SpO2 95%   BMI 24.59 kg/m²     Weight:    Wt Readings from Last 3 Encounters:   20 166 lb 8 oz (75.5 kg)   20 166 lb 9.6 oz (75.6 kg)   20 184 lb 9.6 oz (83.7 kg)       General: Awake, alert and oriented.   HEENT: normocephalic,

## 2020-06-21 NOTE — PLAN OF CARE
bleeding. Transfusion not indicated at this time. Patient verbalizes understanding of all instructions. Will continue to assess and implement POC. Call light within reach and hourly rounding in place. Problem: Nausea/Vomiting:  Goal: Absence of nausea/vomiting  Description: Absence of nausea/vomiting  Outcome: Ongoing    No complaints so far this shift     Problem: Skin Integrity:  Goal: Absence of new skin breakdown  Description: Absence of new skin breakdown  Outcome: Ongoing    No evidence of new skin breakdown during this shift. Problem: Pain:  Goal: Pain level will decrease  Description: Pain level will decrease  Outcome: Ongoing    No complaints of pain during this shift.

## 2020-06-22 ENCOUNTER — APPOINTMENT (OUTPATIENT)
Dept: INTERVENTIONAL RADIOLOGY/VASCULAR | Age: 55
DRG: 391 | End: 2020-06-22
Attending: INTERNAL MEDICINE
Payer: COMMERCIAL

## 2020-06-22 LAB
ALBUMIN SERPL-MCNC: 3.2 G/DL (ref 3.4–5)
ALP BLD-CCNC: 68 U/L (ref 40–129)
ALT SERPL-CCNC: 11 U/L (ref 10–40)
ANION GAP SERPL CALCULATED.3IONS-SCNC: 10 MMOL/L (ref 3–16)
APTT: 31.8 SEC (ref 24.2–36.2)
AST SERPL-CCNC: 13 U/L (ref 15–37)
BASOPHILS ABSOLUTE: 0.1 K/UL (ref 0–0.2)
BASOPHILS RELATIVE PERCENT: 0.6 %
BILIRUB SERPL-MCNC: 0.6 MG/DL (ref 0–1)
BILIRUBIN DIRECT: <0.2 MG/DL (ref 0–0.3)
BILIRUBIN, INDIRECT: ABNORMAL MG/DL (ref 0–1)
BUN BLDV-MCNC: 12 MG/DL (ref 7–20)
CALCIUM SERPL-MCNC: 9 MG/DL (ref 8.3–10.6)
CHLORIDE BLD-SCNC: 105 MMOL/L (ref 99–110)
CO2: 22 MMOL/L (ref 21–32)
CREAT SERPL-MCNC: 1 MG/DL (ref 0.9–1.3)
CULTURE, FUNGUS BLOOD: NORMAL
CULTURE, FUNGUS BLOOD: NORMAL
EOSINOPHILS ABSOLUTE: 4.4 K/UL (ref 0–0.6)
EOSINOPHILS RELATIVE PERCENT: 38.3 %
FUNGUS (MYCOLOGY) CULTURE: NORMAL
FUNGUS (MYCOLOGY) CULTURE: NORMAL
FUNGUS STAIN: NORMAL
FUNGUS STAIN: NORMAL
GFR AFRICAN AMERICAN: >60
GFR NON-AFRICAN AMERICAN: >60
GLUCOSE BLD-MCNC: 95 MG/DL (ref 70–99)
HCT VFR BLD CALC: 22.2 % (ref 40.5–52.5)
HEMOGLOBIN: 7.4 G/DL (ref 13.5–17.5)
INR BLD: 1.08 (ref 0.86–1.14)
LACTATE DEHYDROGENASE: 366 U/L (ref 100–190)
LYMPHOCYTES ABSOLUTE: 1 K/UL (ref 1–5.1)
LYMPHOCYTES RELATIVE PERCENT: 8.4 %
MAGNESIUM: 1.3 MG/DL (ref 1.8–2.4)
MCH RBC QN AUTO: 30.4 PG (ref 26–34)
MCHC RBC AUTO-ENTMCNC: 33.4 G/DL (ref 31–36)
MCV RBC AUTO: 91.1 FL (ref 80–100)
MONOCYTES ABSOLUTE: 1.5 K/UL (ref 0–1.3)
MONOCYTES RELATIVE PERCENT: 12.9 %
NEUTROPHILS ABSOLUTE: 4.5 K/UL (ref 1.7–7.7)
NEUTROPHILS RELATIVE PERCENT: 39.8 %
PDW BLD-RTO: 17 % (ref 12.4–15.4)
PHOSPHORUS: 3.2 MG/DL (ref 2.5–4.9)
PLATELET # BLD: 135 K/UL (ref 135–450)
PMV BLD AUTO: 8.4 FL (ref 5–10.5)
POTASSIUM SERPL-SCNC: 4.8 MMOL/L (ref 3.5–5.1)
PROTHROMBIN TIME: 12.5 SEC (ref 10–13.2)
RBC # BLD: 2.44 M/UL (ref 4.2–5.9)
SODIUM BLD-SCNC: 137 MMOL/L (ref 136–145)
TACROLIMUS BLOOD: 16.9 NG/ML (ref 5–20)
THROAT CULTURE: NORMAL
TOTAL PROTEIN: 5.5 G/DL (ref 6.4–8.2)
URIC ACID, SERUM: 4.6 MG/DL (ref 3.5–7.2)
WBC # BLD: 11.4 K/UL (ref 4–11)

## 2020-06-22 PROCEDURE — 6360000002 HC RX W HCPCS: Performed by: INTERNAL MEDICINE

## 2020-06-22 PROCEDURE — 85610 PROTHROMBIN TIME: CPT

## 2020-06-22 PROCEDURE — 77001 FLUOROGUIDE FOR VEIN DEVICE: CPT

## 2020-06-22 PROCEDURE — 36589 REMOVAL TUNNELED CV CATH: CPT

## 2020-06-22 PROCEDURE — 2580000003 HC RX 258: Performed by: INTERNAL MEDICINE

## 2020-06-22 PROCEDURE — 87070 CULTURE OTHR SPECIMN AEROBIC: CPT

## 2020-06-22 PROCEDURE — 83615 LACTATE (LD) (LDH) ENZYME: CPT

## 2020-06-22 PROCEDURE — 6360000002 HC RX W HCPCS: Performed by: NURSE PRACTITIONER

## 2020-06-22 PROCEDURE — 0JPTXXZ REMOVAL OF TUNNELED VASCULAR ACCESS DEVICE FROM TRUNK SUBCUTANEOUS TISSUE AND FASCIA, EXTERNAL APPROACH: ICD-10-PCS | Performed by: RADIOLOGY

## 2020-06-22 PROCEDURE — 80076 HEPATIC FUNCTION PANEL: CPT

## 2020-06-22 PROCEDURE — 6370000000 HC RX 637 (ALT 250 FOR IP): Performed by: INTERNAL MEDICINE

## 2020-06-22 PROCEDURE — 84100 ASSAY OF PHOSPHORUS: CPT

## 2020-06-22 PROCEDURE — 83735 ASSAY OF MAGNESIUM: CPT

## 2020-06-22 PROCEDURE — 6370000000 HC RX 637 (ALT 250 FOR IP): Performed by: NURSE PRACTITIONER

## 2020-06-22 PROCEDURE — 2060000000 HC ICU INTERMEDIATE R&B

## 2020-06-22 PROCEDURE — 85025 COMPLETE CBC W/AUTO DIFF WBC: CPT

## 2020-06-22 PROCEDURE — 85730 THROMBOPLASTIN TIME PARTIAL: CPT

## 2020-06-22 PROCEDURE — 36592 COLLECT BLOOD FROM PICC: CPT

## 2020-06-22 PROCEDURE — 80048 BASIC METABOLIC PNL TOTAL CA: CPT

## 2020-06-22 PROCEDURE — 80197 ASSAY OF TACROLIMUS: CPT

## 2020-06-22 PROCEDURE — 2580000003 HC RX 258: Performed by: NURSE PRACTITIONER

## 2020-06-22 PROCEDURE — 84550 ASSAY OF BLOOD/URIC ACID: CPT

## 2020-06-22 RX ORDER — TACROLIMUS 1 MG/1
1 CAPSULE ORAL EVERY MORNING
Status: DISCONTINUED | OUTPATIENT
Start: 2020-06-23 | End: 2020-06-25 | Stop reason: HOSPADM

## 2020-06-22 RX ORDER — TACROLIMUS 0.5 MG/1
0.5 CAPSULE ORAL NIGHTLY
Status: DISCONTINUED | OUTPATIENT
Start: 2020-06-23 | End: 2020-06-25 | Stop reason: HOSPADM

## 2020-06-22 RX ADMIN — Medication: at 14:15

## 2020-06-22 RX ADMIN — URSODIOL 500 MG: 250 TABLET, FILM COATED ORAL at 20:41

## 2020-06-22 RX ADMIN — SODIUM CHLORIDE: 9 INJECTION, SOLUTION INTRAVENOUS at 10:48

## 2020-06-22 RX ADMIN — VALACYCLOVIR HYDROCHLORIDE 500 MG: 500 TABLET, FILM COATED ORAL at 20:41

## 2020-06-22 RX ADMIN — OXYCODONE 5 MG: 5 TABLET ORAL at 04:09

## 2020-06-22 RX ADMIN — CEFEPIME 2 G: 2 INJECTION, POWDER, FOR SOLUTION INTRAVENOUS at 10:48

## 2020-06-22 RX ADMIN — HYDROXYZINE PAMOATE 25 MG: 25 CAPSULE ORAL at 02:08

## 2020-06-22 RX ADMIN — HYDROXYZINE PAMOATE 25 MG: 25 CAPSULE ORAL at 20:42

## 2020-06-22 RX ADMIN — Medication 10 ML: at 08:30

## 2020-06-22 RX ADMIN — URSODIOL 500 MG: 250 TABLET, FILM COATED ORAL at 08:30

## 2020-06-22 RX ADMIN — ENOXAPARIN SODIUM 40 MG: 40 INJECTION SUBCUTANEOUS at 18:06

## 2020-06-22 RX ADMIN — SODIUM CHLORIDE 15 ML: 900 IRRIGANT IRRIGATION at 10:59

## 2020-06-22 RX ADMIN — PANTOPRAZOLE SODIUM 40 MG: 40 TABLET, DELAYED RELEASE ORAL at 06:38

## 2020-06-22 RX ADMIN — LORAZEPAM 0.5 MG: 0.5 TABLET ORAL at 20:41

## 2020-06-22 RX ADMIN — Medication: at 20:42

## 2020-06-22 RX ADMIN — Medication 10 ML: at 20:42

## 2020-06-22 RX ADMIN — SODIUM CHLORIDE 15 ML: 900 IRRIGANT IRRIGATION at 20:44

## 2020-06-22 RX ADMIN — MEROPENEM 1 G: 1 INJECTION, POWDER, FOR SOLUTION INTRAVENOUS at 02:50

## 2020-06-22 RX ADMIN — POTASSIUM CHLORIDE 20 MEQ: 20 TABLET, EXTENDED RELEASE ORAL at 08:30

## 2020-06-22 RX ADMIN — TACROLIMUS 1 MG: 1 CAPSULE ORAL at 08:30

## 2020-06-22 RX ADMIN — SODIUM CHLORIDE 15 ML: 900 IRRIGANT IRRIGATION at 06:39

## 2020-06-22 RX ADMIN — FLUCONAZOLE 400 MG: 200 TABLET ORAL at 08:30

## 2020-06-22 RX ADMIN — DAPSONE 100 MG: 100 TABLET ORAL at 08:30

## 2020-06-22 RX ADMIN — MAGNESIUM SULFATE HEPTAHYDRATE 4 G: 40 INJECTION, SOLUTION INTRAVENOUS at 06:35

## 2020-06-22 RX ADMIN — SODIUM CHLORIDE 15 ML: 900 IRRIGANT IRRIGATION at 15:46

## 2020-06-22 RX ADMIN — Medication: at 08:29

## 2020-06-22 RX ADMIN — VALACYCLOVIR HYDROCHLORIDE 500 MG: 500 TABLET, FILM COATED ORAL at 08:30

## 2020-06-22 RX ADMIN — CEFEPIME 2 G: 2 INJECTION, POWDER, FOR SOLUTION INTRAVENOUS at 18:06

## 2020-06-22 RX ADMIN — HYDRALAZINE HYDROCHLORIDE 25 MG: 50 TABLET, FILM COATED ORAL at 08:21

## 2020-06-22 ASSESSMENT — PAIN DESCRIPTION - PROGRESSION
CLINICAL_PROGRESSION: NOT CHANGED

## 2020-06-22 ASSESSMENT — PAIN DESCRIPTION - DESCRIPTORS: DESCRIPTORS: HEADACHE

## 2020-06-22 ASSESSMENT — PAIN DESCRIPTION - LOCATION: LOCATION: HEAD

## 2020-06-22 ASSESSMENT — PAIN SCALES - GENERAL
PAINLEVEL_OUTOF10: 0
PAINLEVEL_OUTOF10: 5
PAINLEVEL_OUTOF10: 0

## 2020-06-22 ASSESSMENT — PAIN DESCRIPTION - ONSET: ONSET: ON-GOING

## 2020-06-22 ASSESSMENT — PAIN DESCRIPTION - ORIENTATION: ORIENTATION: INNER

## 2020-06-22 ASSESSMENT — PAIN - FUNCTIONAL ASSESSMENT: PAIN_FUNCTIONAL_ASSESSMENT: ACTIVITIES ARE NOT PREVENTED

## 2020-06-22 ASSESSMENT — PAIN DESCRIPTION - FREQUENCY: FREQUENCY: INTERMITTENT

## 2020-06-22 ASSESSMENT — PAIN DESCRIPTION - PAIN TYPE: TYPE: ACUTE PAIN

## 2020-06-22 NOTE — PLAN OF CARE
Problem: Falls - Risk of:  Goal: Will remain free from falls  Description: Will remain free from falls  Outcome: Ongoing     Orthostatic vital signs obtained at start of shift - see flowsheet for details. Pt does not meet criteria for orthostasis. Pt is a Med fall risk. See Barbara Hun Fall Score and ABCDS Injury Risk assessments. - Screening for Orthostasis AND not a [place] Risk per PHAM/ABCDS: Pt bed is in low position, side rails up, call light and belongings are in reach. Fall risk light is on outside pts room. Pt encouraged to call for assistance as needed. Will continue with hourly rounds for PO intake, pain needs, toileting and repositioning as needed. Problem: Venous Thromboembolism:  Goal: Will show no signs or symptoms of venous thromboembolism  Description: Will show no signs or symptoms of venous thromboembolism  Outcome: Ongoing     Adherent with DVT Prevention: Pt is at risk for DVT d/t decreased mobility and cancer treatment. Pt educated on importance of activity. Pt has orders for Subcut prophylactic lovenox. Pt verbalizes understanding of need for prophylaxis while inpatient. Problem: Infection - Central Venous Catheter-Associated Bloodstream Infection:  Goal: Will show no infection signs and symptoms  Description: Will show no infection signs and symptoms  Outcome: Ongoing     CVC site remains free of signs/symptoms of infection. No drainage, edema, erythema, pain, or warmth noted at site. Dressing changes continue per protocol and on an as needed basis - see flowsheet. Problem: Bleeding:  Goal: Will show no signs and symptoms of excessive bleeding  Description: Will show no signs and symptoms of excessive bleeding  Outcome: Ongoing     Patient's hemoglobin this AM:   Recent Labs     06/22/20 0418   HGB 7.4*     Patient's platelet count this AM:   Recent Labs     06/22/20 0418       Thrombocytopenia not present at this time.   Patient showing no signs or symptoms of

## 2020-06-22 NOTE — PROGRESS NOTES
radiograph. Dilation of the ascending thoracic aorta 4.3 cm. Pathology:  1. 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 Adequate megakaryocytes. Increased bone marrow storage iron. FISH:  Pending  Cytogenetics:  Pending  Engraftment: Pending    2. EGD/Colonoscopy (6/16/20): FINAL DIAGNOSIS:       A. Small bowel (evaluate for graft-vs.-host disease/GVHD):     - No diagnostic alterations.     - CMV immunostain negative.     - See Comment.       B. Gastric biopsy (gastritis; R/O Helicobacter and GVHD):     - No diagnostic alterations (no evidence of Helicobacter).    - CMV immunostain negative.     - See Comment.       C. Random colon biopsy (R/O GVHD):     - Numerous colonic mucosal fragments, one with hyperplastic polyp.     - No further diagnostic alterations.     - CMV immunostain negative.     - See Comment.       D. Descending colon polyp (R/O GVHD):     - One of three colonic mucosal fragments on slide with tubular adenoma       negative for high-grade dysplasia.     - CMV immunostain negative. COMMENT:   A: Sections contain proximal small bowel mucosa with a few  Brunner glands identifying the source location as duodenum, without  diagnostic alterations. As expected/normal for the biopsy location, the  villi, where well-presented, are tall, slender, and slightly scalloped,  and stromal plasma cells are plentiful. B: Sections contain antral gastric mucosa without diagnostic alterations. Helicobacter organisms are not apparent. C: Sections contain colonic mucosa, one fragment with the indicated  hyperplastic polyp, otherwise without diagnostic alterations.     A-D: Except as diagnosed and/or described above, abnormal infiltrates,  significant architectural alterations, viral cytopathy, granulomas,  dysplasia, and malignancy are absent, with CMV immunostain negative on  each.   BRATI/BRATI    PROBLEM LIST:        1. MDS  2. Anxiety and depression  3. HLD  4. H/o shingles (2004, RLQ)  5. [place] syndrome     Post- Transplant Complications:  1.  Resting tremor   2.  Steroid Induced Hyperglycemia   3.  Hypervolemia   4.  Constipation   5.  Nausea   6.  Headache   7.  Mucositis  8.  Diarrhea / Abdominal Cramping   9.  Neutropenic Fever   10.  HTN  11.  Rectal Pain / Fissure   12.  Urinary Retention   13.  Rash  14. Mild Malnutrition  15. Orthostatic Hypotension   16. N/V/D & GRIFFIN (6/2020)  17. Fevers      TREATMENT:       1. Decitabine x 3 cycles (2/17/20 - 4/14/20)  2. MUD Allo-pb BMT (5/11/20)  Preparative Regimen: Targeted Busulfan & Fludarabine; Busulfan dose increased from 252 mg to 315 mg, for doses 3 and 4. Date of BMT: 5/11/20  Source of stem cells: PBSC - 6.7 x10^6 [id number]/kg  Donor/Recipient Blood Type: A+ / A+  Donor Sex: Male (DID# [id number]), follow VNTR   CMV Donor / Recipient: Positive / Positive        ASSESSMENT AND PLAN:          1. Myelodysplastic Syndrome: Stable disease  - BM Bx/Asp (4/7/20) - ongoing disease  - S/p Bu/Flu & MUD-pb BMT    PLAN:    - Day 30 BMBx (6/10/20): Mildly hypocellular (30%) w/ no evidence of residual increased myeloblasts. FISH, cytogenetics and engraftment pending  - Day 60 (7/10/20)  - Day 100 (8/19/20)     Day + 42     2. ID: Now afebrile, etiology of fevers are unclear  - possibly right sided gram negative PNA (POA) + elevated procalcitonin (6/18/20)   - CT chest (6/15/20) - stable 4 mm pulmonary nodules. No discrete nodular opacity of the corresponding with findings from prior chest radiograph.   - Pan-cx (6/14/20, 6/17/20 & 6/20/20) - NGTD  - COVID (6/14/20) - negative   - Procalcitonin (6/18/20) - 0.45, galactomannan (6/18/20) - negative  - Cont Valtrex, Dapsone ppx (resume Biaxin) & Diflucan  - Merrem Day + 8 (started 6/14/20) switch to Cefipime.  - Remove TLH      Donor/Recipient

## 2020-06-22 NOTE — PLAN OF CARE
Red Prabhakar RN  Outcome: Ongoing   Patient's hemoglobin this AM:   Recent Labs     06/22/20 0418   HGB 7.4*     Patient's platelet count this AM:   Recent Labs     06/22/20 0418       Thrombocytopenia Precautions in place. Patient showing no signs or symptoms of active bleeding. Transfusion not indicated at this time. Patient verbalizes understanding of all instructions. Will continue to assess and implement POC. Call light within reach and hourly rounding in place. Problem: Nausea/Vomiting:  Goal: Ability to achieve adequate nutritional intake will improve  Description: Ability to achieve adequate nutritional intake will improve  6/22/2020 1524 by Devaughn Robles RN  Outcome: Ongoing     Pt denies nausea or emesis at this time. Will monitor. Problem: Skin Integrity:  Goal: Absence of new skin breakdown  Description: Absence of new skin breakdown  6/22/2020 1524 by Devaughn Robles RN  Outcome: Ongoing   Pt continues with generalized rash . Pt has some itching from rash. Pt has vistaril. Will continue to monitor. Problem: Pain:  Goal: Pain level will decrease  Description: Pain level will decrease  6/22/2020 1524 by Devaughn Robles RN  Outcome: Ongoing   Pt denies pain at this time. Will monitor. Problem: Nutrition  Goal: Optimal nutrition therapy  6/22/2020 1524 by Devaughn Robles RN  Outcome: Ongoing   Pt encourage to eat small frequent meals. Will monitor.

## 2020-06-22 NOTE — PLAN OF CARE
Problem: Falls - Risk of:  Goal: Will remain free from falls  Description: Will remain free from falls  6/22/2020 0739 by Maximiliano Ley RN  Outcome: Ongoing     Orthostatic vital signs obtained at start of shift - see flowsheet for details. Pt does not meet criteria for orthostasis. Pt is a Med fall risk. See Leslie Fake Fall Score and ABCDS Injury Risk assessments. - Screening for Orthostasis AND not a [place] Risk per PHAM/ABCDS: Pt bed is in low position, side rails up, call light and belongings are in reach. Fall risk light is on outside pts room. Pt encouraged to call for assistance as needed. Will continue with hourly rounds for PO intake, pain needs, toileting and repositioning as needed. Problem: Venous Thromboembolism:  Goal: Will show no signs or symptoms of venous thromboembolism  Description: Will show no signs or symptoms of venous thromboembolism  6/22/2020 0739 by Maximiliano Ley RN  Outcome: Ongoing     Adherent with DVT Prevention: Pt is at risk for DVT d/t decreased mobility and cancer treatment. Pt educated on importance of activity. Pt has orders for Subcut prophylactic lovenox. Pt verbalizes understanding of need for prophylaxis while inpatient. Problem: Infection - Central Venous Catheter-Associated Bloodstream Infection:  Goal: Will show no infection signs and symptoms  Description: Will show no infection signs and symptoms  6/22/2020 0739 by Maximiliano Ley RN  Outcome: Ongoing     CVC site remains free of signs/symptoms of infection. No drainage, edema, erythema, pain, or warmth noted at site. Dressing changes continue per protocol and on an as needed basis - see flowsheet. Compliant with [facility] Bath Protocol:  Performed CHG bath today per [facility] protocol utilizing CHG solution in the shower. CVC site cleansed with CHG wipe over dressing, skin surrounding dressing, and first 6\" of IV tubing. Pt tolerated well.   Continued to encourage daily CHG bathing per [facility] protocol. Continued to encourage daily CHG bathing per [facility] [employer] protocol. Problem: Bleeding:  Goal: Will show no signs and symptoms of excessive bleeding  Description: Will show no signs and symptoms of excessive bleeding  6/22/2020 0739 by Synthia Severin, RN  Outcome: Ongoing     Patient's hemoglobin this AM:   Recent Labs     06/22/20  0418   HGB 7.4*     Patient's platelet count this AM:   Recent Labs     06/22/20 0418       Thrombocytopenia not present at this time. Patient showing no signs or symptoms of active bleeding. Transfusion not indicated at this time. Patient verbalizes understanding of all instructions. Will continue to assess and implement POC. Call light within reach and hourly rounding in place. Problem: Pain:  Description: Pain management should include both nonpharmacologic and pharmacologic interventions. Goal: Control of acute pain  Description: Control of acute pain  6/22/2020 0739 by Synthia Severin, RN  Outcome: Ongoing   Pt reported pain in throat and a headache throughout this shift. Given PRN oxy, pt able to sleep but reported little relief. Will continue to monitor.

## 2020-06-23 LAB
ANION GAP SERPL CALCULATED.3IONS-SCNC: 8 MMOL/L (ref 3–16)
BASOPHILS ABSOLUTE: 0.1 K/UL (ref 0–0.2)
BASOPHILS RELATIVE PERCENT: 0.9 %
BUN BLDV-MCNC: 18 MG/DL (ref 7–20)
CALCIUM SERPL-MCNC: 8.7 MG/DL (ref 8.3–10.6)
CHLORIDE BLD-SCNC: 103 MMOL/L (ref 99–110)
CO2: 22 MMOL/L (ref 21–32)
CREAT SERPL-MCNC: 1.2 MG/DL (ref 0.9–1.3)
EOSINOPHILS ABSOLUTE: 4.6 K/UL (ref 0–0.6)
EOSINOPHILS RELATIVE PERCENT: 39.2 %
GFR AFRICAN AMERICAN: >60
GFR NON-AFRICAN AMERICAN: >60
GLUCOSE BLD-MCNC: 110 MG/DL (ref 70–99)
HCT VFR BLD CALC: 22.1 % (ref 40.5–52.5)
HEMOGLOBIN: 7.4 G/DL (ref 13.5–17.5)
LYMPHOCYTES ABSOLUTE: 1.2 K/UL (ref 1–5.1)
LYMPHOCYTES RELATIVE PERCENT: 10.4 %
MAGNESIUM: 1.9 MG/DL (ref 1.8–2.4)
MCH RBC QN AUTO: 30.2 PG (ref 26–34)
MCHC RBC AUTO-ENTMCNC: 33.3 G/DL (ref 31–36)
MCV RBC AUTO: 90.9 FL (ref 80–100)
MONOCYTES ABSOLUTE: 1.4 K/UL (ref 0–1.3)
MONOCYTES RELATIVE PERCENT: 12 %
NEUTROPHILS ABSOLUTE: 4.4 K/UL (ref 1.7–7.7)
NEUTROPHILS RELATIVE PERCENT: 37.5 %
PDW BLD-RTO: 16.6 % (ref 12.4–15.4)
PHOSPHORUS: 3.8 MG/DL (ref 2.5–4.9)
PLATELET # BLD: 152 K/UL (ref 135–450)
PMV BLD AUTO: 7.8 FL (ref 5–10.5)
POTASSIUM SERPL-SCNC: 5.2 MMOL/L (ref 3.5–5.1)
RBC # BLD: 2.43 M/UL (ref 4.2–5.9)
SODIUM BLD-SCNC: 133 MMOL/L (ref 136–145)
WBC # BLD: 11.8 K/UL (ref 4–11)

## 2020-06-23 PROCEDURE — 2580000003 HC RX 258: Performed by: NURSE PRACTITIONER

## 2020-06-23 PROCEDURE — 6360000002 HC RX W HCPCS: Performed by: INTERNAL MEDICINE

## 2020-06-23 PROCEDURE — 83735 ASSAY OF MAGNESIUM: CPT

## 2020-06-23 PROCEDURE — 80048 BASIC METABOLIC PNL TOTAL CA: CPT

## 2020-06-23 PROCEDURE — 6360000002 HC RX W HCPCS: Performed by: NURSE PRACTITIONER

## 2020-06-23 PROCEDURE — 84100 ASSAY OF PHOSPHORUS: CPT

## 2020-06-23 PROCEDURE — 6370000000 HC RX 637 (ALT 250 FOR IP): Performed by: INTERNAL MEDICINE

## 2020-06-23 PROCEDURE — 6370000000 HC RX 637 (ALT 250 FOR IP): Performed by: NURSE PRACTITIONER

## 2020-06-23 PROCEDURE — 36592 COLLECT BLOOD FROM PICC: CPT

## 2020-06-23 PROCEDURE — 2580000003 HC RX 258: Performed by: INTERNAL MEDICINE

## 2020-06-23 PROCEDURE — 2060000000 HC ICU INTERMEDIATE R&B

## 2020-06-23 PROCEDURE — 87081 CULTURE SCREEN ONLY: CPT

## 2020-06-23 PROCEDURE — 85025 COMPLETE CBC W/AUTO DIFF WBC: CPT

## 2020-06-23 RX ADMIN — ENOXAPARIN SODIUM 40 MG: 40 INJECTION SUBCUTANEOUS at 17:59

## 2020-06-23 RX ADMIN — SODIUM CHLORIDE: 9 INJECTION, SOLUTION INTRAVENOUS at 06:43

## 2020-06-23 RX ADMIN — PROCHLORPERAZINE MALEATE 10 MG: 10 TABLET ORAL at 14:38

## 2020-06-23 RX ADMIN — HYDROXYZINE PAMOATE 25 MG: 25 CAPSULE ORAL at 09:03

## 2020-06-23 RX ADMIN — FLUCONAZOLE 400 MG: 200 TABLET ORAL at 09:03

## 2020-06-23 RX ADMIN — Medication: at 20:47

## 2020-06-23 RX ADMIN — CEFEPIME 2 G: 2 INJECTION, POWDER, FOR SOLUTION INTRAVENOUS at 10:24

## 2020-06-23 RX ADMIN — SODIUM CHLORIDE 15 ML: 900 IRRIGANT IRRIGATION at 15:57

## 2020-06-23 RX ADMIN — SODIUM CHLORIDE 15 ML: 900 IRRIGANT IRRIGATION at 20:47

## 2020-06-23 RX ADMIN — PANTOPRAZOLE SODIUM 40 MG: 40 TABLET, DELAYED RELEASE ORAL at 06:43

## 2020-06-23 RX ADMIN — URSODIOL 500 MG: 250 TABLET, FILM COATED ORAL at 09:03

## 2020-06-23 RX ADMIN — TACROLIMUS 0.5 MG: 0.5 CAPSULE ORAL at 20:46

## 2020-06-23 RX ADMIN — VALACYCLOVIR HYDROCHLORIDE 500 MG: 500 TABLET, FILM COATED ORAL at 20:46

## 2020-06-23 RX ADMIN — Medication: at 14:35

## 2020-06-23 RX ADMIN — Medication 10 ML: at 09:04

## 2020-06-23 RX ADMIN — DAPSONE 100 MG: 100 TABLET ORAL at 09:03

## 2020-06-23 RX ADMIN — CEFEPIME 2 G: 2 INJECTION, POWDER, FOR SOLUTION INTRAVENOUS at 02:02

## 2020-06-23 RX ADMIN — SODIUM CHLORIDE: 9 INJECTION, SOLUTION INTRAVENOUS at 17:55

## 2020-06-23 RX ADMIN — Medication: at 09:04

## 2020-06-23 RX ADMIN — SODIUM CHLORIDE 15 ML: 900 IRRIGANT IRRIGATION at 10:24

## 2020-06-23 RX ADMIN — URSODIOL 500 MG: 250 TABLET, FILM COATED ORAL at 20:46

## 2020-06-23 RX ADMIN — LORAZEPAM 0.5 MG: 0.5 TABLET ORAL at 09:10

## 2020-06-23 RX ADMIN — CEFEPIME 2 G: 2 INJECTION, POWDER, FOR SOLUTION INTRAVENOUS at 18:00

## 2020-06-23 RX ADMIN — Medication 10 ML: at 20:46

## 2020-06-23 RX ADMIN — TACROLIMUS 1 MG: 1 CAPSULE ORAL at 09:03

## 2020-06-23 RX ADMIN — VALACYCLOVIR HYDROCHLORIDE 500 MG: 500 TABLET, FILM COATED ORAL at 09:03

## 2020-06-23 ASSESSMENT — PAIN SCALES - GENERAL
PAINLEVEL_OUTOF10: 0

## 2020-06-23 NOTE — PROGRESS NOTES
[facility] Allogene[facility] Progress Note    2020    Vasu Mahajan    :  1965    MRN:  [id number]    Subjective:  Still mildly pruritic rash on legs and back, but possibly a little better. He states he slept fairly well and feels better this morning. He is less nauseated and not having diarrhea.       ECOG PS:  (2) Ambulatory and capable of self care, unable to carry out work activity, up and about > 50% or waking hours    KPS: 80% Normal activity with effort; some signs or symptoms of disease    Isolation:  None     Medications    Scheduled Meds:   cefepime  2 g Intravenous Q8H    tacrolimus  1 mg Oral QAM    And    tacrolimus  0.5 mg Oral Nightly    pantoprazole  40 mg Oral QAM AC    fluconazole  400 mg Oral Daily    nystatin   Topical TID    enoxaparin  40 mg Subcutaneous QPM    dapsone  100 mg Oral Daily    ursodiol  500 mg Oral BID    valACYclovir  500 mg Oral BID    sodium chloride flush  10 mL Intravenous 2 times per day    Saline Mouthwash  15 mL Swish & Spit 4x Daily AC & HS     Continuous Infusions:   sodium chloride 50 mL/hr at 20 0643     PRN Meds:hydrocortisone, potassium phosphate IVPB, Hydrocerin, hydrOXYzine, oxyCODONE **OR** oxyCODONE, loperamide, LORazepam, prochlorperazine, sodium chloride flush, potassium chloride, potassium chloride, magnesium sulfate, magnesium hydroxide, Saline Mouthwash, alteplase, acetaminophen    ROS:  As noted above, otherwise remainder of 10-point ROS negative    Physical Exam:    I&O:      Intake/Output Summary (Last 24 hours) at 2020 0712  Last data filed at 2020 0646  Gross per 24 hour   Intake 2347 ml   Output 3400 ml   Net -1053 ml       Vital Signs:  /75   Pulse 78   Temp 99.6 °F (37.6 °C) (Oral)   Resp 16   Ht 5' 9\" (1.753 m)   Wt 167 lb 11.2 oz (76.1 kg)   SpO2 94%   BMI 24.76 kg/m²     Weight:    Wt Readings from Last 3 Encounters:   20 167 lb 11.2 oz (76.1 kg)   20 166 lb 9.6 oz 6/14/20) d/t pruritic rash     Donor/Recipient CMV: Pos / Pos  - CMV PCR weekly   - Start letermovir ppx if started on high-dose steroids      CMV:  6/1/20 - Negative  6/8/20 - Negative  6/15/20 - Negative   6/22/20 - Pending     3.  Heme:  Anemia and thrombocytopenia from chemotherapy    - Transfuse for Hgb < 7 and Platelets < 08L.    - No transfusion today      4.  Metabolic:  Mild GRIFFIN, possibly from decreased oral intake, nausea. Vomiting and diarrhea, POA has improved. SCr up slightly, possibly from supra therapeutic Tacro level. Also w/ hypoNa & hyperK  - S/p Mag Ox 800 mg TID w/ loose stools   - S/p KCl 20 meq daily (stopped 6/22/20)  - Increase IVF:  NS @ 100 mL/hr  - Replace Mg and K+ per protocol      5. Graft versus host disease:  No evidence   - EGD / Colonoscopy ([place], 6/16/20) - gastritis, no obvious colitis. No evidence of GVHD or CMV    Previous Therapy:  - S/p Post-Transplant Cytoxan days + 3 & 4     Current Therapy:   - Cont Tacro 1 mg Qam & 0.56 mg Q pm (decreased 6/22/20)      Tacro Level:  Lab Results   Component Value Date    TACROLEV 16.9 06/22/2020    TACROLEV 11.7 06/19/2020    TACROLEV 9.4 06/17/2020     6. VOD:  No evidence of VOD. Recent Labs     06/22/20  0418   BILIDIR <0.2   BILITOT 0.6     Admission Weight: 166 lb 9.6 oz (75.6 kg)  Current Weight: Weight: 167 lb 11.2 oz (76.1 kg). - Cont Actigall.     7. GI / Nutrition:    Severe Malnutrition:  Appetite & intake is improving, but still diminished    - Cont low microbial diet  - Dietary to follow   Diarrhea:  Resolved   - Colonoscopy (6/16/20) - No colitis, no GVHD  - C.  Diff (6/14/20) - Negative  - S/p Mag Ox   - Cont Imodium if needed  Nausea:  Mild nausea, vomiting has resolved    - EGD (6/16/20) - gastritis, no evidence of GVHD  - Cont PPI (started 6/19/20)  - Cont Compazine as needed  - Cont Ativan 0.5 mg TID prior to meals as needed   Rectal pain / Fissure:  Rectal pain has improved  - CT pelvis (5/25/20) - Small

## 2020-06-23 NOTE — PLAN OF CARE
Problem: Falls - Risk of:  Goal: Will remain free from falls  Description: Will remain free from falls  6/22/2020 1524 by Lena Montes RN  Outcome: Ongoing    Pt with activity orders for up ad [place]. Encouraged pt to be up OOB as much as possible throughout the day and for all meals. Encouraged frequent short naps as necessary to preserve energy but instructed that while awake, pt should be OOB. Pt is visualized to be OOB 51-75% of the time this shift. Will continue to encourage frequent activity. Problem: Venous Thromboembolism:  Goal: Will show no signs or symptoms of venous thromboembolism  Description: Will show no signs or symptoms of venous thromboembolism  6/22/2020 1524 by Lena Montes RN  Outcome: Ongoing   Adherent with DVT Prevention: Pt is at risk for DVT d/t decreased mobility and cancer treatment. Pt educated on importance of activity. Pt has orders for Subcut prophylactic lovenox. Pt verbalizes understanding of need for prophylaxis while inpatient. Problem: Infection - Central Venous Catheter-Associated Bloodstream Infection:  Goal: Will show no infection signs and symptoms  Description: Will show no infection signs and symptoms  6/22/2020 1524 by Lena Montes RN  Outcome: Ongoing   Band-Aid to Lt SC where CVC was removed yesterday. Clean dry intact. CVC site remains free of signs/symptoms of infection. No drainage, edema, erythema, pain, or warmth noted at site. Dressing changes continue per protocol and on an as needed basis - see flowsheet. Performed CHG bath today per [facility] [facility] protocol utilizing CHG solution in the shower. CVC site cleansed with CHG wipe over dressing, skin surrounding dressing, and first 6\" of IV tubing. Pt tolerated well. Continued to encourage daily CHG bathing per [address] protocol.     Problem: Bleeding:  Goal: Will show no signs and symptoms of excessive bleeding  Description: Will show no signs and symptoms of excessive bleeding  6/22/2020 1524 by

## 2020-06-23 NOTE — PLAN OF CARE
signs and symptoms of excessive bleeding  Outcome: Ongoing     Patient's hemoglobin this AM:   Recent Labs     06/23/20  0335   HGB 7.4*     Patient's platelet count this AM:   Recent Labs     06/23/20  0335       Thrombocytopenia not present at this time. Patient showing no signs or symptoms of active bleeding. Transfusion not indicated at this time. Patient verbalizes understanding of all instructions. Will continue to assess and implement POC. Call light within reach and hourly rounding in place.

## 2020-06-23 NOTE — FLOWSHEET NOTE
06/23/20 1712   Encounter Summary   Services provided to: Patient   Referral/Consult From: [facility]   Continue Visiting   (6/23/20, ABDULLAHI. )   Complexity of Encounter Moderate   Length of Encounter 15 minutes   Routine   Type Initial   Assessment Approachable   Intervention Nurtured hope   Outcome Expressed gratitude

## 2020-06-24 LAB
ABO/RH: NORMAL
ALBUMIN SERPL-MCNC: 3 G/DL (ref 3.4–5)
ALP BLD-CCNC: 68 U/L (ref 40–129)
ALT SERPL-CCNC: 10 U/L (ref 10–40)
ANION GAP SERPL CALCULATED.3IONS-SCNC: 9 MMOL/L (ref 3–16)
ANISOCYTOSIS: ABNORMAL
ANTIBODY SCREEN: NORMAL
AST SERPL-CCNC: 13 U/L (ref 15–37)
BASOPHILS ABSOLUTE: 0 K/UL (ref 0–0.2)
BASOPHILS RELATIVE PERCENT: 0 %
BILIRUB SERPL-MCNC: 0.5 MG/DL (ref 0–1)
BILIRUBIN DIRECT: <0.2 MG/DL (ref 0–0.3)
BILIRUBIN, INDIRECT: ABNORMAL MG/DL (ref 0–1)
BLOOD BANK DISPENSE STATUS: NORMAL
BLOOD BANK PRODUCT CODE: NORMAL
BLOOD CULTURE, ROUTINE: NORMAL
BPU ID: NORMAL
BUN BLDV-MCNC: 15 MG/DL (ref 7–20)
CALCIUM SERPL-MCNC: 9 MG/DL (ref 8.3–10.6)
CHLORIDE BLD-SCNC: 105 MMOL/L (ref 99–110)
CO2: 20 MMOL/L (ref 21–32)
CREAT SERPL-MCNC: 1.2 MG/DL (ref 0.9–1.3)
CULTURE, BLOOD 2: NORMAL
DESCRIPTION BLOOD BANK: NORMAL
EOSINOPHILS ABSOLUTE: 4.6 K/UL (ref 0–0.6)
EOSINOPHILS RELATIVE PERCENT: 43 %
FINAL REPORT: NORMAL
GFR AFRICAN AMERICAN: >60
GFR NON-AFRICAN AMERICAN: >60
GLUCOSE BLD-MCNC: 100 MG/DL (ref 70–99)
HCT VFR BLD CALC: 20.6 % (ref 40.5–52.5)
HEMOGLOBIN: 7 G/DL (ref 13.5–17.5)
HYPOCHROMIA: ABNORMAL
INR BLD: 1.12 (ref 0.86–1.14)
LACTATE DEHYDROGENASE: 310 U/L (ref 100–190)
LYMPHOCYTES ABSOLUTE: 1.2 K/UL (ref 1–5.1)
LYMPHOCYTES RELATIVE PERCENT: 11 %
MAGNESIUM: 1.5 MG/DL (ref 1.8–2.4)
MCH RBC QN AUTO: 30.6 PG (ref 26–34)
MCHC RBC AUTO-ENTMCNC: 33.8 G/DL (ref 31–36)
MCV RBC AUTO: 90.5 FL (ref 80–100)
MONOCYTES ABSOLUTE: 1.6 K/UL (ref 0–1.3)
MONOCYTES RELATIVE PERCENT: 15 %
NEUTROPHILS ABSOLUTE: 3.3 K/UL (ref 1.7–7.7)
NEUTROPHILS RELATIVE PERCENT: 31 %
PDW BLD-RTO: 17 % (ref 12.4–15.4)
PHOSPHORUS: 3.8 MG/DL (ref 2.5–4.9)
PLATELET # BLD: 160 K/UL (ref 135–450)
PMV BLD AUTO: 7.9 FL (ref 5–10.5)
POTASSIUM SERPL-SCNC: 5 MMOL/L (ref 3.5–5.1)
PRELIMINARY: NORMAL
PROTHROMBIN TIME: 13 SEC (ref 10–13.2)
RBC # BLD: 2.28 M/UL (ref 4.2–5.9)
SODIUM BLD-SCNC: 134 MMOL/L (ref 136–145)
TACROLIMUS BLOOD: 13 NG/ML (ref 5–20)
TOTAL PROTEIN: 5.5 G/DL (ref 6.4–8.2)
URIC ACID, SERUM: 5.3 MG/DL (ref 3.5–7.2)
WBC # BLD: 10.6 K/UL (ref 4–11)

## 2020-06-24 PROCEDURE — 83735 ASSAY OF MAGNESIUM: CPT

## 2020-06-24 PROCEDURE — 85610 PROTHROMBIN TIME: CPT

## 2020-06-24 PROCEDURE — 6370000000 HC RX 637 (ALT 250 FOR IP): Performed by: INTERNAL MEDICINE

## 2020-06-24 PROCEDURE — 2060000000 HC ICU INTERMEDIATE R&B

## 2020-06-24 PROCEDURE — 2580000003 HC RX 258: Performed by: NURSE PRACTITIONER

## 2020-06-24 PROCEDURE — 83615 LACTATE (LD) (LDH) ENZYME: CPT

## 2020-06-24 PROCEDURE — 84550 ASSAY OF BLOOD/URIC ACID: CPT

## 2020-06-24 PROCEDURE — 2580000003 HC RX 258: Performed by: INTERNAL MEDICINE

## 2020-06-24 PROCEDURE — 84100 ASSAY OF PHOSPHORUS: CPT

## 2020-06-24 PROCEDURE — 6360000002 HC RX W HCPCS: Performed by: NURSE PRACTITIONER

## 2020-06-24 PROCEDURE — 36592 COLLECT BLOOD FROM PICC: CPT

## 2020-06-24 PROCEDURE — 80076 HEPATIC FUNCTION PANEL: CPT

## 2020-06-24 PROCEDURE — 86900 BLOOD TYPING SEROLOGIC ABO: CPT

## 2020-06-24 PROCEDURE — 86850 RBC ANTIBODY SCREEN: CPT

## 2020-06-24 PROCEDURE — 6370000000 HC RX 637 (ALT 250 FOR IP): Performed by: NURSE PRACTITIONER

## 2020-06-24 PROCEDURE — 80197 ASSAY OF TACROLIMUS: CPT

## 2020-06-24 PROCEDURE — 86901 BLOOD TYPING SEROLOGIC RH(D): CPT

## 2020-06-24 PROCEDURE — P9040 RBC LEUKOREDUCED IRRADIATED: HCPCS

## 2020-06-24 PROCEDURE — 80048 BASIC METABOLIC PNL TOTAL CA: CPT

## 2020-06-24 PROCEDURE — 85025 COMPLETE CBC W/AUTO DIFF WBC: CPT

## 2020-06-24 PROCEDURE — 36430 TRANSFUSION BLD/BLD COMPNT: CPT

## 2020-06-24 PROCEDURE — 86923 COMPATIBILITY TEST ELECTRIC: CPT

## 2020-06-24 PROCEDURE — 6360000002 HC RX W HCPCS: Performed by: INTERNAL MEDICINE

## 2020-06-24 RX ORDER — CLARITHROMYCIN 500 MG/1
250 TABLET, COATED ORAL DAILY
Status: DISCONTINUED | OUTPATIENT
Start: 2020-06-24 | End: 2020-06-25 | Stop reason: HOSPADM

## 2020-06-24 RX ORDER — 0.9 % SODIUM CHLORIDE 0.9 %
20 INTRAVENOUS SOLUTION INTRAVENOUS ONCE
Status: DISCONTINUED | OUTPATIENT
Start: 2020-06-24 | End: 2020-06-25

## 2020-06-24 RX ADMIN — ENOXAPARIN SODIUM 40 MG: 40 INJECTION SUBCUTANEOUS at 17:54

## 2020-06-24 RX ADMIN — VALACYCLOVIR HYDROCHLORIDE 500 MG: 500 TABLET, FILM COATED ORAL at 08:24

## 2020-06-24 RX ADMIN — SODIUM CHLORIDE 15 ML: 900 IRRIGANT IRRIGATION at 15:54

## 2020-06-24 RX ADMIN — Medication: at 20:46

## 2020-06-24 RX ADMIN — SODIUM CHLORIDE 15 ML: 900 IRRIGANT IRRIGATION at 20:45

## 2020-06-24 RX ADMIN — TACROLIMUS 0.5 MG: 0.5 CAPSULE ORAL at 20:44

## 2020-06-24 RX ADMIN — Medication 10 ML: at 08:23

## 2020-06-24 RX ADMIN — SODIUM CHLORIDE: 9 INJECTION, SOLUTION INTRAVENOUS at 03:38

## 2020-06-24 RX ADMIN — CLARITHROMYCIN 250 MG: 500 TABLET, COATED ORAL at 11:08

## 2020-06-24 RX ADMIN — Medication 10 ML: at 20:45

## 2020-06-24 RX ADMIN — SODIUM CHLORIDE: 9 INJECTION, SOLUTION INTRAVENOUS at 20:46

## 2020-06-24 RX ADMIN — SODIUM CHLORIDE 15 ML: 900 IRRIGANT IRRIGATION at 11:08

## 2020-06-24 RX ADMIN — Medication: at 15:54

## 2020-06-24 RX ADMIN — VALACYCLOVIR HYDROCHLORIDE 500 MG: 500 TABLET, FILM COATED ORAL at 20:44

## 2020-06-24 RX ADMIN — URSODIOL 500 MG: 250 TABLET, FILM COATED ORAL at 20:44

## 2020-06-24 RX ADMIN — PANTOPRAZOLE SODIUM 40 MG: 40 TABLET, DELAYED RELEASE ORAL at 05:53

## 2020-06-24 RX ADMIN — FLUCONAZOLE 400 MG: 200 TABLET ORAL at 08:24

## 2020-06-24 RX ADMIN — PROCHLORPERAZINE MALEATE 10 MG: 10 TABLET ORAL at 15:53

## 2020-06-24 RX ADMIN — CEFEPIME 2 G: 2 INJECTION, POWDER, FOR SOLUTION INTRAVENOUS at 02:29

## 2020-06-24 RX ADMIN — Medication: at 08:25

## 2020-06-24 RX ADMIN — URSODIOL 500 MG: 250 TABLET, FILM COATED ORAL at 08:24

## 2020-06-24 RX ADMIN — DAPSONE 100 MG: 100 TABLET ORAL at 08:23

## 2020-06-24 RX ADMIN — SODIUM CHLORIDE: 9 INJECTION, SOLUTION INTRAVENOUS at 15:53

## 2020-06-24 RX ADMIN — TACROLIMUS 1 MG: 1 CAPSULE ORAL at 08:24

## 2020-06-24 ASSESSMENT — PAIN SCALES - GENERAL
PAINLEVEL_OUTOF10: 0

## 2020-06-24 NOTE — PROGRESS NOTES
cytopathy, granulomas,  dysplasia, and malignancy are absent, with CMV immunostain negative on  each.   BRATI/BRATI    PROBLEM LIST:        1. MDS  2. Anxiety and depression  3. HLD  4. H/o shingles (2004, RLQ)  5. [place] syndrome     Post- Transplant Complications:  1.  Resting tremor   2.  Steroid Induced Hyperglycemia   3.  Hypervolemia   4.  Constipation   5.  Nausea   6.  Headache   7.  Mucositis  8.  Diarrhea / Abdominal Cramping   9.  Neutropenic Fever   10.  HTN  11.  Rectal Pain / Fissure   12.  Urinary Retention   13.  Rash  14. Mild Malnutrition  15. Orthostatic Hypotension   16. N/V/D & GRIFFIN (6/2020)  17. Fevers      TREATMENT:       1. Decitabine x 3 cycles (2/17/20 - 4/14/20)  2. MUD Allo-pb BMT (5/11/20)  Preparative Regimen: Targeted Busulfan & Fludarabine; Busulfan dose increased from 252 mg to 315 mg, for doses 3 and 4. Date of BMT: 5/11/20  Source of stem cells: PBSC - 6.7 x10^6 [id number]/kg  Donor/Recipient Blood Type: A+ / A+  Donor Sex: Male (DID# [id number]), follow VNTR   CMV Donor / Recipient: Positive / Positive        ASSESSMENT AND PLAN:          1. Myelodysplastic Syndrome: Stable disease  - BM Bx/Asp (4/7/20) - ongoing disease  - S/p Bu/Flu & MUD-pb BMT    PLAN:    - Day 30 BMBx (6/10/20): Mildly hypocellular (30%) w/ no evidence of residual increased myeloblasts. FISH, cytogenetics and engraftment pending  - Day 60 (7/10/20)  - Day 100 (8/19/20)     Day + 44     2. ID: Cont w/ low grade fevers, possibly d/t TLH catheter   - Possible right sided gram negative PNA (POA) w/ elevated procalcitonin (6/18/20)   - CT chest (6/15/20) - stable 4 mm pulmonary nodules. No discrete nodular opacity of the corresponding with findings from prior chest radiograph.   - Pan-cx (6/14/20, 6/17/20 & 6/20/20) - NGTD  - COVID (6/14/20) - negative   - Procalcitonin (6/18/20) - 0.45, galactomannan (6/18/20) - negative  - TLH removed (6/22/20), tip cx (6/22/20) - NGTD  - Cont Valtrex, Dapsone ppx (resume

## 2020-06-24 NOTE — PROGRESS NOTES
EVALUATION:  Evaluation:Progressing towards goal   Goals: pt will improve PO intake to consume greater than 75% of meals and supplements offered through admission   Monitoring: Diet Tolerance , Meal Intake  or Supplement Intake      OBJECTIVE DATA:  · Nutrition-Focused Physical Findings: no n/v; +BM this AM  · Wounds None  RD notes rectal fissure    History reviewed. No pertinent past medical history. ANTHROPOMETRICS  Current Height: 5' 9\" (175.3 cm)  Current Weight: 162 lb 12.8 oz (73.8 kg)    Admission weight: 166 lb 9.6 oz (75.6 kg)  Ideal Bodyweight 160 lb    Usual Bodyweight 185 lb per pt    Weight Changes -19 lb in 3 mo; 10%       BMI BMI (Calculated): 24.1    Wt Readings from Last 50 Encounters:   06/24/20 162 lb 12.8 oz (73.8 kg)   06/05/20 166 lb 9.6 oz (75.6 kg)   03/31/20 184 lb 9.6 oz (83.7 kg)   02/17/20 180 lb (81.6 kg)       COMPARATIVE STANDARDS  Estimated Total Kcals/Day : 30-35 Current Bodyweight (73.8 kg) ~4974-4526 kcal    Estimated Total Protein (g/day) : 1.5-1.8 Current Bodyweight (73.8 kg) 110-133 g/day  Estimated Daily Total Fluid (ml/day): 6325-6987 mL per day     Food / Nutrition-Related History  Pre-Admission / Home Diet:  Pre-Admission/Home Diet: General   Home Supplements / Herbals:   none noted  Food Restrictions / Cultural Requests:   none noted    Diet Orders / Intake / Nutrition Support  Current diet/supplement order: DIET GENERAL;  Dietary Nutrition Supplements: Standard High Calorie Oral Supplement     NSG Recorded PO:   PO Fluids P.O.: 300 mL  PO Meals PO Meals Eaten (%): 76 - 100%(cereal)   PO Intake: 51-75%  PO Supplement: Standard High Calorie   Protein Modular    PO Supplement Intake: 0%  and 51-75%  IVF: 100 mL/hr    NUTRITION RISK LEVEL: Risk Level:  Moderate    Wellington Melchor LD  [place]:  [phone]  Office:  [phone]

## 2020-06-24 NOTE — PLAN OF CARE
Problem: Falls - Risk of:  Goal: Will remain free from falls  Description: Will remain free from falls  Outcome: Ongoing     Orthostatic vital signs obtained at start of shift - see flowsheet for details. Pt does not meet criteria for orthostasis. Pt is a Med fall risk. See [place] Older Fall Score and ABCDS Injury Risk assessments. - Screening for Orthostasis AND not a [place] Risk per PHAM/ABCDS: Pt bed is in low position, side rails up, call light and belongings are in reach. Fall risk light is on outside pts room. Pt encouraged to call for assistance as needed. Will continue with hourly rounds for PO intake, pain needs, toileting and repositioning as needed. Problem: Venous Thromboembolism:  Goal: Will show no signs or symptoms of venous thromboembolism  Description: Will show no signs or symptoms of venous thromboembolism  Outcome: Ongoing   Adherent with DVT Prevention: Pt is at risk for DVT d/t decreased mobility and cancer treatment. Pt educated on importance of activity. Pt has orders for Subcut prophylactic lovenox. Pt verbalizes understanding of need for prophylaxis while inpatient. Problem: Infection - Central Venous Catheter-Associated Bloodstream Infection:  Goal: Will show no infection signs and symptoms  Description: Will show no infection signs and symptoms  Outcome: Ongoing   CVC site remains free of signs/symptoms of infection. No drainage, edema, erythema, pain, or warmth noted at site. Dressing changes continue per protocol and on an as needed basis - see flowsheet. Patient's hemoglobin this AM:   Recent Labs     06/24/20  0340   HGB 7.0*     Patient's platelet count this AM:   Recent Labs     06/24/20  0340       Thrombocytopenia not present at this time. Patient showing no signs or symptoms of active bleeding. Patient transfused blood products per orders - see flowsheet. Patient verbalizes understanding of all instructions.  Will continue to assess and implement POC. Call light within reach and hourly rounding in place. Compliant with BCC Bath Protocol:  Performed CHG bath today per BCC protocol utilizing CHG solution in the shower. CVC site cleansed with CHG wipe over dressing, skin surrounding dressing, and first 6\" of IV tubing. Pt tolerated well. Continued to encourage daily CHG bathing per [address] protocol. Problem: Skin Integrity:  Goal: Absence of new skin breakdown  Description: Absence of new skin breakdown  Outcome: Ongoing   Pt has a resolving rash.

## 2020-06-25 VITALS
HEART RATE: 70 BPM | WEIGHT: 162.8 LBS | TEMPERATURE: 99.3 F | SYSTOLIC BLOOD PRESSURE: 135 MMHG | BODY MASS INDEX: 24.11 KG/M2 | DIASTOLIC BLOOD PRESSURE: 80 MMHG | HEIGHT: 69 IN | RESPIRATION RATE: 16 BRPM | OXYGEN SATURATION: 95 %

## 2020-06-25 LAB
ANION GAP SERPL CALCULATED.3IONS-SCNC: 10 MMOL/L (ref 3–16)
ANISOCYTOSIS: ABNORMAL
APTT: 34.2 SEC (ref 24.2–36.2)
BASOPHILS ABSOLUTE: 0 K/UL (ref 0–0.2)
BASOPHILS RELATIVE PERCENT: 0 %
BUN BLDV-MCNC: 16 MG/DL (ref 7–20)
CALCIUM SERPL-MCNC: 9 MG/DL (ref 8.3–10.6)
CHLORIDE BLD-SCNC: 107 MMOL/L (ref 99–110)
CO2: 20 MMOL/L (ref 21–32)
CREAT SERPL-MCNC: 1.2 MG/DL (ref 0.9–1.3)
CULTURE CATHETER TIP: NORMAL
EOSINOPHILS ABSOLUTE: 2.8 K/UL (ref 0–0.6)
EOSINOPHILS RELATIVE PERCENT: 28 %
GFR AFRICAN AMERICAN: >60
GFR NON-AFRICAN AMERICAN: >60
GLUCOSE BLD-MCNC: 86 MG/DL (ref 70–99)
HCT VFR BLD CALC: 24.3 % (ref 40.5–52.5)
HEMOGLOBIN: 8.1 G/DL (ref 13.5–17.5)
LYMPHOCYTES ABSOLUTE: 0.8 K/UL (ref 1–5.1)
LYMPHOCYTES RELATIVE PERCENT: 8 %
MAGNESIUM: 1.3 MG/DL (ref 1.8–2.4)
MCH RBC QN AUTO: 30.3 PG (ref 26–34)
MCHC RBC AUTO-ENTMCNC: 33.6 G/DL (ref 31–36)
MCV RBC AUTO: 90.3 FL (ref 80–100)
MONOCYTES ABSOLUTE: 0.5 K/UL (ref 0–1.3)
MONOCYTES RELATIVE PERCENT: 5 %
NEUTROPHILS ABSOLUTE: 5.8 K/UL (ref 1.7–7.7)
NEUTROPHILS RELATIVE PERCENT: 59 %
PDW BLD-RTO: 17.4 % (ref 12.4–15.4)
PHOSPHORUS: 3.4 MG/DL (ref 2.5–4.9)
PLATELET # BLD: 169 K/UL (ref 135–450)
PMV BLD AUTO: 7.9 FL (ref 5–10.5)
POTASSIUM SERPL-SCNC: 4.9 MMOL/L (ref 3.5–5.1)
RBC # BLD: 2.69 M/UL (ref 4.2–5.9)
SARS-COV-2, PCR: NOT DETECTED
SODIUM BLD-SCNC: 137 MMOL/L (ref 136–145)
VRE CULTURE: NORMAL
WBC # BLD: 9.9 K/UL (ref 4–11)

## 2020-06-25 PROCEDURE — 85730 THROMBOPLASTIN TIME PARTIAL: CPT

## 2020-06-25 PROCEDURE — 6370000000 HC RX 637 (ALT 250 FOR IP): Performed by: NURSE PRACTITIONER

## 2020-06-25 PROCEDURE — U0003 INFECTIOUS AGENT DETECTION BY NUCLEIC ACID (DNA OR RNA); SEVERE ACUTE RESPIRATORY SYNDROME CORONAVIRUS 2 (SARS-COV-2) (CORONAVIRUS DISEASE [COVID-19]), AMPLIFIED PROBE TECHNIQUE, MAKING USE OF HIGH THROUGHPUT TECHNOLOGIES AS DESCRIBED BY CMS-2020-01-R: HCPCS

## 2020-06-25 PROCEDURE — 85025 COMPLETE CBC W/AUTO DIFF WBC: CPT

## 2020-06-25 PROCEDURE — 36592 COLLECT BLOOD FROM PICC: CPT

## 2020-06-25 PROCEDURE — 2580000003 HC RX 258: Performed by: NURSE PRACTITIONER

## 2020-06-25 PROCEDURE — 84100 ASSAY OF PHOSPHORUS: CPT

## 2020-06-25 PROCEDURE — 83735 ASSAY OF MAGNESIUM: CPT

## 2020-06-25 PROCEDURE — 80048 BASIC METABOLIC PNL TOTAL CA: CPT

## 2020-06-25 PROCEDURE — 6360000002 HC RX W HCPCS: Performed by: INTERNAL MEDICINE

## 2020-06-25 PROCEDURE — 2580000003 HC RX 258: Performed by: INTERNAL MEDICINE

## 2020-06-25 PROCEDURE — 6370000000 HC RX 637 (ALT 250 FOR IP): Performed by: INTERNAL MEDICINE

## 2020-06-25 PROCEDURE — 6360000002 HC RX W HCPCS: Performed by: NURSE PRACTITIONER

## 2020-06-25 RX ORDER — LOPERAMIDE HYDROCHLORIDE 2 MG/1
2 CAPSULE ORAL PRN
COMMUNITY
Start: 2020-06-25 | End: 2020-07-05

## 2020-06-25 RX ORDER — PANTOPRAZOLE SODIUM 40 MG/1
40 TABLET, DELAYED RELEASE ORAL
Qty: 30 TABLET | Refills: 0 | Status: SHIPPED | OUTPATIENT
Start: 2020-06-26 | End: 2020-07-26

## 2020-06-25 RX ORDER — TACROLIMUS 0.5 MG/1
0.5 CAPSULE ORAL NIGHTLY
Qty: 60 CAPSULE | Refills: 3
Start: 2020-06-25

## 2020-06-25 RX ORDER — HEPARIN SODIUM (PORCINE) LOCK FLUSH IV SOLN 100 UNIT/ML 100 UNIT/ML
500 SOLUTION INTRAVENOUS ONCE
Status: COMPLETED | OUTPATIENT
Start: 2020-06-25 | End: 2020-06-25

## 2020-06-25 RX ORDER — NYSTATIN 10B UNIT
POWDER (EA) MISCELLANEOUS
Qty: 1 EACH | Refills: 0 | Status: SHIPPED | OUTPATIENT
Start: 2020-06-25 | End: 2020-08-09

## 2020-06-25 RX ORDER — TACROLIMUS 1 MG/1
1 CAPSULE ORAL EVERY MORNING
Qty: 60 CAPSULE | Refills: 3
Start: 2020-06-25

## 2020-06-25 RX ORDER — LANOLIN ALCOHOL/MO/W.PET/CERES
CREAM (GRAM) TOPICAL PRN
Refills: 0 | COMMUNITY
Start: 2020-06-25

## 2020-06-25 RX ADMIN — URSODIOL 500 MG: 250 TABLET, FILM COATED ORAL at 08:09

## 2020-06-25 RX ADMIN — DAPSONE 100 MG: 100 TABLET ORAL at 08:09

## 2020-06-25 RX ADMIN — MAGNESIUM SULFATE HEPTAHYDRATE 4 G: 40 INJECTION, SOLUTION INTRAVENOUS at 05:43

## 2020-06-25 RX ADMIN — Medication: at 08:10

## 2020-06-25 RX ADMIN — FLUCONAZOLE 400 MG: 200 TABLET ORAL at 08:08

## 2020-06-25 RX ADMIN — TACROLIMUS 1 MG: 1 CAPSULE ORAL at 08:09

## 2020-06-25 RX ADMIN — CLARITHROMYCIN 250 MG: 500 TABLET, COATED ORAL at 08:09

## 2020-06-25 RX ADMIN — VALACYCLOVIR HYDROCHLORIDE 500 MG: 500 TABLET, FILM COATED ORAL at 08:09

## 2020-06-25 RX ADMIN — PANTOPRAZOLE SODIUM 40 MG: 40 TABLET, DELAYED RELEASE ORAL at 05:53

## 2020-06-25 RX ADMIN — Medication 10 ML: at 08:08

## 2020-06-25 RX ADMIN — Medication 500 UNITS: at 09:57

## 2020-06-25 RX ADMIN — SODIUM CHLORIDE: 9 INJECTION, SOLUTION INTRAVENOUS at 05:47

## 2020-06-25 ASSESSMENT — PAIN SCALES - GENERAL
PAINLEVEL_OUTOF10: 0
PAINLEVEL_OUTOF10: 0

## 2020-06-25 NOTE — CARE COORDINATION
Case Management Assessment            Discharge Note                    Date / Time of Note: 6/25/2020 9:57 AM                  Discharge Note Completed by: Nathaniel Valdez    Patient Name: Dustin Winn   YOB: 1965  Diagnosis: Acute kidney injury [facility]) [N17.9]   Date / Time: 6/14/2020 12:02 PM    Current PCP: Jeri Bean MD  Clinic patient: No    Hospitalization in the last 30 days: Yes    Advance Directives:  Code Status: Full Code  [place] DNR form completed and on chart: Not Indicated    Financial:  Payor: Feli Rayo / Plan: Feli Rayo / Product Type: Indemnity /      Pharmacy:    [facility] [place] [address]  Phone: [phone] Fax: [phone]      Assistance purchasing medications?: Potential Assistance Purchasing Medications: No  Assistance provided by Case Management: None at this time    Does patient want to participate in local refill/ meds to beds program?: No    Meds To Beds General Rules:  1. Can ONLY be done Monday- Friday between 8:30am-5pm  2. Prescription(s) must be in pharmacy by 3pm to be filled same day  3. Copy of patient's insurance/ prescription drug card and patient face sheet must be sent along with the prescription(s)  4. Cost of Rx cannot be added to hospital bill. If financial assistance is needed, please contact unit  or ;  or  CANNOT provide pharmacy voucher for patients co-pays  5.  Patients can then  the prescription on their way out of the hospital at discharge, or pharmacy can deliver to the bedside if staff is available. (payment due at time of pick-up or delivery - cash, check, or card accepted)     Able to afford home medications/ co-pay costs: Yes    ADLS:  Current PT AM-PAC Score:   /24  Current OT AM-PAC Score:   /24      DISCHARGE Disposition: Home- No Services Needed    LOC
Case Management Assessment           Daily Note                 Date/ Time of Note: 6/22/2020 12:32 PM         Note completed by: Edwyna Skiff    Patient Name: Zaid Neely  YOB: 1965    Diagnosis:Acute kidney injury [facility]) [N17.9]  Patient Admission Status: Inpatient    Date of Admission:6/14/2020 12:02 PM Length of Stay: 8 GLOS:      Current Plan of Care: home with no needs  ________________________________________________________________________________________  PT AM-PAC:   / 24 per last evaluation on: n/a    OT AM-PAC:   / 24 per last evaluation on: n/a    DME Needs for discharge: none  ________________________________________________________________________________________  Discharge Plan: Home    Tentative discharge date: middle to end of this week? Current barriers to discharge: must be afebrile for 24 hours    Referrals completed: Not Applicable    Resources/ information provided: Not indicated at this time  ________________________________________________________________________________________  Case Management Notes: Contacted patient's wife, Anitha Mitchell, as she had some questions about when the patient might discharge. SW informed that the patient's main barrier to discharge at this time is his fevers. Once patient has been afebrile for 24 hours he can discharge. Anitha Mitchell had some questions about FMLA and sw informed that FMLA leave can always request to be extended since she has 12 weeks total that she can utilize. Patient is getting benz removed today and treatment team is hopeful that this will help with his fevers. Lorena Peña and his family were provided with choice of provider; he and his family are in agreement with the discharge plan.     Care Transition Patient: No Edwyna Skiff, [address]   Case Management Department  Ph: [phone]  Fax: [phone]
Case Management Assessment           Daily Note                 Date/ Time of Note: 6/24/2020 3:56 PM         Note completed by: Nelida Prabhakar    Patient Name: Lourdes Chakraborty  YOB: 1965    Diagnosis:Acute kidney injury [facility]) [N17.9]  Patient Admission Status: Inpatient    Date of Admission:6/14/2020 12:02 PM Length of Stay: 10 GLOS:      Current Plan of Care: home  ________________________________________________________________________________________  PT AM-PAC:   / 24 per last evaluation on: n/a    OT AM-PAC:   / 24 per last evaluation on: n/a    DME Needs for discharge: none  ________________________________________________________________________________________  Discharge Plan: Home    Tentative discharge date: 6/25/2020    Current barriers to discharge: medical clearance    Referrals completed: Not Applicable    Resources/ information provided: Not indicated at this time  ________________________________________________________________________________________  Case Management Notes: Met with patient at bedside. He reports that he is feeling a lot better and he is hopeful that he will discharge tomorrow. Patient to discharge home with no needs. Teofilo Kim and his family were provided with choice of provider; he and his family are in agreement with the discharge plan.     Care Transition Patient: Anne Marie Prabhakar, Adrienne Prieto  Case Management Department  Ph: [phone]  Fax: [phone]
Type of Admission  MDS  S/P MUD Allogeneic SCT Day +36  Admit with Nausea/Vomiting/Diarrhea & Fever        Central venous catheter  Left SC TLC ( 5/5/20, IR)        Plan          Update  6/15/20:  Admitted yesterday with nausea/vomiting& diarrhea. States that nausea has been worse than diarrhea. Plan for EGD &Colonscopy tomorrow. Afebrile. Appears very withdrawn. 6/16/20:  Reports slight pink rash, generalized, Atarax increased. For EGD & Colonoscopy today. Education  6/15/20: Discussed purpose of EGD/Colonoscopy, asks he will be seated, assured that he would be. Discussed if GVHD of gut would most likely treat with  Steroids.         Discharge          Pending  EGD+Colonoscopy--->6/16
Type of Admission  MDS  S/P MUD Allogeneic SCT Day +44  Admit with Nausea/Vomiting/Diarrhea & Fever        Central venous catheter  Left SC TLC ( 5/5/20, IR)        Plan          Update  6/15/20:  Admitted yesterday with nausea/vomiting& diarrhea. States that nausea has been worse than diarrhea. Plan for EGD &Colonscopy tomorrow. Afebrile. Appears very withdrawn. 6/19/20:  Low grade temps, reports increase fatigue this afternoon. No GVHD noted on EGD or colonoscopy, no loose stools today. Slight erythematous of face & scalp, eye lids are edematous. Started on Nystatin powder to groin & axillae. Appears very withdrawn. 6/22/20:  Seen on rounds with Dr. Neelam Wilhelm, blood cultures negative to date. Will have Castillo Catheter removed today. Atarax prn for pruritus, thought to be secondary to antibiotics. 6/24/20:  Afebrile, discontinue Cefepime & will restart oral Biaxin. Plan is to discharge tomorrow if no problems. Spouse, Lonell Sony present during rounds with Dr. Letty Price  6/15/20: Discussed purpose of EGD/Colonoscopy, asks he will be seated, assured that he would be. Discussed if GVHD of gut would most likely treat with  Steroids. 6/24/20:  Spoke with Nidia Pena regarding need to increase oral intake  To keep creatinine down.           Discharge  Thursday, 6/25        Pending  EGD+Colonoscopy--->6/16
now readmitted with an acute kidney injury. Patient is independent at baseline without DME. He has good family support and is currently on short-term disability from his job. He will likely discharge home with no needs. The Plan for Transition of Care is related to the following treatment goals of Acute kidney injury ([facility] [address] [N17.9]    The Patient and/or patient representative Joel Avalos and his family were provided with a choice of provider and agrees with the discharge plan Yes    Freedom of choice list was provided with basic dialogue that supports the patient's individualized plan of care/goals and shares the quality data associated with the providers.  Not Indicated    Care Transition patient: Anne Marie Haro, [address]   Case Management Department  Ph: [phone]   Fax: [phone]

## 2020-06-25 NOTE — DISCHARGE SUMMARY
organisms are not apparent. C: Sections contain colonic mucosa, one fragment with the indicated  hyperplastic polyp, otherwise without diagnostic alterations. A-D: Except as diagnosed and/or described above, abnormal infiltrates,  significant architectural alterations, viral cytopathy, granulomas,  dysplasia, and malignancy are absent, with CMV immunostain negative on  each.   BRATI/BRATI     PROBLEM LIST:         1. MDS  2. Anxiety and depression  3. HLD  4. H/o shingles (2004, RLQ)  5. Barber Joseph syndrome     Post- Transplant Complications:  1.  Resting tremor   2.  Steroid Induced Hyperglycemia   3.  Hypervolemia   4.  Constipation   5.  Nausea   6.  Headache   7.  Mucositis  8.  Diarrhea / Abdominal Cramping   9.  Neutropenic Fever   10.  HTN  11.  Rectal Pain / Fissure   12.  Urinary Retention   13.  Rash  14.  Mild Malnutrition  15.  Orthostatic Hypotension   16. N/V/D & GRIFFIN (6/2020)  17. Fevers  18. Rash (possible Merrem allergy)     TREATMENT:        1. Decitabine x 3 cycles (2/17/20 - 4/14/20)  2.  MUD Allo-pb BMT (5/11/20)  Preparative Regimen: Targeted Busulfan & Fludarabine; Busulfan dose increased from 252 mg to 315 mg, for doses 3 and 4. Date of BMT: 5/11/20  Source of stem cells: PBSC - 6.7 x10^6 vt94damin/kg  Donor/Recipient Blood Type: A+ / A+  Donor Sex: Male (DID# [id number]), follow VNTR   CMV Donor / Recipient: Positive / Positive       ASSESSMENT AND PLAN:         1. Myelodysplastic Syndrome: Stable disease  - BM Bx/Asp (4/7/20) - ongoing disease  - S/p Bu/Flu & MUD-pb BMT     PLAN:    - Day 30 BMBx (6/10/20): Mildly hypocellular (30%) w/ no evidence of residual increased myeloblasts. FISH & cytogenetics WNL; engraftment pending  - Day 60 (7/10/20)  - Day 100 (8/19/20)     Day + 45     2. ID: Afebrile, no evidence of infection.   Fevers were possibly from [facility] catheter or ossible right sided gram negative PNA (POA) w/ elevated procalcitonin (6/18/20)   - CT chest (6/15/20) - stable 4 mm pulmonary nodules. No discrete nodular opacity of the corresponding with findings from prior chest radiograph. - TLH removed (6/22/20), tip cx (6/22/20) - NGTD  - Cont Biaxin, Diflucan & Valtrex, Dapsone ppx        Donor/Recipient CMV: Pos / Pos  - CMV PCR weekly   - Start letermovir ppx if started on high-dose steroids      CMV:  6/1/20 - Negative  6/8/20 - Negative  6/15/20 - Negative   6/22/20 - Pending     3.  Heme: Cholo Shaneka from chemotherapy    - Transfuse for Hgb < 7 and Platelets < 41L.    - No transfusion today      4.  Metabolic:  Mild GRIFFIN, possibly from decreased oral intake, nausea, vomiting & diarrhea, POA has improved. SCr up slightly, possibly from supra therapeutic Tacro level. Also w/ mild hyperK  - Encourage 3 L/day  - IV Mag 4 gm on 6/27/20     5. Graft versus host disease:  No evidence   - EGD / Colonoscopy ([place], 6/16/20) - gastritis, no obvious colitis. No evidence of GVHD or CMV     Previous Therapy:  - S/p Post-Transplant Cytoxan days + 3 & 4      Current Therapy:   - Cont Tacro 1 mg Qam & 0.5 mg Q pm (decreased 6/22/20)    - tacro level on 6/27/20     Tacro Level:        Lab Results   Component Value Date     TACROLEV 16.9 06/22/2020     TACROLEV 11.7 06/19/2020     TACROLEV 9.4 06/17/2020      6. VOD:  No evidence of VOD. - Cont Actigall.      7. GI / Nutrition:    Severe Malnutrition:  Appetite & intake is improving, but still very diminished    - Cont low microbial diet  Diarrhea:  Resolved, but still loose/soft stools   - Colonoscopy (6/16/20) - No colitis, no GVHD  - C. Diff (6/14/20) - Negative  - S/p Mag Ox   - Cont Imodium if needed  Nausea:  Mild nausea, vomiting has resolved    - EGD (6/16/20) - gastritis, no evidence of GVHD  - Cont PPI x 30 days for gastritis (started 6/19/20)  - Cont Compazine as needed    8. Psych: Anxiety: Increased anxiety over last couple weeks  - Psych following as needed     9.   Derm:  Erythematous pruritic rash on legs and trunk, possibly from

## 2020-06-25 NOTE — PLAN OF CARE
06/25/20  0410   HGB 8.1*     Patient's platelet count this AM:   Recent Labs     06/25/20  0410       Thrombocytopenia not present at this time. Patient showing no signs or symptoms of active bleeding. Transfusion not indicated at this time. Patient verbalizes understanding of all instructions. Will continue to assess and implement POC. Call light within reach and hourly rounding in place. Problem: Nausea/Vomiting:  Goal: Ability to achieve adequate nutritional intake will improve  Description: Ability to achieve adequate nutritional intake will improve  6/25/2020 0457 by Madi Atwood RN  Outcome: Ongoing  Note: Pt denies nausea ,will continue to monitor. Problem: Diarrhea:  Goal: Bowel elimination is within specified parameters  Description: Bowel elimination is within specified parameters  6/25/2020 0457 by Madi Atwood RN  Outcome: Ongoing  Note: Pt didn't have any BM's this shift. Problem: Skin Integrity:  Goal: Absence of new skin breakdown  Description: Absence of new skin breakdown  6/25/2020 0457 by Madi Atwood RN  Outcome: Ongoing  Note: Pt skin remains dry and flaky and continues with scabs around old CVC removal site. Problem: Pain:  Goal: Pain level will decrease  Description: Pain level will decrease  Outcome: Ongoing  Note: Pt denies pain, will continue to monitor.

## 2020-06-26 LAB
CMV DNA QNT PCR: 3.6 LOG CPY/ML
CMV DNA QUANTATATIVE INTERPRETATION: 3.4 LOG IU/ML
CMV DNA QUANTATATIVE INTERPRETATION: DETECTED
CMV DNA QUANTITATIVE: ABNORMAL IU/ML
CMV SOURCE: ABNORMAL
CMVQ COPY/ML: ABNORMAL CPY/ML

## 2020-07-13 LAB
CULTURE, FUNGUS BLOOD: NORMAL
CULTURE, FUNGUS BLOOD: NORMAL
FUNGUS (MYCOLOGY) CULTURE: NORMAL
FUNGUS (MYCOLOGY) CULTURE: NORMAL
FUNGUS STAIN: NORMAL
FUNGUS STAIN: NORMAL

## 2020-07-20 LAB
CULTURE, FUNGUS BLOOD: NORMAL
FUNGUS (MYCOLOGY) CULTURE: NORMAL
FUNGUS STAIN: NORMAL

## 2020-08-09 ENCOUNTER — APPOINTMENT (OUTPATIENT)
Dept: CT IMAGING | Age: 55
End: 2020-08-09
Payer: COMMERCIAL

## 2020-08-09 ENCOUNTER — HOSPITAL ENCOUNTER (EMERGENCY)
Age: 55
Discharge: HOME | End: 2020-08-09
Attending: EMERGENCY MEDICINE
Payer: COMMERCIAL

## 2020-08-09 VITALS
RESPIRATION RATE: 18 BRPM | TEMPERATURE: 97.9 F | SYSTOLIC BLOOD PRESSURE: 144 MMHG | HEIGHT: 70 IN | HEART RATE: 66 BPM | WEIGHT: 160 LBS | BODY MASS INDEX: 22.9 KG/M2 | OXYGEN SATURATION: 95 % | DIASTOLIC BLOOD PRESSURE: 96 MMHG

## 2020-08-09 LAB
ANION GAP SERPL CALCULATED.3IONS-SCNC: 11 MMOL/L (ref 3–16)
ANISOCYTOSIS: ABNORMAL
BASOPHILS ABSOLUTE: 0 K/UL (ref 0–0.2)
BASOPHILS RELATIVE PERCENT: 1 %
BUN BLDV-MCNC: 16 MG/DL (ref 7–20)
CALCIUM SERPL-MCNC: 9.8 MG/DL (ref 8.3–10.6)
CHLORIDE BLD-SCNC: 105 MMOL/L (ref 99–110)
CO2: 23 MMOL/L (ref 21–32)
CREAT SERPL-MCNC: 0.9 MG/DL (ref 0.9–1.3)
EOSINOPHILS ABSOLUTE: 0.1 K/UL (ref 0–0.6)
EOSINOPHILS RELATIVE PERCENT: 4 %
GFR AFRICAN AMERICAN: >60
GFR NON-AFRICAN AMERICAN: >60
GLUCOSE BLD-MCNC: 108 MG/DL (ref 70–99)
HCT VFR BLD CALC: 40.7 % (ref 40.5–52.5)
HEMOGLOBIN: 13.1 G/DL (ref 13.5–17.5)
LYMPHOCYTES ABSOLUTE: 0.6 K/UL (ref 1–5.1)
LYMPHOCYTES RELATIVE PERCENT: 22 %
MCH RBC QN AUTO: 32.5 PG (ref 26–34)
MCHC RBC AUTO-ENTMCNC: 32.2 G/DL (ref 31–36)
MCV RBC AUTO: 100.7 FL (ref 80–100)
MONOCYTES ABSOLUTE: 0.9 K/UL (ref 0–1.3)
MONOCYTES RELATIVE PERCENT: 34 %
NEUTROPHILS ABSOLUTE: 1 K/UL (ref 1.7–7.7)
NEUTROPHILS RELATIVE PERCENT: 39 %
PDW BLD-RTO: 18 % (ref 12.4–15.4)
PLATELET # BLD: 48 K/UL (ref 135–450)
PLATELET SLIDE REVIEW: ABNORMAL
PMV BLD AUTO: 7.5 FL (ref 5–10.5)
POTASSIUM REFLEX MAGNESIUM: 3.9 MMOL/L (ref 3.5–5.1)
RBC # BLD: 4.04 M/UL (ref 4.2–5.9)
SODIUM BLD-SCNC: 139 MMOL/L (ref 136–145)
WBC # BLD: 2.6 K/UL (ref 4–11)

## 2020-08-09 PROCEDURE — 80048 BASIC METABOLIC PNL TOTAL CA: CPT

## 2020-08-09 PROCEDURE — 6360000002 HC RX W HCPCS: Performed by: PHYSICIAN ASSISTANT

## 2020-08-09 PROCEDURE — 70450 CT HEAD/BRAIN W/O DYE: CPT

## 2020-08-09 PROCEDURE — 85025 COMPLETE CBC W/AUTO DIFF WBC: CPT

## 2020-08-09 PROCEDURE — 99283 EMERGENCY DEPT VISIT LOW MDM: CPT

## 2020-08-09 PROCEDURE — 96374 THER/PROPH/DIAG INJ IV PUSH: CPT

## 2020-08-09 PROCEDURE — 96375 TX/PRO/DX INJ NEW DRUG ADDON: CPT

## 2020-08-09 PROCEDURE — 2580000003 HC RX 258: Performed by: PHYSICIAN ASSISTANT

## 2020-08-09 RX ORDER — PROCHLORPERAZINE EDISYLATE 5 MG/ML
10 INJECTION INTRAMUSCULAR; INTRAVENOUS ONCE
Status: COMPLETED | OUTPATIENT
Start: 2020-08-09 | End: 2020-08-09

## 2020-08-09 RX ORDER — KETOROLAC TROMETHAMINE 30 MG/ML
15 INJECTION, SOLUTION INTRAMUSCULAR; INTRAVENOUS ONCE
Status: COMPLETED | OUTPATIENT
Start: 2020-08-09 | End: 2020-08-09

## 2020-08-09 RX ORDER — DIPHENHYDRAMINE HYDROCHLORIDE 50 MG/ML
25 INJECTION INTRAMUSCULAR; INTRAVENOUS ONCE
Status: COMPLETED | OUTPATIENT
Start: 2020-08-09 | End: 2020-08-09

## 2020-08-09 RX ORDER — SODIUM CHLORIDE, SODIUM LACTATE, POTASSIUM CHLORIDE, CALCIUM CHLORIDE 600; 310; 30; 20 MG/100ML; MG/100ML; MG/100ML; MG/100ML
1000 INJECTION, SOLUTION INTRAVENOUS ONCE
Status: COMPLETED | OUTPATIENT
Start: 2020-08-09 | End: 2020-08-09

## 2020-08-09 RX ORDER — AMLODIPINE BESYLATE 2.5 MG/1
2.5 TABLET ORAL DAILY
COMMUNITY

## 2020-08-09 RX ORDER — LORAZEPAM 0.5 MG/1
0.5 TABLET ORAL EVERY 6 HOURS PRN
COMMUNITY

## 2020-08-09 RX ADMIN — KETOROLAC TROMETHAMINE 15 MG: 30 INJECTION, SOLUTION INTRAMUSCULAR at 12:14

## 2020-08-09 RX ADMIN — SODIUM CHLORIDE, POTASSIUM CHLORIDE, SODIUM LACTATE AND CALCIUM CHLORIDE 1000 ML: 600; 310; 30; 20 INJECTION, SOLUTION INTRAVENOUS at 09:59

## 2020-08-09 RX ADMIN — DIPHENHYDRAMINE HYDROCHLORIDE 25 MG: 50 INJECTION, SOLUTION INTRAMUSCULAR; INTRAVENOUS at 09:59

## 2020-08-09 RX ADMIN — PROCHLORPERAZINE EDISYLATE 10 MG: 5 INJECTION INTRAMUSCULAR; INTRAVENOUS at 10:00

## 2020-08-09 ASSESSMENT — ENCOUNTER SYMPTOMS
EYES NEGATIVE: 1
NAUSEA: 0
SHORTNESS OF BREATH: 0
GASTROINTESTINAL NEGATIVE: 1
VOMITING: 0
RESPIRATORY NEGATIVE: 1

## 2020-08-09 ASSESSMENT — PAIN SCALES - GENERAL: PAINLEVEL_OUTOF10: 6

## 2020-08-09 ASSESSMENT — PAIN DESCRIPTION - LOCATION: LOCATION: HEAD

## 2020-08-09 ASSESSMENT — PAIN DESCRIPTION - PAIN TYPE: TYPE: ACUTE PAIN

## 2020-08-09 NOTE — ED PROVIDER NOTES
[address] [facility] [address] ENCOUNTER          PHYSICIAN ASSISTANT NOTE       Date of evaluation: 8/9/2020    Chief Complaint     Headache and Hypertension      History of Present Illness     Guillermo Davidson is a 63 y.o. male who presents with headache and hypertension. Patient reports he has had a waxing and waning right-sided headache for the past 2 weeks. Patient reports he is also had difficulty controlling his blood pressure for the past 2 weeks. Patient reports his normal blood pressure is 110/70, but has been 168/100 regularly. Patient has been in contact with his doctors have been adjusting his blood pressure medicine. Patient does not take anything for his blood pressure today. Patient did take Tylenol for his headache. Patient denies any fevers, vision change, dysarthria, focal weakness. No dizziness. No neck pain or stiffness. Patient did receive a stem cell transplant on May 11, 2020. Wife notes that they called his physician today who told him to come to the ER because his most recent blood work showed he was neutropenic. Review of Systems     Review of Systems   Constitutional: Negative. Negative for fever. Eyes: Negative. Negative for visual disturbance. Respiratory: Negative. Negative for shortness of breath. Cardiovascular: Negative. Negative for chest pain. Gastrointestinal: Negative. Negative for nausea and vomiting. Musculoskeletal: Negative. Negative for neck pain and neck stiffness. Skin: Negative. Neurological: Positive for headaches. Negative for facial asymmetry, speech difficulty and weakness. All other systems reviewed and are negative. Past Medical, Surgical, Family, and Social History     He has a past medical history of Hypertension and MDS (myelodysplastic syndrome) ([facility] [address] He has a past surgical history that includes Upper gastrointestinal endoscopy (N/A, 6/16/2020);  Colonoscopy (N/A, 6/16/2020); and Colonoscopy (6/16/2020). His family history is not on file. He reports that he has quit smoking. His smoking use included cigarettes. He quit after 8.00 years of use. He has never used smokeless tobacco. He reports previous alcohol use. He reports previous drug use. Medications     Previous Medications    ACETAMINOPHEN (TYLENOL) 325 MG TABLET    Take 2 tablets by mouth every 4 hours as needed for Pain (headache)    AMLODIPINE (NORVASC) 2.5 MG TABLET    Take 2.5 mg by mouth daily    CLARITHROMYCIN (BIAXIN) 250 MG TABLET    Take 1 tablet by mouth daily    DAPSONE 100 MG TABLET    Take 1 tablet by mouth daily    HYDROCORTISONE 2.5 % CREAM    Apply topically 2 times daily to hands, heels, low back and buttocks. LORAZEPAM (ATIVAN) 0.5 MG TABLET    Take 0.5 mg by mouth every 6 hours as needed for Anxiety. PANTOPRAZOLE (PROTONIX) 40 MG TABLET    Take 1 tablet by mouth every morning (before breakfast)    SKIN PROTECTANTS, MISC. (HYDROCERIN) CREA CREAM    Apply topically as needed (dry skin)    TACROLIMUS (PROGRAF) 0.5 MG CAPSULE    Take 1 capsule by mouth nightly    TACROLIMUS (PROGRAF) 1 MG CAPSULE    Take 1 capsule by mouth every morning    VALACYCLOVIR (VALTREX) 500 MG TABLET    Take 1 tablet by mouth 2 times daily       Allergies     He is allergic to pcn [penicillins] and meropenem. Physical Exam     INITIAL VITALS: BP: (!) 164/98, Temp: 97.9 °F (36.6 °C), Pulse: 66, Resp: 18, SpO2: 100 %  Physical Exam  Vitals signs and nursing note reviewed. Constitutional:       Appearance: He is not toxic-appearing. HENT:      Head: Normocephalic and atraumatic. Right Ear: Tympanic membrane, ear canal and external ear normal.      Left Ear: Tympanic membrane, ear canal and external ear normal.   Eyes:      Extraocular Movements: Extraocular movements intact. Conjunctiva/sclera: Conjunctivae normal.      Pupils: Pupils are equal, round, and reactive to light.       Comments: No nystagmus   Neck:      Musculoskeletal: Normal range of motion and neck supple. No neck rigidity or muscular tenderness. Cardiovascular:      Rate and Rhythm: Normal rate and regular rhythm. Pulmonary:      Effort: Pulmonary effort is normal.      Breath sounds: Normal breath sounds. Skin:     General: Skin is warm and dry. Neurological:      General: No focal deficit present. Mental Status: He is alert and oriented to person, place, and time. Mental status is at baseline. Comments: No facial droop or dysarthria. Patient with a tremor to the right upper and lower extremity, which is not new. No focal deficits. Psychiatric:         Mood and Affect: Mood normal.         Behavior: Behavior normal.         Thought Content: Thought content normal.         Judgment: Judgment normal.           RADIOLOGY:  CT Head WO Contrast   Final Result   1.  Normal brain          LABS:   Results for orders placed or performed during the hospital encounter of 08/09/20   CBC Auto Differential   Result Value Ref Range    WBC 2.6 (L) 4.0 - 11.0 K/uL    RBC 4.04 (L) 4.20 - 5.90 M/uL    Hemoglobin 13.1 (L) 13.5 - 17.5 g/dL    Hematocrit 40.7 40.5 - 52.5 %    .7 (H) 80.0 - 100.0 fL    MCH 32.5 26.0 - 34.0 pg    MCHC 32.2 31.0 - 36.0 g/dL    RDW 18.0 (H) 12.4 - 15.4 %    Platelets 48 (L) 243 - 450 K/uL    MPV 7.5 5.0 - 10.5 fL    PLATELET SLIDE REVIEW Decreased     Neutrophils % 39.0 %    Lymphocytes % 22.0 %    Monocytes % 34.0 %    Eosinophils % 4.0 %    Basophils % 1.0 %    Neutrophils Absolute 1.0 (L) 1.7 - 7.7 K/uL    Lymphocytes Absolute 0.6 (L) 1.0 - 5.1 K/uL    Monocytes Absolute 0.9 0.0 - 1.3 K/uL    Eosinophils Absolute 0.1 0.0 - 0.6 K/uL    Basophils Absolute 0.0 0.0 - 0.2 K/uL    Anisocytosis Occasional (A)    Basic Metabolic Panel w/ Reflex to MG   Result Value Ref Range    Sodium 139 136 - 145 mmol/L    Potassium reflex Magnesium 3.9 3.5 - 5.1 mmol/L    Chloride 105 99 - 110 mmol/L    CO2 23 21 - 32 mmol/L    Anion Gap 11 3 - 16    Glucose 108 (H) 70 - 99 mg/dL    BUN 16 7 - 20 mg/dL    CREATININE 0.9 0.9 - 1.3 mg/dL    GFR Non-African American >60 >60    GFR African American >60 >60    Calcium 9.8 8.3 - 10.6 mg/dL       ED BEDSIDE ULTRASOUND:      RECENT VITALS:  BP: (!) 144/96, Temp: 97.9 °F (36.6 °C), Pulse: 66, Resp: 18, SpO2: 95 %     Procedures         ED Course     Nursing Notes, Past Medical Hx,Past Surgical Hx, Social Hx, Allergies, and Family Hx were reviewed. The patient was given the following medications:  Orders Placed This Encounter   Medications    prochlorperazine (COMPAZINE) injection 10 mg    diphenhydrAMINE (BENADRYL) injection 25 mg    lactated ringers infusion 1,000 mL    ketorolac (TORADOL) injection 15 mg       CONSULTS:  IP CONSULT TO Casey / NOE / Artie Ours is a 63 y.o. male with hypertension and headache. Patient is uncomfortable but in no acute distress. Patient is alert and oriented with a nonfocal neurologic exam.  No meningismus. Patient is hypertensive to 164/98. Patient given Compazine, Benadryl, fluids for his headache. Patient is leukopenic without neutropenia. Normal renal function. CT head shows no acute process. Patient with continued pain. Patient given Toradol. Patient with significant improvement in headache and improvement of blood pressure to 140s/90s. I spoke with Dr. Oscar Brennan for [facility] who agrees with plant for outpatient management, does not recommend at changes in medications at this time. Patient to follow-up with Oncology tomorrow. Patient agreeable with plan. Return precautions discussed. This patient was also evaluated by the attending physician. All care plans were discussed and agreed upon. Clinical Impression     1. Acute nonintractable headache, unspecified headache type    2.  Hypertension, unspecified type        Disposition     PATIENT REFERRED TO:  Rafy Mcconnell MD  [facility] [address] [place], [address]

## 2020-08-10 ENCOUNTER — HOSPITAL ENCOUNTER (OUTPATIENT)
Dept: MRI IMAGING | Age: 55
Discharge: HOME | End: 2020-08-10
Payer: COMMERCIAL

## 2020-08-10 PROCEDURE — A9579 GAD-BASE MR CONTRAST NOS,1ML: HCPCS | Performed by: INTERNAL MEDICINE

## 2020-08-10 PROCEDURE — 6360000004 HC RX CONTRAST MEDICATION: Performed by: INTERNAL MEDICINE

## 2020-08-10 PROCEDURE — 70553 MRI BRAIN STEM W/O & W/DYE: CPT

## 2020-08-10 RX ADMIN — GADOTERIDOL 15 ML: 279.3 INJECTION, SOLUTION INTRAVENOUS at 14:50

## 2020-08-11 ENCOUNTER — HOSPITAL ENCOUNTER (OUTPATIENT)
Dept: ONCOLOGY | Age: 55
Setting detail: INFUSION SERIES
Discharge: HOME | End: 2020-08-11
Payer: COMMERCIAL

## 2020-08-11 VITALS
HEART RATE: 78 BPM | DIASTOLIC BLOOD PRESSURE: 79 MMHG | SYSTOLIC BLOOD PRESSURE: 119 MMHG | TEMPERATURE: 98.6 F | RESPIRATION RATE: 18 BRPM

## 2020-08-11 LAB
BLOOD BANK DISPENSE STATUS: NORMAL
BLOOD BANK PRODUCT CODE: NORMAL
BPU ID: NORMAL
DESCRIPTION BLOOD BANK: NORMAL

## 2020-08-11 PROCEDURE — P9037 PLATE PHERES LEUKOREDU IRRAD: HCPCS

## 2020-08-11 PROCEDURE — 36430 TRANSFUSION BLD/BLD COMPNT: CPT

## 2020-08-11 RX ORDER — HEPARIN SODIUM (PORCINE) LOCK FLUSH IV SOLN 100 UNIT/ML 100 UNIT/ML
500 SOLUTION INTRAVENOUS PRN
Status: DISCONTINUED | OUTPATIENT
Start: 2020-08-11 | End: 2020-08-12 | Stop reason: HOSPADM

## 2020-08-11 RX ORDER — FLUCONAZOLE 200 MG/1
200 TABLET ORAL 2 TIMES DAILY
COMMUNITY

## 2020-08-11 RX ORDER — 0.9 % SODIUM CHLORIDE 0.9 %
20 INTRAVENOUS SOLUTION INTRAVENOUS ONCE
Status: DISCONTINUED | OUTPATIENT
Start: 2020-08-11 | End: 2020-08-12 | Stop reason: HOSPADM

## 2020-08-11 RX ORDER — CALCIUM CARBONATE 300MG(750)
TABLET,CHEWABLE ORAL 3 TIMES DAILY
COMMUNITY

## 2020-08-11 NOTE — PLAN OF CARE
Problem: KNOWLEDGE DEFICIT  Goal: Patient/S.O. demonstrates understanding of disease process, treatment plan, medications, and discharge instructions. Outcome: Met This Shift   Pt seen and assessed at [address] today for 1 unit of platelets infusion per orders from Dr. Pauly Cabello because patient is getting an LP today in [facility]. Infused per [facility] policy. Monitoring completed for infusion reactions - see flowsheet. Pt tolerated infusion well and without incident. Pt verbalizes understanding of discharge instructions. Discharged ambulatory with wife. Problem: Falls - Risk of:  Goal: Will remain free from falls  Description: Will remain free from falls  Outcome: Met This Shift   Pt is a low fall risk. Explained fall risk precautions to pt & wife and rationale behind their use to keep the patient safe. Belongings are in reach. Pt encouraged to notify staff for any and all assistance. Staff present in tx suite throughout entirety of pts treatment to monitor and protect from falls. Assistance provided when ambulating to restroom utilizing Stay With Me.

## 2020-10-07 NOTE — PLAN OF CARE
thromboembolism  5/22/2020 1419 by Radha Meade RN  Outcome: Ongoing   Adherent with DVT Prevention: Pt is at risk for DVT d/t decreased mobility and cancer treatment. Pt educated on importance of activity. Pt has orders for Subcut prophylactic lovenox - not being given at this time while pt has low platelet count less than 100 - see results. Pt verbalizes understanding of need for prophylaxis while inpatient. Problem: Bleeding:  Goal: Will show no signs and symptoms of excessive bleeding  Description: Will show no signs and symptoms of excessive bleeding  5/22/2020 1419 by Radha Meade RN  Outcome: Ongoing   Patient's hemoglobin this AM:   Recent Labs     05/22/20  0335   HGB 7.5*     Patient's platelet count this AM:   Recent Labs     05/22/20  0335   PLT 16*    Thrombocytopenia Precautions in place. Patient showing no signs or symptoms of active bleeding. Transfusion not indicated at this time. Patient verbalizes understanding of all instructions. Will continue to assess and implement POC. Call light within reach and hourly rounding in place. Problem: Anxiety:  Goal: Level of anxiety will decrease  Description: Level of anxiety will decrease  5/22/2020 1419 by Radha Meade RN  Outcome: Ongoing   Pt. Has been calm/cooperative/hopeful throughout shift and states \"I can just get over these hurdles and we'll be good from there. \"; pt denies spiritual or emotional needs at this time; pt does not state or express symptoms of anxiety at this time; pt verbalizes understanding that he can report symptoms of anxiety to RN staff; call light/table in reach. Will continue to monitor.      Problem: Nausea/Vomiting:  Goal: Absence of nausea/vomiting  Description: Absence of nausea/vomiting  5/22/2020 1419 by Radha Meade RN  Outcome: Ongoing   Pt has denied N/V throughout shift; pt verbalizes understanding that he is to report s/s of nausea to RN staff for antiemetics; pt has been tolerating diet throughout shift and been finishing meals - see flowsheets; call light/table in reach. Will continue to monitor. Problem: PROTECTIVE PRECAUTIONS  Goal: Patient will remain free of nosocomial Infections  5/22/2020 1419 by Damien Ferrer RN  Outcome: Ongoing   Pt. Demonstrates correct handwashing and hand hygiene and is following bathing protocol this shift; RN staff and all staff entering pt's room using proper PPE and hand hygiene when touching patient or caring for patient. Will continue to monitor. Problem: Discharge Planning:  Goal: Discharged to appropriate level of care  Description: Discharged to appropriate level of care  5/22/2020 1419 by Damien Ferrer RN  Outcome: Ongoing   Pt. Verbalizes understanding of plan of care at this time and is actively involved in plan of care and demonstrates understanding of role in care/recovery; pt denies further questions a this time. Will continue to monitor. Problem: Nutrition Deficit:  Goal: Ability to achieve adequate nutritional intake will improve  Description: Ability to achieve adequate nutritional intake will improve  Outcome: Ongoing     Problem: Skin Integrity:  Goal: Absence of new skin breakdown  Description: Absence of new skin breakdown  Outcome: Ongoing   Pt does not show any new signs of skin breakdown at this time; Fidel scale score greater than 18; will continue to monitor. Problem: Diarrhea:  Goal: Bowel elimination is within specified parameters  Description: Bowel elimination is within specified parameters  Outcome: Ongoing   Pt. Verbalizes understanding that he is to report any watery, loose stools to RN staff and that PRN immodium is ordered for loose Bms; PRN immodium given x1 so far this shift; call light/table in reach. Will continue to monitor.      Problem: Diarrhea:  Goal: Passage of soft, formed stool  Description: Passage of soft, formed stool  Outcome: Ongoing     Problem: Diarrhea:  Goal: Establishment of normal bowel no

## 2020-11-13 ENCOUNTER — HOSPITAL ENCOUNTER (OUTPATIENT)
Dept: GENERAL RADIOLOGY | Age: 55
Discharge: HOME | End: 2020-11-13
Payer: COMMERCIAL

## 2020-11-13 ENCOUNTER — HOSPITAL ENCOUNTER (OUTPATIENT)
Age: 55
Discharge: HOME | End: 2020-11-13
Payer: COMMERCIAL

## 2020-11-13 PROCEDURE — 71046 X-RAY EXAM CHEST 2 VIEWS: CPT

## 2021-01-15 ENCOUNTER — HOSPITAL ENCOUNTER (OUTPATIENT)
Dept: ONCOLOGY | Age: 56
Setting detail: INFUSION SERIES
Discharge: HOME | End: 2021-01-15
Payer: COMMERCIAL

## 2021-01-15 ENCOUNTER — HOSPITAL ENCOUNTER (OUTPATIENT)
Dept: GENERAL RADIOLOGY | Age: 56
Discharge: HOME | End: 2021-01-15
Payer: COMMERCIAL

## 2021-01-15 DIAGNOSIS — D46.9 MYELODYSPLASTIC SYNDROME (HCC): ICD-10-CM

## 2021-01-15 LAB
BASE EXCESS ARTERIAL: -3 MMOL/L (ref -3–3)
CARBOXYHEMOGLOBIN ARTERIAL: 0.2 % (ref 0–1.5)
HCO3 ARTERIAL: 21 MMOL/L (ref 21–29)
HEMOGLOBIN, ART, EXTENDED: 11 G/DL
METHEMOGLOBIN ARTERIAL: 2.7 % (ref 0–1.4)
O2 SAT, ARTERIAL: 96 % (ref 93–100)
PCO2 ARTERIAL: 31 MMHG (ref 35–45)
PH ARTERIAL: 7.43 (ref 7.35–7.45)
PO2 ARTERIAL: 87 MMHG (ref 75–108)
TCO2 ARTERIAL: 31 MMOL/L

## 2021-01-15 PROCEDURE — 36600 WITHDRAWAL OF ARTERIAL BLOOD: CPT

## 2021-01-15 PROCEDURE — 71046 X-RAY EXAM CHEST 2 VIEWS: CPT

## 2021-01-15 PROCEDURE — 82803 BLOOD GASES ANY COMBINATION: CPT

## 2021-01-15 NOTE — ONCOLOGY
Pt here for blood gasses to be drawn. Houston Leonard from RT here to draw. Pt tolerated well.  Discharged home

## 2021-05-05 ENCOUNTER — HOSPITAL ENCOUNTER (OUTPATIENT)
Dept: INTERVENTIONAL RADIOLOGY/VASCULAR | Age: 56
Discharge: HOME | End: 2021-05-05
Payer: COMMERCIAL

## 2021-05-05 VITALS — WEIGHT: 170 LBS | BODY MASS INDEX: 24.34 KG/M2 | HEIGHT: 70 IN | TEMPERATURE: 98.3 F

## 2021-05-05 DIAGNOSIS — D63.0 ANEMIA ASSOC WITH MYELODYSPLASTIC SYNDROME TREATED WITH ERYTHROPOIETIN (HCC): ICD-10-CM

## 2021-05-05 DIAGNOSIS — D46.9 ANEMIA ASSOC WITH MYELODYSPLASTIC SYNDROME TREATED WITH ERYTHROPOIETIN (HCC): ICD-10-CM

## 2021-05-05 LAB — INR BLD: 1 (ref 0.86–1.14)

## 2021-05-05 PROCEDURE — 99152 MOD SED SAME PHYS/QHP 5/>YRS: CPT | Performed by: RADIOLOGY

## 2021-05-05 PROCEDURE — 6360000002 HC RX W HCPCS

## 2021-05-05 PROCEDURE — 85610 PROTHROMBIN TIME: CPT

## 2021-05-05 PROCEDURE — 36590 REMOVAL TUNNELED CV CATH: CPT | Performed by: RADIOLOGY

## 2021-05-05 PROCEDURE — 2500000003 HC RX 250 WO HCPCS

## 2021-05-05 NOTE — PROCEDURES
IR Brief Postoperative Note    Bibi Osorio  YOB: 1965  [id number]    Pre-operative Diagnosis: cancer    Post-operative Diagnosis: Same    Procedure: port removal    Anesthesia: moderate    Surgeons/Assistants: ольга    Estimated Blood Loss: Minimal    Complications: none    Specimens: were not obtained    See full procedure dictation to follow      Patton Boeck MD  5/5/2021

## 2021-05-19 ENCOUNTER — HOSPITAL ENCOUNTER (OUTPATIENT)
Dept: GENERAL RADIOLOGY | Age: 56
Discharge: HOME | End: 2021-05-19
Payer: COMMERCIAL

## 2021-05-19 DIAGNOSIS — Z48.290 ENCOUNTER FOR AFTERCARE FOLLOWING BONE MARROW TRANSPLANT (HCC): ICD-10-CM

## 2021-05-19 PROCEDURE — 77080 DXA BONE DENSITY AXIAL: CPT

## 2021-06-02 ENCOUNTER — HOSPITAL ENCOUNTER (OUTPATIENT)
Dept: PULMONOLOGY | Age: 56
Discharge: HOME | End: 2021-06-02
Payer: COMMERCIAL

## 2021-06-02 VITALS — OXYGEN SATURATION: 98 %

## 2021-06-02 PROCEDURE — 94664 DEMO&/EVAL PT USE INHALER: CPT

## 2021-06-02 PROCEDURE — 94760 N-INVAS EAR/PLS OXIMETRY 1: CPT

## 2021-06-02 PROCEDURE — 94729 DIFFUSING CAPACITY: CPT

## 2021-06-02 PROCEDURE — 94726 PLETHYSMOGRAPHY LUNG VOLUMES: CPT

## 2021-06-02 PROCEDURE — 94010 BREATHING CAPACITY TEST: CPT

## 2021-06-02 RX ORDER — ALBUTEROL SULFATE 2.5 MG/3ML
2.5 SOLUTION RESPIRATORY (INHALATION) ONCE
Status: DISCONTINUED | OUTPATIENT
Start: 2021-06-02 | End: 2021-06-03 | Stop reason: HOSPADM

## 2021-06-04 NOTE — PROCEDURES
[address]               [address] [place], [address]                               PULMONARY FUNCTION    PATIENT NAME: Alex Palmer                 :        1965  MED REC NO:   [id number]                          ROOM:  ACCOUNT NO:   [de-identified]                           ADMIT DATE: 2021  PROVIDER:     Carlos Jimenez MD    DATE OF PROCEDURE:  2021    FINDINGS:  Forced vital capacity mildly reduced, expiratory flow rates  and FEV1 to FVC ratio are normal.  Lung volume determinations by  plethysmography show low normal total lung capacity, mildly reduced  functional residual capacity, normal RV. Single-breath diffusion  capacity mildly reduced, when compared to alveolar volume. IMPRESSION:  Mild restrictive ventilatory defect. Findings consistent  with neuromuscular, chest wall, pleural, or parenchymal disorder. In  comparison to previous study of 2020, there is no significant  change.         May Condon MD    D: 2021 20:47:23       T: 2021 20:55:21     [email or web address]  Job#: [id number]     Doc#: [id number]    CC:

## 2021-06-25 ENCOUNTER — HOSPITAL ENCOUNTER (OUTPATIENT)
Dept: ONCOLOGY | Age: 56
Setting detail: INFUSION SERIES
End: 2021-06-25
Payer: COMMERCIAL

## 2021-06-26 ENCOUNTER — HOSPITAL ENCOUNTER (OUTPATIENT)
Dept: ONCOLOGY | Age: 56
Setting detail: INFUSION SERIES
Discharge: HOME | End: 2021-06-26
Payer: COMMERCIAL

## 2021-06-26 VITALS
WEIGHT: 162.48 LBS | OXYGEN SATURATION: 99 % | DIASTOLIC BLOOD PRESSURE: 75 MMHG | HEART RATE: 76 BPM | RESPIRATION RATE: 20 BRPM | HEIGHT: 71 IN | BODY MASS INDEX: 22.75 KG/M2 | TEMPERATURE: 98.3 F | SYSTOLIC BLOOD PRESSURE: 113 MMHG

## 2021-06-26 LAB
CORTISOL 30 MIN: 13.5 UG/DL
CORTISOL 60 MIN: 17.1 UG/DL
CORTISOL BASE: 6.4 UG/DL

## 2021-06-26 PROCEDURE — 80400 ACTH STIMULATION PANEL: CPT

## 2021-06-26 PROCEDURE — 6360000002 HC RX W HCPCS: Performed by: INTERNAL MEDICINE

## 2021-06-26 RX ORDER — COSYNTROPIN 0.25 MG/ML
0.25 INJECTION, POWDER, FOR SOLUTION INTRAMUSCULAR; INTRAVENOUS ONCE
Status: COMPLETED | OUTPATIENT
Start: 2021-06-26 | End: 2021-06-26

## 2021-06-26 RX ADMIN — COSYNTROPIN 0.25 MG: 0.25 INJECTION, POWDER, LYOPHILIZED, FOR SOLUTION INTRAMUSCULAR; INTRAVENOUS at 09:15

## 2021-06-26 NOTE — PROGRESS NOTES
Pt arrived with Wife to have acth stimulation test completed. 20 gauge IV was placed in right hand. Labs drawn and sent. Pt denied additional questions or concerns. Discharged ambulatory to home.

## 2021-08-05 ENCOUNTER — HOSPITAL ENCOUNTER (OUTPATIENT)
Dept: MRI IMAGING | Age: 56
Discharge: HOME | End: 2021-08-05
Payer: COMMERCIAL

## 2021-08-05 DIAGNOSIS — D46.9 MYELODYSPLASTIC SYNDROME (HCC): ICD-10-CM

## 2021-08-05 PROCEDURE — 73721 MRI JNT OF LWR EXTRE W/O DYE: CPT

## 2024-08-22 NOTE — PROCEDURES
IR Brief Postoperative Note    Law Antoine  YOB: 1965  [id number]    Pre-operative Diagnosis: myelodysplastic syndrome    Post-operative Diagnosis: Same    Procedure: left IJ laila benz for use    Anesthesia: moderate    Surgeons/Assistants: ольга    Estimated Blood Loss: Minimal    Complications: none    Specimens: were not obtained    See full procedure dictation to follow      Pablo Avila MD  5/5/2020 [Family History Reviewed and Updated] : family history reviewed and updated

## (undated) DEVICE — FORCEPS BX L240CM JAW DIA2.4MM ORNG L CAP W/ NDL DISP RAD